# Patient Record
Sex: FEMALE | Race: BLACK OR AFRICAN AMERICAN | Employment: UNEMPLOYED | URBAN - METROPOLITAN AREA
[De-identification: names, ages, dates, MRNs, and addresses within clinical notes are randomized per-mention and may not be internally consistent; named-entity substitution may affect disease eponyms.]

---

## 2017-01-23 ENCOUNTER — TELEPHONE (OUTPATIENT)
Dept: FAMILY MEDICINE CLINIC | Age: 57
End: 2017-01-23

## 2017-01-23 ENCOUNTER — OFFICE VISIT (OUTPATIENT)
Dept: FAMILY MEDICINE CLINIC | Age: 57
End: 2017-01-23

## 2017-01-23 VITALS
SYSTOLIC BLOOD PRESSURE: 136 MMHG | HEIGHT: 66 IN | BODY MASS INDEX: 31.92 KG/M2 | RESPIRATION RATE: 16 BRPM | WEIGHT: 198.6 LBS | DIASTOLIC BLOOD PRESSURE: 83 MMHG | TEMPERATURE: 97.9 F | OXYGEN SATURATION: 96 % | HEART RATE: 77 BPM

## 2017-01-23 DIAGNOSIS — I10 ESSENTIAL HYPERTENSION: ICD-10-CM

## 2017-01-23 DIAGNOSIS — Z91.199 PATIENT NON ADHERENCE: ICD-10-CM

## 2017-01-23 DIAGNOSIS — R35.89 POLYURIA: ICD-10-CM

## 2017-01-23 DIAGNOSIS — E78.1 HYPERTRIGLYCERIDEMIA: ICD-10-CM

## 2017-01-23 DIAGNOSIS — E78.00 HYPERCHOLESTEREMIA: ICD-10-CM

## 2017-01-23 DIAGNOSIS — M48.061 SPINAL STENOSIS OF LUMBAR REGION: Primary | ICD-10-CM

## 2017-01-23 RX ORDER — HYDROCODONE BITARTRATE AND ACETAMINOPHEN 5; 325 MG/1; MG/1
1 TABLET ORAL
Qty: 15 TAB | Refills: 1 | Status: SHIPPED | OUTPATIENT
Start: 2017-01-23 | End: 2017-03-24 | Stop reason: ALTCHOICE

## 2017-01-23 RX ORDER — NAPROXEN 500 MG/1
500 TABLET ORAL 2 TIMES DAILY WITH MEALS
Qty: 60 TAB | Refills: 1 | Status: SHIPPED | OUTPATIENT
Start: 2017-01-23 | End: 2017-06-20

## 2017-01-23 NOTE — PROGRESS NOTES
Emigdio Torres is a 64 y.o. female, her grandson is with her today. Low back pain with sciatica on left. MRI was rescheduled, I advice not to miss it any more, next one scheduled in Feb 2017. Chronic low back pain no changes. Ibuprofen not enough for the pain. Sometimes she needs extra especially with taking care of her grand children. She have been on it in the past and it doesn't cause her to be drowsy. Hypertriglyceridemia: Trigly 600. She is on lopid 600mg BID, pravastatin to 20mg. No side effect. Haven't done labs I've given her for about 6-9 months now. DM2: worsen A1C.  10.9. She is seeing Endocrine. Diet is her main issue. She haven't done any labs for a while. Did not bring her glucometer and doesn't record her BG. Nephrotic syndrome secondary to her uncontrolled DM2. Polyuria slowly more frequent, denies foul odor, dysuria, pelvic discomfort, flank pain, fever or chills. Likely her DM2 uncontrolled. Reviewed: active problem list, medication list, allergies, notes from last encounter, lab results    Pertinent items are noted in HPI. No Known Allergies  Current Outpatient Prescriptions on File Prior to Visit   Medication Sig Dispense Refill    insulin lispro (HUMALOG) 100 unit/mL injection 36 Units by SubCUTAneous route three (3) times daily.  gabapentin (NEURONTIN) 300 mg capsule 3 pills twice in the day and 2 tab at night. 150 Cap 3    fish oil-omega-3 fatty acids 300-1,000 mg cap TAKE ONE CAPSULE BY MOUTH TWICE A  Cap 3    insulin detemir (LEVEMIR FLEXTOUCH) 100 unit/mL (3 mL) inpn 50 Units by SubCUTAneous route two (2) times a day.  pravastatin (PRAVACHOL) 20 mg tablet Take 1 Tab by mouth nightly. Indications: HYPERCHOLESTEROLEMIA 90 Tab 1    gemfibrozil (LOPID) 600 mg tablet Take 1 Tab by mouth two (2) times a day. 60 Tab 2    omega-3 fatty acids-vitamin e (FISH OIL) 1,000 mg cap Take 1 Cap by mouth two (2) times a day.  60 Cap 2    metFORMIN ER (GLUCOPHAGE XR) 750 mg tablet TAKE 1 TABLET BY MOUTH TWICE A  Tab 1    carvedilol (COREG) 25 mg tablet TAKE 1 TABLET BY MOUTH TWICE A DAY FOR 30 DAYS **PLEASE MAKE FOLLOW UP APPOINTMENT** 180 Tab 1    irbesartan (AVAPRO) 300 mg tablet TAKE 1 TABLET BY MOUTH EVERY DAY 90 Tab 1    spironolactone (ALDACTONE) 25 mg tablet TAKE 1 TABLET BY MOUTH EVERY DAY 90 Tab 1    aspirin delayed-release 81 mg tablet Take 81 mg by mouth daily. No current facility-administered medications on file prior to visit. Patient Active Problem List   Diagnosis Code    HTN (hypertension) I10    Type II diabetes mellitus, uncontrolled ([facility] E11.65    Hypercholesteremia E78.00    Chronic lower back pain M54.5, G89.29    Sickle cell trait (HCC) D57.3    Essential hypertension with goal blood pressure less than 140/90 I10    Chronic bilateral low back pain without sciatica M54.5, G89.29    Encounter for long-term (current) use of medications Z79.899    Breast cancer screening Z12.39    Type 2 diabetes, uncontrolled, with neuropathy ([facility] E11.40, E11.65    Patient non adherence Z91.19       Visit Vitals    /83    Pulse 77    Temp 97.9 °F (36.6 °C) (Oral)    Resp 16    Ht 5' 6\" (1.676 m)    Wt 198 lb 9.6 oz (90.1 kg)    SpO2 96%    BMI 32.05 kg/m2     General:  alert, cooperative, no distress, appears stated age, mildly obese   Head:  NCAT w/o lesions or tenderness. Sclera white, eyes quiet    Lungs: clear to auscultation bilaterally   Heart:  regular rate and rhythm, S1, S2 normal, no murmur, click, rub or gallop   Abdomen: soft, non-tender   Neuro: normal without focal findings  mental status, speech normal, alert and oriented x iii  JENN  cranial nerves 2-12 intact   Negative straight leg raise. Normal strength bilat lower legs, normal sensate exam.        Assessment/Plans:    Rupesh Aponte was seen today for back pain.     Diagnoses and all orders for this visit:    Spinal stenosis of lumbar region - 2 prescriptions given. -     HYDROcodone-acetaminophen (NORCO) 5-325 mg per tablet; Take 1 Tab by mouth daily as needed for Pain.  -     naproxen (NAPROSYN) 500 mg tablet; Take 1 Tab by mouth two (2) times daily (with meals). Don't take more than 4 days in a row. Essential hypertension  -     METABOLIC PANEL, COMPREHENSIVE    Hypercholesteremia  -     METABOLIC PANEL, COMPREHENSIVE  -     NMR LIPOPROFILE    Hypertriglyceridemia  -     METABOLIC PANEL, COMPREHENSIVE  -     NMR LIPOPROFILE    Type 2 diabetes, uncontrolled, with neuropathy (HCC)  -     HEMOGLOBIN A1C W/O EAG  -     METABOLIC PANEL, COMPREHENSIVE    Polyuria  -     URINALYSIS W/ RFLX MICROSCOPIC    Patient non adherence      Discussed plans, risk/benefits of treatments/observations. Through the use of shared decision making, above plans were agreed upon. Medication compliance advised. Patient verbalized understanding. Follow-up Disposition:  Return for DM2, XOL, Labs.       Marissa Ackerman MD  1/23/2017

## 2017-01-23 NOTE — MR AVS SNAPSHOT
Visit Information Date & Time Provider Department Dept. Phone Encounter #  
 1/23/2017 10:45 AM Halley Álvarez MD [facility] at [address] [id number] Follow-up Instructions Return for DM2, XOL, Labs. Upcoming Health Maintenance Date Due Hepatitis C Screening 1960 FOOT EXAM Q1 6/4/1970 Pneumococcal 19-64 Highest Risk (1 of 3 - PCV13) 6/4/1979 DTaP/Tdap/Td series (1 - Tdap) 6/4/1981 PAP AKA CERVICAL CYTOLOGY 6/27/2006 FOBT Q 1 YEAR AGE 50-75 6/4/2010 INFLUENZA AGE 9 TO ADULT 8/1/2016 HEMOGLOBIN A1C Q6M 12/28/2016 EYE EXAM RETINAL OR DILATED Q1 3/11/2017 MICROALBUMIN Q1 6/28/2017 LIPID PANEL Q1 6/28/2017 BREAST CANCER SCRN MAMMOGRAM 8/12/2018 Allergies as of 1/23/2017  Review Complete On: 11/28/2016 By: Aubrey Victoria RN No Known Allergies Current Immunizations  Reviewed on 9/24/2015 Name Date Influenza Vaccine Simran Tellez) 10/8/2015 Not reviewed this visit You Were Diagnosed With   
  
 Codes Comments Spinal stenosis of lumbar region    -  Primary ICD-10-CM: M48.06 
ICD-9-CM: 724.02 Essential hypertension     ICD-10-CM: I10 
ICD-9-CM: 401.9 Hypercholesteremia     ICD-10-CM: E78.00 ICD-9-CM: 272.0 Hypertriglyceridemia     ICD-10-CM: E78.1 ICD-9-CM: 272.1 Type 2 diabetes, uncontrolled, with neuropathy (HCC)     ICD-10-CM: E11.40, E11.65 ICD-9-CM: 250.62, 357.2 Polyuria     ICD-10-CM: R35.8 ICD-9-CM: 788.42 Vitals BP Pulse Temp Resp Height(growth percentile) Weight(growth percentile) 136/83 77 97.9 °F (36.6 °C) (Oral) 16 5' 6\" (1.676 m) 198 lb 9.6 oz (90.1 kg) SpO2 BMI OB Status Smoking Status 96% 32.05 kg/m2 Hysterectomy Former Smoker Vitals History BMI and BSA Data Body Mass Index Body Surface Area 32.05 kg/m 2 2.05 m 2 Preferred Pharmacy Pharmacy Name Phone  [facility] [address] [facility] [place] [phone] Your Updated Medication List  
  
   
This list is accurate as of: 1/23/17 11:13 AM.  Always use your most recent med list.  
  
  
  
  
 aspirin delayed-release 81 mg tablet Take 81 mg by mouth daily. carvedilol 25 mg tablet Commonly known as:  COREG  
TAKE 1 TABLET BY MOUTH TWICE A DAY FOR 30 DAYS **PLEASE MAKE FOLLOW UP APPOINTMENT**  
  
 fish oil-omega-3 fatty acids 300-1,000 mg Cap TAKE ONE CAPSULE BY MOUTH TWICE A DAY  
  
 gabapentin 300 mg capsule Commonly known as:  NEURONTIN  
3 pills twice in the day and 2 tab at night. gemfibrozil 600 mg tablet Commonly known as:  LOPID Take 1 Tab by mouth two (2) times a day. HumaLOG 100 unit/mL injection Generic drug:  insulin lispro 36 Units by SubCUTAneous route three (3) times daily. HYDROcodone-acetaminophen 5-325 mg per tablet Commonly known as:  Julaine Kava Take 1 Tab by mouth daily as needed for Pain. insulin detemir 100 unit/mL (3 mL) Inpn Commonly known as:  LEVEMIR FLEXTOUCH  
50 Units by SubCUTAneous route two (2) times a day. irbesartan 300 mg tablet Commonly known as:  AVAPRO TAKE 1 TABLET BY MOUTH EVERY DAY  
  
 metFORMIN  mg tablet Commonly known as:  GLUCOPHAGE XR  
TAKE 1 TABLET BY MOUTH TWICE A DAY  
  
 omega-3 fatty acids-vitamin e 1,000 mg Cap Commonly known as:  FISH OIL Take 1 Cap by mouth two (2) times a day. pravastatin 20 mg tablet Commonly known as:  PRAVACHOL Take 1 Tab by mouth nightly. Indications: HYPERCHOLESTEROLEMIA  
  
 spironolactone 25 mg tablet Commonly known as:  ALDACTONE  
TAKE 1 TABLET BY MOUTH EVERY DAY Prescriptions Printed Refills HYDROcodone-acetaminophen (NORCO) 5-325 mg per tablet 1 Sig: Take 1 Tab by mouth daily as needed for Pain. Class: Print Route: Oral  
  
We Performed the Following HEMOGLOBIN A1C W/O EAG [78433 CPT(R)] METABOLIC PANEL, COMPREHENSIVE [06071 CPT(R)] NMR LIPOPROFILE [id number] CPT(R)] URINALYSIS W/ RFLX MICROSCOPIC [33048 CPT(R)] Follow-up Instructions Return for DM2, XOL, Labs. Introducing [facility] & [facility] SERVICES! Dear Josey oYung: 
Thank you for requesting a Snaps account. Our records indicate that you already have an active Snaps account. You can access your account anytime at [email or web address] Did you know that you can access your hospital and ER discharge instructions at any time in Snaps? You can also review all of your test results from your hospital stay or ER visit. Additional Information If you have questions, please visit the Frequently Asked Questions section of the Snaps website at [email or web address] Remember, Snaps is NOT to be used for urgent needs. For medical emergencies, dial 911. Now available from your iPhone and Android! Please provide this summary of care documentation to your next provider. Your primary care clinician is listed as Lupe Chapa. If you have any questions after today's visit, please call [phone].

## 2017-01-24 LAB
ALBUMIN SERPL-MCNC: 4.7 G/DL (ref 3.5–5.5)
ALBUMIN/GLOB SERPL: 1.7 {RATIO} (ref 1.1–2.5)
ALP SERPL-CCNC: 53 IU/L (ref 39–117)
ALT SERPL-CCNC: 48 IU/L (ref 0–32)
APPEARANCE UR: ABNORMAL
AST SERPL-CCNC: 40 IU/L (ref 0–40)
BACTERIA #/AREA URNS HPF: ABNORMAL /[HPF]
BILIRUB SERPL-MCNC: 0.3 MG/DL (ref 0–1.2)
BILIRUB UR QL STRIP: NEGATIVE
BUN SERPL-MCNC: 20 MG/DL (ref 6–24)
BUN/CREAT SERPL: 22 (ref 9–23)
CALCIUM SERPL-MCNC: 9.9 MG/DL (ref 8.7–10.2)
CASTS URNS QL MICRO: ABNORMAL /LPF
CHLORIDE SERPL-SCNC: 101 MMOL/L (ref 96–106)
CHOLEST SERPL-MCNC: 162 MG/DL (ref 100–199)
CO2 SERPL-SCNC: 25 MMOL/L (ref 18–29)
COLOR UR: YELLOW
CREAT SERPL-MCNC: 0.92 MG/DL (ref 0.57–1)
EPI CELLS #/AREA URNS HPF: ABNORMAL /HPF
GLOBULIN SER CALC-MCNC: 2.8 G/DL (ref 1.5–4.5)
GLUCOSE SERPL-MCNC: 166 MG/DL (ref 65–99)
GLUCOSE UR QL: NEGATIVE
HBA1C MFR BLD: 9.7 % (ref 4.8–5.6)
HDL SERPL-SCNC: 23.4 UMOL/L
HDLC SERPL-MCNC: 27 MG/DL
HGB UR QL STRIP: NEGATIVE
KETONES UR QL STRIP: NEGATIVE
LDL SERPL QN: 19.8 NM
LDL SERPL-SCNC: 1402 NMOL/L
LDL SMALL SERPL-SCNC: 1054 NMOL/L
LDLC SERPL CALC-MCNC: 88 MG/DL (ref 0–99)
LEUKOCYTE ESTERASE UR QL STRIP: ABNORMAL
LP-IR SCORE SERPL: 83
MICRO URNS: ABNORMAL
MUCOUS THREADS URNS QL MICRO: PRESENT
NITRITE UR QL STRIP: POSITIVE
PH UR STRIP: 6.5 [PH] (ref 5–7.5)
POTASSIUM SERPL-SCNC: 4.3 MMOL/L (ref 3.5–5.2)
PROT SERPL-MCNC: 7.5 G/DL (ref 6–8.5)
PROT UR QL STRIP: NEGATIVE
RBC #/AREA URNS HPF: ABNORMAL /HPF
SODIUM SERPL-SCNC: 142 MMOL/L (ref 134–144)
SP GR UR: 1.02 (ref 1–1.03)
TRIGL SERPL-MCNC: 235 MG/DL (ref 0–149)
UROBILINOGEN UR STRIP-MCNC: 1 MG/DL (ref 0.2–1)
WBC #/AREA URNS HPF: >30 /HPF

## 2017-02-01 ENCOUNTER — TELEPHONE (OUTPATIENT)
Dept: FAMILY MEDICINE CLINIC | Age: 57
End: 2017-02-01

## 2017-02-01 DIAGNOSIS — N30.00 ACUTE CYSTITIS WITHOUT HEMATURIA: Primary | ICD-10-CM

## 2017-02-01 RX ORDER — CIPROFLOXACIN 250 MG/1
250 TABLET, FILM COATED ORAL EVERY 12 HOURS
Qty: 10 TAB | Refills: 0 | Status: SHIPPED | OUTPATIENT
Start: 2017-02-01 | End: 2017-02-06

## 2017-02-01 NOTE — TELEPHONE ENCOUNTER
Called pt. per Dr. Wero Chavarria to tell her he called in an antibiotic  because her lab results came back she has a UTI. Also told her we are mailing the rest of her lab results.

## 2017-02-18 ENCOUNTER — HOSPITAL ENCOUNTER (EMERGENCY)
Age: 57
Discharge: HOME | End: 2017-02-18
Attending: EMERGENCY MEDICINE | Admitting: EMERGENCY MEDICINE
Payer: COMMERCIAL

## 2017-02-18 ENCOUNTER — APPOINTMENT (OUTPATIENT)
Dept: GENERAL RADIOLOGY | Age: 57
End: 2017-02-18
Attending: EMERGENCY MEDICINE
Payer: COMMERCIAL

## 2017-02-18 VITALS
TEMPERATURE: 98.7 F | HEIGHT: 66 IN | SYSTOLIC BLOOD PRESSURE: 154 MMHG | WEIGHT: 198 LBS | RESPIRATION RATE: 16 BRPM | DIASTOLIC BLOOD PRESSURE: 81 MMHG | OXYGEN SATURATION: 98 % | BODY MASS INDEX: 31.82 KG/M2 | HEART RATE: 77 BPM

## 2017-02-18 DIAGNOSIS — R05.9 COUGH: Primary | ICD-10-CM

## 2017-02-18 PROCEDURE — 71020 XR CHEST PA LAT: CPT

## 2017-02-18 PROCEDURE — 99282 EMERGENCY DEPT VISIT SF MDM: CPT

## 2017-02-18 RX ORDER — BENZONATATE 100 MG/1
100 CAPSULE ORAL
Qty: 30 CAP | Refills: 0 | Status: SHIPPED | OUTPATIENT
Start: 2017-02-18 | End: 2017-02-25

## 2017-02-18 NOTE — PROGRESS NOTES
Prior to Admission Medications   Prescriptions Last Dose Informant Patient Reported? Taking? HYDROcodone-acetaminophen (NORCO) 5-325 mg per tablet 2017 at 2100  No Yes   Sig: Take 1 Tab by mouth daily as needed for Pain. aspirin delayed-release 81 mg tablet 2017 at Unknown time  Yes Yes   Sig: Take 81 mg by mouth daily. carvedilol (COREG) 25 mg tablet 2017 at 2100  No Yes   Sig: TAKE 1 TABLET BY MOUTH TWICE A DAY FOR 30 DAYS **PLEASE MAKE FOLLOW UP APPOINTMENT**   fish oil-omega-3 fatty acids 300-1,000 mg cap 2017 at 2100  No Yes   Sig: TAKE ONE CAPSULE BY MOUTH TWICE A DAY   gabapentin (NEURONTIN) 300 mg capsule 2017 at 2100  No Yes   Sig: 3 pills twice in the day and 2 tab at night. Patient taking differently: Take  by mouth. 3 pills twice in the day 3 in evening  and 2 tab at night. gemfibrozil (LOPID) 600 mg tablet 2017 at 2100  No Yes   Sig: Take 1 Tab by mouth two (2) times a day. insulin detemir (LEVEMIR FLEXTOUCH) 100 unit/mL (3 mL) inpn 2017 at 2100  Yes Yes   Si Units by SubCUTAneous route two (2) times a day. insulin lispro (HUMALOG) 100 unit/mL injection 2017 at 1800  Yes Yes   Si Units by SubCUTAneous route three (3) times daily. irbesartan (AVAPRO) 300 mg tablet 2017 at 0900  No Yes   Sig: TAKE 1 TABLET BY MOUTH EVERY DAY   metFORMIN ER (GLUCOPHAGE XR) 750 mg tablet 2017 at 2100  No Yes   Sig: TAKE 1 TABLET BY MOUTH TWICE A DAY   naproxen (NAPROSYN) 500 mg tablet Not Taking at Unknown time  No No   Sig: Take 1 Tab by mouth two (2) times daily (with meals). Don't take more than 4 days in a row. omega-3 fatty acids-vitamin e (FISH OIL) 1,000 mg cap 2017 at Unknown time  No Yes   Sig: Take 1 Cap by mouth two (2) times a day. pravastatin (PRAVACHOL) 20 mg tablet 2017 at 2100  No Yes   Sig: Take 1 Tab by mouth nightly.  Indications: HYPERCHOLESTEROLEMIA      Facility-Administered Medications: None   Self: List updated per patient interview. Spironolactone removed. Gabapentin dosage updated.

## 2017-02-18 NOTE — ED PROVIDER NOTES
HPI Comments: 64 y.o. female with extensive past medical history, please see list, significant for DM and HTN who presents from home with chief complaint of cough. Pt reports she has had a nonproductive cough for 11 days which is worse when \"lying down at night\". She states she had fever, chills, and diaphoresis at initial onset of her cough, which have self-resolved since then. She also complains that she is having difficulty sleeping secondary to her cough. Pt notes she has been taking Robitussin DM with no relief. Pt denies previous hx of asthma and emphysema. Pt denies leg swelling and leg pain. There are no other acute medical concerns at this time. Social Hx: former smoker    PCP: Mehul Baker MD    Note written by Campos Gomez, as dictated by Cristal Moore MD 12:43 PM    The history is provided by the patient.         Past Medical History:   Diagnosis Date    Back pain     Breast cancer screening 6/28/2016    Chronic bilateral low back pain without sciatica 6/28/2016    Diabetes ([address]     Encounter for long-term (current) use of medications 6/28/2016    Essential hypertension with goal blood pressure less than 140/90 6/28/2016    Hypercholesteremia 8/6/2015    Hypercholesterolemia     Hypertension     Ill-defined condition      hyperlipidemia    Other ill-defined conditions(799.89)      high cholesterol    Patient non adherence 1/23/2017    Type 2 diabetes, uncontrolled, with neuropathy ([address] 1/23/2017    Type II diabetes mellitus, uncontrolled ([address] 8/6/2015       Past Surgical History:   Procedure Laterality Date    Hx heent       Ear surgery    Hx heent       Left eye    Hx gyn       hysterectomy    Hx other surgical       back surgery X 2    Hx other surgical       Collapsed left lung    Hx other surgical       axillary sweat glands removed    Hx carpal tunnel release      Pr otoplasty protruding ear w/wo size rdctj      Hx ovarian cyst removal      Hx orthopaedic left wrist & left elbow    Hx orthopaedic       Back Surgery x 3    Hx orthopaedic       right thumb at [address] cardiac surg procedure unlist       murmur         Family History:   Problem Relation Age of Onset    Heart Disease Mother        Social History     Social History    Marital status: SINGLE     Spouse name: N/A    Number of children: N/A    Years of education: N/A     Occupational History    Not on file. Social History Main Topics    Smoking status: Former Smoker     Packs/day: 1.00     Years: 30.00     Quit date: 4/1/2005    Smokeless tobacco: Never Used    Alcohol use No    Drug use: No    Sexual activity: No     Other Topics Concern    Not on file     Social History Narrative    ** Merged History Encounter **              ALLERGIES: Review of patient's allergies indicates no known allergies. Review of Systems   Constitutional: Positive for chills, diaphoresis and fever. Negative for activity change, appetite change and fatigue. HENT: Negative for ear pain, facial swelling, sore throat and trouble swallowing. Eyes: Negative for pain, discharge and visual disturbance. Respiratory: Positive for cough. Negative for chest tightness, shortness of breath and wheezing. Cardiovascular: Negative for chest pain, palpitations and leg swelling. Gastrointestinal: Negative for abdominal pain, blood in stool, nausea and vomiting. Genitourinary: Negative for difficulty urinating, flank pain and hematuria. Musculoskeletal: Negative for arthralgias, joint swelling, myalgias and neck pain. Skin: Negative for color change and rash. Neurological: Negative for dizziness, weakness, numbness and headaches. Hematological: Negative for adenopathy. Does not bruise/bleed easily. Psychiatric/Behavioral: Positive for sleep disturbance. Negative for behavioral problems and confusion. All other systems reviewed and are negative.       Vitals:    02/18/17 1202   BP: 154/81 Pulse: 77   Resp: 16   Temp: 98.7 °F (37.1 °C)   SpO2: 98%   Weight: 89.8 kg (198 lb)   Height: 5' 6\" (1.676 m)            Physical Exam   Constitutional: She is oriented to person, place, and time. She appears well-developed and well-nourished. No distress. HENT:   Head: Normocephalic and atraumatic. Nose: Nose normal.   Mouth/Throat: Oropharynx is clear and moist.   Eyes: Conjunctivae and EOM are normal. Pupils are equal, round, and reactive to light. No scleral icterus. Neck: Normal range of motion. Neck supple. No JVD present. No tracheal deviation present. No thyromegaly present. No carotid bruits noted. Cardiovascular: Normal rate, regular rhythm, normal heart sounds and intact distal pulses. Exam reveals no gallop and no friction rub. No murmur heard. Pulmonary/Chest: Effort normal and breath sounds normal. No respiratory distress. She has no wheezes. She has no rales. She exhibits no tenderness. Abdominal: Soft. Bowel sounds are normal. She exhibits no distension and no mass. There is no tenderness. There is no rebound and no guarding. Musculoskeletal: Normal range of motion. She exhibits no edema or tenderness. Lymphadenopathy:     She has no cervical adenopathy. Neurological: She is alert and oriented to person, place, and time. She has normal reflexes. No cranial nerve deficit. Coordination normal.   Skin: Skin is warm and dry. No rash noted. No erythema. Psychiatric: She has a normal mood and affect. Her behavior is normal. Judgment and thought content normal.   Nursing note and vitals reviewed.   Note written by Campos Cervantes, as dictated by Salbador Apodaca MD 12:43 PM    MDM  Number of Diagnoses or Management Options  Cough: new and requires workup     Amount and/or Complexity of Data Reviewed  Clinical lab tests: ordered and reviewed  Tests in the radiology section of CPT®: ordered and reviewed  Decide to obtain previous medical records or to obtain history from someone other than the patient: yes  Review and summarize past medical records: yes  Independent visualization of images, tracings, or specimens: yes    Risk of Complications, Morbidity, and/or Mortality  Presenting problems: moderate  Diagnostic procedures: moderate  Management options: moderate    Patient Progress  Patient progress: stable    ED Course       Procedures    PROGRESS NOTE:  12:44 PM  CXR is clear. Pt has an occasional cough. Will discharge with tessalon perles on top of fluids and rest. Instructed Pt to follow up with her PCP if symptoms continue.

## 2017-03-07 ENCOUNTER — TELEPHONE (OUTPATIENT)
Dept: FAMILY MEDICINE CLINIC | Age: 57
End: 2017-03-07

## 2017-03-07 DIAGNOSIS — M48.061 SPINAL STENOSIS OF LUMBAR REGION: ICD-10-CM

## 2017-03-07 RX ORDER — GABAPENTIN 300 MG/1
CAPSULE ORAL
Qty: 150 CAP | Refills: 3 | Status: SHIPPED | OUTPATIENT
Start: 2017-03-07 | End: 2017-07-01 | Stop reason: SDUPTHER

## 2017-03-07 NOTE — TELEPHONE ENCOUNTER
Called [facility] message left to refill pts test strips, 50 in a box refill 2 boxes test BS once daily.

## 2017-03-08 ENCOUNTER — TELEPHONE (OUTPATIENT)
Dept: FAMILY MEDICINE CLINIC | Age: 57
End: 2017-03-08

## 2017-03-24 ENCOUNTER — OFFICE VISIT (OUTPATIENT)
Dept: FAMILY MEDICINE CLINIC | Age: 57
End: 2017-03-24

## 2017-03-24 VITALS
WEIGHT: 199.2 LBS | HEART RATE: 79 BPM | DIASTOLIC BLOOD PRESSURE: 85 MMHG | SYSTOLIC BLOOD PRESSURE: 140 MMHG | RESPIRATION RATE: 18 BRPM | BODY MASS INDEX: 32.02 KG/M2 | OXYGEN SATURATION: 98 % | HEIGHT: 66 IN | TEMPERATURE: 98.1 F

## 2017-03-24 DIAGNOSIS — E78.00 HYPERCHOLESTEREMIA: ICD-10-CM

## 2017-03-24 DIAGNOSIS — M54.40 CHRONIC RIGHT-SIDED LOW BACK PAIN WITH SCIATICA, SCIATICA LATERALITY UNSPECIFIED: ICD-10-CM

## 2017-03-24 DIAGNOSIS — G89.29 CHRONIC RIGHT-SIDED LOW BACK PAIN WITH SCIATICA, SCIATICA LATERALITY UNSPECIFIED: ICD-10-CM

## 2017-03-24 RX ORDER — PRAVASTATIN SODIUM 40 MG/1
40 TABLET ORAL
Qty: 30 TAB | Refills: 2 | Status: CANCELLED | OUTPATIENT
Start: 2017-03-24

## 2017-03-24 RX ORDER — TRAMADOL HYDROCHLORIDE 50 MG/1
50 TABLET ORAL
Qty: 30 TAB | Refills: 0 | Status: SHIPPED | OUTPATIENT
Start: 2017-03-24 | End: 2017-04-16

## 2017-03-24 RX ORDER — INSULIN LISPRO 100 [IU]/ML
18 INJECTION, SOLUTION INTRAVENOUS; SUBCUTANEOUS 3 TIMES DAILY
Qty: 1 VIAL | Refills: 0
Start: 2017-03-24 | End: 2018-05-02 | Stop reason: SDUPTHER

## 2017-03-24 NOTE — PROGRESS NOTES
1. Have you been to the ER, urgent care clinic since your last visit? Hospitalized since your last visit? No    2. Have you seen or consulted any other health care providers outside of the [address] [place] [address] since your last visit? Include any pap smears or colon screening. Yes Where: heart physician     Pt states she is here for follow up on diabetes and left leg pain.  Rates leg pain a 0/10 when sitting and 10/10 when she is walking

## 2017-03-24 NOTE — MR AVS SNAPSHOT
Visit Information Date & Time Provider Department Dept. Phone Encounter #  
 3/24/2017  3:15 PM Juliet Conteh MD [facility] at [address] [id number] Follow-up Instructions Return in about 3 months (around 6/24/2017) for medicare wellness exam. Upcoming Health Maintenance Date Due Hepatitis C Screening 1960 FOOT EXAM Q1 6/4/1970 Pneumococcal 19-64 Highest Risk (1 of 3 - PCV13) 6/4/1979 PAP AKA CERVICAL CYTOLOGY 6/27/2006 FOBT Q 1 YEAR AGE 50-75 6/4/2010 EYE EXAM RETINAL OR DILATED Q1 3/11/2017 MICROALBUMIN Q1 6/28/2017 HEMOGLOBIN A1C Q6M 7/23/2017 LIPID PANEL Q1 1/23/2018 BREAST CANCER SCRN MAMMOGRAM 8/12/2018 DTaP/Tdap/Td series (2 - Td) 3/24/2027 Allergies as of 3/24/2017  Review Complete On: 3/24/2017 By: Juliet Conteh MD  
 No Known Allergies Current Immunizations  Reviewed on 9/24/2015 Name Date Influenza Vaccine Vy Amirah) 10/8/2015 Not reviewed this visit You Were Diagnosed With   
  
 Codes Comments Type 2 diabetes, uncontrolled, with neuropathy ([facility]    -  Primary ICD-10-CM: E11.40, E11.65 ICD-9-CM: 250.62, 357.2 Chronic right-sided low back pain with sciatica, sciatica laterality unspecified     ICD-10-CM: M54.40, G89.29 ICD-9-CM: 724.2, 724.3, 338.29 Hypercholesteremia     ICD-10-CM: E78.00 ICD-9-CM: 272.0 Vitals BP Pulse Temp Resp Height(growth percentile) Weight(growth percentile) 140/85 (BP 1 Location: Right arm, BP Patient Position: Sitting) 79 98.1 °F (36.7 °C) (Oral) 18 5' 6\" (1.676 m) 199 lb 3.2 oz (90.4 kg) SpO2 BMI OB Status Smoking Status 98% 32.15 kg/m2 Hysterectomy Former Smoker Vitals History BMI and BSA Data Body Mass Index Body Surface Area  
 32.15 kg/m 2 2.05 m 2 Preferred Pharmacy Pharmacy Name Phone [place] [address] [facility] [phone] Your Updated Medication List  
  
   
This list is accurate as of: 3/24/17  4:19 PM.  Always use your most recent med list.  
  
  
  
  
 aspirin delayed-release 81 mg tablet Take 81 mg by mouth daily. carvedilol 25 mg tablet Commonly known as:  COREG  
TAKE 1 TABLET BY MOUTH TWICE A DAY FOR 30 DAYS **PLEASE MAKE FOLLOW UP APPOINTMENT**  
  
 fish oil-omega-3 fatty acids 300-1,000 mg Cap TAKE ONE CAPSULE BY MOUTH TWICE A DAY  
  
 gabapentin 300 mg capsule Commonly known as:  NEURONTIN  
3 pills twice in the day and 2 tab at night. gemfibrozil 600 mg tablet Commonly known as:  LOPID Take 1 Tab by mouth two (2) times a day. insulin detemir 100 unit/mL (3 mL) Inpn Commonly known as:  LEVEMIR FLEXTOUCH  
50 Units by SubCUTAneous route two (2) times a day. insulin lispro 100 unit/mL injection Commonly known as:  HumaLOG  
18 Units by SubCUTAneous route three (3) times daily. irbesartan 300 mg tablet Commonly known as:  AVAPRO TAKE 1 TABLET BY MOUTH EVERY DAY Liraglutide 0.6 mg/0.1 mL (18 mg/3 mL) sub-q pen Commonly known as:  VICTOZA  
0.6mg once daily for 2 weeks then 1.2mg daily. metFORMIN  mg tablet Commonly known as:  GLUCOPHAGE XR  
TAKE 1 TABLET BY MOUTH TWICE A DAY  
  
 naproxen 500 mg tablet Commonly known as:  NAPROSYN Take 1 Tab by mouth two (2) times daily (with meals). Don't take more than 4 days in a row. omega-3 fatty acids-vitamin e 1,000 mg Cap Commonly known as:  FISH OIL Take 1 Cap by mouth two (2) times a day. pravastatin 20 mg tablet Commonly known as:  PRAVACHOL Take 1 Tab by mouth nightly. Indications: HYPERCHOLESTEROLEMIA  
  
 traMADol 50 mg tablet Commonly known as:  ULTRAM  
Take 1 Tab by mouth daily as needed for Pain. Prescriptions Printed Refills  
 traMADol (ULTRAM) 50 mg tablet 0 Sig: Take 1 Tab by mouth daily as needed for Pain. Class: Print  Route: Oral  
  
 Prescriptions Sent to Pharmacy Refills Liraglutide (VICTOZA) 0.6 mg/0.1 mL (18 mg/3 mL) sub-q pen 2 Si.6mg once daily for 2 weeks then 1.2mg daily. Class: Normal  
 Pharmacy: [facility] [address] Ph #: [phone] Follow-up Instructions Return in about 3 months (around 2017) for medicare wellness exam.  
  
  
Introducing \[facility]\"" & HEALTH SERVICES! Dear Francine Page: 
Thank you for requesting a J.G. ink account. Our records indicate that you already have an active J.G. ink account. You can access your account anytime at [email or web address] Did you know that you can access your hospital and ER discharge instructions at any time in J.G. ink? You can also review all of your test results from your hospital stay or ER visit. Additional Information If you have questions, please visit the Frequently Asked Questions section of the J.G. ink website at [email or web address] Remember, J.G. ink is NOT to be used for urgent needs. For medical emergencies, dial 911. Now available from your iPhone and Android! Please provide this summary of care documentation to your next provider. Your primary care clinician is listed as Lisandro Downs. If you have any questions after today's visit, please call [phone].

## 2017-03-24 NOTE — PROGRESS NOTES
Laurita Ash is a 64 y.o. female, her grandson is with her today. Low back pain with sciatica on left. MRI was rescheduled, I advice not to miss it any more, next one scheduled in Feb 2017. Chronic low back pain no changes. Ibuprofen not enough for the pain. Sometimes she needs extra especially with taking care of her grand children. She have been on it in the past and it doesn't cause her to be drowsy. Hypertriglyceridemia: Trigly 600. She is on lopid 600mg BID, pravastatin to 20mg. No side effect. Haven't done labs I've given her for about 6-9 months now. DM2: worsen A1C. 10.9. She is seeing Endocrine. Diet is her main issue. She haven't done any labs for a while. Did not bring her glucometer and doesn't record her BG. Nephrotic syndrome secondary to her uncontrolled DM2. Polyuria slowly more frequent, denies foul odor, dysuria, pelvic discomfort, flank pain, fever or chills. Likely her DM2 uncontrolled. Reviewed: active problem list, medication list, allergies, notes from last encounter, lab results    Pertinent items are noted in HPI. No Known Allergies  Current Outpatient Prescriptions on File Prior to Visit   Medication Sig Dispense Refill    gabapentin (NEURONTIN) 300 mg capsule 3 pills twice in the day and 2 tab at night. 150 Cap 3    fish oil-omega-3 fatty acids 300-1,000 mg cap TAKE ONE CAPSULE BY MOUTH TWICE A  Cap 3    insulin detemir (LEVEMIR FLEXTOUCH) 100 unit/mL (3 mL) inpn 50 Units by SubCUTAneous route two (2) times a day.  pravastatin (PRAVACHOL) 20 mg tablet Take 1 Tab by mouth nightly. Indications: HYPERCHOLESTEROLEMIA 90 Tab 1    omega-3 fatty acids-vitamin e (FISH OIL) 1,000 mg cap Take 1 Cap by mouth two (2) times a day.  60 Cap 2    metFORMIN ER (GLUCOPHAGE XR) 750 mg tablet TAKE 1 TABLET BY MOUTH TWICE A  Tab 1    carvedilol (COREG) 25 mg tablet TAKE 1 TABLET BY MOUTH TWICE A DAY FOR 30 DAYS **PLEASE MAKE FOLLOW UP APPOINTMENT** 180 Tab 1    irbesartan (AVAPRO) 300 mg tablet TAKE 1 TABLET BY MOUTH EVERY DAY 90 Tab 1    aspirin delayed-release 81 mg tablet Take 81 mg by mouth daily.  naproxen (NAPROSYN) 500 mg tablet Take 1 Tab by mouth two (2) times daily (with meals). Don't take more than 4 days in a row. 60 Tab 1    gemfibrozil (LOPID) 600 mg tablet Take 1 Tab by mouth two (2) times a day. 60 Tab 2     No current facility-administered medications on file prior to visit. Patient Active Problem List   Diagnosis Code    HTN (hypertension) I10    Type II diabetes mellitus, uncontrolled ([facility] E11.65    Hypercholesteremia E78.00    Chronic lower back pain M54.5, G89.29    Sickle cell trait ([facility] D57.3    Essential hypertension with goal blood pressure less than 140/90 I10    Encounter for long-term (current) use of medications Z79.899    Breast cancer screening Z12.39    Type 2 diabetes, uncontrolled, with neuropathy ([facility] E11.40, E11.65    Patient non adherence Z91.19       Visit Vitals    /85 (BP 1 Location: Right arm, BP Patient Position: Sitting)    Pulse 79    Temp 98.1 °F (36.7 °C) (Oral)    Resp 18    Ht 5' 6\" (1.676 m)    Wt 199 lb 3.2 oz (90.4 kg)    SpO2 98%    BMI 32.15 kg/m2     General:  alert, cooperative, no distress, appears stated age, mildly obese   Head:  NCAT w/o lesions or tenderness. Sclera white, eyes quiet    Lungs: clear to auscultation bilaterally   Heart:  regular rate and rhythm, S1, S2 normal, no murmur, click, rub or gallop   Abdomen: soft, non-tender   Neuro: normal without focal findings  mental status, speech normal, alert and oriented x iii  JENN  cranial nerves 2-12 intact   Negative straight leg raise. Normal strength bilat lower legs, normal sensate exam.        Assessment/Plans:    Sabrina Little was seen today for diabetes and leg pain.     Diagnoses and all orders for this visit:    Type 2 diabetes, uncontrolled, with neuropathy (HCC)  -     Liraglutide (VICTOZA) 0.6 mg/0.1 mL (18 mg/3 mL) sub-q pen; 0.6mg once daily for 2 weeks then 1.2mg daily. -     insulin lispro (HUMALOG) 100 unit/mL injection; 18 Units by SubCUTAneous route three (3) times daily. Chronic right-sided low back pain with sciatica, sciatica laterality unspecified - given two prescription.  -     traMADol (ULTRAM) 50 mg tablet; Take 1 Tab by mouth daily as needed for Pain. Hypercholesteremia    Discussed plans, risk/benefits of treatments/observations. Through the use of shared decision making, above plans were agreed upon. Medication compliance advised. Patient verbalized understanding.      Follow-up Disposition:  Return in about 3 months (around 6/24/2017) for medicare wellness exam.      Halley Álvarez MD  3/24/2017

## 2017-04-05 ENCOUNTER — TELEPHONE (OUTPATIENT)
Dept: FAMILY MEDICINE CLINIC | Age: 57
End: 2017-04-05

## 2017-04-05 NOTE — TELEPHONE ENCOUNTER
Returned call to pt, no answer, message left to ask her to return my call with MRI results from [facility].

## 2017-04-05 NOTE — TELEPHONE ENCOUNTER
Pt states her her hip, groin & down to her knee still hurt, tramadol doesn't seem to help     Had MRI done at [facility]     Best number to reach her is [phone]

## 2017-04-06 ENCOUNTER — OFFICE VISIT (OUTPATIENT)
Dept: FAMILY MEDICINE CLINIC | Age: 57
End: 2017-04-06

## 2017-04-06 VITALS
RESPIRATION RATE: 16 BRPM | HEIGHT: 66 IN | DIASTOLIC BLOOD PRESSURE: 85 MMHG | BODY MASS INDEX: 31.82 KG/M2 | TEMPERATURE: 97.8 F | OXYGEN SATURATION: 100 % | SYSTOLIC BLOOD PRESSURE: 191 MMHG | WEIGHT: 198 LBS | HEART RATE: 67 BPM

## 2017-04-06 DIAGNOSIS — R10.30 GROIN PAIN, UNSPECIFIED LATERALITY: ICD-10-CM

## 2017-04-06 DIAGNOSIS — G89.29 CHRONIC MIDLINE LOW BACK PAIN WITH LEFT-SIDED SCIATICA: Primary | ICD-10-CM

## 2017-04-06 DIAGNOSIS — M54.42 CHRONIC MIDLINE LOW BACK PAIN WITH LEFT-SIDED SCIATICA: Primary | ICD-10-CM

## 2017-04-06 LAB
BILIRUB UR QL STRIP: NEGATIVE
GLUCOSE UR-MCNC: NEGATIVE MG/DL
KETONES P FAST UR STRIP-MCNC: NEGATIVE MG/DL
PH UR STRIP: 6 [PH] (ref 4.6–8)
PROT UR QL STRIP: NEGATIVE MG/DL
SP GR UR STRIP: 1.02 (ref 1–1.03)
UA UROBILINOGEN AMB POC: NORMAL (ref 0.2–1)
URINALYSIS CLARITY POC: CLEAR
URINALYSIS COLOR POC: YELLOW
URINE BLOOD POC: NORMAL
URINE LEUKOCYTES POC: NORMAL
URINE NITRITES POC: NEGATIVE

## 2017-04-06 RX ORDER — HYDROCODONE BITARTRATE AND ACETAMINOPHEN 5; 325 MG/1; MG/1
1 TABLET ORAL
Qty: 30 TAB | Refills: 0 | Status: SHIPPED | OUTPATIENT
Start: 2017-04-06 | End: 2017-04-16

## 2017-04-06 RX ORDER — METFORMIN HYDROCHLORIDE 750 MG/1
TABLET, EXTENDED RELEASE ORAL
Qty: 180 TAB | Refills: 1 | Status: SHIPPED | OUTPATIENT
Start: 2017-04-06 | End: 2018-03-02 | Stop reason: ALTCHOICE

## 2017-04-06 RX ORDER — PREDNISONE 10 MG/1
TABLET ORAL
Qty: 21 TAB | Refills: 0 | Status: SHIPPED | OUTPATIENT
Start: 2017-04-06 | End: 2017-06-20

## 2017-04-06 NOTE — PROGRESS NOTES
Chief Complaint   Patient presents with    Groin Pain     on left side going down leg causing leg to go numb

## 2017-04-06 NOTE — PROGRESS NOTES
Low Back Pain  Jerome Hoang is a 64 y.o. female who presents for evaluation of groin pain    Low back problems. PMHx of laminectomy. She was seeing Dr. German Carver at [facility] who had ordered MRI. She have f/u with him but he told her to take ibuprofen. MRI showed moderate to severe neural foraminal narrowing at different levels. Symptoms have been present for 2 weeks and include numbness in knee down to ankle, pain in groin down to ankle. Initial inciting event: none. Symptoms are worst: all day. Alleviating factors identifiable by patient are sitting. Exacerbating factors identifiable by patient are standing, walking. Previous lower back problems: Have had surgery on her back 3 times. She have finished PT. 'Red Flags' for Fracture:  1. Recent history of major trauma: no  2. Minor trauma or strenuous lifting in older or potentially osteoporotic patient: no  3. History of chronic corticosteroid therapy: no    Red Flags' Cauda Equina Syndrome:  1. Complaint of saddle anesthesia: no  2. Recent onset of bladder dysfunction, guanaco. retention: no  3.  Severe or progressive LE neurologic deficit: no    Groin pain: U/A positive for +1 leuk, will get culture    ROS:  Constitutional: negative for fevers, chills and fatigue  Respiratory: negative for cough or dyspnea on exertion  Cardiovascular: negative for chest pain, dyspnea, palpitations  Gastrointestinal: negative for nausea, vomiting, diarrhea and abdominal pain  Genitourinary:per HPI  Musculoskeletal: per HPI      No Known Allergies          Past Medical History:   Diagnosis Date    Back pain     Breast cancer screening 6/28/2016    Chronic bilateral low back pain without sciatica 6/28/2016    Diabetes ([facility]     Encounter for long-term (current) use of medications 6/28/2016    Essential hypertension with goal blood pressure less than 140/90 6/28/2016    Hypercholesteremia 8/6/2015    Hypercholesterolemia     Hypertension     Ill-defined condition hyperlipidemia    Other ill-defined conditions(799.89)     high cholesterol    Patient non adherence 1/23/2017    Type 2 diabetes, uncontrolled, with neuropathy ([facility] 1/23/2017    Type II diabetes mellitus, uncontrolled ([facility] 8/6/2015      Current Outpatient Prescriptions   Medication Sig Dispense Refill    metFORMIN ER (GLUCOPHAGE XR) 750 mg tablet TAKE 1 TABLET BY MOUTH TWICE A  Tab 1    HYDROcodone-acetaminophen (NORCO) 5-325 mg per tablet Take 1 Tab by mouth two (2) times daily as needed for Pain. Max Daily Amount: 2 Tabs. 30 Tab 0    predniSONE (STERAPRED DS) 10 mg dose pack See administration instruction per 10mg dose pack 21 Tab 0    insulin lispro (HUMALOG) 100 unit/mL injection 18 Units by SubCUTAneous route three (3) times daily. 1 Vial 0    gabapentin (NEURONTIN) 300 mg capsule 3 pills twice in the day and 2 tab at night. 150 Cap 3    fish oil-omega-3 fatty acids 300-1,000 mg cap TAKE ONE CAPSULE BY MOUTH TWICE A  Cap 3    insulin detemir (LEVEMIR FLEXTOUCH) 100 unit/mL (3 mL) inpn 50 Units by SubCUTAneous route two (2) times a day.  pravastatin (PRAVACHOL) 20 mg tablet Take 1 Tab by mouth nightly. Indications: HYPERCHOLESTEROLEMIA 90 Tab 1    gemfibrozil (LOPID) 600 mg tablet Take 1 Tab by mouth two (2) times a day. 60 Tab 2    omega-3 fatty acids-vitamin e (FISH OIL) 1,000 mg cap Take 1 Cap by mouth two (2) times a day. 60 Cap 2    carvedilol (COREG) 25 mg tablet TAKE 1 TABLET BY MOUTH TWICE A DAY FOR 30 DAYS **PLEASE MAKE FOLLOW UP APPOINTMENT** 180 Tab 1    irbesartan (AVAPRO) 300 mg tablet TAKE 1 TABLET BY MOUTH EVERY DAY 90 Tab 1    aspirin delayed-release 81 mg tablet Take 81 mg by mouth daily.  Liraglutide (VICTOZA) 0.6 mg/0.1 mL (18 mg/3 mL) sub-q pen 0.6mg once daily for 2 weeks then 1.2mg daily. 2 Syringe 2    traMADol (ULTRAM) 50 mg tablet Take 1 Tab by mouth daily as needed for Pain.  30 Tab 0    naproxen (NAPROSYN) 500 mg tablet Take 1 Tab by mouth two (2) times daily (with meals). Don't take more than 4 days in a row. 60 Tab 1        Objective:     Visit Vitals    /85    Pulse 67    Temp 97.8 °F (36.6 °C) (Oral)    Resp 16    Ht 5' 6\" (1.676 m)    Wt 198 lb (89.8 kg)    SpO2 100%    BMI 31.96 kg/m2       General appearance - alert, well appearing, and in no distress  Mental status - alert, oriented to person, place, and time  Chest - clear to auscultation, no wheezes, rales or rhonchi, symmetric air entry  Heart - normal rate, regular rhythm, normal S1, S2, no murmurs, rubs, clicks or gallops  Abdomen - soft, nontender, nondistended, no masses or organomegaly  Neurological - alert, oriented, normal speech, no focal findings or movement disorder noted  Back - Normal ROM    General: alert, cooperative, no distress, appears stated age   Body habitus: mildly obese   Gait: antalgic   Visible deformity? no   Leg muscle asymmetry? no   Tender spinous processes? no   Tender back or buttock? no         Strength Testing: Normal bilat lower legs and feet   Reflexes: 0, knees bilat   Sensory Exam (Light Touch): Normal bilat   Straight Leg Raise Testing: Positive on left. Results for orders placed or performed in visit on 04/06/17   AMB POC URINALYSIS DIP STICK AUTO W/O MICRO   Result Value Ref Range    Color (UA POC) Yellow     Clarity (UA POC) Clear     Glucose (UA POC) Negative Negative    Bilirubin (UA POC) Negative Negative    Ketones (UA POC) Negative Negative    Specific gravity (UA POC) 1.020 1.001 - 1.035    Blood (UA POC) Trace Negative    pH (UA POC) 6.0 4.6 - 8.0    Protein (UA POC) Negative Negative mg/dL    Urobilinogen (UA POC) 0.2 mg/dL 0.2 - 1    Nitrites (UA POC) Negative Negative    Leukocyte esterase (UA POC) 1+ Negative     Groin pain: U/A positive for +1 leuk, will get culture      Assessment/Plan:     Gardenia Haque was seen today for groin pain.     Diagnoses and all orders for this visit:    Chronic midline low back pain with left-sided sciatica  -     HYDROcodone-acetaminophen (NORCO) 5-325 mg per tablet; Take 1 Tab by mouth two (2) times daily as needed for Pain. Max Daily Amount: 2 Tabs. -     predniSONE (STERAPRED DS) 10 mg dose pack; See administration instruction per 10mg dose pack  -     REFERRAL TO ORTHOPEDIC SURGERY    Type 2 diabetes, uncontrolled, with neuropathy (HCC)  -     metFORMIN ER (GLUCOPHAGE XR) 750 mg tablet; TAKE 1 TABLET BY MOUTH TWICE A DAY    Groin pain, unspecified laterality  -     AMB POC URINALYSIS DIP STICK AUTO W/O MICRO  -     CULTURE, URINE    I have discussed the diagnosis with the patient and the intended plan as seen in the above orders. The patient has received an after-visit summary and questions were answered concerning future plans. I have discussed medication side effects and warnings with the patient as well. Follow-up Disposition:  Return in about 1 week (around 4/13/2017), or if symptoms worsen or fail to improve.       Marylen Kirsten, MD  4/6/2017

## 2017-04-06 NOTE — MR AVS SNAPSHOT
Visit Information Date & Time Provider Department Dept. Phone Encounter #  
 4/6/2017 12:15 PM Cher Mccullough MD [facility] at [address] [id number] Your Appointments 4/6/2017 12:15 PM  
ROUTINE CARE with Cher Mccullough MD  
[facility]) Appt Note: groin pain [address] [place] [address] [address] [address]  
  
    
 6/27/2017 10:30 AM  
Medicare Physical with Cher Mccullough MD  
[facility]) Appt Note: 3mo fup;medicare wellness exam  
 [address] [place] [address] [phone]  
[facility] Upcoming Health Maintenance Date Due Hepatitis C Screening 1960 FOOT EXAM Q1 6/4/1970 Pneumococcal 19-64 Medium Risk (1 of 1 - PPSV23) 6/4/1979 PAP AKA CERVICAL CYTOLOGY 6/27/2006 FOBT Q 1 YEAR AGE 50-75 6/4/2010 EYE EXAM RETINAL OR DILATED Q1 3/11/2017 MICROALBUMIN Q1 6/28/2017 HEMOGLOBIN A1C Q6M 7/23/2017 LIPID PANEL Q1 1/23/2018 BREAST CANCER SCRN MAMMOGRAM 8/12/2018 DTaP/Tdap/Td series (2 - Td) 3/24/2027 Allergies as of 4/6/2017  Review Complete On: 4/6/2017 By: Cher Mccullough MD  
 No Known Allergies Current Immunizations  Reviewed on 9/24/2015 Name Date Influenza Vaccine Kerri Funk) 10/8/2015 Not reviewed this visit You Were Diagnosed With   
  
 Codes Comments Chronic midline low back pain with left-sided sciatica    -  Primary ICD-10-CM: M54.42, G89.29 ICD-9-CM: 724.2, 724.3, 338.29 Type 2 diabetes, uncontrolled, with neuropathy (HCC)     ICD-10-CM: E11.40, E11.65 ICD-9-CM: 250.62, 357.2 Vitals BP Pulse Temp Resp Height(growth percentile) Weight(growth percentile) 191/85 67 97.8 °F (36.6 °C) (Oral) 16 5' 6\" (1.676 m) 198 lb (89.8 kg) SpO2 BMI OB Status Smoking Status 100% 31.96 kg/m2 Hysterectomy Former Smoker Vitals History BMI and BSA Data Body Mass Index Body Surface Area 31.96 kg/m 2 2.04 m 2 Preferred Pharmacy Pharmacy Name Phone [facility] [phone] Your Updated Medication List  
  
   
This list is accurate as of: 4/6/17 11:02 AM.  Always use your most recent med list.  
  
  
  
  
 aspirin delayed-release 81 mg tablet Take 81 mg by mouth daily. carvedilol 25 mg tablet Commonly known as:  COREG  
TAKE 1 TABLET BY MOUTH TWICE A DAY FOR 30 DAYS **PLEASE MAKE FOLLOW UP APPOINTMENT**  
  
 fish oil-omega-3 fatty acids 300-1,000 mg Cap TAKE ONE CAPSULE BY MOUTH TWICE A DAY  
  
 gabapentin 300 mg capsule Commonly known as:  NEURONTIN  
3 pills twice in the day and 2 tab at night. gemfibrozil 600 mg tablet Commonly known as:  LOPID Take 1 Tab by mouth two (2) times a day. HYDROcodone-acetaminophen 5-325 mg per tablet Commonly known as:  Maurice Kilts Take 1 Tab by mouth two (2) times daily as needed for Pain. Max Daily Amount: 2 Tabs. insulin detemir 100 unit/mL (3 mL) Inpn Commonly known as:  LEVEMIR FLEXTOUCH  
50 Units by SubCUTAneous route two (2) times a day. insulin lispro 100 unit/mL injection Commonly known as:  HumaLOG  
18 Units by SubCUTAneous route three (3) times daily. irbesartan 300 mg tablet Commonly known as:  AVAPRO TAKE 1 TABLET BY MOUTH EVERY DAY Liraglutide 0.6 mg/0.1 mL (18 mg/3 mL) sub-q pen Commonly known as:  VICTOZA  
0.6mg once daily for 2 weeks then 1.2mg daily. metFORMIN  mg tablet Commonly known as:  GLUCOPHAGE XR  
TAKE 1 TABLET BY MOUTH TWICE A DAY  
  
 naproxen 500 mg tablet Commonly known as:  NAPROSYN Take 1 Tab by mouth two (2) times daily (with meals). Don't take more than 4 days in a row. omega-3 fatty acids-vitamin e 1,000 mg Cap Commonly known as:  FISH OIL Take 1 Cap by mouth two (2) times a day. pravastatin 20 mg tablet Commonly known as:  PRAVACHOL Take 1 Tab by mouth nightly. Indications: HYPERCHOLESTEROLEMIA  
  
 predniSONE 10 mg dose pack Commonly known as:  STERAPRED DS See administration instruction per 10mg dose pack  
  
 traMADol 50 mg tablet Commonly known as:  ULTRAM  
Take 1 Tab by mouth daily as needed for Pain. Prescriptions Printed Refills HYDROcodone-acetaminophen (NORCO) 5-325 mg per tablet 0 Sig: Take 1 Tab by mouth two (2) times daily as needed for Pain. Max Daily Amount: 2 Tabs. Class: Print Route: Oral  
  
Prescriptions Sent to Pharmacy Refills  
 metFORMIN ER (GLUCOPHAGE XR) 750 mg tablet 1 Sig: TAKE 1 TABLET BY MOUTH TWICE A DAY Class: Normal  
 Pharmacy: [facility] [address] [facility] Ph #: [phone]  
 predniSONE (STERAPRED DS) 10 mg dose pack 0 Sig: See administration instruction per 10mg dose pack Class: Normal  
 Pharmacy: [facility] [address], [place] Ph #: [phone] We Performed the Following REFERRAL TO ORTHOPEDIC SURGERY [REF62 Custom] Comments:  
 Please evaluate patient for back pain, MRI done showed neura foraminal narrowing and hx of laminectomy. I don't believe she is pain seeking. Referral Information Referral ID Referred By Referred To  
  
 [id number] EDUARDO LIN   
   [address] [facility] [address] [place], [facility] [address] Phone: [phone] Visits Status Start Date End Date 1 New Request 4/6/17 4/6/18 If your referral has a status of pending review or denied, additional information will be sent to support the outcome of this decision. Introducing [facility]! Dear Елена [place]: Thank you for requesting a [employer] account. Our records indicate that you already have an active [employer] account. You can access your account anytime at [email or web address] Did you know that you can access your hospital and ER discharge instructions at any time in [employer]? You can also review all of your test results from your hospital stay or ER visit. Additional Information If you have questions, please visit the Frequently Asked Questions section of the [employer] website at [email or web address] Remember, [employer] is NOT to be used for urgent needs. For medical emergencies, dial 911. Now available from your iPhone and Android! Please provide this summary of care documentation to your next provider. Your primary care clinician is listed as Sachin Minor. If you have any questions after today's visit, please call [phone].

## 2017-04-08 LAB — BACTERIA UR CULT: ABNORMAL

## 2017-04-11 ENCOUNTER — TELEPHONE (OUTPATIENT)
Dept: FAMILY MEDICINE CLINIC | Age: 57
End: 2017-04-11

## 2017-04-11 DIAGNOSIS — N30.00 ACUTE CYSTITIS WITHOUT HEMATURIA: Primary | ICD-10-CM

## 2017-04-11 RX ORDER — NITROFURANTOIN 25; 75 MG/1; MG/1
100 CAPSULE ORAL 2 TIMES DAILY
Qty: 14 CAP | Refills: 0 | Status: SHIPPED | OUTPATIENT
Start: 2017-04-11 | End: 2017-04-18

## 2017-04-11 NOTE — TELEPHONE ENCOUNTER
Called pt per Dr. Johny Lay ot let her know that she has a UTI, he called in [address] already. She said ok thank you.

## 2017-04-16 ENCOUNTER — HOSPITAL ENCOUNTER (EMERGENCY)
Age: 57
Discharge: HOME | End: 2017-04-16
Attending: EMERGENCY MEDICINE
Payer: COMMERCIAL

## 2017-04-16 ENCOUNTER — APPOINTMENT (OUTPATIENT)
Dept: GENERAL RADIOLOGY | Age: 57
End: 2017-04-16
Attending: NURSE PRACTITIONER
Payer: COMMERCIAL

## 2017-04-16 VITALS
SYSTOLIC BLOOD PRESSURE: 162 MMHG | OXYGEN SATURATION: 96 % | WEIGHT: 198 LBS | BODY MASS INDEX: 32.99 KG/M2 | DIASTOLIC BLOOD PRESSURE: 84 MMHG | HEIGHT: 65 IN | RESPIRATION RATE: 16 BRPM | HEART RATE: 65 BPM | TEMPERATURE: 98.3 F

## 2017-04-16 DIAGNOSIS — M79.672 LEFT FOOT PAIN: Primary | ICD-10-CM

## 2017-04-16 PROCEDURE — 73630 X-RAY EXAM OF FOOT: CPT

## 2017-04-16 PROCEDURE — 74011250637 HC RX REV CODE- 250/637: Performed by: NURSE PRACTITIONER

## 2017-04-16 PROCEDURE — 99283 EMERGENCY DEPT VISIT LOW MDM: CPT

## 2017-04-16 RX ORDER — IBUPROFEN 600 MG/1
600 TABLET ORAL
Status: COMPLETED | OUTPATIENT
Start: 2017-04-16 | End: 2017-04-16

## 2017-04-16 RX ORDER — HYDROCODONE BITARTRATE AND ACETAMINOPHEN 5; 325 MG/1; MG/1
1 TABLET ORAL
Qty: 8 TAB | Refills: 0 | Status: SHIPPED | OUTPATIENT
Start: 2017-04-16 | End: 2017-06-20

## 2017-04-16 RX ADMIN — IBUPROFEN 600 MG: 600 TABLET, FILM COATED ORAL at 13:39

## 2017-04-16 NOTE — DISCHARGE INSTRUCTIONS
Foot Pain: Care Instructions  Your Care Instructions  Foot injuries that cause pain and swelling are fairly common. Almost all sports or home repair projects can cause a misstep that ends up as foot pain. Normal wear and tear, especially as you get older, also can cause foot pain. Most minor foot injuries will heal on their own, and home treatment is usually all you need to do. If you have a severe injury, you may need tests and treatment. Follow-up care is a key part of your treatment and safety. Be sure to make and go to all appointments, and call your doctor if you are having problems. Its also a good idea to know your test results and keep a list of the medicines you take. How can you care for yourself at home? · Take pain medicines exactly as directed. ¨ If the doctor gave you a prescription medicine for pain, take it as prescribed. ¨ If you are not taking a prescription pain medicine, ask your doctor if you can take an over-the-counter medicine. · Rest and protect your foot. Take a break from any activity that may cause pain. · Put ice or a cold pack on your foot for 10 to 20 minutes at a time. Put a thin cloth between the ice and your skin. · Prop up the sore foot on a pillow when you ice it or anytime you sit or lie down during the next 3 days. Try to keep it above the level of your heart. This will help reduce swelling. · Your doctor may recommend that you wrap your foot with an elastic bandage. Keep your foot wrapped for as long as your doctor advises. · If your doctor recommends crutches, use them as directed. · Wear roomy footwear. · As soon as pain and swelling end, begin gentle exercises of your foot. Your doctor can tell you which exercises will help. When should you call for help? Call 911 anytime you think you may need emergency care. For example, call if:  · Your foot turns pale, white, blue, or cold.   Call your doctor now or seek immediate medical care if:  · You cannot move or stand on your foot. · Your foot looks twisted or out of its normal position. · Your foot is not stable when you step down. · You have signs of infection, such as:  ¨ Increased pain, swelling, warmth, or redness. ¨ Red streaks leading from the sore area. ¨ Pus draining from a place on your foot. ¨ A fever. · Your foot is numb or tingly. Watch closely for changes in your health, and be sure to contact your doctor if:  · You do not get better as expected. · You have bruises from an injury that last longer than 2 weeks. Where can you learn more? Go to http://betina-dominick.info/. Enter O125 in the search box to learn more about \"Foot Pain: Care Instructions. \"  Current as of: May 23, 2016  Content Version: 11.2  © 8449-1074 [employer]. Care instructions adapted under license by [employer]Ã¢â€[employer]Â¢ (which disclaims liability or warranty for this information). If you have questions about a medical condition or this instruction, always ask your healthcare professional. Solange López[employer] disclaims any warranty or liability for your use of this information. We hope that we have addressed all of your medical concerns. The examination and treatment you received in the Emergency Department were for an emergent problem and were not intended as complete care. It is important that you follow up with your healthcare provider(s) for ongoing care. If your symptoms worsen or do not improve as expected, and you are unable to reach your usual health care provider(s), you should return to the Emergency Department. Today's healthcare is undergoing tremendous change, and patient satisfaction surveys are one of the many tools to assess the quality of medical care. You may receive a survey from the [employer] regarding your experience in the Emergency Department.   I hope that your experience has been completely positive, particularly the medical care that I provided. As such, please participate in the survey; anything less than excellent does not meet my expectations or intentions. [facility] and [facility] participate in nationally recognized quality of care measures. If your blood pressure is greater than 120/80, as reported below, we urge that you seek medical care to address the potential of high blood pressure, commonly known as hypertension. Hypertension can be hereditary or can be caused by certain medical conditions, pain, stress, or \"white coat syndrome. \"       Please make an appointment with your health care provider(s) for follow up of your Emergency Department visit. VITALS:   Patient Vitals for the past 8 hrs:   Temp Pulse Resp BP SpO2   04/16/17 1251 98.3 °F (36.8 °C) 65 16 162/84 96 %          Thank you for allowing us to provide you with medical care today. We realize that you have many choices for your emergency care needs. Please choose us in the future for any continued health care needs. Abhijit Butcher [facility] Brochure, [address]: [phone]            No results found for this or any previous visit (from the past 24 hour(s)). Xr Foot Lt Min 3 V    Result Date: 4/16/2017  EXAM:  XR FOOT LT MIN 3 V INDICATION:   Left foot pain. COMPARISON:  None. FINDINGS:  Three views of the left foot demonstrate no fracture or other acute osseous or articular abnormality. The soft tissues are within normal limits. IMPRESSION:  No acute abnormality.

## 2017-04-16 NOTE — ED PROVIDER NOTES
HPI Comments: 63 yo F with hx of HTN, DM, high cholesterol (see list)  presents ambulatory to the ED for evaluation of pain to the top of her L foot. States she has had a \"knot\" to the top of her foot for about one year, worse over the last month with increase in pain while wearing socks, shoes. She denies trauma or injury. Denies fever or redness to area. Has not been evaluated for this previously. States she has been taking Tylenol and Advil at home for the sx. Past Medical History:  No date: Back pain  6/28/2016: Breast cancer screening  6/28/2016: Chronic bilateral low back pain without sciati*  No date: Diabetes ([facility] [address]  6/28/2016: Encounter for long-term (current) use of medic*  6/28/2016: Essential hypertension with goal blood pressur*  8/6/2015: Hypercholesteremia  No date: Hypercholesterolemia  No date: Hypertension  No date: Ill-defined condition      Comment: hyperlipidemia  No date: Other ill-defined conditions      Comment: high cholesterol  1/23/2017: Patient non adherence  1/23/2017: Type 2 diabetes, uncontrolled, with neuropathy*  8/6/2015: Type II diabetes mellitus, uncontrolled ([address]    Social History    Marital status: SINGLE              Spouse name:                       Years of education:                 Number of children:               Occupational History    None on file    Social History Main Topics    Smoking status: Former Smoker                                                                Packs/day: 1.00      Years: 30.00          Quit date: 4/1/2005    Smokeless status: Never Used                        Alcohol use: No              Drug use: No              Sexual activity: No                   Other Topics            Concern    None on file    Social History Narrative    ** Merged History Encounter **               Patient is a 64 y.o. female presenting with foot pain. Foot Pain    Pertinent negatives include no back pain.         Past Medical History:   Diagnosis Date    Back pain     Breast cancer screening 6/28/2016    Chronic bilateral low back pain without sciatica 6/28/2016    Diabetes ([facility] [address]     Encounter for long-term (current) use of medications 6/28/2016    Essential hypertension with goal blood pressure less than 140/90 6/28/2016    Hypercholesteremia 8/6/2015    Hypercholesterolemia     Hypertension     Ill-defined condition     hyperlipidemia    Other ill-defined conditions     high cholesterol    Patient non adherence 1/23/2017    Type 2 diabetes, uncontrolled, with neuropathy ([facility] [address] 1/23/2017    Type II diabetes mellitus, uncontrolled ([facility] [address] 8/6/2015       Past Surgical History:   Procedure Laterality Date    CARDIAC SURG PROCEDURE UNLIST      murmur    HX CARPAL TUNNEL RELEASE      HX GYN      hysterectomy    HX HEENT      Ear surgery    HX HEENT      Left eye    HX ORTHOPAEDIC      left wrist & left elbow    HX ORTHOPAEDIC      Back Surgery x 3    HX ORTHOPAEDIC      right thumb at [address] HX OTHER SURGICAL      back surgery X 2    HX OTHER SURGICAL      Collapsed left lung    HX OTHER SURGICAL      axillary sweat glands removed    HX OVARIAN CYST REMOVAL      CT OTOPLASTY PROTRUDING EAR W/WO SIZE RDCTJ           Family History:   Problem Relation Age of Onset    Heart Disease Mother        Social History     Social History    Marital status: SINGLE     Spouse name: N/A    Number of children: N/A    Years of education: N/A     Occupational History    Not on file. Social History Main Topics    Smoking status: Former Smoker     Packs/day: 1.00     Years: 30.00     Quit date: 4/1/2005    Smokeless tobacco: Never Used    Alcohol use No    Drug use: No    Sexual activity: No     Other Topics Concern    Not on file     Social History Narrative    ** Merged History Encounter **              ALLERGIES: Review of patient's allergies indicates no known allergies.     Review of Systems   Constitutional: Negative for chills and fever.   HENT: Negative for congestion and facial swelling. Eyes: Negative for discharge. Respiratory: Negative for cough and shortness of breath. Cardiovascular: Negative for chest pain and leg swelling. Gastrointestinal: Negative for nausea. Genitourinary: Negative for dysuria. Musculoskeletal: Negative for back pain and joint swelling. L foot pain    Skin: Negative for color change. Neurological: Negative for seizures and facial asymmetry. Psychiatric/Behavioral: Negative for confusion. Vitals:    04/16/17 1251   BP: 162/84   Pulse: 65   Resp: 16   Temp: 98.3 °F (36.8 °C)   SpO2: 96%   Weight: 89.8 kg (198 lb)   Height: 5' 5\" (1.651 m)            Physical Exam   Constitutional: She is oriented to person, place, and time. She appears well-developed and well-nourished. No distress. HENT:   Head: Normocephalic and atraumatic. Eyes: Conjunctivae and EOM are normal. Pupils are equal, round, and reactive to light. Neck: Normal range of motion. Neck supple. Cardiovascular: Normal rate, regular rhythm, normal heart sounds and intact distal pulses. Pulmonary/Chest: Effort normal and breath sounds normal.   No evidence of SOB. Abdominal: Soft. Bowel sounds are normal. She exhibits no distension and no mass. There is no tenderness. There is no rebound and no guarding. Abdomen soft, nontender. Musculoskeletal: Normal range of motion. She exhibits tenderness. She exhibits no edema. Left foot: There is tenderness and bony tenderness. There is normal range of motion, no swelling and normal capillary refill. Hard, raised area to top of L foot with no evidence of erythema, edema. +2 DP on L. No ankle edema. Cap refill less than 3 seconds. Neurological: She is alert and oriented to person, place, and time. She has normal strength. She displays no atrophy. No cranial nerve deficit or sensory deficit. She exhibits normal muscle tone.  Coordination and gait normal. GCS eye subscore is 4. GCS verbal subscore is 5. GCS motor subscore is 6. Skin: Skin is warm, dry and intact. Psychiatric: She has a normal mood and affect. Her behavior is normal. Judgment and thought content normal.   Nursing note and vitals reviewed. MDM  Number of Diagnoses or Management Options  Diagnosis management comments: 65 yo F with hard, raised area to top of L foot for approximately one year. States it has increased in size over the last 1-2 months. Denies trauma or injury. Notes pain with anything touching the affected area. No erythema, edema or evidence of infection. +2 DP on L. Cap refill less than 3 seconds. Xray and medication for discomfort ordered. Xray impression:   Results     XR FOOT LT MIN 3 V (Accession [id number]) (Order [id number])      Allergies       No Known Allergies     Result Information     Status Provider Status       Final result (Exam End: 4/16/2017  1:56 PM) Open      Study Result     EXAM: XR FOOT LT MIN 3 V     INDICATION: Left foot pain.     COMPARISON: None.     FINDINGS: Three views of the left foot demonstrate no fracture or other acute  osseous or articular abnormality. The soft tissues are within normal limits.     IMPRESSION  IMPRESSION: No acute abnormality.     Discussed hx, presentation and findings with Dr. Damian Telles who agrees with plan of care and plan for discharge with FU with orthopedics/podiatry for evaluation of current sx. Provider information given at the time of discharge. Discussed findings and plan for discharge with pt who agrees with plan of care/discharge.           Amount and/or Complexity of Data Reviewed  Tests in the radiology section of CPT®: ordered and reviewed  Discuss the patient with other providers: yes (Dr. Damian Telles )    Patient Progress  Patient progress: stable    ED Course       Procedures

## 2017-04-16 NOTE — ED TRIAGE NOTES
Pt reports having a knot to the top of her left foot for the past 3 months. Pt states it is tender to touch.

## 2017-04-17 ENCOUNTER — HOSPITAL ENCOUNTER (EMERGENCY)
Age: 57
Discharge: HOME | End: 2017-04-17
Attending: EMERGENCY MEDICINE
Payer: COMMERCIAL

## 2017-04-17 VITALS
HEIGHT: 65 IN | RESPIRATION RATE: 16 BRPM | BODY MASS INDEX: 32.29 KG/M2 | SYSTOLIC BLOOD PRESSURE: 189 MMHG | OXYGEN SATURATION: 100 % | DIASTOLIC BLOOD PRESSURE: 93 MMHG | WEIGHT: 193.8 LBS | HEART RATE: 53 BPM

## 2017-04-17 DIAGNOSIS — E16.2 HYPOGLYCEMIA: Primary | ICD-10-CM

## 2017-04-17 DIAGNOSIS — R00.1 BRADYCARDIA: ICD-10-CM

## 2017-04-17 LAB
ALBUMIN SERPL BCP-MCNC: 4.3 G/DL (ref 3.5–5)
ALBUMIN/GLOB SERPL: 1.1 {RATIO} (ref 1.1–2.2)
ALP SERPL-CCNC: 63 U/L (ref 45–117)
ALT SERPL-CCNC: 24 U/L (ref 12–78)
ANION GAP BLD CALC-SCNC: 9 MMOL/L (ref 5–15)
AST SERPL W P-5'-P-CCNC: 14 U/L (ref 15–37)
ATRIAL RATE: 43 BPM
BASOPHILS # BLD AUTO: 0 K/UL (ref 0–0.1)
BASOPHILS # BLD: 0 % (ref 0–1)
BILIRUB SERPL-MCNC: 0.4 MG/DL (ref 0.2–1)
BUN SERPL-MCNC: 24 MG/DL (ref 6–20)
BUN/CREAT SERPL: 20 (ref 12–20)
CALCIUM SERPL-MCNC: 9.2 MG/DL (ref 8.5–10.1)
CALCULATED P AXIS, ECG09: 45 DEGREES
CALCULATED R AXIS, ECG10: -12 DEGREES
CALCULATED T AXIS, ECG11: 4 DEGREES
CHLORIDE SERPL-SCNC: 107 MMOL/L (ref 97–108)
CO2 SERPL-SCNC: 25 MMOL/L (ref 21–32)
CREAT SERPL-MCNC: 1.18 MG/DL (ref 0.55–1.02)
DIAGNOSIS, 93000: NORMAL
DIFFERENTIAL METHOD BLD: ABNORMAL
EOSINOPHIL # BLD: 0.1 K/UL (ref 0–0.4)
EOSINOPHIL NFR BLD: 3 % (ref 0–7)
ERYTHROCYTE [DISTWIDTH] IN BLOOD BY AUTOMATED COUNT: 16.1 % (ref 11.5–14.5)
GLOBULIN SER CALC-MCNC: 3.8 G/DL (ref 2–4)
GLUCOSE BLD STRIP.AUTO-MCNC: 163 MG/DL (ref 65–100)
GLUCOSE BLD STRIP.AUTO-MCNC: 42 MG/DL (ref 65–100)
GLUCOSE BLD STRIP.AUTO-MCNC: 56 MG/DL (ref 65–100)
GLUCOSE BLD STRIP.AUTO-MCNC: 60 MG/DL (ref 65–100)
GLUCOSE SERPL-MCNC: 40 MG/DL (ref 65–100)
HCT VFR BLD AUTO: 35.1 % (ref 35–47)
HGB BLD-MCNC: 11.1 G/DL (ref 11.5–16)
LYMPHOCYTES # BLD AUTO: 16 % (ref 12–49)
LYMPHOCYTES # BLD: 0.8 K/UL (ref 0.8–3.5)
MAGNESIUM SERPL-MCNC: 2.3 MG/DL (ref 1.6–2.4)
MCH RBC QN AUTO: 22.2 PG (ref 26–34)
MCHC RBC AUTO-ENTMCNC: 31.6 G/DL (ref 30–36.5)
MCV RBC AUTO: 70.3 FL (ref 80–99)
MONOCYTES # BLD: 0.3 K/UL (ref 0–1)
MONOCYTES NFR BLD AUTO: 7 % (ref 5–13)
NEUTS SEG # BLD: 3.5 K/UL (ref 1.8–8)
NEUTS SEG NFR BLD AUTO: 74 % (ref 32–75)
P-R INTERVAL, ECG05: 166 MS
PLATELET # BLD AUTO: 336 K/UL (ref 150–400)
POTASSIUM SERPL-SCNC: 3.3 MMOL/L (ref 3.5–5.1)
PROT SERPL-MCNC: 8.1 G/DL (ref 6.4–8.2)
Q-T INTERVAL, ECG07: 532 MS
QRS DURATION, ECG06: 92 MS
QTC CALCULATION (BEZET), ECG08: 449 MS
RBC # BLD AUTO: 4.99 M/UL (ref 3.8–5.2)
RBC MORPH BLD: ABNORMAL
SERVICE CMNT-IMP: ABNORMAL
SODIUM SERPL-SCNC: 141 MMOL/L (ref 136–145)
TROPONIN I SERPL-MCNC: <0.04 NG/ML
VENTRICULAR RATE, ECG03: 43 BPM
WBC # BLD AUTO: 4.7 K/UL (ref 3.6–11)

## 2017-04-17 PROCEDURE — 80053 COMPREHEN METABOLIC PANEL: CPT | Performed by: EMERGENCY MEDICINE

## 2017-04-17 PROCEDURE — 83735 ASSAY OF MAGNESIUM: CPT | Performed by: EMERGENCY MEDICINE

## 2017-04-17 PROCEDURE — 82962 GLUCOSE BLOOD TEST: CPT

## 2017-04-17 PROCEDURE — 96374 THER/PROPH/DIAG INJ IV PUSH: CPT

## 2017-04-17 PROCEDURE — 84484 ASSAY OF TROPONIN QUANT: CPT | Performed by: EMERGENCY MEDICINE

## 2017-04-17 PROCEDURE — 96366 THER/PROPH/DIAG IV INF ADDON: CPT

## 2017-04-17 PROCEDURE — 36415 COLL VENOUS BLD VENIPUNCTURE: CPT | Performed by: EMERGENCY MEDICINE

## 2017-04-17 PROCEDURE — 85025 COMPLETE CBC W/AUTO DIFF WBC: CPT | Performed by: EMERGENCY MEDICINE

## 2017-04-17 PROCEDURE — 93005 ELECTROCARDIOGRAM TRACING: CPT

## 2017-04-17 PROCEDURE — 74011000258 HC RX REV CODE- 258: Performed by: EMERGENCY MEDICINE

## 2017-04-17 PROCEDURE — 99285 EMERGENCY DEPT VISIT HI MDM: CPT

## 2017-04-17 RX ORDER — DEXTROSE MONOHYDRATE 100 MG/ML
250 INJECTION, SOLUTION INTRAVENOUS ONCE
Status: COMPLETED | OUTPATIENT
Start: 2017-04-17 | End: 2017-04-17

## 2017-04-17 RX ORDER — MAGNESIUM SULFATE 100 %
CRYSTALS MISCELLANEOUS
Status: DISCONTINUED
Start: 2017-04-17 | End: 2017-04-17 | Stop reason: HOSPADM

## 2017-04-17 RX ADMIN — DEXTROSE MONOHYDRATE 250 ML: 10 INJECTION, SOLUTION INTRAVENOUS at 17:47

## 2017-04-17 NOTE — DISCHARGE INSTRUCTIONS
Bradycardia: Care Instructions  Your Care Instructions    Bradycardia is a slow heart rate. If your heart beats too slowly, it can't supply your body with enough blood. This can make you weak or dizzy. Or it may make you pass out. Sometimes medicine can cause this problem. If this happens, your doctor may have you adjust one of your medicines. If a medicine is not the problem, your doctor may recommend a pacemaker. It is important to treat bradycardia so that you don't get more serious health problems. Your doctor will want to see you on a routine schedule to make sure that your heartbeat is normal.  Follow-up care is a key part of your treatment and safety. Be sure to make and go to all appointments, and call your doctor if you are having problems. It's also a good idea to know your test results and keep a list of the medicines you take. How can you care for yourself at home? · Take your medicines exactly as prescribed. Call your doctor if you think you are having a problem with your medicine. If your bradycardia is caused by another disease, your doctor will try to treat the disease. If it is caused by heart medicines, he or she will adjust your medicines. · Make lifestyle changes to improve your heart health. ¨ To control your cholesterol, avoid foods with a lot of fat, saturated fat, or sodium. Try to eat more fiber. And if your doctor says it's okay, get some exercise on most days. ¨ Do not smoke. Smoking can make your heart condition worse. If you need help quitting, talk to your doctor about stop-smoking programs and medicines. These can increase your chances of quitting for good. ¨ Limit alcohol to 2 drinks a day for men and 1 drink a day for women. Too much alcohol can cause health problems. Pacemaker  If you have a pacemaker, you will get more specific information about it. Be sure to:  · Check your pulse as your doctor tells you.   · Have your pacemaker checked as often as your doctor recommends. You may be able to do this over the phone or computer. · Avoid strong magnetic or electrical fields. These include wand metal detectors used in airports, MRIs, welding equipment, and generators. · You will be checked several times right after you get your pacemaker and when it is time to have the battery changed. Batteries last for 5 to 15 years. · You can talk on a cell phone. But keep it 6 inches away from your pacemaker. · Microwaves, TVs, radios, and kitchen and bathroom appliances won't harm you. When should you call for help? Call 911 anytime you think you may need emergency care. For example, call if:  · You passed out (lost consciousness). · You have symptoms of a heart attack. These may include:  ¨ Chest pain or pressure, or a strange feeling in the chest.  ¨ Sweating. ¨ Shortness of breath. ¨ Nausea or vomiting. ¨ Pain, pressure, or a strange feeling in the back, neck, jaw, or upper belly or in one or both shoulders or arms. ¨ Lightheadedness or sudden weakness. ¨ A fast or irregular heartbeat. After you call 911, the  may tell you to chew 1 adult-strength or 2 to 4 low-dose aspirin. Wait for an ambulance. Do not try to drive yourself. · You have symptoms of a stroke. These may include:  ¨ Sudden numbness, tingling, weakness, or loss of movement in your face, arm, or leg, especially on only one side of your body. ¨ Sudden vision changes. ¨ Sudden trouble speaking. ¨ Sudden confusion or trouble understanding simple statements. ¨ Sudden problems with walking or balance. ¨ A sudden, severe headache that is different from past headaches. Call your doctor now or seek immediate medical care if:  · You are dizzy or lightheaded, or you feel like you may faint. · You have new or increased shortness of breath. · You had a pacemaker implanted and you have signs of infection, such as:  ¨ Increased pain, swelling, warmth, or redness.   ¨ Red streaks leading from the cut (incision). ¨ Pus draining from the incision. ¨ A fever. Watch closely for changes in your health, and be sure to contact your doctor if you have any problems. Where can you learn more? Go to http://betina-dominick.info/. Enter Q621 in the search box to learn more about \"Bradycardia: Care Instructions. \"  Current as of: January 27, 2016  Content Version: 11.2  © 7441-3095 [employer]. Care instructions adapted under license by [employer] (which disclaims liability or warranty for this information). If you have questions about a medical condition or this instruction, always ask your healthcare professional. [address] any warranty or liability for your use of this information. Hypoglycemia: Care Instructions  Your Care Instructions  Hypoglycemia means that your blood sugar is low and your body is not getting enough fuel. Some people get low blood sugar from not eating often enough. Some medicines to treat diabetes can cause low blood sugar. People who have had surgery on their stomachs or intestines may get hypoglycemia. Problems with the pancreas, kidneys, or liver also can cause low blood sugar. A snack or drink with sugar in it will raise your blood sugar and should ease your symptoms right away. Your doctor may recommend that you change or stop your medicines until you can get your blood sugar levels under control. In the long run, you may need to change your diet and eating habits so that you get enough fuel for your body throughout the day. Follow-up care is a key part of your treatment and safety. Be sure to make and go to all appointments, and call your doctor if you are having problems. It's also a good idea to know your test results and keep a list of the medicines you take. How can you care for yourself at home? · Learn to recognize the early signs of low blood sugar. Signs include:  ¨ Nausea. ¨ Hunger.   ¨ Feeling nervous, irritable, or shaky. ¨ Cold, clammy, wet skin. ¨ Sweating (when you are not exercising). ¨ A fast heartbeat. ¨ Numbness or tingling of the fingertips or lips. · If you feel an episode of low blood sugar coming on, drink fruit juice or sugared (not diet) soda, or eat sugar in the form of candy, cubes, or tablets. [employer]) are another [employer]. · Eat small, frequent meals so that you do not get too hungry between meals. · Balance extra exercise with eating more. · Keep a written record of your low blood sugar episodes, including when you last ate and what you ate, so that you can learn what causes your blood sugar to drop. · Make sure your family, friends, and coworkers know the symptoms of low blood sugar and know what to do to get your sugar level up. · Wear medical alert jewelry that lists your condition. You can buy this at most drugstores. When should you call for help? Call 911 anytime you think you may need emergency care. For example, call if:  · You have a seizure. · You passed out (lost consciousness), or you suddenly become very sleepy or confused. (You may have very low blood sugar.)  Call your doctor now or seek immediate medical care if:  · You have symptoms of low blood sugar, such as:  ¨ Sweating. ¨ Feeling nervous, shaky, and weak. ¨ Extreme hunger and slight nausea. ¨ Dizziness and headache. ¨ Blurred vision. ¨ Confusion. Watch closely for changes in your health, and be sure to contact your doctor if:  · Your symptoms continue or return. Where can you learn more? Go to http://betina-dominick.info/. Enter O827 in the search box to learn more about \"Hypoglycemia: Care Instructions. \"  Current as of: May 23, 2016  Content Version: 11.2  © 1797-0971 [employer]. Care instructions adapted under license by [employer] (which disclaims liability or warranty for this information).  If you have questions about a medical condition or this instruction, always ask your healthcare professional. [place] [address] any warranty or liability for your use of this information. We hope that we have addressed all of your medical concerns. The examination and treatment you received in the Emergency Department were for an emergent problem and were not intended as complete care. It is important that you follow up with your healthcare provider(s) for ongoing care. If your symptoms worsen or do not improve as expected, and you are unable to reach your usual health care provider(s), you should return to the Emergency Department. Today's healthcare is undergoing tremendous change, and patient satisfaction surveys are one of the many tools to assess the quality of medical care. You may receive a survey from the [facility] regarding your experience in the Emergency Department. I hope that your experience has been completely positive, particularly the medical care that I provided. As such, please participate in the survey; anything less than excellent does not meet my expectations or intentions. [facility] and [address] [place] [address] participate in nationally recognized quality of care measures. If your blood pressure is greater than 120/80, as reported below, we urge that you seek medical care to address the potential of high blood pressure, commonly known as hypertension. Hypertension can be hereditary or can be caused by certain medical conditions, pain, stress, or \"white coat syndrome. \"       Please make an appointment with your health care provider(s) for follow up of your Emergency Department visit. VITALS:   Patient Vitals for the past 8 hrs:   Pulse Resp BP SpO2   04/17/17 1800 (!) 53 16 (!) 189/93 100 %   04/17/17 1630 (!) 47 16 174/75 100 %          Thank you for allowing us to provide you with medical care today.   We realize that you have many choices for your emergency care needs. Please choose us in the future for any continued health care needs. Siobhan Farris MD    [facility], [facility].   Office: [phone]            Recent Results (from the past 24 hour(s))   EKG, 12 LEAD, INITIAL    Collection Time: 04/17/17  4:38 PM   Result Value Ref Range    Ventricular Rate 43 BPM    Atrial Rate 43 BPM    P-R Interval 166 ms    QRS Duration 92 ms    Q-T Interval 532 ms    QTC Calculation (Bezet) 449 ms    Calculated P Axis 45 degrees    Calculated R Axis -12 degrees    Calculated T Axis 4 degrees    Diagnosis       Marked sinus bradycardia  When compared with ECG of 21-APR-2015 18:02,  No significant change was found     GLUCOSE, POC    Collection Time: 04/17/17  4:40 PM   Result Value Ref Range    Glucose (POC) 60 (L) 65 - 100 mg/dL    Performed by Carrie Batres    GLUCOSE, POC    Collection Time: 04/17/17  5:10 PM   Result Value Ref Range    Glucose (POC) 42 (LL) 65 - 100 mg/dL    Performed by Carrie Batres    CBC WITH AUTOMATED DIFF    Collection Time: 04/17/17  5:24 PM   Result Value Ref Range    WBC 4.7 3.6 - 11.0 K/uL    RBC 4.99 3.80 - 5.20 M/uL    HGB 11.1 (L) 11.5 - 16.0 g/dL    HCT 35.1 35.0 - 47.0 %    MCV 70.3 (L) 80.0 - 99.0 FL    MCH 22.2 (L) 26.0 - 34.0 PG    MCHC 31.6 30.0 - 36.5 g/dL    RDW 16.1 (H) 11.5 - 14.5 %    PLATELET 046 561 - 297 K/uL    NEUTROPHILS 74 32 - 75 %    LYMPHOCYTES 16 12 - 49 %    MONOCYTES 7 5 - 13 %    EOSINOPHILS 3 0 - 7 %    BASOPHILS 0 0 - 1 %    ABS. NEUTROPHILS 3.5 1.8 - 8.0 K/UL    ABS. LYMPHOCYTES 0.8 0.8 - 3.5 K/UL    ABS. MONOCYTES 0.3 0.0 - 1.0 K/UL    ABS. EOSINOPHILS 0.1 0.0 - 0.4 K/UL    ABS.  BASOPHILS 0.0 0.0 - 0.1 K/UL    DF SMEAR SCANNED      RBC COMMENTS ANISOCYTOSIS  1+        RBC COMMENTS HYPOCHROMIA  1+        RBC COMMENTS OVALOCYTES  PRESENT       METABOLIC PANEL, COMPREHENSIVE    Collection Time: 04/17/17  5:24 PM   Result Value Ref Range    Sodium 141 136 - 145 mmol/L Potassium 3.3 (L) 3.5 - 5.1 mmol/L    Chloride 107 97 - 108 mmol/L    CO2 25 21 - 32 mmol/L    Anion gap 9 5 - 15 mmol/L    Glucose 40 (LL) 65 - 100 mg/dL    BUN 24 (H) 6 - 20 MG/DL    Creatinine 1.18 (H) 0.55 - 1.02 MG/DL    BUN/Creatinine ratio 20 12 - 20      GFR est AA 57 (L) >60 ml/min/1.73m2    GFR est non-AA 47 (L) >60 ml/min/1.73m2    Calcium 9.2 8.5 - 10.1 MG/DL    Bilirubin, total 0.4 0.2 - 1.0 MG/DL    ALT (SGPT) 24 12 - 78 U/L    AST (SGOT) 14 (L) 15 - 37 U/L    Alk. phosphatase 63 45 - 117 U/L    Protein, total 8.1 6.4 - 8.2 g/dL    Albumin 4.3 3.5 - 5.0 g/dL    Globulin 3.8 2.0 - 4.0 g/dL    A-G Ratio 1.1 1.1 - 2.2     MAGNESIUM    Collection Time: 04/17/17  5:24 PM   Result Value Ref Range    Magnesium 2.3 1.6 - 2.4 mg/dL   TROPONIN I    Collection Time: 04/17/17  5:24 PM   Result Value Ref Range    Troponin-I, Qt. <0.04 <0.05 ng/mL   GLUCOSE, POC    Collection Time: 04/17/17  5:41 PM   Result Value Ref Range    Glucose (POC) 56 (L) 65 - 100 mg/dL    Performed by Amber Stephens    GLUCOSE, POC    Collection Time: 04/17/17  6:07 PM   Result Value Ref Range    Glucose (POC) 163 (H) 65 - 100 mg/dL    Performed by Amber Stephens        No results found.

## 2017-04-17 NOTE — ED TRIAGE NOTES
TRIAGE NOTE:  Patient arrives by EMS with c/o weakness that began this morning. EMS reports patient BS 89 in which they gave her oral glucose. Patient placed on monitor and was found to be in Sinus Harsh 30-40\"s. Patient reports BS this morning was 91.   Patient reports taking 50 units of Levemir and 18 units of Humalog this AM, patient reports she did eat breakfast.

## 2017-04-17 NOTE — ED PROVIDER NOTES
HPI Comments: 64 y.o. female with past medical history significant for DM,HTN, patient non adherence, hyperlipids, hypercholesterol, who presents via EMS with chief complaint of lightheadedness. Pt reports feeling fine upon waking up this morning, but that she felt sudden lightheadedness, dizziness, and an episode of diaphoresis today and says this is how she felt when she last had low blood sugar, so EMS was called. She reports checking her BS at 80 this am, and that she took her levemir and humalog. She says she is compliant with her medication. EMS reports pt's BS was 86 on scene, after which they gave oral glucose. Pt says that her cardiologist is at [facility]. Pt denies N/V/D, fever, cough, CP, SOB. There are no other acute medical concerns at this time. PCP: Syed Gee MD    Note written by Campos Mcdonough, as dictated by Leia Waldrop MD 5:32 PM      The history is provided by the patient and the EMS personnel.         Past Medical History:   Diagnosis Date    Back pain     Breast cancer screening 6/28/2016    Chronic bilateral low back pain without sciatica 6/28/2016    Diabetes ([address]     Encounter for long-term (current) use of medications 6/28/2016    Essential hypertension with goal blood pressure less than 140/90 6/28/2016    Hypercholesteremia 8/6/2015    Hypercholesterolemia     Hypertension     Ill-defined condition     hyperlipidemia    Other ill-defined conditions     high cholesterol    Patient non adherence 1/23/2017    Type 2 diabetes, uncontrolled, with neuropathy ([address] 1/23/2017    Type II diabetes mellitus, uncontrolled ([address] 8/6/2015       Past Surgical History:   Procedure Laterality Date    CARDIAC SURG PROCEDURE UNLIST      murmur    HX CARPAL TUNNEL RELEASE      HX GYN      hysterectomy    HX HEENT      Ear surgery    HX HEENT      Left eye    HX ORTHOPAEDIC      left wrist & left elbow    HX ORTHOPAEDIC      Back Surgery x 3    HX ORTHOPAEDIC      right thumb at [address] HX OTHER SURGICAL      back surgery X 2    HX OTHER SURGICAL      Collapsed left lung    HX OTHER SURGICAL      axillary sweat glands removed    HX OVARIAN CYST REMOVAL      W[place] OTOPLASTY PROTRUDING EAR W/WO SIZE RDCTJ           Family History:   Problem Relation Age of Onset    Heart Disease Mother        Social History     Social History    Marital status: SINGLE     Spouse name: N/A    Number of children: N/A    Years of education: N/A     Occupational History    Not on file. Social History Main Topics    Smoking status: Former Smoker     Packs/day: 1.00     Years: 30.00     Quit date: 4/1/2005    Smokeless tobacco: Never Used    Alcohol use No    Drug use: No    Sexual activity: No     Other Topics Concern    Not on file     Social History Narrative    ** Merged History Encounter **              ALLERGIES: Review of patient's allergies indicates no known allergies. Review of Systems   Constitutional: Positive for diaphoresis. Negative for fever. HENT: Negative for facial swelling. Eyes: Negative for visual disturbance. Respiratory: Negative for cough, chest tightness and shortness of breath. Cardiovascular: Negative for chest pain. Gastrointestinal: Negative for abdominal pain, diarrhea, nausea and vomiting. Genitourinary: Negative for dysuria. Musculoskeletal: Negative for arthralgias. Skin: Negative for rash. Neurological: Positive for dizziness and light-headedness. Hematological: Negative for adenopathy. Psychiatric/Behavioral: Negative for suicidal ideas. All other systems reviewed and are negative. Vitals:    04/17/17 1630   BP: 174/75   Pulse: (!) 47   Resp: 16   SpO2: 100%   Weight: 87.9 kg (193 lb 12.8 oz)   Height: 5' 5\" (1.651 m)            Physical Exam   Constitutional: She is oriented to person, place, and time. She appears well-developed and well-nourished. She appears lethargic. No distress. Slow to respond. Obese. Somnolent. HENT:   Head: Normocephalic and atraumatic. Mouth/Throat: Oropharynx is clear and moist.   Eyes: Pupils are equal, round, and reactive to light. No scleral icterus. Neck: Normal range of motion. Neck supple. No thyromegaly present. Cardiovascular: Regular rhythm, normal heart sounds and intact distal pulses. Bradycardia present. No murmur heard. Pulmonary/Chest: Effort normal and breath sounds normal. No respiratory distress. Abdominal: Soft. Bowel sounds are normal. She exhibits no distension. There is no tenderness. Musculoskeletal: Normal range of motion. She exhibits no edema. Neurological: She is oriented to person, place, and time. She appears lethargic. Skin: Skin is warm and dry. No rash noted. She is not diaphoretic. Nursing note and vitals reviewed. Note written by Campos Lundy, as dictated by Manisha Rodriguez MD 5:32 PM      MDM  Number of Diagnoses or Management Options  Bradycardia:   Hypoglycemia:   Diagnosis management comments: A:  62yo F with weakness since waking up this AM.  Took regular insulin. Reports eating breakfast but felt her BGL was low so took oral glucose. Pt found to be bradycardic with elevated BP. Exam otherwise unremarkable. P:  ecg  Labs  d10  food    ED Course       Procedures    7:28 PM  IV ACCESS WITH ULTRASOUND GUIDANCE  Manisha Rodriguez MD gained IV access using  **18* gauge needle. Indication is vascular access could not be obtained. The site was cleansed with an alcohol prep, the **L antecubital* vein was localized with ultrasound guidance in an anterior approach. Line confirmation was obtained by direct visualization and good blood return. No anaesthetic was used. The line was successfully flushed with normal saline and was secured with transparent tape. The patient tolerated the procedure well. There were no complications. ED EKG interpretation:  Rhythm: sinus cherri. Rate (approx.): 43.   Axis: normal.  ST segment:  No concerning ST elevations or depressions. This EKG was interpreted by Cruzito Leija MD,ED Provider. labs unremarkable except for glucose=40 (prior to d10 or food)  BP remained elevated but HR improved after glucose and eating. On reassessment, pr resting comfortably without complaints. Glucose stable after eating meal in ED. Stable for discharge home. Pt advised to decreased carvedilol in half until after f/u with PCP and/or cardiologist.  Continue to eat regularly while taking insulin or consider decreasing humulog.

## 2017-05-25 ENCOUNTER — HOSPITAL ENCOUNTER (EMERGENCY)
Age: 57
Discharge: HOME | End: 2017-05-25
Attending: STUDENT IN AN ORGANIZED HEALTH CARE EDUCATION/TRAINING PROGRAM
Payer: COMMERCIAL

## 2017-05-25 VITALS
WEIGHT: 189 LBS | TEMPERATURE: 98.6 F | BODY MASS INDEX: 31.49 KG/M2 | SYSTOLIC BLOOD PRESSURE: 112 MMHG | HEIGHT: 65 IN | DIASTOLIC BLOOD PRESSURE: 73 MMHG | RESPIRATION RATE: 18 BRPM | HEART RATE: 87 BPM | OXYGEN SATURATION: 92 %

## 2017-05-25 DIAGNOSIS — M67.471 GANGLION CYST OF RIGHT FOOT: Primary | ICD-10-CM

## 2017-05-25 PROCEDURE — 99282 EMERGENCY DEPT VISIT SF MDM: CPT

## 2017-05-25 NOTE — ED PROVIDER NOTES
HPI Comments: 64 y.o. female with past medical history significant for hypercholesterolemia, DM, HTN, hyperlipidemia, hysterectomy, back surgery x2, murmur who presents accompanied by son with chief complaint of foot pain. Patient presents in ED complaining of left foot pain secondary to \"knot\" on top of foot. She reports exacerbation in pain with palpation. Patient denies any recent trauma or injury. She denies any gait problem. There are no other acute medical concerns at this time. Social hx: former smoker; quit date: 4/1/2005; 1 pack per day for 30 years. Denies ETOH use. PCP: Rebeca Raya MD    Note written by Laurie Bergeron. Janett Kidd, as dictated by Georgina Dubose MD 11:02 AM      The history is provided by the patient. No  was used.         Past Medical History:   Diagnosis Date    Back pain     Breast cancer screening 6/28/2016    Chronic bilateral low back pain without sciatica 6/28/2016    Diabetes ([address]     Encounter for long-term (current) use of medications 6/28/2016    Essential hypertension with goal blood pressure less than 140/90 6/28/2016    Hypercholesteremia 8/6/2015    Hypercholesterolemia     Hypertension     Ill-defined condition     hyperlipidemia    Other ill-defined conditions     high cholesterol    Patient non adherence 1/23/2017    Type 2 diabetes, uncontrolled, with neuropathy ([address] 1/23/2017    Type II diabetes mellitus, uncontrolled ([address] 8/6/2015       Past Surgical History:   Procedure Laterality Date    CARDIAC SURG PROCEDURE UNLIST      murmur    HX CARPAL TUNNEL RELEASE      HX GYN      hysterectomy    HX HEENT      Ear surgery    HX HEENT      Left eye    HX ORTHOPAEDIC      left wrist & left elbow    HX ORTHOPAEDIC      Back Surgery x 3    HX ORTHOPAEDIC      right thumb at [address] HX OTHER SURGICAL      back surgery X 2    HX OTHER SURGICAL      Collapsed left lung    HX OTHER SURGICAL      axillary sweat glands removed    HX OVARIAN CYST REMOVAL      RI OTOPLASTY PROTRUDING EAR W/WO SIZE RDCTJ           Family History:   Problem Relation Age of Onset    Heart Disease Mother        Social History     Social History    Marital status: SINGLE     Spouse name: N/A    Number of children: N/A    Years of education: N/A     Occupational History    Not on file. Social History Main Topics    Smoking status: Former Smoker     Packs/day: 1.00     Years: 30.00     Quit date: 4/1/2005    Smokeless tobacco: Never Used    Alcohol use No    Drug use: No    Sexual activity: No     Other Topics Concern    Not on file     Social History Narrative    ** Merged History Encounter **              ALLERGIES: Review of patient's allergies indicates no known allergies. Review of Systems   Constitutional: Negative for activity change, diaphoresis, fatigue and fever. HENT: Negative for congestion and sore throat. Eyes: Negative for photophobia and visual disturbance. Respiratory: Negative for chest tightness and shortness of breath. Cardiovascular: Negative for chest pain, palpitations and leg swelling. Gastrointestinal: Negative for abdominal pain, blood in stool, constipation, diarrhea, nausea and vomiting. Genitourinary: Negative for difficulty urinating, dysuria, flank pain, frequency and hematuria. Musculoskeletal: Positive for myalgias (left foot). Negative for back pain and gait problem. Neurological: Negative for dizziness, syncope, numbness and headaches. All other systems reviewed and are negative. Vitals:    05/25/17 1019   BP: 112/73   Pulse: 87   Resp: 18   Temp: 98.6 °F (37 °C)   SpO2: 92%   Weight: 85.7 kg (189 lb)   Height: 5' 5\" (1.651 m)            Physical Exam   Constitutional: She is oriented to person, place, and time. She appears well-developed and well-nourished. No distress. HENT:   Head: Normocephalic and atraumatic.    Nose: Nose normal.   Mouth/Throat: Oropharynx is clear and moist. No oropharyngeal exudate. Eyes: Conjunctivae and EOM are normal. Right eye exhibits no discharge. Left eye exhibits no discharge. No scleral icterus. Neck: Normal range of motion. Neck supple. No JVD present. No tracheal deviation present. No thyromegaly present. Cardiovascular: Normal rate, regular rhythm, normal heart sounds and intact distal pulses. Exam reveals no gallop and no friction rub. No murmur heard. Pulmonary/Chest: Effort normal and breath sounds normal. No stridor. No respiratory distress. She has no wheezes. She has no rales. She exhibits no tenderness. Abdominal: Bowel sounds are normal. She exhibits no distension and no mass. There is no tenderness. There is no rebound. Musculoskeletal: Normal range of motion. She exhibits no edema or tenderness. Lymphadenopathy:     She has no cervical adenopathy. Neurological: She is alert and oriented to person, place, and time. No cranial nerve deficit. Coordination normal.   Skin: Skin is warm and dry. No rash noted. She is not diaphoretic. No erythema. No pallor. Tender ~2 cm cyst formation overlying 4th metatarsal region. Mobile cyst with flexion and extension of toes. No erythema or fluctuance. Psychiatric: She has a normal mood and affect. Her behavior is normal. Judgment and thought content normal.    Note written by Rigo Escamilla. Miranda Castillo, as dictated by Rika Orellana MD 11:02 AM        MDM  Number of Diagnoses or Management Options  Ganglion cyst of right foot:   Diagnosis management comments: Foot pain, ganglion cyst.  65 y/o female with ganglion cyst on US performed. Will have pt follow up with podiatry. No indication for imaging and not amicable for I&D on US.     Risk of Complications, Morbidity, and/or Mortality  Presenting problems: low  Diagnostic procedures: low  Management options: low    Patient Progress  Patient progress: stable    ED Course       Procedures    PROGRESS NOTE:  11:06 AM  Bedside US shows small ganglion cyst overlying 4th metatarsal region. Small fluid collection. Not amicable for drainage.

## 2017-05-25 NOTE — DISCHARGE INSTRUCTIONS
We hope that we have addressed all of your medical concerns. The examination and treatment you received in the Emergency Department were for an emergent problem and were not intended as complete care. It is important that you follow up with your healthcare provider(s) for ongoing care. If your symptoms worsen or do not improve as expected, and you are unable to reach your usual health care provider(s), you should return to the Emergency Department. Today's healthcare is undergoing tremendous change, and patient satisfaction surveys are one of the many tools to assess the quality of medical care. You may receive a survey from the [employer] regarding your experience in the Emergency Department. I hope that your experience has been completely positive, particularly the medical care that I provided. As such, please participate in the survey; anything less than excellent does not meet my expectations or intentions. [facility] and [address] [place] [address] participate in nationally recognized quality of care measures. If your blood pressure is greater than 120/80, as reported below, we urge that you seek medical care to address the potential of high blood pressure, commonly known as hypertension. Hypertension can be hereditary or can be caused by certain medical conditions, pain, stress, or \"white coat syndrome. \"       Please make an appointment with your health care provider(s) for follow up of your Emergency Department visit. VITALS:   Patient Vitals for the past 8 hrs:   Temp Pulse Resp BP SpO2   05/25/17 1019 98.6 °F (37 °C) 87 18 112/73 92 %          Thank you for allowing us to provide you with medical care today. We realize that you have many choices for your emergency care needs. Please choose us in the future for any continued health care needs. Bettye Robb, [facility].   Office: [phone]            No results found for this or any previous visit (from the past 24 hour(s)). No results found. Ganglions: Care Instructions  Your Care Instructions    A ganglion is a small sac, or cyst, filled with a clear fluid that is like jelly. A ganglion may look like a bump on the hand or wrist. It also can appear on your feet, ankles, knees, or shoulders. It is not cancer. A ganglion can grow out of the protective area, or capsule, around a joint. It also can grow on a tendon sheath, which covers the ropelike tendons that connect muscle to bone. A ganglion may hurt or cause numbness if it presses on a nerve. Many ganglions do not need treatment, and they often go away on their own. But if a ganglion hurts, becomes larger, causes numbness, or limits your activity, your doctor may want to drain it with a needle and syringe or remove it with minor surgery. Follow-up care is a key part of your treatment and safety. Be sure to make and go to all appointments, and call your doctor if you are having problems. It's also a good idea to know your test results and keep a list of the medicines you take. How can you care for yourself at home? · Wear a wrist or finger splint as directed by your doctor. It will keep your wrist or hand from moving and help reduce the fluid in the cyst. This may be all you need for the ganglion to shrink and go away. · Do not smash a ganglion with a book or other heavy object. You may break a bone or otherwise injure your wrist by trying this folk remedy, and the ganglion may return anyway. · Do not try to drain the fluid by poking the ganglion with a pin or any other sharp object. You could cause an infection. When should you call for help? Call your doctor now or seek immediate medical care if:  · You have signs of infection, such as:  ¨ Increased pain, swelling, warmth, or redness.   ¨ Red streaks leading from the cyst.  ¨ Pus draining from the cyst.  ¨ A fever. Watch closely for changes in your health, and be sure to contact your doctor if:  · You have increasing pain. · Your ganglion is getting larger. · You still have pain or numbness from a ganglion. Where can you learn more? Go to http://betina-dominick.info/. Enter S031 in the search box to learn more about \"Ganglions: Care Instructions. \"  Current as of: May 23, 2016  Content Version: 11.2  © 4443-7706 [employer]. Care instructions adapted under license by [employer] (which disclaims liability or warranty for this information). If you have questions about a medical condition or this instruction, always ask your healthcare professional. [address] any warranty or liability for your use of this information.

## 2017-06-06 DIAGNOSIS — E78.1 HYPERTRIGLYCERIDEMIA: ICD-10-CM

## 2017-06-07 RX ORDER — IRBESARTAN 300 MG/1
TABLET ORAL
Qty: 30 TAB | Refills: 0 | Status: SHIPPED | OUTPATIENT
Start: 2017-06-07 | End: 2017-07-01 | Stop reason: SDUPTHER

## 2017-06-07 RX ORDER — GEMFIBROZIL 600 MG/1
600 TABLET, FILM COATED ORAL 2 TIMES DAILY
Qty: 60 TAB | Refills: 0 | Status: SHIPPED | OUTPATIENT
Start: 2017-06-07 | End: 2017-07-01 | Stop reason: SDUPTHER

## 2017-06-20 NOTE — PERIOP NOTES
PAT PHONE INTERVIEW COMPLETED WITH PT.  PT WAS GIVEN INFECTION PREVENTION INFORMATION VERBALLY; PT VOICED UNDERSTANDING. PT WAS GIVEN THE OPPORTUNITY TO ASK ADDITIONAL QUESTIONS. MESSAGE LEFT FOR MOISES MCMULLEN TO DR. Danielle Galloway TO SEND ORDERS WITH A SIGNATURE ON THEM.

## 2017-06-21 ENCOUNTER — ANESTHESIA EVENT (OUTPATIENT)
Dept: SURGERY | Age: 57
End: 2017-06-21
Payer: COMMERCIAL

## 2017-06-21 ENCOUNTER — ANESTHESIA (OUTPATIENT)
Dept: SURGERY | Age: 57
End: 2017-06-21
Payer: COMMERCIAL

## 2017-06-21 ENCOUNTER — HOSPITAL ENCOUNTER (OUTPATIENT)
Age: 57
Setting detail: OUTPATIENT SURGERY
Discharge: HOME | End: 2017-06-21
Attending: ORTHOPAEDIC SURGERY | Admitting: ORTHOPAEDIC SURGERY
Payer: COMMERCIAL

## 2017-06-21 VITALS
BODY MASS INDEX: 31.49 KG/M2 | HEIGHT: 65 IN | WEIGHT: 189 LBS | OXYGEN SATURATION: 98 % | HEART RATE: 58 BPM | SYSTOLIC BLOOD PRESSURE: 155 MMHG | RESPIRATION RATE: 15 BRPM | TEMPERATURE: 98.2 F | DIASTOLIC BLOOD PRESSURE: 77 MMHG

## 2017-06-21 LAB
ANION GAP BLD CALC-SCNC: 16 MMOL/L (ref 5–15)
BUN BLD-MCNC: 19 MG/DL (ref 9–20)
CA-I BLD-MCNC: 1.23 MMOL/L (ref 1.12–1.32)
CHLORIDE BLD-SCNC: 106 MMOL/L (ref 98–107)
CO2 BLD-SCNC: 23 MMOL/L (ref 21–32)
CREAT BLD-MCNC: 0.8 MG/DL (ref 0.6–1.3)
GLUCOSE BLD STRIP.AUTO-MCNC: 125 MG/DL (ref 65–100)
GLUCOSE BLD-MCNC: 145 MG/DL (ref 65–100)
HCT VFR BLD CALC: 31 % (ref 35–47)
HGB BLD-MCNC: 10.5 GM/DL (ref 11.5–16)
POTASSIUM BLD-SCNC: 4.4 MMOL/L (ref 3.5–5.1)
SERVICE CMNT-IMP: ABNORMAL
SERVICE CMNT-IMP: ABNORMAL
SODIUM BLD-SCNC: 140 MMOL/L (ref 136–145)

## 2017-06-21 PROCEDURE — 76210000006 HC OR PH I REC 0.5 TO 1 HR: Performed by: ORTHOPAEDIC SURGERY

## 2017-06-21 PROCEDURE — 77030010509 HC AIRWY LMA MSK TELE -A: Performed by: ANESTHESIOLOGY

## 2017-06-21 PROCEDURE — 76060000032 HC ANESTHESIA 0.5 TO 1 HR: Performed by: ORTHOPAEDIC SURGERY

## 2017-06-21 PROCEDURE — 74011000250 HC RX REV CODE- 250: Performed by: ORTHOPAEDIC SURGERY

## 2017-06-21 PROCEDURE — 82962 GLUCOSE BLOOD TEST: CPT

## 2017-06-21 PROCEDURE — 76210000021 HC REC RM PH II 0.5 TO 1 HR: Performed by: ORTHOPAEDIC SURGERY

## 2017-06-21 PROCEDURE — 74011250636 HC RX REV CODE- 250/636: Performed by: ANESTHESIOLOGY

## 2017-06-21 PROCEDURE — 74011250636 HC RX REV CODE- 250/636: Performed by: ORTHOPAEDIC SURGERY

## 2017-06-21 PROCEDURE — 77030018836 HC SOL IRR NACL ICUM -A: Performed by: ORTHOPAEDIC SURGERY

## 2017-06-21 PROCEDURE — 77030036687 HC SHOE PSTOP S2SG -A

## 2017-06-21 PROCEDURE — 76010000138 HC OR TIME 0.5 TO 1 HR: Performed by: ORTHOPAEDIC SURGERY

## 2017-06-21 PROCEDURE — 74011250636 HC RX REV CODE- 250/636

## 2017-06-21 PROCEDURE — 88304 TISSUE EXAM BY PATHOLOGIST: CPT | Performed by: ORTHOPAEDIC SURGERY

## 2017-06-21 PROCEDURE — 80047 BASIC METABLC PNL IONIZED CA: CPT

## 2017-06-21 PROCEDURE — 77030013079 HC BLNKT BAIR HGGR 3M -A: Performed by: ANESTHESIOLOGY

## 2017-06-21 PROCEDURE — 77030002933 HC SUT MCRYL J&J -A: Performed by: ORTHOPAEDIC SURGERY

## 2017-06-21 PROCEDURE — 74011000250 HC RX REV CODE- 250

## 2017-06-21 RX ORDER — FENTANYL CITRATE 50 UG/ML
25 INJECTION, SOLUTION INTRAMUSCULAR; INTRAVENOUS
Status: DISCONTINUED | OUTPATIENT
Start: 2017-06-21 | End: 2017-06-21 | Stop reason: HOSPADM

## 2017-06-21 RX ORDER — SODIUM CHLORIDE 9 MG/ML
25 INJECTION, SOLUTION INTRAVENOUS CONTINUOUS
Status: DISCONTINUED | OUTPATIENT
Start: 2017-06-21 | End: 2017-06-21 | Stop reason: HOSPADM

## 2017-06-21 RX ORDER — LIDOCAINE HYDROCHLORIDE 20 MG/ML
INJECTION, SOLUTION EPIDURAL; INFILTRATION; INTRACAUDAL; PERINEURAL AS NEEDED
Status: DISCONTINUED | OUTPATIENT
Start: 2017-06-21 | End: 2017-06-21 | Stop reason: HOSPADM

## 2017-06-21 RX ORDER — SODIUM CHLORIDE, SODIUM LACTATE, POTASSIUM CHLORIDE, CALCIUM CHLORIDE 600; 310; 30; 20 MG/100ML; MG/100ML; MG/100ML; MG/100ML
100 INJECTION, SOLUTION INTRAVENOUS CONTINUOUS
Status: DISCONTINUED | OUTPATIENT
Start: 2017-06-21 | End: 2017-06-21 | Stop reason: HOSPADM

## 2017-06-21 RX ORDER — HYDROMORPHONE HYDROCHLORIDE 1 MG/ML
0.2 INJECTION, SOLUTION INTRAMUSCULAR; INTRAVENOUS; SUBCUTANEOUS
Status: DISCONTINUED | OUTPATIENT
Start: 2017-06-21 | End: 2017-06-21 | Stop reason: HOSPADM

## 2017-06-21 RX ORDER — SODIUM CHLORIDE 0.9 % (FLUSH) 0.9 %
5-10 SYRINGE (ML) INJECTION AS NEEDED
Status: DISCONTINUED | OUTPATIENT
Start: 2017-06-21 | End: 2017-06-21 | Stop reason: HOSPADM

## 2017-06-21 RX ORDER — HYDROCODONE BITARTRATE AND ACETAMINOPHEN 5; 325 MG/1; MG/1
1 TABLET ORAL
Qty: 30 TAB | Refills: 0 | Status: SHIPPED | OUTPATIENT
Start: 2017-06-21 | End: 2017-07-11 | Stop reason: SDUPTHER

## 2017-06-21 RX ORDER — MIDAZOLAM HYDROCHLORIDE 1 MG/ML
0.5 INJECTION, SOLUTION INTRAMUSCULAR; INTRAVENOUS
Status: DISCONTINUED | OUTPATIENT
Start: 2017-06-21 | End: 2017-06-21 | Stop reason: HOSPADM

## 2017-06-21 RX ORDER — DIPHENHYDRAMINE HYDROCHLORIDE 50 MG/ML
12.5 INJECTION, SOLUTION INTRAMUSCULAR; INTRAVENOUS AS NEEDED
Status: DISCONTINUED | OUTPATIENT
Start: 2017-06-21 | End: 2017-06-21 | Stop reason: HOSPADM

## 2017-06-21 RX ORDER — CEPHALEXIN 500 MG/1
500 CAPSULE ORAL 4 TIMES DAILY
Qty: 4 CAP | Refills: 0 | Status: SHIPPED | OUTPATIENT
Start: 2017-06-21 | End: 2017-06-22

## 2017-06-21 RX ORDER — ROPIVACAINE HYDROCHLORIDE 5 MG/ML
30 INJECTION, SOLUTION EPIDURAL; INFILTRATION; PERINEURAL AS NEEDED
Status: DISCONTINUED | OUTPATIENT
Start: 2017-06-21 | End: 2017-06-21 | Stop reason: HOSPADM

## 2017-06-21 RX ORDER — MIDAZOLAM HYDROCHLORIDE 1 MG/ML
1 INJECTION, SOLUTION INTRAMUSCULAR; INTRAVENOUS AS NEEDED
Status: DISCONTINUED | OUTPATIENT
Start: 2017-06-21 | End: 2017-06-21 | Stop reason: HOSPADM

## 2017-06-21 RX ORDER — CEFAZOLIN SODIUM IN 0.9 % NACL 2 G/50 ML
2 INTRAVENOUS SOLUTION, PIGGYBACK (ML) INTRAVENOUS ONCE
Status: COMPLETED | OUTPATIENT
Start: 2017-06-21 | End: 2017-06-21

## 2017-06-21 RX ORDER — FENTANYL CITRATE 50 UG/ML
INJECTION, SOLUTION INTRAMUSCULAR; INTRAVENOUS AS NEEDED
Status: DISCONTINUED | OUTPATIENT
Start: 2017-06-21 | End: 2017-06-21 | Stop reason: HOSPADM

## 2017-06-21 RX ORDER — BUPIVACAINE HYDROCHLORIDE 5 MG/ML
INJECTION, SOLUTION EPIDURAL; INTRACAUDAL AS NEEDED
Status: DISCONTINUED | OUTPATIENT
Start: 2017-06-21 | End: 2017-06-21 | Stop reason: HOSPADM

## 2017-06-21 RX ORDER — ONDANSETRON 2 MG/ML
INJECTION INTRAMUSCULAR; INTRAVENOUS AS NEEDED
Status: DISCONTINUED | OUTPATIENT
Start: 2017-06-21 | End: 2017-06-21 | Stop reason: HOSPADM

## 2017-06-21 RX ORDER — PROPOFOL 10 MG/ML
INJECTION, EMULSION INTRAVENOUS AS NEEDED
Status: DISCONTINUED | OUTPATIENT
Start: 2017-06-21 | End: 2017-06-21 | Stop reason: HOSPADM

## 2017-06-21 RX ORDER — SODIUM CHLORIDE, SODIUM LACTATE, POTASSIUM CHLORIDE, CALCIUM CHLORIDE 600; 310; 30; 20 MG/100ML; MG/100ML; MG/100ML; MG/100ML
25 INJECTION, SOLUTION INTRAVENOUS CONTINUOUS
Status: DISCONTINUED | OUTPATIENT
Start: 2017-06-21 | End: 2017-06-21 | Stop reason: HOSPADM

## 2017-06-21 RX ORDER — FENTANYL CITRATE 50 UG/ML
50 INJECTION, SOLUTION INTRAMUSCULAR; INTRAVENOUS AS NEEDED
Status: DISCONTINUED | OUTPATIENT
Start: 2017-06-21 | End: 2017-06-21 | Stop reason: HOSPADM

## 2017-06-21 RX ORDER — OXYCODONE HYDROCHLORIDE 5 MG/1
5 TABLET ORAL AS NEEDED
Status: DISCONTINUED | OUTPATIENT
Start: 2017-06-21 | End: 2017-06-21 | Stop reason: HOSPADM

## 2017-06-21 RX ORDER — SODIUM CHLORIDE 0.9 % (FLUSH) 0.9 %
5-10 SYRINGE (ML) INJECTION EVERY 8 HOURS
Status: DISCONTINUED | OUTPATIENT
Start: 2017-06-21 | End: 2017-06-21 | Stop reason: HOSPADM

## 2017-06-21 RX ORDER — MIDAZOLAM HYDROCHLORIDE 1 MG/ML
INJECTION, SOLUTION INTRAMUSCULAR; INTRAVENOUS AS NEEDED
Status: DISCONTINUED | OUTPATIENT
Start: 2017-06-21 | End: 2017-06-21 | Stop reason: HOSPADM

## 2017-06-21 RX ORDER — MORPHINE SULFATE 2 MG/ML
2 INJECTION, SOLUTION INTRAMUSCULAR; INTRAVENOUS
Status: DISCONTINUED | OUTPATIENT
Start: 2017-06-21 | End: 2017-06-21 | Stop reason: HOSPADM

## 2017-06-21 RX ORDER — LIDOCAINE HYDROCHLORIDE 10 MG/ML
0.1 INJECTION, SOLUTION EPIDURAL; INFILTRATION; INTRACAUDAL; PERINEURAL AS NEEDED
Status: DISCONTINUED | OUTPATIENT
Start: 2017-06-21 | End: 2017-06-21 | Stop reason: HOSPADM

## 2017-06-21 RX ORDER — ONDANSETRON 2 MG/ML
4 INJECTION INTRAMUSCULAR; INTRAVENOUS AS NEEDED
Status: DISCONTINUED | OUTPATIENT
Start: 2017-06-21 | End: 2017-06-21 | Stop reason: HOSPADM

## 2017-06-21 RX ADMIN — MIDAZOLAM HYDROCHLORIDE 2 MG: 1 INJECTION, SOLUTION INTRAMUSCULAR; INTRAVENOUS at 13:04

## 2017-06-21 RX ADMIN — FENTANYL CITRATE 100 MCG: 50 INJECTION, SOLUTION INTRAMUSCULAR; INTRAVENOUS at 13:13

## 2017-06-21 RX ADMIN — LIDOCAINE HYDROCHLORIDE 40 MG: 20 INJECTION, SOLUTION EPIDURAL; INFILTRATION; INTRACAUDAL; PERINEURAL at 13:13

## 2017-06-21 RX ADMIN — CEFAZOLIN 2 G: 1 INJECTION, POWDER, FOR SOLUTION INTRAMUSCULAR; INTRAVENOUS; PARENTERAL at 13:19

## 2017-06-21 RX ADMIN — FENTANYL CITRATE 50 MCG: 50 INJECTION, SOLUTION INTRAMUSCULAR; INTRAVENOUS at 13:21

## 2017-06-21 RX ADMIN — PROPOFOL 200 MG: 10 INJECTION, EMULSION INTRAVENOUS at 13:13

## 2017-06-21 RX ADMIN — SODIUM CHLORIDE, SODIUM LACTATE, POTASSIUM CHLORIDE, AND CALCIUM CHLORIDE 25 ML/HR: 600; 310; 30; 20 INJECTION, SOLUTION INTRAVENOUS at 12:44

## 2017-06-21 RX ADMIN — ONDANSETRON 4 MG: 2 INJECTION INTRAMUSCULAR; INTRAVENOUS at 13:35

## 2017-06-21 NOTE — OP NOTES
[address]   Dunia Morales, [address]   OP NOTE       Name:  Mell Jones   MR#:  [id number]   :  1960   Account #:  [de-identified]    Surgery Date:  2017   Date of Adm:  2017       PREOPERATIVE DIAGNOSIS:   1. Left foot dorsal exostosis. 2. Left foot pain. POSTOPERATIVE DIAGNOSES   1. Left foot dorsal exostosis. 2. Left foot pain. 3. Left foot ganglion cyst.    PROCEDURE PERFORMED: Excision left foot dorsal exostosis as   well as ganglion cyst.    SURGEON: Laila Up MD    ANESTHESIA: General.    ESTIMATED BLOOD LOSS: Minimal.    COMPLICATIONS: None. TOURNIQUET TIME: 16 minutes, Esmarch tourniquet, left lower   extremity. SPECIMENS REMOVED: Cyst was sent to the lab. DISPOSITION: The patient was taken to the recovery room in stable   condition. OPERATIVE INDICATIONS: The patient is a 72-year-old female who   has several prominent dorsal exostoses on her left foot, which felt quite   hard and bony. We discussed options. She wished to have these   excised. Informed consent was obtained regarding all risks and   benefits, and she wished to proceed. DESCRIPTION OF PROCEDURE: The patient was identified in the   preoperative holding area. The left lower extremity was marked with   the patient. All preoperative questions were answered. She was seen   by the anesthesia department, taken to the operating room and   transferred to the operating table in supine position. Once appropriate   anesthesia was obtained, the left leg was prepped and draped in the   usual sterile fashion. A time-out was conducted indicating correct   operative site and preoperative antibiotics given prior to incision being   made. Next, an Esmarch tourniquet was applied by appropriately   padding the supramalleolar area and placing the tourniquet. Some   plain Marcaine without epinephrine was injected around the site for   local anesthetic.      A somewhat curved incision was made in the dorsal portion of her foot   to include all of these prominent exostoses and dissected down   carefully to the soft tissue with tenotomy scissors and Gertrude retractors   were used to retract this. The main lump that we came onto actually   appeared to be more of a ganglion cyst, but it was quite hard and felt   bony. This was cut out and sure enough had a little bit of fluid   consistent with a ganglion cyst and this was removed and placed in a   cup to be sent to the lab for analysis. There were 2 other prominent   exostoses at the base of the second and third metatarsals, which an   osteotome and mallet were used to remove and a rasp was used to   further debride this down. Once all these prominent areas that had been bothering her had been   removed, the whole area was thoroughly irrigated with saline, closed   with Monocryl suture deep layer, nylon sutures in the skin. Sterile   dressings were placed. The patient was awakened and taken to   recovery in stable condition.         MD Emery Rollins / Veda Worthington   D:  06/21/2017   13:42   T:  06/21/2017   14:09   Job #:  [id number]

## 2017-06-21 NOTE — ANESTHESIA POSTPROCEDURE EVALUATION
Post-Anesthesia Evaluation and Assessment    Patient: Pamella Eduardo MRN: [id number]  SSN: [id number]    YOB: 1960  Age: 62 y.o. Sex: female       Cardiovascular Function/Vital Signs  Visit Vitals    /77    Pulse (!) 58    Temp 36.8 °C (98.2 °F)    Resp 15    Ht 5' 5\" (1.651 m)    Wt 85.7 kg (189 lb)    SpO2 98%    BMI 31.45 kg/m2       Patient is status post general anesthesia for Procedure(s):  EXCISION LEFT FOOT DORSAL EXOSTOSIS (CHOICE/BLOCK). Nausea/Vomiting: None    Postoperative hydration reviewed and adequate. Pain:  Pain Scale 1: Numeric (0 - 10) (06/21/17 1436)  Pain Intensity 1: 0 (06/21/17 1436)   Managed    Neurological Status:   Neuro (WDL): Within Defined Limits (06/21/17 1436)  Neuro  LUE Motor Response: Purposeful (06/21/17 1436)  LLE Motor Response: Purposeful (06/21/17 1436)  RUE Motor Response: Purposeful (06/21/17 1436)  RLE Motor Response: Purposeful (06/21/17 1436)   At baseline    Mental Status and Level of Consciousness: Arousable    Pulmonary Status:   O2 Device: Nasal cannula (06/21/17 1530)   Adequate oxygenation and airway patent    Complications related to anesthesia: None    Post-anesthesia assessment completed.  No concerns    Signed By: Regla Guerra MD     June 21, 2017

## 2017-06-21 NOTE — IP AVS SNAPSHOT
Current Discharge Medication List  
  
START taking these medications Dose & Instructions Dispensing Information Comments Morning Noon Evening Bedtime  
 cephALEXin 500 mg capsule Commonly known as:  Manpreet Salinas Your last dose was: Your next dose is:    
   
   
 Dose:  500 mg Take 1 Cap by mouth four (4) times daily for 1 day. Quantity:  4 Cap Refills:  0 HYDROcodone-acetaminophen 5-325 mg per tablet Commonly known as:  Jay Mccauley Your last dose was: Your next dose is:    
   
   
 Dose:  1 Tab Take 1 Tab by mouth every four (4) hours as needed for Pain. Max Daily Amount: 6 Tabs. Quantity:  30 Tab Refills:  0 CONTINUE these medications which have CHANGED Dose & Instructions Dispensing Information Comments Morning Noon Evening Bedtime  
 insulin lispro 100 unit/mL injection Commonly known as:  HumaLOG What changed:  how much to take Your last dose was: Your next dose is:    
   
   
 Dose:  18 Units 18 Units by SubCUTAneous route three (3) times daily. Quantity:  1 Vial  
Refills:  0 CONTINUE these medications which have NOT CHANGED Dose & Instructions Dispensing Information Comments Morning Noon Evening Bedtime  
 aspirin delayed-release 81 mg tablet Your last dose was: Your next dose is:    
   
   
 Dose:  81 mg Take 81 mg by mouth daily. Refills:  0  
     
   
   
   
  
 fish oil-omega-3 fatty acids 300-1,000 mg Cap Your last dose was: Your next dose is: TAKE ONE CAPSULE BY MOUTH TWICE A DAY Quantity:  180 Cap Refills:  3  
     
   
   
   
  
 gabapentin 300 mg capsule Commonly known as:  NEURONTIN Your last dose was: Your next dose is:    
   
   
 3 pills twice in the day and 2 tab at night. Quantity:  150 Cap Refills:  3 gemfibrozil 600 mg tablet Commonly known as:  LOPID Your last dose was: Your next dose is:    
   
   
 Dose:  600 mg Take 1 Tab by mouth two (2) times a day. Quantity:  60 Tab Refills:  0  
     
   
   
   
  
 insulin detemir 100 unit/mL (3 mL) Inpn Commonly known as:  eMlani West Your last dose was: Your next dose is:    
   
   
 Dose:  40 Units 40 Units by SubCUTAneous route two (2) times a day. Refills:  0  
     
   
   
   
  
 irbesartan 300 mg tablet Commonly known as:  AVAPRO Your last dose was: Your next dose is: TAKE 1 TABLET BY MOUTH EVERY DAY Quantity:  30 Tab Refills:  0  
     
   
   
   
  
 metFORMIN  mg tablet Commonly known as:  GLUCOPHAGE XR Your last dose was: Your next dose is: TAKE 1 TABLET BY MOUTH TWICE A DAY Quantity:  180 Tab Refills:  1  
     
   
   
   
  
 pravastatin 20 mg tablet Commonly known as:  PRAVACHOL Your last dose was: Your next dose is:    
   
   
 Dose:  20 mg Take 1 Tab by mouth nightly. Indications: HYPERCHOLESTEROLEMIA Quantity:  90 Tab Refills:  1 Where to Get Your Medications Information on where to get these meds will be given to you by the nurse or doctor. ! Ask your nurse or doctor about these medications  
  cephALEXin 500 mg capsule HYDROcodone-acetaminophen 5-325 mg per tablet

## 2017-06-21 NOTE — ROUTINE PROCESS
Patient: Davy Barbosa MRN: [id number]  SSN: [id number]   YOB: 1960  Age: 62 y.o. Sex: female     Patient is status post Procedure(s):  EXCISION LEFT FOOT DORSAL EXOSTOSIS (CHOICE/BLOCK). Surgeon(s) and Role:     * Jose Rodriguez MD - Primary    Local/Dose/Irrigation:                    Peripheral IV 06/21/17 Left External jugular (Active)   Site Assessment Clean, dry, & intact 6/21/2017 12:43 PM   Phlebitis Assessment 0 6/21/2017 12:43 PM   Infiltration Assessment 0 6/21/2017 12:43 PM   Dressing Status Clean, dry, & intact; New 6/21/2017 12:43 PM   Dressing Type Transparent;Tape 6/21/2017 12:43 PM   Hub Color/Line Status Green 6/21/2017 12:43 PM            Airway - Endotracheal Tube (Active)                   Dressing/Packing:  Wound Foot Dorsal-DRESSING TYPE: 4 x 4;Xeroform;Gauze wrap (stacy) (06/21/17 2183)  Splint/Cast:  ]    Other:

## 2017-06-21 NOTE — BRIEF OP NOTE
BRIEF OPERATIVE NOTE    Date of Procedure: 6/21/2017   Preoperative Diagnosis: EXOSTOSIS OF LEFT FOOT  Postoperative Diagnosis: EXOSTOSIS OF LEFT FOOT    Procedure(s):  EXCISION LEFT FOOT DORSAL EXOSTOSIS (CHOICE/BLOCK)  Surgeon(s) and Role:     * Doni Herron MD - Primary         Assistant Staff:       Surgical Staff:  Circ-1: Lavelel Osorio RN-1: Annamarie Hilton RN  Event Time In   Incision Start 1322   Incision Close 1339     Anesthesia: General   Estimated Blood Loss: min  Specimens:   ID Type Source Tests Collected by Time Destination   1 : left foot cyst Fresh Foot, left  Doni Herron MD 6/21/2017 1331 Pathology      Findings: as Fer Jacobs   Complications: none  Implants: * No implants in log *

## 2017-06-21 NOTE — ANESTHESIA PREPROCEDURE EVALUATION
Anesthetic History   No history of anesthetic complications            Review of Systems / Medical History  Patient summary reviewed, nursing notes reviewed and pertinent labs reviewed    Pulmonary  Within defined limits                 Neuro/Psych   Within defined limits           Cardiovascular    Hypertension: well controlled          Hyperlipidemia    Exercise tolerance: >4 METS     GI/Hepatic/Renal  Within defined limits              Endo/Other    Diabetes: type 2, using insulin    Arthritis     Other Findings            Physical Exam    Airway  Mallampati: II  TM Distance: 4 - 6 cm  Neck ROM: normal range of motion   Mouth opening: Normal     Cardiovascular    Rhythm: regular  Rate: normal         Dental    Dentition: Full upper dentures and Poor dentition     Pulmonary  Breath sounds clear to auscultation               Abdominal  GI exam deferred       Other Findings            Anesthetic Plan    ASA: 3  Anesthesia type: general          Induction: Intravenous  Anesthetic plan and risks discussed with: Patient

## 2017-06-21 NOTE — IP AVS SNAPSHOT
[address] [facility] [address] [place] [address] 
[phone] Patient: Nadine Condon MRN: [id number] YMR:9/8/8772 You are allergic to the following No active allergies Recent Documentation Height Weight BMI OB Status Smoking Status 1.651 m 85.7 kg 31.45 kg/m2 Hysterectomy Former Smoker Emergency Contacts Name Discharge Info Relation Home Work [employer] [facility] DISCHARGE CAREGIVER [3] Child [2] [phone] About your hospitalization You were admitted on:  June 21, 2017 [facility] You were discharged on:  June 21, 2017 Unit phone number:  [phone] Why you were hospitalized Your primary diagnosis was:  Not on File Providers Seen During Your Hospitalizations Provider Role Specialty Primary office phone Yung Garcia MD Attending Provider Orthopedic Surgery [phone] Your Primary Care Physician (PCP) Primary Care Physician Office Phone Office Fax Delia Erwin [phone] Follow-up Information Follow up With Details Comments Contact Info Tito Hamm MD   [address] [facility] 1 [address] [facility] 
[phone] Yung Garcia MD Schedule an appointment as soon as possible for a visit in 2 week(s)  [address] [place] [address] [address] [place] [address] 
[phone] Your Appointments Tuesday June 27, 2017 10:30 AM EDT Medicare Physical with Tito Hamm MD  
[facility] at ED [facility]) [address] [place] [address] [address] [facility]  
[phone] Current Discharge Medication List  
  
START taking these medications Dose & Instructions Dispensing Information Comments Morning Noon Evening Bedtime  
 cephALEXin 500 mg capsule Commonly known as:  Jim Zuniga Your last dose was: Your next dose is:    
   
   
 Dose:  500 mg Take 1 Cap by mouth four (4) times daily for 1 day. Quantity:  4 Cap Refills:  0 HYDROcodone-acetaminophen 5-325 mg per tablet Commonly known as:  Mlromy Henley Your last dose was: Your next dose is:    
   
   
 Dose:  1 Tab Take 1 Tab by mouth every four (4) hours as needed for Pain. Max Daily Amount: 6 Tabs. Quantity:  30 Tab Refills:  0 CONTINUE these medications which have CHANGED Dose & Instructions Dispensing Information Comments Morning Noon Evening Bedtime  
 insulin lispro 100 unit/mL injection Commonly known as:  HumaLOG What changed:  how much to take Your last dose was: Your next dose is:    
   
   
 Dose:  18 Units 18 Units by SubCUTAneous route three (3) times daily. Quantity:  1 Vial  
Refills:  0 CONTINUE these medications which have NOT CHANGED Dose & Instructions Dispensing Information Comments Morning Noon Evening Bedtime  
 aspirin delayed-release 81 mg tablet Your last dose was: Your next dose is:    
   
   
 Dose:  81 mg Take 81 mg by mouth daily. Refills:  0  
     
   
   
   
  
 fish oil-omega-3 fatty acids 300-1,000 mg Cap Your last dose was: Your next dose is: TAKE ONE CAPSULE BY MOUTH TWICE A DAY Quantity:  180 Cap Refills:  3  
     
   
   
   
  
 gabapentin 300 mg capsule Commonly known as:  NEURONTIN Your last dose was: Your next dose is:    
   
   
 3 pills twice in the day and 2 tab at night. Quantity:  150 Cap Refills:  3  
     
   
   
   
  
 gemfibrozil 600 mg tablet Commonly known as:  LOPID Your last dose was: Your next dose is:    
   
   
 Dose:  600 mg Take 1 Tab by mouth two (2) times a day. Quantity:  60 Tab Refills:  0  
     
   
   
   
  
 insulin detemir 100 unit/mL (3 mL) Inpn Commonly known as:  Francis Ramos Your last dose was: Your next dose is:    
   
   
 Dose:  40 Units 40 Units by SubCUTAneous route two (2) times a day. Refills:  0  
     
   
   
   
  
 irbesartan 300 mg tablet Commonly known as:  AVAPRO Your last dose was: Your next dose is: TAKE 1 TABLET BY MOUTH EVERY DAY Quantity:  30 Tab Refills:  0  
     
   
   
   
  
 metFORMIN  mg tablet Commonly known as:  GLUCOPHAGE XR Your last dose was: Your next dose is: TAKE 1 TABLET BY MOUTH TWICE A DAY Quantity:  180 Tab Refills:  1  
     
   
   
   
  
 pravastatin 20 mg tablet Commonly known as:  PRAVACHOL Your last dose was: Your next dose is:    
   
   
 Dose:  20 mg Take 1 Tab by mouth nightly. Indications: HYPERCHOLESTEROLEMIA Quantity:  90 Tab Refills:  1 Where to Get Your Medications Information on where to get these meds will be given to you by the nurse or doctor. ! Ask your nurse or doctor about these medications  
  cephALEXin 500 mg capsule HYDROcodone-acetaminophen 5-325 mg per tablet Discharge Instructions May bear weight on heel only in post op shoe Maintain dressing until follow up Dr. Nita Pitts Postoperative Patient Instructions 1. Please maintain the dressing and/or splint placed at surgery until your follow up appointment. We will remove it at your appointment. Please keep the dressing clean and dry. If you have any questions or problems with your dressing, please call our office at [phone]. 
2. Please elevate the operative extremity to the level of the heart to keep swelling at a minimum. You may get up to move around, but when seated please keep the extremity elevated as much as possible. This will decrease swelling and postoperative pain. 3. If you were told to be non-weight bearing following surgery, please do not place the foot on the ground. You may use crutches or we can help arrange for a scooter for you to stay off of your operative leg. 4. If you are in a special shoe or boot and told that you may bear weight as tolerated, then you may do so, but please keep the extremity elevated when not moving around. 5. If you had a block from the Anesthesia doctor before your surgery, expect this to wear off around 12-24 hours after you received it and you should start taking your pain medication when the feeling begins to come back into your leg. 6. A prescription for pain medicine is provided. The key to pain control is staying ahead. For the first 48-72 hours after your surgery, you may want to take your pain medication of a regular schedule. After that, you may only need it on an as-needed basis. 7. Please begin taking one Enteric Coated Aspirin 325 mg daily on the morning after your surgery until you are told you do not need to do this anymore. This can lower the risk of developing a blood clot after surgery. If you are not sure if you can take an Aspirin daily, please check with your primary care doctor to verify. 8. Signs and symptoms of infection include: redness, increased pain, increased swelling not relieved by elevation, drainage, fever, or chills. If you develop any of these symptoms, call the office at [phone]. 
9. Please follow-up at my office at 12-15 days after surgery or when specified at your pre-operative appointment. Please follow the [address] [place] [address] 10 Discharge Instructions provided to you by Dr. Manish Brand for your medications. DATE OF FOLLOW-UP APPOINTMENT: _____________________________________ 
             [address] [facility] [address] Santiago MD 
6/21/2017  
 
______________________________________________________________________ Anesthesia Discharge Instructions After general anesthesia or intervenous sedation, for 24 hours or while taking prescription Narcotics: · Limit your activities · Do not drive or operate hazardous machinery · If you have not urinated within 8 hours after discharge, please contact your surgeon on call. · Do not make important personal or business decisions · Do not drink alcoholic beverages Report the following to your surgeon: 
· Excessive pain, swelling, redness or odor of or around the surgical area · Temperature over 100.5 degrees · Nausea and vomiting lasting longer than 4 hours or if unable to take medication · Any signs of decreased circulation or nerve impairment to extremity:  Change in color, persistent numbness, tingling, coldness or increased pain. · Any questions Discharge Instructions Attachments/References MEFS - CEPHALEXIN (BIO-CEF, KEFLEX) - (BY MOUTH) (ENGLISH) MEFS - HYDROCODONE/ACETAMINOPHEN (VICODIN, Edy Nigh, LORTAB) - (BY MOUTH) (ENGLISH) Discharge Orders None Introducing [facility]! Dear Alan Langley: 
Thank you for requesting a [employer] account. Our records indicate that you already have an active [employer] account. You can access your account anytime at [email or web address] Did you know that you can access your hospital and ER discharge instructions at any time in [employer]? You can also review all of your test results from your hospital stay or ER visit. Additional Information If you have questions, please visit the Frequently Asked Questions section of the [employer] website at [email or web address] Remember, [employer] is NOT to be used for urgent needs. For medical emergencies, dial 911. Now available from your iPhone and Android! General Information Please provide this summary of care documentation to your next provider. Patient Signature:  ____________________________________________________________ Date:  ____________________________________________________________  
  
Gatha Cyphers Provider Signature:  ____________________________________________________________ Date:  ____________________________________________________________ More Information Cephalexin (Bio-Cef, Keflex) - (By mouth) Why this medicine is used:  
Treats infections. Contact a nurse or doctor right away if you have: · Blistering, peeling, or red skin rash · Severe or bloody diarrhea Common side effects: · Mild diarrhea or nausea © 2017 [employer] Information is for End User's use only and may not be sold, redistributed or otherwise used for commercial purposes. Hydrocodone/Acetaminophen (Vicodin, Norco, Lortab) - (By mouth) Why this medicine is used:  
Treats pain. Contact a nurse or doctor right away if you have: · Blistering, peeling, red skin rash · Fast or slow heartbeat, shallow breathing, blue lips, fingernails, or skin · Anxiety, restlessness, muscle spasms, twitching, seeing or hearing things that are not there · Dark urine or pale stools, yellow skin or eyes · Extreme weakness, sweating, seizures, cold or clammy skin · Lightheadedness, dizziness, fainting, fever, sweating Common side effects: 
· Constipation, nausea, vomiting, loss of appetite, stomach pain · Tiredness or sleepiness © 2017 [employer] Information is for End User's use only and may not be sold, redistributed or otherwise used for commercial purposes.

## 2017-06-21 NOTE — DISCHARGE INSTRUCTIONS
May bear weight on heel only in post op shoe   Maintain dressing until follow up           Dr. Jenny Loomis Postoperative Patient Instructions    1. Please maintain the dressing and/or splint placed at surgery until your follow up appointment. We will remove it at your appointment. Please keep the dressing clean and dry. If you have any questions or problems with your dressing, please call our office at [phone].  2. Please elevate the operative extremity to the level of the heart to keep swelling at a minimum. You may get up to move around, but when seated please keep the extremity elevated as much as possible. This will decrease swelling and postoperative pain. 3. If you were told to be non-weight bearing following surgery, please do not place the foot on the ground. You may use crutches or we can help arrange for a scooter for you to stay off of your operative leg. 4. If you are in a special shoe or boot and told that you may bear weight as tolerated, then you may do so, but please keep the extremity elevated when not moving around. 5. If you had a block from the Anesthesia doctor before your surgery, expect this to wear off around 12-24 hours after you received it and you should start taking your pain medication when the feeling begins to come back into your leg. 6. A prescription for pain medicine is provided. The key to pain control is staying ahead. For the first 48-72 hours after your surgery, you may want to take your pain medication of a regular schedule. After that, you may only need it on an as-needed basis. 7. Please begin taking one Enteric Coated Aspirin 325 mg daily on the morning after your surgery until you are told you do not need to do this anymore. This can lower the risk of developing a blood clot after surgery. If you are not sure if you can take an Aspirin daily, please check with your primary care doctor to verify.   8. Signs and symptoms of infection include: redness, increased pain, increased swelling not relieved by elevation, drainage, fever, or chills. If you develop any of these symptoms, call the office at [phone]. 9. Please follow-up at my office at 12-15 days after surgery or when specified at your pre-operative appointment. Please follow the [address] [place] [address] Discharge Instructions provided to you by Dr. Juancho Jones for your medications. DATE OF FOLLOW-UP APPOINTMENT: _____________________________________               [address] [facility] 100    Ceferino Shannon MD  6/21/2017     ______________________________________________________________________    Anesthesia Discharge Instructions    After general anesthesia or intervenous sedation, for 24 hours or while taking prescription Narcotics:  · Limit your activities  · Do not drive or operate hazardous machinery  · If you have not urinated within 8 hours after discharge, please contact your surgeon on call. · Do not make important personal or business decisions  · Do not drink alcoholic beverages    Report the following to your surgeon:  · Excessive pain, swelling, redness or odor of or around the surgical area  · Temperature over 100.5 degrees  · Nausea and vomiting lasting longer than 4 hours or if unable to take medication  · Any signs of decreased circulation or nerve impairment to extremity:  Change in color, persistent numbness, tingling, coldness or increased pain.   · Any questions

## 2017-07-01 DIAGNOSIS — E78.1 HYPERTRIGLYCERIDEMIA: ICD-10-CM

## 2017-07-03 RX ORDER — GEMFIBROZIL 600 MG/1
TABLET, FILM COATED ORAL
Qty: 60 TAB | Refills: 0 | Status: SHIPPED | OUTPATIENT
Start: 2017-07-03 | End: 2017-09-25 | Stop reason: SDUPTHER

## 2017-07-03 RX ORDER — IRBESARTAN 300 MG/1
TABLET ORAL
Qty: 30 TAB | Refills: 0 | Status: SHIPPED | OUTPATIENT
Start: 2017-07-03 | End: 2017-07-17 | Stop reason: SDUPTHER

## 2017-07-11 ENCOUNTER — OFFICE VISIT (OUTPATIENT)
Dept: FAMILY MEDICINE CLINIC | Age: 57
End: 2017-07-11

## 2017-07-11 VITALS
BODY MASS INDEX: 32.82 KG/M2 | RESPIRATION RATE: 16 BRPM | TEMPERATURE: 97.8 F | HEART RATE: 76 BPM | HEIGHT: 65 IN | DIASTOLIC BLOOD PRESSURE: 91 MMHG | WEIGHT: 197 LBS | OXYGEN SATURATION: 96 % | SYSTOLIC BLOOD PRESSURE: 133 MMHG

## 2017-07-11 DIAGNOSIS — M48.061 SPINAL STENOSIS OF LUMBAR REGION: ICD-10-CM

## 2017-07-11 DIAGNOSIS — L30.9 ECZEMA, UNSPECIFIED TYPE: Primary | ICD-10-CM

## 2017-07-11 RX ORDER — TRIAMCINOLONE ACETONIDE 1 MG/G
OINTMENT TOPICAL 2 TIMES DAILY
Qty: 30 G | Refills: 0 | Status: SHIPPED | OUTPATIENT
Start: 2017-07-11 | End: 2017-08-25 | Stop reason: SDUPTHER

## 2017-07-11 RX ORDER — GABAPENTIN 300 MG/1
CAPSULE ORAL
Qty: 150 CAP | Refills: 2 | Status: SHIPPED | OUTPATIENT
Start: 2017-07-11 | End: 2017-12-01 | Stop reason: SDUPTHER

## 2017-07-11 RX ORDER — CHLORTHALIDONE 25 MG/1
TABLET ORAL DAILY
COMMUNITY
End: 2017-07-12 | Stop reason: SDUPTHER

## 2017-07-11 RX ORDER — PRAVASTATIN SODIUM 40 MG/1
40 TABLET ORAL
COMMUNITY
End: 2017-07-12 | Stop reason: SDUPTHER

## 2017-07-11 RX ORDER — LIDOCAINE 50 MG/G
1 PATCH TOPICAL EVERY 24 HOURS
Qty: 30 EACH | Refills: 0 | Status: SHIPPED | OUTPATIENT
Start: 2017-07-11 | End: 2017-08-15 | Stop reason: ALTCHOICE

## 2017-07-11 RX ORDER — HYDROCODONE BITARTRATE AND ACETAMINOPHEN 5; 325 MG/1; MG/1
1 TABLET ORAL
Qty: 20 TAB | Refills: 0 | Status: SHIPPED | OUTPATIENT
Start: 2017-07-11 | End: 2017-08-11 | Stop reason: SDUPTHER

## 2017-07-11 RX ORDER — TIZANIDINE 2 MG/1
2 TABLET ORAL
Qty: 30 TAB | Refills: 0 | Status: SHIPPED | OUTPATIENT
Start: 2017-07-11 | End: 2017-09-03 | Stop reason: SDUPTHER

## 2017-07-11 NOTE — PROGRESS NOTES
Low Back Pain  Sandra Diaz is a 62 y.o. female who presents for evaluation of groin pain    Went to ED twice for foot pain, had operation on foot eventually: exostosis and ganglion cyst removal.    Had one episode of hypoglycemia for her it was in 80. Rash started out on lower left leg, X 2 weeks, slowly climbing up leg to lower knee. Small cluster of round lesion, some looks like vesicles. Denies going in the yard or trying anything new. Report had similar issue years ago and saw dermatologist and was given a cream and it helped. One lesion on left lower leg. Low back problems. Since last visit have seen new orthopedic and had 2 steroid injection to the spine, didn't think it helps. At this time we'll try to manage pain. Try combination of muscle relaxant, gabapentin, pain patch first, she report they do not help, and norco 5mg every day PRN. We'll f/u to see which ones provide the most benefit. PMHx of laminectomy. She was seeing Dr. Bobby Acosta at [address] [place] [address] who had ordered MRI. She have f/u with him but he told her to take ibuprofen. MRI showed moderate to severe neural foraminal narrowing at different levels. Symptoms have been present for 6 weeks and include numbness in knee down to ankle, pain in groin down to ankle. Initial inciting event: none. Symptoms are worst: all day. Alleviating factors identifiable by patient are sitting. Exacerbating factors identifiable by patient are standing, walking. Previous lower back problems: Have had surgery on her back 3 times. She have finished PT. 'Red Flags' for Fracture:  1. Recent history of major trauma: no  2. Minor trauma or strenuous lifting in older or potentially osteoporotic patient: no  3. History of chronic corticosteroid therapy: no    Red Flags' Cauda Equina Syndrome:  1. Complaint of saddle anesthesia: no  2. Recent onset of bladder dysfunction, guanaco. retention: no  3.  Severe or progressive LE neurologic deficit: no    ROS:  Constitutional: negative for fevers, chills and fatigue  Respiratory: negative for cough or dyspnea on exertion  Cardiovascular: negative for chest pain, dyspnea, palpitations  Gastrointestinal: negative for nausea, vomiting, diarrhea and abdominal pain  Genitourinary:per HPI  Musculoskeletal: per HPI      No Known Allergies          Past Medical History:   Diagnosis Date    Arthritis     Back pain     Breast cancer screening 6/28/2016    Chronic bilateral low back pain without sciatica 6/28/2016    Diabetes ([facility]     Encounter for long-term (current) use of medications 6/28/2016    Essential hypertension with goal blood pressure less than 140/90 6/28/2016    Hypercholesteremia 8/6/2015    Hypercholesterolemia     Hypertension     Ill-defined condition     hyperlipidemia    Other ill-defined conditions     high cholesterol    Patient non adherence 1/23/2017    [facility], [facility] 1/23/2017    Type II diabetes mellitus, uncontrolled ([facility] 8/6/2015      Current Outpatient Prescriptions   Medication Sig Dispense Refill    pravastatin (PRAVACHOL) 40 mg tablet Take 40 mg by mouth nightly.  chlorthalidone (HYGROTEN) 25 mg tablet Take  by mouth daily.  gabapentin (NEURONTIN) 300 mg capsule TAKE THREE CAPSULES BY MOUTH IN THE MORNING AND AFTERNOON AND TAKE TWO CAPSULES EVERY NIGHT AT BEDTIME 150 Cap 2    triamcinolone acetonide (KENALOG) 0.1 % ointment Apply  to affected area two (2) times a day. use thin layer 30 g 0    lidocaine (LIDODERM) 5 % 1 Patch by TransDERmal route every twenty-four (24) hours. Apply patch to the affected area for 12 hours a day and remove for 12 hours a day. 30 Each 0    tiZANidine (ZANAFLEX) 2 mg tablet Take 1 Tab by mouth nightly. 30 Tab 0    HYDROcodone-acetaminophen (NORCO) 5-325 mg per tablet Take 1 Tab by mouth daily as needed for Pain.  20 Tab 0    gemfibrozil (LOPID) 600 mg tablet TAKE ONE TABLET BY MOUTH TWICE A DAY 60 Tab 0    irbesartan (AVAPRO) 300 mg tablet TAKE ONE TABLET BY MOUTH DAILY 30 Tab 0    metFORMIN ER (GLUCOPHAGE XR) 750 mg tablet TAKE 1 TABLET BY MOUTH TWICE A  Tab 1    insulin lispro (HUMALOG) 100 unit/mL injection 18 Units by SubCUTAneous route three (3) times daily. (Patient taking differently: 24 Units by SubCUTAneous route three (3) times daily. ) 1 Vial 0    fish oil-omega-3 fatty acids 300-1,000 mg cap TAKE ONE CAPSULE BY MOUTH TWICE A  Cap 3    insulin detemir (LEVEMIR FLEXTOUCH) 100 unit/mL (3 mL) inpn 40 Units by SubCUTAneous route two (2) times a day.  aspirin delayed-release 81 mg tablet Take 81 mg by mouth daily. Objective:     Visit Vitals    BP (!) 133/91 (BP 1 Location: Right arm, BP Patient Position: Sitting)    Pulse 76    Temp 97.8 °F (36.6 °C) (Oral)    Resp 16    Ht 5' 5\" (1.651 m)    Wt 197 lb (89.4 kg)    SpO2 96%    BMI 32.78 kg/m2       General appearance - alert, well appearing, and in no distress  Mental status - alert, oriented to person, place, and time  Chest - clear to auscultation, no wheezes, rales or rhonchi, symmetric air entry  Heart - normal rate, regular rhythm, normal S1, S2, no murmurs, rubs, clicks or gallops  Abdomen - soft, nontender, nondistended, no masses or organomegaly  Neurological - alert, oriented, normal speech, no focal findings or movement disorder noted  Back - Normal ROM    Skin: lower left leg, X 2 weeks, slowly climbing up leg to lower knee. Small cluster of round lesion, some looks like vesicles. Denies going in the yard or trying anything new. Report had similar issue years ago and saw dermatologist and was given a cream and it helped. One lesion on left lower leg.      General: alert, cooperative, no distress, appears stated age   Body habitus: mildly obese   Gait: antalgic   Visible deformity? no   Leg muscle asymmetry? no   Tender spinous processes? no   Tender back or buttock? no         Strength Testing: Normal bilat lower legs and feet   Reflexes: 0, knees bilat   Sensory Exam (Light Touch): Normal bilat   Straight Leg Raise Testing: negative bilat       Assessment/Plan:     Kika Nobles was seen today for groin pain and rash. Diagnoses and all orders for this visit:    Eczema, unspecified type  -     triamcinolone acetonide (KENALOG) 0.1 % ointment; Apply  to affected area two (2) times a day. use thin layer    Spinal stenosis of lumbar region  -     gabapentin (NEURONTIN) 300 mg capsule; TAKE THREE CAPSULES BY MOUTH IN THE MORNING AND AFTERNOON AND TAKE TWO CAPSULES EVERY NIGHT AT BEDTIME  -     lidocaine (LIDODERM) 5 %; 1 Patch by TransDERmal route every twenty-four (24) hours. Apply patch to the affected area for 12 hours a day and remove for 12 hours a day. -     tiZANidine (ZANAFLEX) 2 mg tablet; Take 1 Tab by mouth nightly.  -     HYDROcodone-acetaminophen (NORCO) 5-325 mg per tablet; Take 1 Tab by mouth daily as needed for Pain. I have discussed the diagnosis with the patient and the intended plan as seen in the above orders. The patient has received an after-visit summary and questions were answered concerning future plans. I have discussed medication side effects and warnings with the patient as well. Follow-up Disposition:  Return in about 4 weeks (around 8/8/2017) for low back pain.       Hamlet Jones MD  7/11/2017

## 2017-07-11 NOTE — MR AVS SNAPSHOT
Visit Information Date & Time Provider Department Dept. Phone Encounter #  
 7/11/2017  3:00 PM Nimisha Alvarez MD [facility] at [address] [id number] Follow-up Instructions Return in about 4 weeks (around 8/8/2017) for low back pain. Upcoming Health Maintenance Date Due Hepatitis C Screening 1960 FOOT EXAM Q1 6/4/1970 Pneumococcal 19-64 Medium Risk (1 of 1 - PPSV23) 6/4/1979 PAP AKA CERVICAL CYTOLOGY 6/27/2006 FOBT Q 1 YEAR AGE 50-75 6/4/2010 EYE EXAM RETINAL OR DILATED Q1 3/11/2017 MICROALBUMIN Q1 6/28/2017 HEMOGLOBIN A1C Q6M 7/23/2017 INFLUENZA AGE 9 TO ADULT 8/1/2017 LIPID PANEL Q1 1/23/2018 BREAST CANCER SCRN MAMMOGRAM 8/12/2018 DTaP/Tdap/Td series (2 - Td) 3/24/2027 Allergies as of 7/11/2017  Review Complete On: 6/21/2017 By: Jodie Gutiérrez RN No Known Allergies Current Immunizations  Reviewed on 9/24/2015 Name Date Influenza Vaccine Mrak Kappa) 10/8/2015 Not reviewed this visit You Were Diagnosed With   
  
 Codes Comments Eczema, unspecified type    -  Primary ICD-10-CM: L30.9 ICD-9-CM: 692.9 Spinal stenosis of lumbar region     ICD-10-CM: M48.06 
ICD-9-CM: 724.02 Vitals BP Pulse Temp Resp Height(growth percentile) Weight(growth percentile) (!) 133/91 (BP 1 Location: Right arm, BP Patient Position: Sitting) 76 97.8 °F (36.6 °C) (Oral) 16 5' 5\" (1.651 m) 197 lb (89.4 kg) SpO2 BMI OB Status Smoking Status 96% 32.78 kg/m2 Hysterectomy Former Smoker Vitals History BMI and BSA Data Body Mass Index Body Surface Area 32.78 kg/m 2 2.02 m 2 Preferred Pharmacy Pharmacy Name Phone Dayana Chilel [address] [facility] [address] [facility] [phone] Your Updated Medication List  
  
   
This list is accurate as of: 7/11/17  3:24 PM.  Always use your most recent med list.  
  
  
  
  
 aspirin delayed-release 81 mg tablet Take 81 mg by mouth daily. chlorthalidone 25 mg tablet Commonly known as:  Earnie Sleet Take  by mouth daily. fish oil-omega-3 fatty acids 300-1,000 mg Cap TAKE ONE CAPSULE BY MOUTH TWICE A DAY  
  
 gabapentin 300 mg capsule Commonly known as:  NEURONTIN  
TAKE THREE CAPSULES BY MOUTH IN THE MORNING AND AFTERNOON AND TAKE TWO CAPSULES EVERY NIGHT AT BEDTIME  
  
 gemfibrozil 600 mg tablet Commonly known as:  LOPID TAKE ONE TABLET BY MOUTH TWICE A DAY HYDROcodone-acetaminophen 5-325 mg per tablet Commonly known as:  Eva Jose Take 1 Tab by mouth daily as needed for Pain. insulin detemir 100 unit/mL (3 mL) Inpn Commonly known as:  LEVEMIR FLEXTOUCH  
40 Units by SubCUTAneous route two (2) times a day. insulin lispro 100 unit/mL injection Commonly known as:  HumaLOG  
18 Units by SubCUTAneous route three (3) times daily. irbesartan 300 mg tablet Commonly known as:  AVAPRO TAKE ONE TABLET BY MOUTH DAILY  
  
 lidocaine 5 % Commonly known as:  LIDODERM  
1 Patch by TransDERmal route every twenty-four (24) hours. Apply patch to the affected area for 12 hours a day and remove for 12 hours a day. metFORMIN  mg tablet Commonly known as:  GLUCOPHAGE XR  
TAKE 1 TABLET BY MOUTH TWICE A DAY  
  
 pravastatin 40 mg tablet Commonly known as:  PRAVACHOL Take 40 mg by mouth nightly. tiZANidine 2 mg tablet Commonly known as:  Diego Able Take 1 Tab by mouth nightly. triamcinolone acetonide 0.1 % ointment Commonly known as:  KENALOG Apply  to affected area two (2) times a day. use thin layer Prescriptions Printed Refills  
 lidocaine (LIDODERM) 5 % 0 Si Patch by TransDERmal route every twenty-four (24) hours. Apply patch to the affected area for 12 hours a day and remove for 12 hours a day. Class: Print  Route: TransDERmal  
 HYDROcodone-acetaminophen (NORCO) 5-325 mg per tablet 0  
 Sig: Take 1 Tab by mouth daily as needed for Pain. Class: Print Route: Oral  
  
Prescriptions Sent to Pharmacy Refills  
 gabapentin (NEURONTIN) 300 mg capsule 2 Sig: TAKE THREE CAPSULES BY MOUTH IN THE MORNING AND AFTERNOON AND TAKE TWO CAPSULES EVERY NIGHT AT BEDTIME Class: Normal  
 Pharmacy: [facility] [address]  Ph #: [phone]  
 triamcinolone acetonide (KENALOG) 0.1 % ointment 0 Sig: Apply  to affected area two (2) times a day. use thin layer Class: Normal  
 Pharmacy: [place] [address] Ph #: [phone] Route: Topical  
 tiZANidine (ZANAFLEX) 2 mg tablet 0 Sig: Take 1 Tab by mouth nightly. Class: Normal  
 Pharmacy: [place] [address] Ph #: [phone] Route: Oral  
  
Follow-up Instructions Return in about 4 weeks (around 8/8/2017) for low back pain. Introducing [facility]! Dear Oseas Mejia: 
Thank you for requesting a [employer] account. Our records indicate that you already have an active [employer] account. You can access your account anytime at [email or web address] Did you know that you can access your hospital and ER discharge instructions at any time in Didatuan? You can also review all of your test results from your hospital stay or ER visit. Additional Information If you have questions, please visit the Frequently Asked Questions section of the [employer] website at [email or web address] Remember, Didatuan is NOT to be used for urgent needs. For medical emergencies, dial 911. Now available from your iPhone and Android! Please provide this summary of care documentation to your next provider. Your primary care clinician is listed as Laina Quevedo. If you have any questions after today's visit, please call [phone].

## 2017-07-12 DIAGNOSIS — M48.061 SPINAL STENOSIS OF LUMBAR REGION: ICD-10-CM

## 2017-07-12 RX ORDER — CHLORTHALIDONE 25 MG/1
25 TABLET ORAL DAILY
Qty: 90 TAB | Refills: 1 | Status: SHIPPED | OUTPATIENT
Start: 2017-07-12 | End: 2018-02-18 | Stop reason: SDUPTHER

## 2017-07-12 RX ORDER — PRAVASTATIN SODIUM 40 MG/1
40 TABLET ORAL
Qty: 90 TAB | Refills: 1 | Status: SHIPPED | OUTPATIENT
Start: 2017-07-12 | End: 2017-08-15 | Stop reason: SINTOL

## 2017-07-17 RX ORDER — IRBESARTAN 300 MG/1
TABLET ORAL
Qty: 30 TAB | Refills: 5 | Status: SHIPPED | OUTPATIENT
Start: 2017-07-17 | End: 2018-03-26 | Stop reason: SDUPTHER

## 2017-07-18 ENCOUNTER — TELEPHONE (OUTPATIENT)
Dept: FAMILY MEDICINE CLINIC | Age: 57
End: 2017-07-18

## 2017-08-11 ENCOUNTER — OFFICE VISIT (OUTPATIENT)
Dept: FAMILY MEDICINE CLINIC | Age: 57
End: 2017-08-11

## 2017-08-11 VITALS
HEIGHT: 65 IN | HEART RATE: 63 BPM | BODY MASS INDEX: 32.99 KG/M2 | DIASTOLIC BLOOD PRESSURE: 83 MMHG | SYSTOLIC BLOOD PRESSURE: 141 MMHG | OXYGEN SATURATION: 97 % | WEIGHT: 198 LBS | TEMPERATURE: 97.8 F | RESPIRATION RATE: 16 BRPM

## 2017-08-11 DIAGNOSIS — Z23 ENCOUNTER FOR IMMUNIZATION: ICD-10-CM

## 2017-08-11 DIAGNOSIS — Z00.00 MEDICARE ANNUAL WELLNESS VISIT, SUBSEQUENT: Primary | ICD-10-CM

## 2017-08-11 DIAGNOSIS — Z79.899 ENCOUNTER FOR LONG-TERM (CURRENT) USE OF MEDICATIONS: ICD-10-CM

## 2017-08-11 DIAGNOSIS — G89.29 CHRONIC LOW BACK PAIN WITH SCIATICA, SCIATICA LATERALITY UNSPECIFIED, UNSPECIFIED BACK PAIN LATERALITY: ICD-10-CM

## 2017-08-11 DIAGNOSIS — E78.00 HYPERCHOLESTEREMIA: ICD-10-CM

## 2017-08-11 DIAGNOSIS — M54.40 CHRONIC LOW BACK PAIN WITH SCIATICA, SCIATICA LATERALITY UNSPECIFIED, UNSPECIFIED BACK PAIN LATERALITY: ICD-10-CM

## 2017-08-11 DIAGNOSIS — I10 ESSENTIAL HYPERTENSION WITH GOAL BLOOD PRESSURE LESS THAN 140/90: ICD-10-CM

## 2017-08-11 DIAGNOSIS — M48.061 SPINAL STENOSIS OF LUMBAR REGION: ICD-10-CM

## 2017-08-11 RX ORDER — HYDROCODONE BITARTRATE AND ACETAMINOPHEN 5; 325 MG/1; MG/1
1 TABLET ORAL
Qty: 20 TAB | Refills: 0 | Status: SHIPPED | OUTPATIENT
Start: 2017-08-11 | End: 2017-12-01 | Stop reason: ALTCHOICE

## 2017-08-11 NOTE — PROGRESS NOTES
This is a Regine-Raj Exam (AWV)     I have reviewed the patient's medical history in detail and updated the computerized patient record. Rash on lower left leg, is resolved. Knee pain. Advice that i've been seeing her for pain. That this will need to wait since it's not acute. I will refer her to chronic pain clinic. For her back pain we'll refill her norco #20, she's to see pain clinic for management. She haven't done annual exam, check on diabetes, HTN, cholesterol or other medication. HTN: BP borderline today. Report compliance with her meds. HLD: she's no longer taking pravastatin, report causes leg cramps. We'll d/c. To take fish oil 2000u every day. DM2: taking oral meds. She's not checking her BG regularly. Denies hypoglycemic or hyperglycemic symptoms. We'll get labs monitoring.        History     Past Medical History:   Diagnosis Date    Arthritis     Back pain     Breast cancer screening 6/28/2016    Chronic bilateral low back pain without sciatica 6/28/2016    Diabetes ([address]     Encounter for long-term (current) use of medications 6/28/2016    Essential hypertension with goal blood pressure less than 140/90 6/28/2016    Hypercholesteremia 8/6/2015    Hypercholesterolemia     Hypertension     Ill-defined condition     hyperlipidemia    Other ill-defined conditions     high cholesterol    Patient non adherence 1/23/2017    Type 2 diabetes, uncontrolled, with neuropathy ([address] 1/23/2017    Type II diabetes mellitus, uncontrolled ([address] 8/6/2015      Past Surgical History:   Procedure Laterality Date    CARDIAC SURG PROCEDURE UNLIST      murmur- PT DENIES SURGERY AND MURMUR 6/20/17    HX CARPAL TUNNEL RELEASE      HX GYN      hysterectomy    HX HEENT      Ear surgery    HX HEENT      Left eye    HX ORTHOPAEDIC      left wrist & left elbow    HX ORTHOPAEDIC      Back Surgery x 3    HX ORTHOPAEDIC      right thumb at [address] OTHER SURGICAL      back surgery X 2    HX OTHER SURGICAL      Collapsed left lung    HX OTHER SURGICAL      axillary sweat glands removed    HX OVARIAN CYST REMOVAL      CA OTOPLASTY PROTRUDING EAR W/WO SIZE RDCTJ       Current Outpatient Prescriptions   Medication Sig Dispense Refill    HYDROcodone-acetaminophen (NORCO) 5-325 mg per tablet Take 1 Tab by mouth daily as needed for Pain. 20 Tab 0    irbesartan (AVAPRO) 300 mg tablet TAKE ONE TABLET BY MOUTH DAILY 30 Tab 5    chlorthalidone (HYGROTEN) 25 mg tablet Take 1 Tab by mouth daily. 90 Tab 1    gabapentin (NEURONTIN) 300 mg capsule TAKE THREE CAPSULES BY MOUTH IN THE MORNING AND AFTERNOON AND TAKE TWO CAPSULES EVERY NIGHT AT BEDTIME 150 Cap 2    triamcinolone acetonide (KENALOG) 0.1 % ointment Apply  to affected area two (2) times a day. use thin layer 30 g 0    tiZANidine (ZANAFLEX) 2 mg tablet Take 1 Tab by mouth nightly. 30 Tab 0    gemfibrozil (LOPID) 600 mg tablet TAKE ONE TABLET BY MOUTH TWICE A DAY 60 Tab 0    metFORMIN ER (GLUCOPHAGE XR) 750 mg tablet TAKE 1 TABLET BY MOUTH TWICE A  Tab 1    insulin lispro (HUMALOG) 100 unit/mL injection 18 Units by SubCUTAneous route three (3) times daily. (Patient taking differently: 24 Units by SubCUTAneous route three (3) times daily. ) 1 Vial 0    fish oil-omega-3 fatty acids 300-1,000 mg cap TAKE ONE CAPSULE BY MOUTH TWICE A  Cap 3    insulin detemir (LEVEMIR FLEXTOUCH) 100 unit/mL (3 mL) inpn 40 Units by SubCUTAneous route two (2) times a day.  aspirin delayed-release 81 mg tablet Take 81 mg by mouth daily.        Allergies   Allergen Reactions    Pravachol [Pravastatin] Myalgia     Family History   Problem Relation Age of Onset    Heart Disease Mother     Heart Attack Mother     No Known Problems Father     Cancer Sister      UNKNOWN    Diabetes Son     Sickle Cell Anemia Daughter     Anesth Problems Son      CHEST PAIN     Social History   Substance Use Topics    Smoking status: Former Smoker     Packs/day: 1.00     Years: 30.00     Quit date: 4/1/2005    Smokeless tobacco: Never Used    Alcohol use No     Patient Active Problem List   Diagnosis Code    HTN (hypertension) I10    Type II diabetes mellitus, uncontrolled ([facility] E11.65    Hypercholesteremia E78.00    Chronic lower back pain M54.5, G89.29    Sickle cell trait ([facility] D57.3    Essential hypertension with goal blood pressure less than 140/90 I10    Encounter for long-term (current) use of medications Z79.899    Breast cancer screening Z12.39    Type 2 diabetes, uncontrolled, with neuropathy ([facility] E11.40, E11.65    Patient non adherence Z91.19         Depression Risk Factor Screening:     PHQ over the last two weeks 8/6/2015   Little interest or pleasure in doing things Not at all   Feeling down, depressed or hopeless Not at all   Total Score PHQ 2 0     Alcohol Risk Factor Screening: On any occasion during the past 3 months, have you had more than 3 drinks containing alcohol? Yes    Do you average more than 7 drinks per week? No      Functional Ability and Level of Safety:     Hearing Loss   none    Activities of Daily Living   Self-care. Requires assistance with: no ADLs    Fall Risk   No flowsheet data found. Abuse Screen   Patient is not abused    Review of Systems   A comprehensive review of systems was negative except for that written in the HPI.     Physical Examination     Evaluation of Cognitive Function:  Mood/affect:  neutral  Appearance: age appropriate, casually dressed and overweight  Family member/caregiver input: none needed    Visit Vitals    /83    Pulse 63    Temp 97.8 °F (36.6 °C) (Oral)    Resp 16    Ht 5' 5\" (1.651 m)    Wt 198 lb (89.8 kg)    SpO2 97%    BMI 32.95 kg/m2     General appearance - alert, well appearing, and in no distress  Mental status - alert, oriented to person, place, and time  Chest - clear to auscultation, no wheezes, rales or rhonchi, symmetric air entry  Heart - normal rate, regular rhythm, normal S1, S2, no murmurs, rubs, clicks or gallops  Abdomen - soft, nontender, nondistended, no masses or organomegaly  Neurological - alert, oriented, normal speech, no focal findings or movement disorder noted  Back - Normal ROM    Patient Care Team:  Elin Adkins MD as PCP - General (Family Practice)  Edward Lin MD (Family Practice)  Bobby Acosta MD (Neurosurgery)    Advice/Referrals/Counseling   Education and counseling provided:  Are appropriate based on today's review and evaluation  Pneumococcal Vaccine  Influenza Vaccine  Screening Mammography  Colorectal cancer screening tests  Diabetes screening test    Assessment/Plan     Diagnoses and all orders for this visit:    1. Medicare annual wellness visit, subsequent  -     LIPID PANEL  -     HEMOGLOBIN A1C W/O EAG  -     CBC W/O DIFF  -     METABOLIC PANEL, COMPREHENSIVE  -     MICROALBUMIN, UR, RAND W/ MICROALBUMIN/CREA RATIO  -     TSH RFX ON ABNORMAL TO FREE T4  -     VITAMIN D, 25 HYDROXY  -     Pneumococcal Polysac (PPSV) 23-Valent  -     OCCULT BLOOD, IMMUNOASSAY (FIT)    2. Chronic low back pain with sciatica, sciatica laterality unspecified, unspecified back pain laterality  -     REFERRAL TO PAIN CLINIC  -     HYDROcodone-acetaminophen (NORCO) 5-325 mg per tablet; Take 1 Tab by mouth daily as needed for Pain. 3. Type 2 diabetes, uncontrolled, with neuropathy (HCC)  -     HEMOGLOBIN A1C W/O EAG  -     MICROALBUMIN, UR, RAND W/ MICROALBUMIN/CREA RATIO    4. Essential hypertension with goal blood pressure less than 906/91  -     METABOLIC PANEL, COMPREHENSIVE  -     MICROALBUMIN, UR, RAND W/ MICROALBUMIN/CREA RATIO    5.  Encounter for long-term (current) use of medications  -     LIPID PANEL  -     HEMOGLOBIN A1C W/O EAG  -     CBC W/O DIFF  -     METABOLIC PANEL, COMPREHENSIVE  -     MICROALBUMIN, UR, RAND W/ MICROALBUMIN/CREA RATIO  -     TSH RFX ON ABNORMAL TO FREE T4  -     VITAMIN D, 25 HYDROXY    6. Hypercholesteremia  -     LIPID PANEL  -     METABOLIC PANEL, COMPREHENSIVE    7. Encounter for immunization  -     Pneumococcal Polysac (PPSV) 23-Valent  -     OCCULT BLOOD, IMMUNOASSAY (FIT)    8. Spinal stenosis of lumbar region  -     HYDROcodone-acetaminophen (NORCO) 5-325 mg per tablet; Take 1 Tab by mouth daily as needed for Pain. Other orders  -     CKD REPORT  -     DIABETES PATIENT EDUCATION      Follow-up Disposition:  Return in about 2 months (around 10/11/2017) for diabetes, HTN, cholesterol, sooner if labs abnormal.      Analia Vazquez MD  8/15/2017.

## 2017-08-11 NOTE — MR AVS SNAPSHOT
Visit Information Date & Time Provider Department Dept. Phone Encounter #  
 8/11/2017 11:30 AM Hamlet Jones MD [facility] at [address] [id number] Follow-up Instructions Return in about 2 months (around 10/11/2017). Upcoming Health Maintenance Date Due Hepatitis C Screening 1960 FOOT EXAM Q1 6/4/1970 Pneumococcal 19-64 Medium Risk (1 of 1 - PPSV23) 6/4/1979 PAP AKA CERVICAL CYTOLOGY 6/27/2006 FOBT Q 1 YEAR AGE 50-75 6/4/2010 EYE EXAM RETINAL OR DILATED Q1 3/11/2017 MICROALBUMIN Q1 6/28/2017 HEMOGLOBIN A1C Q6M 7/23/2017 INFLUENZA AGE 9 TO ADULT 8/1/2017 LIPID PANEL Q1 1/23/2018 BREAST CANCER SCRN MAMMOGRAM 8/12/2018 DTaP/Tdap/Td series (2 - Td) 3/24/2027 Allergies as of 8/11/2017  Review Complete On: 8/11/2017 By: Hamlet Jones MD  
 No Known Allergies Current Immunizations  Reviewed on 9/24/2015 Name Date Influenza Vaccine Elenora Gemma) 10/8/2015 Pneumococcal Polysaccharide (PPSV-23)  Incomplete Not reviewed this visit You Were Diagnosed With   
  
 Codes Comments Medicare annual wellness visit, subsequent    -  Primary ICD-10-CM: Z00.00 ICD-9-CM: V70.0 Chronic low back pain with sciatica, sciatica laterality unspecified, unspecified back pain laterality     ICD-10-CM: M54.40, G89.29 ICD-9-CM: 724.2, 724.3, 338.29 Type 2 diabetes, uncontrolled, with neuropathy (HCC)     ICD-10-CM: E11.40, E11.65 ICD-9-CM: 250.62, 357.2 Essential hypertension with goal blood pressure less than 140/90     ICD-10-CM: I10 
ICD-9-CM: 401.9 Encounter for long-term (current) use of medications     ICD-10-CM: Z79.899 ICD-9-CM: V58.69 Hypercholesteremia     ICD-10-CM: E78.00 ICD-9-CM: 272.0 Encounter for immunization     ICD-10-CM: L21 ICD-9-CM: V03.89 Spinal stenosis of lumbar region     ICD-10-CM: M48.06 
ICD-9-CM: 724.02 Vitals BP Pulse Temp Resp Height(growth percentile) Weight(growth percentile) 141/83 63 97.8 °F (36.6 °C) (Oral) 16 5' 5\" (1.651 m) 198 lb (89.8 kg) SpO2 BMI OB Status Smoking Status 97% 32.95 kg/m2 Hysterectomy Former Smoker Vitals History BMI and BSA Data Body Mass Index Body Surface Area 32.95 kg/m 2 2.03 m 2 Preferred Pharmacy Pharmacy Name Phone Jessica Barrera [address] [facility] [address] [facility] [phone] Your Updated Medication List  
  
   
This list is accurate as of: 8/11/17 11:54 AM.  Always use your most recent med list.  
  
  
  
  
 aspirin delayed-release 81 mg tablet Take 81 mg by mouth daily. chlorthalidone 25 mg tablet Commonly known as:  Danuta Roles Take 1 Tab by mouth daily. fish oil-omega-3 fatty acids 300-1,000 mg Cap TAKE ONE CAPSULE BY MOUTH TWICE A DAY  
  
 gabapentin 300 mg capsule Commonly known as:  NEURONTIN  
TAKE THREE CAPSULES BY MOUTH IN THE MORNING AND AFTERNOON AND TAKE TWO CAPSULES EVERY NIGHT AT BEDTIME  
  
 gemfibrozil 600 mg tablet Commonly known as:  LOPID TAKE ONE TABLET BY MOUTH TWICE A DAY HYDROcodone-acetaminophen 5-325 mg per tablet Commonly known as:  Two Dot Laity Take 1 Tab by mouth daily as needed for Pain. insulin detemir 100 unit/mL (3 mL) Inpn Commonly known as:  LEVEMIR FLEXTOUCH  
40 Units by SubCUTAneous route two (2) times a day. insulin lispro 100 unit/mL injection Commonly known as:  HumaLOG  
18 Units by SubCUTAneous route three (3) times daily. irbesartan 300 mg tablet Commonly known as:  AVAPRO TAKE ONE TABLET BY MOUTH DAILY  
  
 lidocaine 5 % Commonly known as:  LIDODERM  
1 Patch by TransDERmal route every twenty-four (24) hours. Apply patch to the affected area for 12 hours a day and remove for 12 hours a day. metFORMIN  mg tablet Commonly known as:  GLUCOPHAGE XR  
TAKE 1 TABLET BY MOUTH TWICE A DAY  
  
 pravastatin 40 mg tablet Commonly known as:  PRAVACHOL Take 1 Tab by mouth nightly. tiZANidine 2 mg tablet Commonly known as:  Temo Vicky Take 1 Tab by mouth nightly. triamcinolone acetonide 0.1 % ointment Commonly known as:  KENALOG Apply  to affected area two (2) times a day. use thin layer Prescriptions Printed Refills HYDROcodone-acetaminophen (NORCO) 5-325 mg per tablet 0 Sig: Take 1 Tab by mouth daily as needed for Pain. Class: Print Route: Oral  
  
We Performed the Following CBC W/O DIFF [29842 CPT(R)] HEMOGLOBIN A1C W/O EAG [34808 CPT(R)] LIPID PANEL [99231 CPT(R)] METABOLIC PANEL, COMPREHENSIVE [50131 CPT(R)] MICROALBUMIN, UR, RAND W/ MICROALBUMIN/CREA RATIO [id number] CPT(R)] OCCULT BLOOD, IMMUNOASSAY (FIT) [id number] CPT(R)] PNEUMOCOCCAL POLYSACCHARIDE VACCINE, 23-VALENT, ADULT OR IMMUNOSUPPRESSED PT DOSE, [70990 CPT(R)] REFERRAL TO PAIN CLINIC [MBW96 Custom] Comments:  
 Please evaluate patient for low back pain, hx of back surgery X3. TSH RFX ON ABNORMAL TO FREE T4 [[id number] Custom] VITAMIN D, 25 HYDROXY [id number] CPT(R)] Follow-up Instructions Return in about 2 months (around 10/11/2017). Referral Information Referral ID Referred By Referred To  
  
 [id number] DELIA, [address] [facility] [place] [address] [place], [address] Visits Status Start Date End Date 1 New Request 8/11/17 8/11/18 If your referral has a status of pending review or denied, additional information will be sent to support the outcome of this decision. Introducing [facility]! Dear Selina Merlin: 
Thank you for requesting a [place] [employer] account. Our records indicate that you already have an active [place] [employer] account. You can access your account anytime at [email or web address] Did you know that you can access your hospital and ER discharge instructions at any time in [employer]-One? You can also review all of your test results from your hospital stay or ER visit. Additional Information If you have questions, please visit the Frequently Asked Questions section of the [employer] website at [email or web address] Remember, [employer]-One is NOT to be used for urgent needs. For medical emergencies, dial 911. Now available from your iPhone and Android! Please provide this summary of care documentation to your next provider. Your primary care clinician is listed as Daniel Jacobsen. If you have any questions after today's visit, please call [phone].

## 2017-08-12 LAB
25(OH)D3+25(OH)D2 SERPL-MCNC: 22.8 NG/ML (ref 30–100)
ALBUMIN SERPL-MCNC: 4.7 G/DL (ref 3.5–5.5)
ALBUMIN/CREAT UR: 12.6 MG/G CREAT (ref 0–30)
ALBUMIN/GLOB SERPL: 1.7 {RATIO} (ref 1.2–2.2)
ALP SERPL-CCNC: 48 IU/L (ref 39–117)
ALT SERPL-CCNC: 31 IU/L (ref 0–32)
AST SERPL-CCNC: 29 IU/L (ref 0–40)
BILIRUB SERPL-MCNC: 0.3 MG/DL (ref 0–1.2)
BUN SERPL-MCNC: 21 MG/DL (ref 6–24)
BUN/CREAT SERPL: 20 (ref 9–23)
CALCIUM SERPL-MCNC: 9.4 MG/DL (ref 8.7–10.2)
CHLORIDE SERPL-SCNC: 100 MMOL/L (ref 96–106)
CHOLEST SERPL-MCNC: 172 MG/DL (ref 100–199)
CO2 SERPL-SCNC: 20 MMOL/L (ref 18–29)
CREAT SERPL-MCNC: 1.07 MG/DL (ref 0.57–1)
CREAT UR-MCNC: 72.8 MG/DL
ERYTHROCYTE [DISTWIDTH] IN BLOOD BY AUTOMATED COUNT: 17.2 % (ref 12.3–15.4)
GLOBULIN SER CALC-MCNC: 2.7 G/DL (ref 1.5–4.5)
GLUCOSE SERPL-MCNC: 182 MG/DL (ref 65–99)
HBA1C MFR BLD: 9.3 % (ref 4.8–5.6)
HCT VFR BLD AUTO: 32.7 % (ref 34–46.6)
HDLC SERPL-MCNC: 34 MG/DL
HGB BLD-MCNC: 9.9 G/DL (ref 11.1–15.9)
INTERPRETATION: NORMAL
LDLC SERPL CALC-MCNC: 102 MG/DL (ref 0–99)
Lab: NORMAL
MCH RBC QN AUTO: 21.8 PG (ref 26.6–33)
MCHC RBC AUTO-ENTMCNC: 30.3 G/DL (ref 31.5–35.7)
MCV RBC AUTO: 72 FL (ref 79–97)
MICROALBUMIN UR-MCNC: 9.2 UG/ML
PLATELET # BLD AUTO: 296 X10E3/UL (ref 150–379)
POTASSIUM SERPL-SCNC: 5.1 MMOL/L (ref 3.5–5.2)
PROT SERPL-MCNC: 7.4 G/DL (ref 6–8.5)
RBC # BLD AUTO: 4.54 X10E6/UL (ref 3.77–5.28)
SODIUM SERPL-SCNC: 138 MMOL/L (ref 134–144)
TRIGL SERPL-MCNC: 181 MG/DL (ref 0–149)
TSH SERPL DL<=0.005 MIU/L-ACNC: 1.48 UIU/ML (ref 0.45–4.5)
VLDLC SERPL CALC-MCNC: 36 MG/DL (ref 5–40)
WBC # BLD AUTO: 4.7 X10E3/UL (ref 3.4–10.8)

## 2017-08-16 ENCOUNTER — TELEPHONE (OUTPATIENT)
Dept: FAMILY MEDICINE CLINIC | Age: 57
End: 2017-08-16

## 2017-08-16 NOTE — TELEPHONE ENCOUNTER
Called pt per Dr. Georjean Lombard, no answer message left to tell her that her lab results came back with high cholesterol, uncontrolled diabetes, low vit D. Dr. Georjean Lombard wants to see her sooner than her next appt. 10/11/17. Ask her to return call and make a sooner appt.

## 2017-08-18 LAB
HEMOCCULT STL QL IA: NEGATIVE
PLEASE NOTE:, 188601: NORMAL

## 2017-08-25 DIAGNOSIS — L30.9 ECZEMA, UNSPECIFIED TYPE: ICD-10-CM

## 2017-08-28 RX ORDER — TRIAMCINOLONE ACETONIDE 1 MG/G
OINTMENT TOPICAL 2 TIMES DAILY
Qty: 30 G | Refills: 0 | Status: SHIPPED | OUTPATIENT
Start: 2017-08-28 | End: 2018-02-01 | Stop reason: SDUPTHER

## 2017-09-03 DIAGNOSIS — M48.061 SPINAL STENOSIS OF LUMBAR REGION: ICD-10-CM

## 2017-09-05 RX ORDER — TIZANIDINE 2 MG/1
TABLET ORAL
Qty: 30 TAB | Refills: 0 | Status: SHIPPED | OUTPATIENT
Start: 2017-09-05 | End: 2017-12-01 | Stop reason: ALTCHOICE

## 2017-09-25 DIAGNOSIS — E78.1 HYPERTRIGLYCERIDEMIA: ICD-10-CM

## 2017-09-25 RX ORDER — GEMFIBROZIL 600 MG/1
TABLET, FILM COATED ORAL
Qty: 180 TAB | Refills: 1 | Status: SHIPPED | OUTPATIENT
Start: 2017-09-25 | End: 2018-08-02 | Stop reason: ALTCHOICE

## 2017-10-11 ENCOUNTER — OFFICE VISIT (OUTPATIENT)
Dept: FAMILY MEDICINE CLINIC | Age: 57
End: 2017-10-11

## 2017-10-11 ENCOUNTER — TELEPHONE (OUTPATIENT)
Dept: FAMILY MEDICINE CLINIC | Age: 57
End: 2017-10-11

## 2017-10-11 VITALS
OXYGEN SATURATION: 100 % | HEART RATE: 72 BPM | SYSTOLIC BLOOD PRESSURE: 155 MMHG | BODY MASS INDEX: 32.15 KG/M2 | HEIGHT: 65 IN | RESPIRATION RATE: 16 BRPM | WEIGHT: 193 LBS | DIASTOLIC BLOOD PRESSURE: 93 MMHG | TEMPERATURE: 97.1 F

## 2017-10-11 DIAGNOSIS — E55.9 VITAMIN D DEFICIENCY: ICD-10-CM

## 2017-10-11 DIAGNOSIS — Z79.899 ENCOUNTER FOR LONG-TERM (CURRENT) USE OF MEDICATIONS: ICD-10-CM

## 2017-10-11 DIAGNOSIS — I10 ESSENTIAL HYPERTENSION WITH GOAL BLOOD PRESSURE LESS THAN 140/90: ICD-10-CM

## 2017-10-11 DIAGNOSIS — E78.00 HYPERCHOLESTEREMIA: ICD-10-CM

## 2017-10-11 RX ORDER — ERGOCALCIFEROL 1.25 MG/1
50000 CAPSULE ORAL
Qty: 12 CAP | Refills: 1 | Status: SHIPPED | OUTPATIENT
Start: 2017-10-11 | End: 2018-03-19 | Stop reason: SDUPTHER

## 2017-10-11 RX ORDER — PRAVASTATIN SODIUM 40 MG/1
TABLET ORAL
COMMUNITY
Start: 2017-02-07 | End: 2017-10-11 | Stop reason: SINTOL

## 2017-10-11 RX ORDER — CARVEDILOL 25 MG/1
TABLET ORAL
COMMUNITY
Start: 2016-04-11 | End: 2017-04-06

## 2017-10-11 RX ORDER — CARVEDILOL 25 MG/1
25 TABLET ORAL 2 TIMES DAILY
COMMUNITY
Start: 2017-09-30 | End: 2020-01-27

## 2017-10-11 NOTE — PROGRESS NOTES
Perla Hoover is a 62 y.o. female     has a past medical history of Arthritis; Back pain; Breast cancer screening (6/28/2016); Chronic bilateral low back pain without sciatica (6/28/2016); Diabetes ([facility] Encounter for long-term (current) use of medications (6/28/2016); Essential hypertension with goal blood pressure less than 140/90 (6/28/2016); Hypercholesteremia (8/6/2015); Hypercholesterolemia; Hypertension; Ill-defined condition; Other ill-defined conditions(799.89); Patient non adherence (1/23/2017); Type 2 diabetes, uncontrolled, with neuropathy ([facility] (1/23/2017); and Type II diabetes mellitus, uncontrolled ([facility] (8/6/2015). Vit d deficiency: start 00695 Q 7 days. DM2: A1C 9.3% down form 9.7 in Jan 2017. Increase lantus to 42u BID. Continue humalog TID with meals, metformin. She doesn't donnie BG logs or checks them regularly. Advice to do so as always. HTN: BP high, she didn't take her antihypertensive this morning. HLD: continue fish oil, can't tolerate statins. Reviewed: active problem list, medication list, allergies, notes from last encounter, lab results    A comprehensive review of systems was negative except for that written in the HPI. Allergies   Allergen Reactions    Pravachol [Pravastatin] Myalgia     Current Outpatient Prescriptions on File Prior to Visit   Medication Sig Dispense Refill    gemfibrozil (LOPID) 600 mg tablet TAKE ONE TABLET BY MOUTH TWICE A  Tab 1    triamcinolone acetonide (KENALOG) 0.1 % ointment Apply  to affected area two (2) times a day. 30 g 0    irbesartan (AVAPRO) 300 mg tablet TAKE ONE TABLET BY MOUTH DAILY 30 Tab 5    chlorthalidone (HYGROTEN) 25 mg tablet Take 1 Tab by mouth daily.  90 Tab 1    gabapentin (NEURONTIN) 300 mg capsule TAKE THREE CAPSULES BY MOUTH IN THE MORNING AND AFTERNOON AND TAKE TWO CAPSULES EVERY NIGHT AT BEDTIME 150 Cap 2    metFORMIN ER (GLUCOPHAGE XR) 750 mg tablet TAKE 1 TABLET BY MOUTH TWICE A  Tab 1  insulin lispro (HUMALOG) 100 unit/mL injection 18 Units by SubCUTAneous route three (3) times daily. (Patient taking differently: 24 Units by SubCUTAneous route three (3) times daily. ) 1 Vial 0    fish oil-omega-3 fatty acids 300-1,000 mg cap TAKE ONE CAPSULE BY MOUTH TWICE A  Cap 3    aspirin delayed-release 81 mg tablet Take 81 mg by mouth daily.  tiZANidine (ZANAFLEX) 2 mg tablet TAKE ONE TABLET BY MOUTH EVERY NIGHT AT BEDTIME 30 Tab 0    HYDROcodone-acetaminophen (NORCO) 5-325 mg per tablet Take 1 Tab by mouth daily as needed for Pain. 20 Tab 0     No current facility-administered medications on file prior to visit. Patient Active Problem List   Diagnosis Code    Hypercholesteremia E78.00    Chronic lower back pain M54.5, G89.29    Sickle cell trait ([facility] D57.3    Essential hypertension with goal blood pressure less than 140/90 I10    Encounter for long-term (current) use of medications Z79.899    Breast cancer screening Z12.31    Type 2 diabetes, uncontrolled, with neuropathy ([facility] E11.40, E11.65    Patient non adherence Z91.19       Visit Vitals    BP (!) 155/93    Pulse 72    Temp 97.1 °F (36.2 °C) (Oral)    Resp 16    Ht 5' 5\" (1.651 m)    Wt 193 lb (87.5 kg)    SpO2 100%    BMI 32.12 kg/m2       General appearance: alert, cooperative, no distress, appears stated age  Neurologic: Alert and oriented X 3, normal strength and tone, symmetric. Normal without focal findings. Cranial nerves 2-12 intact. Normal coordination and gait. Mental status: Alert, oriented, thought content appropriate, affect: stable, mood-congruent. Head: Normocephalic, without obvious abnormality, atraumatic  Eyes: conjunctivae/corneas clear. PERRL, EOM's intact. Neck: supple, symmetrical, trachea midline, no JVD  Lungs: clear to auscultation bilaterally  Heart: regular rate and rhythm, S1, S2 normal, no murmur, click, rub or gallop  Abdomen: soft, non-tender.      Feet: dry, warm, skin intact, structure intact. normal DP and PT pulses, no trophic changes or ulcerative lesions  Right foot normal monofilament exam  Left foot decrease sensation up to lower calf. Assessment/Plans:    Diagnoses and all orders for this visit:    1. Type 2 diabetes, uncontrolled, with neuropathy (HCC)  -     ergocalciferol (ERGOCALCIFEROL) 50,000 unit capsule; Take 1 Cap by mouth every seven (7) days. -      DIABETES FOOT EXAM  -     METABOLIC PANEL, COMPREHENSIVE  -     HEMOGLOBIN A1C W/O EAG    2. Essential hypertension with goal blood pressure less than 718/76  -     METABOLIC PANEL, COMPREHENSIVE    3. Hypercholesteremia  -     METABOLIC PANEL, COMPREHENSIVE    4. Vitamin D deficiency  -     insulin detemir (LEVEMIR FLEXTOUCH) 100 unit/mL (3 mL) inpn; 42 Units by SubCUTAneous route two (2) times a day. 5. Encounter for long-term (current) use of medications  -     METABOLIC PANEL, COMPREHENSIVE  -     HEMOGLOBIN A1C W/O EAG      Discussed plans, risk/benefits of treatments/observations. Through the use of shared decision making, above plans were agreed upon. Medication compliance advised. Patient verbalized understanding. Follow-up Disposition:  Return in about 3 months (around 1/11/2018) for diabetes, HTN.       Liberty Mccartney MD  10/11/2017

## 2017-10-11 NOTE — MR AVS SNAPSHOT
Visit Information Date & Time Provider Department Dept. Phone Encounter #  
 10/11/2017 11:20 AM [place] [facility] [place] [address] [id number] Follow-up Instructions Return in about 3 months (around 1/11/2018) for diabetes, HTN. Upcoming Health Maintenance Date Due Hepatitis C Screening 1960 FOOT EXAM Q1 6/4/1970 PAP AKA CERVICAL CYTOLOGY 6/27/2006 EYE EXAM RETINAL OR DILATED Q1 3/11/2017 HEMOGLOBIN A1C Q6M 2/11/2018 MICROALBUMIN Q1 8/11/2018 LIPID PANEL Q1 8/11/2018 BREAST CANCER SCRN MAMMOGRAM 8/12/2018 FOBT Q 1 YEAR AGE 50-75 8/16/2018 DTaP/Tdap/Td series (2 - Td) 3/24/2027 Allergies as of 10/11/2017  Review Complete On: 10/11/2017 By: Eliezer Arnett LPN Severity Noted Reaction Type Reactions Pravachol [Pravastatin]  08/15/2017    Myalgia Current Immunizations  Reviewed on 8/11/2017 Name Date Influenza Vaccine Fara Olive) 10/8/2015 Pneumococcal Polysaccharide (PPSV-23) 8/11/2017 Not reviewed this visit You Were Diagnosed With   
  
 Codes Comments Type 2 diabetes, uncontrolled, with neuropathy ([facility]    -  Primary ICD-10-CM: E11.40, E11.65 ICD-9-CM: 250.62, 357.2 Essential hypertension with goal blood pressure less than 140/90     ICD-10-CM: I10 
ICD-9-CM: 401.9 Hypercholesteremia     ICD-10-CM: E78.00 ICD-9-CM: 272.0 Vitamin D deficiency     ICD-10-CM: E55.9 ICD-9-CM: 268.9 Encounter for long-term (current) use of medications     ICD-10-CM: Z79.899 ICD-9-CM: V58.69 Vitals BP Pulse Temp Resp Height(growth percentile) Weight(growth percentile) (!) 155/93 72 97.1 °F (36.2 °C) (Oral) 16 5' 5\" (1.651 m) 193 lb (87.5 kg) SpO2 BMI OB Status Smoking Status 100% 32.12 kg/m2 Hysterectomy Former Smoker Vitals History BMI and BSA Data Body Mass Index Body Surface Area  
 32.12 kg/m 2 2 m 2 Preferred Pharmacy Pharmacy Name Phone Lilia B[facility] [address] [facility] [address] [facility] [phone] Your Updated Medication List  
  
   
This list is accurate as of: 10/11/17 11:49 AM.  Always use your most recent med list.  
  
  
  
  
 aspirin delayed-release 81 mg tablet Take 81 mg by mouth daily. carvedilol 25 mg tablet Commonly known as:  COREG  
  
 chlorthalidone 25 mg tablet Commonly known as:  Germaine Phoenix Take 1 Tab by mouth daily. ergocalciferol 50,000 unit capsule Commonly known as:  ERGOCALCIFEROL Take 1 Cap by mouth every seven (7) days. fish oil-omega-3 fatty acids 300-1,000 mg Cap TAKE ONE CAPSULE BY MOUTH TWICE A DAY  
  
 gabapentin 300 mg capsule Commonly known as:  NEURONTIN  
TAKE THREE CAPSULES BY MOUTH IN THE MORNING AND AFTERNOON AND TAKE TWO CAPSULES EVERY NIGHT AT BEDTIME  
  
 gemfibrozil 600 mg tablet Commonly known as:  LOPID TAKE ONE TABLET BY MOUTH TWICE A DAY HYDROcodone-acetaminophen 5-325 mg per tablet Commonly known as:  Alana President Take 1 Tab by mouth daily as needed for Pain. insulin detemir 100 unit/mL (3 mL) Inpn Commonly known as:  LEVEMIR FLEXTOUCH  
42 Units by SubCUTAneous route two (2) times a day. insulin lispro 100 unit/mL injection Commonly known as:  HumaLOG  
18 Units by SubCUTAneous route three (3) times daily. irbesartan 300 mg tablet Commonly known as:  AVAPRO TAKE ONE TABLET BY MOUTH DAILY  
  
 metFORMIN  mg tablet Commonly known as:  GLUCOPHAGE XR  
TAKE 1 TABLET BY MOUTH TWICE A DAY  
  
 tiZANidine 2 mg tablet Commonly known as:  Brigitte Sohan TAKE ONE TABLET BY MOUTH EVERY NIGHT AT BEDTIME  
  
 triamcinolone acetonide 0.1 % ointment Commonly known as:  KENALOG Apply  to affected area two (2) times a day. Prescriptions Sent to Pharmacy Refills  
 ergocalciferol (ERGOCALCIFEROL) 50,000 unit capsule 1 Sig: Take 1 Cap by mouth every seven (7) days.   
 Class: Normal  
 Pharmacy: Sourav Avilez [address] [facility] [address] Ph #: [phone] Route: Oral  
  
We Performed the Following HEMOGLOBIN A1C W/O EAG [38270 CPT(R)]  DIABETES FOOT EXAM [HM7 Custom] METABOLIC PANEL, COMPREHENSIVE [50282 CPT(R)] Follow-up Instructions Return in about 3 months (around 1/11/2018) for diabetes, HTN. Introducing [facility] & [facility] SERVICES! Dear Abhi Clemens: 
Thank you for requesting a GOODWIN account. Our records indicate that you already have an active GOODWIN account. You can access your account anytime at [email or web address] Did you know that you can access your hospital and ER discharge instructions at any time in GOODWIN? You can also review all of your test results from your hospital stay or ER visit. Additional Information If you have questions, please visit the Frequently Asked Questions section of the GOODWIN website at [email or web address] Remember, GOODWIN is NOT to be used for urgent needs. For medical emergencies, dial 911. Now available from your iPhone and Android! Please provide this summary of care documentation to your next provider. Your primary care clinician is listed as Lesa Mercado. If you have any questions after today's visit, please call [phone].

## 2017-10-11 NOTE — TELEPHONE ENCOUNTER
Pt wants to confirm if she is to stop taking - pravastatin       Best number to reach her is [phone]

## 2017-10-12 ENCOUNTER — TELEPHONE (OUTPATIENT)
Dept: FAMILY MEDICINE CLINIC | Age: 57
End: 2017-10-12

## 2017-10-12 NOTE — TELEPHONE ENCOUNTER
Called pt message left to tell her to take pravastatin if she can tolerate it if not take fish oil .

## 2017-10-27 ENCOUNTER — HOSPITAL ENCOUNTER (EMERGENCY)
Age: 57
Discharge: HOME | End: 2017-10-27
Attending: EMERGENCY MEDICINE
Payer: COMMERCIAL

## 2017-10-27 VITALS
SYSTOLIC BLOOD PRESSURE: 155 MMHG | RESPIRATION RATE: 18 BRPM | HEART RATE: 69 BPM | OXYGEN SATURATION: 97 % | TEMPERATURE: 98.8 F | DIASTOLIC BLOOD PRESSURE: 80 MMHG

## 2017-10-27 DIAGNOSIS — G89.29 CHRONIC LEFT-SIDED LOW BACK PAIN, WITH SCIATICA PRESENCE UNSPECIFIED: Primary | ICD-10-CM

## 2017-10-27 DIAGNOSIS — M54.16 LUMBAR RADICULOPATHY: ICD-10-CM

## 2017-10-27 DIAGNOSIS — M54.5 CHRONIC LEFT-SIDED LOW BACK PAIN, WITH SCIATICA PRESENCE UNSPECIFIED: Primary | ICD-10-CM

## 2017-10-27 PROCEDURE — 74011250636 HC RX REV CODE- 250/636: Performed by: PHYSICIAN ASSISTANT

## 2017-10-27 PROCEDURE — 96372 THER/PROPH/DIAG INJ SC/IM: CPT

## 2017-10-27 PROCEDURE — 99282 EMERGENCY DEPT VISIT SF MDM: CPT

## 2017-10-27 RX ORDER — CYCLOBENZAPRINE HCL 10 MG
10 TABLET ORAL
Qty: 15 TAB | Refills: 0 | Status: SHIPPED | OUTPATIENT
Start: 2017-10-27 | End: 2017-12-01 | Stop reason: ALTCHOICE

## 2017-10-27 RX ORDER — DICLOFENAC POTASSIUM 50 MG/1
50 TABLET, FILM COATED ORAL 3 TIMES DAILY
Qty: 15 TAB | Refills: 0 | Status: SHIPPED | OUTPATIENT
Start: 2017-10-27 | End: 2018-05-02 | Stop reason: ALTCHOICE

## 2017-10-27 RX ORDER — KETOROLAC TROMETHAMINE 30 MG/ML
60 INJECTION, SOLUTION INTRAMUSCULAR; INTRAVENOUS
Status: COMPLETED | OUTPATIENT
Start: 2017-10-27 | End: 2017-10-27

## 2017-10-27 RX ADMIN — KETOROLAC TROMETHAMINE 60 MG: 30 INJECTION, SOLUTION INTRAMUSCULAR at 10:21

## 2017-10-27 NOTE — DISCHARGE INSTRUCTIONS
Back Pain: Care Instructions  Your Care Instructions    Back pain has many possible causes. It is often related to problems with muscles and ligaments of the back. It may also be related to problems with the nerves, discs, or bones of the back. Moving, lifting, standing, sitting, or sleeping in an awkward way can strain the back. Sometimes you don't notice the injury until later. Arthritis is another common cause of back pain. Although it may hurt a lot, back pain usually improves on its own within several weeks. Most people recover in 12 weeks or less. Using good home treatment and being careful not to stress your back can help you feel better sooner. Follow-up care is a key part of your treatment and safety. Be sure to make and go to all appointments, and call your doctor if you are having problems. It's also a good idea to know your test results and keep a list of the medicines you take. How can you care for yourself at home? · Sit or lie in positions that are most comfortable and reduce your pain. Try one of these positions when you lie down:  ¨ Lie on your back with your knees bent and supported by large pillows. ¨ Lie on the floor with your legs on the seat of a sofa or chair. Tildon Floss on your side with your knees and hips bent and a pillow between your legs. ¨ Lie on your stomach if it does not make pain worse. · Do not sit up in bed, and avoid soft couches and twisted positions. Bed rest can help relieve pain at first, but it delays healing. Avoid bed rest after the first day of back pain. · Change positions every 30 minutes. If you must sit for long periods of time, take breaks from sitting. Get up and walk around, or lie in a comfortable position. · Try using a heating pad on a low or medium setting for 15 to 20 minutes every 2 or 3 hours. Try a warm shower in place of one session with the heating pad. · You can also try an ice pack for 10 to 15 minutes every 2 to 3 hours.  Put a thin cloth between the ice pack and your skin. · Take pain medicines exactly as directed. ¨ If the doctor gave you a prescription medicine for pain, take it as prescribed. ¨ If you are not taking a prescription pain medicine, ask your doctor if you can take an over-the-counter medicine. · Take short walks several times a day. You can start with 5 to 10 minutes, 3 or 4 times a day, and work up to longer walks. Walk on level surfaces and avoid hills and stairs until your back is better. · Return to work and other activities as soon as you can. Continued rest without activity is usually not good for your back. · To prevent future back pain, do exercises to stretch and strengthen your back and stomach. Learn how to use good posture, safe lifting techniques, and proper body mechanics. When should you call for help? Call your doctor now or seek immediate medical care if:  ? · You have new or worsening numbness in your legs. ? · You have new or worsening weakness in your legs. (This could make it hard to stand up.)   ? · You lose control of your bladder or bowels. ? Watch closely for changes in your health, and be sure to contact your doctor if:  ? · Your pain gets worse. ? · You are not getting better after 2 weeks. Where can you learn more? Go to http://betina-dominick.info/. Enter B373 in the search box to learn more about \"Back Pain: Care Instructions. \"  Current as of: March 21, 2017  Content Version: 11.4  © 4211-2300 [employer]. Care instructions adapted under license by [employer] (which disclaims liability or warranty for this information). If you have questions about a medical condition or this instruction, always ask your healthcare professional. [place] [address] any warranty or liability for your use of this information.          Back Pain, Emergency or Urgent Symptoms: Care Instructions  Your Care Instructions    Many people have back pain at one time or another. In most cases, pain gets better with self-care that includes over-the-counter pain medicine, ice, heat, and exercises. Unless you have symptoms of a severe injury or heart attack, you may be able to give yourself a few days before you call a doctor. But some back problems are very serious. Do not ignore symptoms that need to be checked right away. Follow-up care is a key part of your treatment and safety. Be sure to make and go to all appointments, and call your doctor if you are having problems. It's also a good idea to know your test results and keep a list of the medicines you take. How can you care for yourself at home? · Sit or lie in positions that are most comfortable and that reduce your pain. Try one of these positions when you lie down:  ¨ Lie on your back with your knees bent and supported by large pillows. ¨ Lie on the floor with your legs on the seat of a sofa or chair. Josephus Phlegm on your side with your knees and hips bent and a pillow between your legs. ¨ Lie on your stomach if it does not make pain worse. · Do not sit up in bed, and avoid soft couches and twisted positions. Bed rest can help relieve pain at first, but it delays healing. Avoid bed rest after the first day. · Change positions every 30 minutes. If you must sit for long periods of time, take breaks from sitting. Get up and walk around, or lie flat. · Try using a heating pad on a low or medium setting, for 15 to 20 minutes every 2 or 3 hours. Try a warm shower in place of one session with the heating pad. You can also buy single-use heat wraps that last up to 8 hours. You can also try ice or cold packs on your back for 10 to 20 minutes at a time, several times a day. (Put a thin cloth between the ice pack and your skin.) This reduces pain and makes it easier to be active and exercise. · Take pain medicines exactly as directed. ¨ If the doctor gave you a prescription medicine for pain, take it as prescribed.   ¨ If you are not taking a prescription pain medicine, ask your doctor if you can take an over-the-counter medicine. When should you call for help? Call 911 anytime you think you may need emergency care. For example, call if:  ? · You are unable to move a leg at all. ? · You have back pain with severe belly pain. ? · You have symptoms of a heart attack. These may include:  ¨ Chest pain or pressure, or a strange feeling in the chest.  ¨ Sweating. ¨ Shortness of breath. ¨ Nausea or vomiting. ¨ Pain, pressure, or a strange feeling in the back, neck, jaw, or upper belly or in one or both shoulders or arms. ¨ Lightheadedness or sudden weakness. ¨ A fast or irregular heartbeat. After you call 911, the  may tell you to chew 1 adult-strength or 2 to 4 low-dose aspirin. Wait for an ambulance. Do not try to drive yourself. ?Call your doctor now or seek immediate medical care if:  ? · You have new or worse symptoms in your arms, legs, chest, belly, or buttocks. Symptoms may include:  ¨ Numbness or tingling. ¨ Weakness. ¨ Pain. ? · You lose bladder or bowel control. ? · You have back pain and:  ¨ You have injured your back while lifting or doing some other activity. Call if the pain is severe, has not gone away after 1 or 2 days, and you cannot do your normal daily activities. ¨ You have had a back injury before that needed treatment. ¨ Your pain has lasted longer than 4 weeks. ¨ You have had weight loss you cannot explain. ¨ You are age 48 or older. ¨ You have cancer now or have had it before. ? Watch closely for changes in your health, and be sure to contact your doctor if you are not getting better as expected. Where can you learn more? Go to http://betina-dominick.info/. Enter S973 in the search box to learn more about \"Back Pain, Emergency or Urgent Symptoms: Care Instructions. \"  Current as of: March 20, 2017  Content Version: 11.4  © 0292-7953 [employer].  Care instructions adapted under license by [employer] (which disclaims liability or warranty for this information). If you have questions about a medical condition or this instruction, always ask your healthcare professional. [address] any warranty or liability for your use of this information.

## 2017-10-27 NOTE — ED PROVIDER NOTES
HPI Comments: 62year old female presenting to the ED for buttock/leg pain. Pt notes that the leg/groin pain has been going on for about 1-2 years, notes that the buttock pain started about 1-2 months ago. Pt notes that when she stands up she has left low back, buttock,  and anterior/lateral thigh pain, worsened with walking. States that sometimes she feels like she has numbness in the knee. Pt notes that she has been seeing Dr. Julisa Reeves (ortho), has been receiving steroid injections in her back and hip, most recently this past summer. [place] [facility] [place]. Pt notes that today she came in primarily because of buttock pain. Denies swelling or redness in the leg. Notes that sometimes the left leg feels weak and notes that she [place] in the mall parking lot a couple of weeks ago. Patient denies hx VTE, recent immobilization, exogenous estrogen use. Pt has tried ibuprofen, aleve, and tylenol at home with minimal relief. Patient denies urinary retention, incontinence of bowel or bladder, perineal numbness, fever, or history of IV drug abuse. No long-term steroid use. Pt notes that her last [place] was about one year ago [place] [facility]. Pt was referred to spinal surgery (Edyta) by the physician who does her injections and has an appt next month. No CP, SOB, or other concerns. PMHx: high cholesterol, DM, HTN, back pain  Social: non-smoker      Patient is a 62 y.o. female presenting with leg pain. The history is provided by the patient. Leg Pain    Associated symptoms include back pain.         Past Medical History:   Diagnosis Date    Arthritis     Back pain     Breast cancer screening 6/28/2016    Chronic bilateral low back pain without sciatica 6/28/2016    Diabetes ([facility] [address]     Encounter for long-term (current) use of medications 6/28/2016    Essential hypertension with goal blood pressure less than 140/90 6/28/2016    Hypercholesteremia 8/6/2015    Hypercholesterolemia     Hypertension     Ill-defined condition     hyperlipidemia    Other ill-defined conditions(799.89)     high cholesterol    Patient non adherence 1/23/2017    Type 2 diabetes, uncontrolled, with neuropathy ([facility] [address] 1/23/2017    Type II diabetes mellitus, uncontrolled ([facility] [address] 8/6/2015       Past Surgical History:   Procedure Laterality Date    CARDIAC SURG PROCEDURE UNLIST      murmur- PT DENIES SURGERY AND MURMUR 6/20/17    HX CARPAL TUNNEL RELEASE      HX GYN      hysterectomy    HX HEENT      Ear surgery    HX HEENT      Left eye    HX ORTHOPAEDIC      left wrist & left elbow    HX ORTHOPAEDIC      Back Surgery x 3    HX ORTHOPAEDIC      right thumb at [address] HX OTHER SURGICAL      back surgery X 2    HX OTHER SURGICAL      Collapsed left lung    HX OTHER SURGICAL      axillary sweat glands removed    HX OVARIAN CYST REMOVAL      ME OTOPLASTY PROTRUDING EAR W/WO SIZE RDCTJ           Family History:   Problem Relation Age of Onset    Heart Disease Mother     Heart Attack Mother     No Known Problems Father     Cancer Sister      UNKNOWN    Diabetes Son     Sickle Cell Anemia Daughter     Anesth Problems Son      CHEST PAIN       Social History     Social History    Marital status: SINGLE     Spouse name: N/A    Number of children: N/A    Years of education: N/A     Occupational History    Not on file. Social History Main Topics    Smoking status: Former Smoker     Packs/day: 1.00     Years: 30.00     Quit date: 4/1/2005    Smokeless tobacco: Never Used    Alcohol use No    Drug use: No    Sexual activity: No     Other Topics Concern    Not on file     Social History Narrative    ** Merged History Encounter **              ALLERGIES: Pravachol [pravastatin]    Review of Systems   Constitutional: Negative for fever. HENT: Negative for congestion. Eyes: Negative for discharge. Respiratory: Negative for shortness of breath. Cardiovascular: Negative for chest pain.    Gastrointestinal: Negative for diarrhea and vomiting. Genitourinary: Negative for difficulty urinating. Musculoskeletal: Positive for back pain and myalgias. Negative for joint swelling. Skin: Negative for wound. Neurological: Negative for syncope. All other systems reviewed and are negative. Vitals:    10/27/17 0940   BP: 155/80   Pulse: 69   Resp: 18   Temp: 98.8 °F (37.1 °C)   SpO2: 97%            Physical Exam   Constitutional: She appears well-developed and well-nourished. No distress. HENT:   Head: Normocephalic and atraumatic. Right Ear: External ear normal.   Left Ear: External ear normal.   Eyes: Conjunctivae are normal. Pupils are equal, round, and reactive to light. Right eye exhibits no discharge. Left eye exhibits no discharge. Neck: Normal range of motion. Neck supple. No C-spine midline tenderness   Cardiovascular: Normal rate, regular rhythm and normal heart sounds. Exam reveals no gallop and no friction rub. No murmur heard. Distal pulses intact   Pulmonary/Chest: Effort normal and breath sounds normal. No respiratory distress. Abdominal: Soft. She exhibits no distension. Musculoskeletal:   No CVA tenderness  + left lumbar muscular TTP extending into the left buttock   Neurological: She is alert. She has normal strength. She is not disoriented. No sensory deficit. 5/5 strength at the ankles, knees, and hips  Sensation grossly intact distally  Normal achilles reflexes   Skin: Skin is warm and dry. Psychiatric: She has a normal mood and affect. Her behavior is normal.   Nursing note and vitals reviewed. MDM  Number of Diagnoses or Management Options  Diagnosis management comments: 62year old female presenting for chronic back pain with radicular symptoms. Occasional feelings of numbness and weakness.   No red flag symptoms c/f cauda equina, epidural abscess, etc.  Good strength on exam.  Will try course of NSAID with muscle relaxer, encouraged pt to keep ortho f/u as planned.        Amount and/or Complexity of Data Reviewed  Discuss the patient with other providers: yes (Dr. Peace Colvin, ED attending)      ED Course       Procedures

## 2017-10-27 NOTE — ED NOTES
The patient displayed an understanding in discharge information and has no further questions at the time of discharge. The patient was discharged home with (2) prescription(s). The patient appears to be in no respiratory distress at the time of discharge. The patient is ambulatory out of ER with a steady gait.

## 2017-10-27 NOTE — ED TRIAGE NOTES
Pt arrives from home c/o left leg pain for 5-6 mo. Pt is being treated by Dr. Kenyon Tejada (ortho) and reports steroid shots are not working. Pt ambulatory to room with steady gait.  Denies injury/trauma

## 2017-11-27 ENCOUNTER — TELEPHONE (OUTPATIENT)
Dept: FAMILY MEDICINE CLINIC | Age: 57
End: 2017-11-27

## 2017-11-29 RX ORDER — SENNOSIDES 25 MG/1
TABLET, FILM COATED ORAL
COMMUNITY
End: 2017-12-01 | Stop reason: ALTCHOICE

## 2017-12-01 ENCOUNTER — OFFICE VISIT (OUTPATIENT)
Dept: FAMILY MEDICINE CLINIC | Age: 57
End: 2017-12-01

## 2017-12-01 VITALS
BODY MASS INDEX: 32.82 KG/M2 | HEIGHT: 65 IN | HEART RATE: 91 BPM | RESPIRATION RATE: 20 BRPM | TEMPERATURE: 98.8 F | DIASTOLIC BLOOD PRESSURE: 80 MMHG | WEIGHT: 197 LBS | SYSTOLIC BLOOD PRESSURE: 125 MMHG | OXYGEN SATURATION: 96 %

## 2017-12-01 DIAGNOSIS — M48.061 SPINAL STENOSIS OF LUMBAR REGION, UNSPECIFIED WHETHER NEUROGENIC CLAUDICATION PRESENT: ICD-10-CM

## 2017-12-01 DIAGNOSIS — M54.32 SCIATICA OF LEFT SIDE: Primary | ICD-10-CM

## 2017-12-01 RX ORDER — GABAPENTIN 300 MG/1
CAPSULE ORAL
Qty: 150 CAP | Refills: 2
Start: 2017-12-01 | End: 2018-01-15 | Stop reason: SDUPTHER

## 2017-12-01 RX ORDER — PREDNISONE 10 MG/1
TABLET ORAL
Qty: 21 TAB | Refills: 0 | Status: SHIPPED | OUTPATIENT
Start: 2017-12-01 | End: 2017-12-14 | Stop reason: ALTCHOICE

## 2017-12-01 RX ORDER — TRAMADOL HYDROCHLORIDE 50 MG/1
50 TABLET ORAL
Qty: 30 TAB | Refills: 0 | Status: SHIPPED | OUTPATIENT
Start: 2017-12-01 | End: 2018-02-23 | Stop reason: SDUPTHER

## 2017-12-01 NOTE — MR AVS SNAPSHOT
Visit Information Date & Time Provider Department Dept. Phone Encounter #  
 12/1/2017  2:40 PM Destiny Contreras MD [facility] at [address] [id number] Follow-up Instructions Return in about 1 week (around 12/8/2017), or if symptoms worsen or fail to improve. Your Appointments 1/17/2018 11:00 AM  
ROUTINE CARE with Destiny Contreras MD  
[facility] at [facility] Appt Note: 3m fu   diabetes  htn  
 [address] [id number]  
[facility] Upcoming Health Maintenance Date Due Hepatitis C Screening 1960 EYE EXAM RETINAL OR DILATED Q1 2/1/2018* HEMOGLOBIN A1C Q6M 2/11/2018 MICROALBUMIN Q1 8/11/2018 LIPID PANEL Q1 8/11/2018 BREAST CANCER SCRN MAMMOGRAM 8/12/2018 FOBT Q 1 YEAR AGE 50-75 8/16/2018 FOOT EXAM Q1 10/11/2018 PAP AKA CERVICAL CYTOLOGY 12/1/2020 DTaP/Tdap/Td series (2 - Td) 3/24/2027 *Topic was postponed. The date shown is not the original due date. Allergies as of 12/1/2017  Review Complete On: 10/27/2017 By: Jatin Oconnell RN Severity Noted Reaction Type Reactions Pravachol [Pravastatin]  08/15/2017    Myalgia Current Immunizations  Reviewed on 8/11/2017 Name Date Influenza Vaccine Dakota Keller) 10/8/2015 Pneumococcal Polysaccharide (PPSV-23) 8/11/2017 Not reviewed this visit You Were Diagnosed With   
  
 Codes Comments Sciatica of left side    -  Primary ICD-10-CM: M54.32 
ICD-9-CM: 724.3 Spinal stenosis of lumbar region, unspecified whether neurogenic claudication present     ICD-10-CM: M48.061 
ICD-9-CM: 724.02 Vitals BP Pulse Temp Resp Height(growth percentile) Weight(growth percentile) 125/80 91 98.8 °F (37.1 °C) (Oral) 20 5' 5\" (1.651 m) 197 lb (89.4 kg) SpO2 BMI OB Status Smoking Status 96% 32.78 kg/m2 Hysterectomy Former Smoker Vitals History BMI and BSA Data Body Mass Index Body Surface Area 32.78 kg/m 2 2.02 m 2 Preferred Pharmacy Pharmacy Name Phone Agatha Her[facility] [id number] [place] [address] [address] [facility] [phone] Your Updated Medication List  
  
   
This list is accurate as of: 12/1/17  3:30 PM.  Always use your most recent med list.  
  
  
  
  
 aspirin delayed-release 81 mg tablet Take 81 mg by mouth daily. carvedilol 25 mg tablet Commonly known as:  COREG  
  
 chlorthalidone 25 mg tablet Commonly known as:  Emily Folk Take 1 Tab by mouth daily. diclofenac potassium 50 mg tablet Commonly known as:  CATAFLAM  
Take 1 Tab by mouth three (3) times daily. ergocalciferol 50,000 unit capsule Commonly known as:  ERGOCALCIFEROL Take 1 Cap by mouth every seven (7) days. fish oil-omega-3 fatty acids 300-1,000 mg Cap TAKE ONE CAPSULE BY MOUTH TWICE A DAY  
  
 gabapentin 300 mg capsule Commonly known as:  NEURONTIN  
2 tab in am, 1 tab with dinner and 2 tab bed time  
  
 gemfibrozil 600 mg tablet Commonly known as:  LOPID TAKE ONE TABLET BY MOUTH TWICE A DAY  
  
 insulin detemir 100 unit/mL (3 mL) Inpn Commonly known as:  LEVEMIR FLEXTOUCH  
42 Units by SubCUTAneous route two (2) times a day. insulin lispro 100 unit/mL injection Commonly known as:  HumaLOG  
18 Units by SubCUTAneous route three (3) times daily. irbesartan 300 mg tablet Commonly known as:  AVAPRO TAKE ONE TABLET BY MOUTH DAILY  
  
 metFORMIN  mg tablet Commonly known as:  GLUCOPHAGE XR  
TAKE 1 TABLET BY MOUTH TWICE A DAY  
  
 predniSONE 10 mg dose pack Commonly known as:  STERAPRED DS See administration instruction per 10mg dose pack  
  
 traMADol 50 mg tablet Commonly known as:  ULTRAM  
Take 1 Tab by mouth daily as needed for Pain.  
  
 triamcinolone acetonide 0.1 % ointment Commonly known as:  KENALOG Apply  to affected area two (2) times a day. Prescriptions Printed Refills  
 traMADol (ULTRAM) 50 mg tablet 0 Sig: Take 1 Tab by mouth daily as needed for Pain. Class: Print Route: Oral  
  
Prescriptions Sent to Pharmacy Refills  
 predniSONE (STERAPRED DS) 10 mg dose pack 0 Sig: See administration instruction per 10mg dose pack Class: Normal  
 Pharmacy: [address] Dr. Madie Plata [facility] Ph #: [phone] Follow-up Instructions Return in about 1 week (around 12/8/2017), or if symptoms worsen or fail to improve. Introducing [facility]! Dear Beatrice January: 
Thank you for requesting a [employer] account. Our records indicate that you already have an active [employer] account. You can access your account anytime at [email or web address] Did you know that you can access your hospital and ER discharge instructions at any time in [employer]? You can also review all of your test results from your hospital stay or ER visit. Additional Information If you have questions, please visit the Frequently Asked Questions section of the [employer] website at [email or web address] Remember, [employer] is NOT to be used for urgent needs. For medical emergencies, dial 911. Now available from your iPhone and Android! Please provide this summary of care documentation to your next provider. Your primary care clinician is listed as Yakelin Fry. If you have any questions after today's visit, please call [phone].

## 2017-12-01 NOTE — PROGRESS NOTES
Last Trevizo is a 62 y.o. female      Low Back Pain  Last Trevizo is a 62 y.o. female, buttock/leg pain. Pt notes that the leg/groin pain has been going on for about 1-2 years, notes that the buttock pain started about 1-2 months ago. Pt notes that when she stands up she has left low back, buttock,  and anterior/lateral thigh pain, worsened with walking. Pt sees Dr. Everette House (ortho), has been receiving steroid injections in her back and hip, most recently this past summer. She was referred and went to see another orthopedic surgeon and was told \"doesn't need surgery\" and was sent back here. Had back surgery at [facility] 3[place]4 years ago. Gabapentin, she says she takes 3 tabs in am, 3 with dinner and 2 with bed. It doesn't seem right b/c i've only given her enough for 2 in am, 2 with dinner and 1 at bed that makes 150#/month. I told her to go back to the right dose at #150/month    Current flares up, write for prednisone, and tramadol #30. 'Red Flags' for Fracture:  1. Recent history of major trauma: no  2. Minor trauma or strenuous lifting in older or potentially osteoporotic patient: no  3. History of chronic corticosteroid therapy: no    Red Flags' Cauda Equina Syndrome:  1. Complaint of saddle anesthesia: no  2. Recent onset of bladder dysfunction, guanaco. retention: no  3.  Severe or progressive LE neurologic deficit: no      ROS:  Constitutional: negative for fevers, chills and fatigue  Respiratory: negative for cough or dyspnea on exertion  Cardiovascular: negative for chest pain, dyspnea, palpitations  Gastrointestinal: negative for nausea, vomiting, diarrhea and abdominal pain  Genitourinary:per HPI  Musculoskeletal: per HPI    Allergies   Allergen Reactions    Pravachol [Pravastatin] Myalgia      Social History     Social History    Marital status: SINGLE     Spouse name: N/A    Number of children: N/A    Years of education: N/A     Social History Main Topics    Smoking status: Former Smoker Packs/day: 1.00     Years: 30.00     Quit date: 4/1/2005    Smokeless tobacco: Never Used    Alcohol use No    Drug use: No    Sexual activity: No     Other Topics Concern    None     Social History Narrative    ** Merged History Encounter **           Family History   Problem Relation Age of Onset    Heart Disease Mother     Heart Attack Mother     No Known Problems Father     Cancer Sister      UNKNOWN    Diabetes Son     Sickle Cell Anemia Daughter     Anesth Problems Son      CHEST PAIN      Past Surgical History:   Procedure Laterality Date    CARDIAC SURG PROCEDURE UNLIST      murmur- PT DENIES SURGERY AND MURMUR 6/20/17    HX CARPAL TUNNEL RELEASE      HX GYN      hysterectomy    HX HEENT      Ear surgery    HX HEENT      Left eye    HX ORTHOPAEDIC      left wrist & left elbow    HX ORTHOPAEDIC      Back Surgery x 3    HX ORTHOPAEDIC      right thumb at [address] HX OTHER SURGICAL      back surgery X 2    HX OTHER SURGICAL      Collapsed left lung    HX OTHER SURGICAL      axillary sweat glands removed    HX OVARIAN CYST REMOVAL      SC OTOPLASTY PROTRUDING EAR W/WO SIZE RDCTJ        Past Medical History:   Diagnosis Date    Arthritis     Back pain     Breast cancer screening 6/28/2016    Chronic bilateral low back pain without sciatica 6/28/2016    Diabetes ([facility] [address]     Encounter for long-term (current) use of medications 6/28/2016    Essential hypertension with goal blood pressure less than 140/90 6/28/2016    Hypercholesteremia 8/6/2015    Hypercholesterolemia     Hypertension     Ill-defined condition     hyperlipidemia    Other ill-defined conditions(799.89)     high cholesterol    Patient non adherence 1/23/2017    Type 2 diabetes, uncontrolled, with neuropathy ([facility] [address] 1/23/2017    Type II diabetes mellitus, uncontrolled ([facility] [address] 8/6/2015      Current Outpatient Prescriptions   Medication Sig Dispense Refill    predniSONE (STERAPRED DS) 10 mg dose pack See administration instruction per 10mg dose pack 21 Tab 0    traMADol (ULTRAM) 50 mg tablet Take 1 Tab by mouth daily as needed for Pain. 30 Tab 0    gabapentin (NEURONTIN) 300 mg capsule 2 tab in am, 1 tab with dinner and 2 tab bed time 150 Cap 2    fish oil-omega-3 fatty acids 300-1,000 mg cap TAKE ONE CAPSULE BY MOUTH TWICE A  Cap 1    diclofenac potassium (CATAFLAM) 50 mg tablet Take 1 Tab by mouth three (3) times daily. 15 Tab 0    carvedilol (COREG) 25 mg tablet       ergocalciferol (ERGOCALCIFEROL) 50,000 unit capsule Take 1 Cap by mouth every seven (7) days. 12 Cap 1    insulin detemir (LEVEMIR FLEXTOUCH) 100 unit/mL (3 mL) inpn 42 Units by SubCUTAneous route two (2) times a day. 6 Adjustable Dose Pre-filled Pen Syringe 2    gemfibrozil (LOPID) 600 mg tablet TAKE ONE TABLET BY MOUTH TWICE A  Tab 1    triamcinolone acetonide (KENALOG) 0.1 % ointment Apply  to affected area two (2) times a day. 30 g 0    irbesartan (AVAPRO) 300 mg tablet TAKE ONE TABLET BY MOUTH DAILY 30 Tab 5    chlorthalidone (HYGROTEN) 25 mg tablet Take 1 Tab by mouth daily. 90 Tab 1    metFORMIN ER (GLUCOPHAGE XR) 750 mg tablet TAKE 1 TABLET BY MOUTH TWICE A  Tab 1    insulin lispro (HUMALOG) 100 unit/mL injection 18 Units by SubCUTAneous route three (3) times daily. (Patient taking differently: 24 Units by SubCUTAneous route three (3) times daily. ) 1 Vial 0    aspirin delayed-release 81 mg tablet Take 81 mg by mouth daily.           Objective:     Visit Vitals    /80    Pulse 91    Temp 98.8 °F (37.1 °C) (Oral)    Resp 20    Ht 5' 5\" (1.651 m)    Wt 197 lb (89.4 kg)    SpO2 96%    BMI 32.78 kg/m2       General appearance - alert, well appearing, and in no distress  Mental status - alert, oriented to person, place, and time  Chest - clear to auscultation, no wheezes, rales or rhonchi, symmetric air entry  Heart - normal rate, regular rhythm, normal S1, S2, no murmurs, rubs, clicks or gallops  Abdomen - soft, nontender, nondistended, no masses or organomegaly  Neurological - alert, oriented, normal speech, no focal findings or movement disorder noted  Back - Normal ROM    General: alert, cooperative, no distress, appears stated age   Body habitus: Not obese   Gait: Normal   Visible deformity? no   Leg muscle asymmetry? no   Tender spinous processes? no   Tender back or buttock? No         Strength Testing: Normal bilat upper, lower legs and feet   Reflexes:   Sensory Exam (Light Touch): Normal bilat   Straight Leg Raise Testing:        Assessment/Plan:     Diagnoses and all orders for this visit:    1. Sciatica of left side  -     predniSONE (STERAPRED DS) 10 mg dose pack; See administration instruction per 10mg dose pack  -     traMADol (ULTRAM) 50 mg tablet; Take 1 Tab by mouth daily as needed for Pain.  -     gabapentin (NEURONTIN) 300 mg capsule; 2 tab in am, 1 tab with dinner and 2 tab bed time    2. Spinal stenosis of lumbar region, unspecified whether neurogenic claudication present  -     predniSONE (STERAPRED DS) 10 mg dose pack; See administration instruction per 10mg dose pack  -     traMADol (ULTRAM) 50 mg tablet; Take 1 Tab by mouth daily as needed for Pain.  -     gabapentin (NEURONTIN) 300 mg capsule; 2 tab in am, 1 tab with dinner and 2 tab bed time         I have discussed the diagnosis with the patient and the intended plan as seen in the above orders. The patient has received an after-visit summary and questions were answered concerning future plans. I have discussed medication side effects and warnings with the patient as well. Follow-up Disposition:  Return in about 1 week (around 12/8/2017), or if symptoms worsen or fail to improve.       Praneeth Lora MD  12/1/2017

## 2017-12-14 ENCOUNTER — OFFICE VISIT (OUTPATIENT)
Dept: FAMILY MEDICINE CLINIC | Age: 57
End: 2017-12-14

## 2017-12-14 VITALS
OXYGEN SATURATION: 98 % | TEMPERATURE: 97.9 F | SYSTOLIC BLOOD PRESSURE: 126 MMHG | BODY MASS INDEX: 32.99 KG/M2 | WEIGHT: 198 LBS | DIASTOLIC BLOOD PRESSURE: 75 MMHG | RESPIRATION RATE: 16 BRPM | HEIGHT: 65 IN | HEART RATE: 72 BPM

## 2017-12-14 DIAGNOSIS — M54.42 CHRONIC MIDLINE LOW BACK PAIN WITH LEFT-SIDED SCIATICA: Primary | ICD-10-CM

## 2017-12-14 DIAGNOSIS — G89.29 CHRONIC MIDLINE LOW BACK PAIN WITH LEFT-SIDED SCIATICA: Primary | ICD-10-CM

## 2017-12-14 PROBLEM — E11.21 TYPE 2 DIABETES MELLITUS WITH NEPHROPATHY (HCC): Status: ACTIVE | Noted: 2017-12-14

## 2017-12-14 RX ORDER — GEMFIBROZIL 600 MG/1
TABLET, FILM COATED ORAL
COMMUNITY
Start: 2016-08-17 | End: 2018-02-23 | Stop reason: SDUPTHER

## 2017-12-14 RX ORDER — METHOCARBAMOL 750 MG/1
TABLET, FILM COATED ORAL
COMMUNITY
Start: 2015-04-15 | End: 2018-02-23 | Stop reason: ALTCHOICE

## 2017-12-14 RX ORDER — GEMFIBROZIL 600 MG/1
TABLET, FILM COATED ORAL
COMMUNITY
Start: 2017-02-07 | End: 2018-02-23 | Stop reason: SDUPTHER

## 2017-12-14 RX ORDER — ASPIRIN 325 MG
TABLET, DELAYED RELEASE (ENTERIC COATED) ORAL
COMMUNITY
Start: 2016-10-17 | End: 2019-05-07

## 2017-12-14 RX ORDER — DULOXETIN HYDROCHLORIDE 20 MG/1
20 CAPSULE, DELAYED RELEASE ORAL DAILY
Qty: 30 CAP | Refills: 2 | Status: SHIPPED | OUTPATIENT
Start: 2017-12-14 | End: 2018-05-02 | Stop reason: ALTCHOICE

## 2017-12-14 RX ORDER — FENOFIBRATE 145 MG/1
145 TABLET, COATED ORAL DAILY
COMMUNITY
Start: 2017-12-01 | End: 2018-05-02 | Stop reason: ALTCHOICE

## 2017-12-14 RX ORDER — PRAVASTATIN SODIUM 20 MG/1
TABLET ORAL
COMMUNITY
Start: 2016-08-17 | End: 2017-12-14 | Stop reason: SDUPTHER

## 2017-12-14 RX ORDER — INSULIN ASPART 100 [IU]/ML
INJECTION, SOLUTION INTRAVENOUS; SUBCUTANEOUS
COMMUNITY
Start: 2016-08-17 | End: 2018-02-23 | Stop reason: ALTCHOICE

## 2017-12-14 RX ORDER — METHYLPREDNISOLONE 4 MG/1
TABLET ORAL
COMMUNITY
Start: 2016-09-27 | End: 2017-12-14 | Stop reason: SDUPTHER

## 2017-12-14 RX ORDER — FLUOCINONIDE 1 MG/G
CREAM TOPICAL
COMMUNITY
Start: 2017-12-06 | End: 2018-05-02 | Stop reason: ALTCHOICE

## 2017-12-14 RX ORDER — IRBESARTAN 300 MG/1
TABLET ORAL
COMMUNITY
Start: 2016-04-11 | End: 2017-12-14 | Stop reason: SDUPTHER

## 2017-12-14 RX ORDER — DOCUSATE SODIUM 100 MG/1
CAPSULE, LIQUID FILLED ORAL
COMMUNITY
Start: 2017-02-07 | End: 2018-02-23 | Stop reason: ALTCHOICE

## 2017-12-14 RX ORDER — CHLORTHALIDONE 25 MG/1
TABLET ORAL
COMMUNITY
Start: 2016-04-11 | End: 2017-12-14 | Stop reason: SDUPTHER

## 2017-12-14 RX ORDER — METFORMIN HYDROCHLORIDE 500 MG/1
500 TABLET, EXTENDED RELEASE ORAL 2 TIMES DAILY
COMMUNITY
Start: 2017-11-29

## 2017-12-14 RX ORDER — SPIRONOLACTONE 25 MG/1
12.5 TABLET ORAL
COMMUNITY
Start: 2016-04-11

## 2017-12-14 RX ORDER — CLOBETASOL PROPIONATE 0.5 MG/G
OINTMENT TOPICAL
COMMUNITY
Start: 2014-04-02 | End: 2018-05-02 | Stop reason: ALTCHOICE

## 2017-12-14 RX ORDER — BLOOD SUGAR DIAGNOSTIC
STRIP MISCELLANEOUS
COMMUNITY
Start: 2017-11-08

## 2017-12-14 RX ORDER — TAMSULOSIN HYDROCHLORIDE 0.4 MG/1
CAPSULE ORAL
COMMUNITY
Start: 2015-04-17 | End: 2017-12-14 | Stop reason: SDUPTHER

## 2017-12-14 RX ORDER — PRAVASTATIN SODIUM 20 MG/1
TABLET ORAL
COMMUNITY
Start: 2016-08-17 | End: 2018-02-23

## 2017-12-14 RX ORDER — INSULIN LISPRO 100 [IU]/ML
INJECTION, SOLUTION INTRAVENOUS; SUBCUTANEOUS
COMMUNITY
Start: 2017-12-01 | End: 2018-05-02 | Stop reason: SDUPTHER

## 2017-12-14 RX ORDER — OXYCODONE HYDROCHLORIDE 10 MG/1
TABLET ORAL
COMMUNITY
Start: 2015-04-15 | End: 2018-02-23 | Stop reason: ALTCHOICE

## 2017-12-14 RX ORDER — INSULIN ASPART 100 [IU]/ML
INJECTION, SOLUTION INTRAVENOUS; SUBCUTANEOUS
COMMUNITY
Start: 2016-08-17 | End: 2018-02-23 | Stop reason: SDUPTHER

## 2017-12-14 RX ORDER — METHYLPREDNISOLONE 4 MG/1
TABLET ORAL
COMMUNITY
Start: 2016-09-27 | End: 2018-02-23 | Stop reason: ALTCHOICE

## 2017-12-14 RX ORDER — IRBESARTAN 300 MG/1
TABLET ORAL
COMMUNITY
Start: 2016-04-11 | End: 2018-02-23 | Stop reason: SDUPTHER

## 2017-12-14 RX ORDER — METFORMIN HYDROCHLORIDE 750 MG/1
TABLET, EXTENDED RELEASE ORAL
COMMUNITY
Start: 2017-04-06 | End: 2018-02-23 | Stop reason: DRUGHIGH

## 2017-12-14 RX ORDER — MELATONIN
COMMUNITY
Start: 2014-04-29 | End: 2018-02-23 | Stop reason: ALTCHOICE

## 2017-12-14 RX ORDER — ONDANSETRON 4 MG/1
TABLET, FILM COATED ORAL
COMMUNITY
Start: 2015-04-15 | End: 2018-02-23 | Stop reason: ALTCHOICE

## 2017-12-14 RX ORDER — INSULIN GLARGINE 100 [IU]/ML
INJECTION, SOLUTION SUBCUTANEOUS
COMMUNITY
Start: 2015-04-01 | End: 2018-02-23 | Stop reason: ALTCHOICE

## 2017-12-14 RX ORDER — CYCLOBENZAPRINE HCL 10 MG
TABLET ORAL
COMMUNITY
Start: 2017-10-27 | End: 2018-05-02 | Stop reason: ALTCHOICE

## 2017-12-14 RX ORDER — LANCETS
EACH MISCELLANEOUS
COMMUNITY
Start: 2017-11-09

## 2017-12-14 RX ORDER — GUAIFENESIN 100 MG/5ML
81 LIQUID (ML) ORAL DAILY
COMMUNITY

## 2017-12-14 RX ORDER — ATORVASTATIN CALCIUM 40 MG/1
40 TABLET, FILM COATED ORAL DAILY
COMMUNITY
Start: 2017-12-01 | End: 2018-08-15 | Stop reason: DRUGHIGH

## 2017-12-14 RX ORDER — PEN NEEDLE, DIABETIC 31 GX3/16"
NEEDLE, DISPOSABLE MISCELLANEOUS
COMMUNITY
Start: 2017-11-18

## 2017-12-14 RX ORDER — GABAPENTIN 300 MG/1
CAPSULE ORAL
COMMUNITY
Start: 2017-03-07 | End: 2018-01-15 | Stop reason: SDUPTHER

## 2017-12-14 RX ORDER — TAMSULOSIN HYDROCHLORIDE 0.4 MG/1
CAPSULE ORAL
COMMUNITY
Start: 2015-04-17 | End: 2018-02-23 | Stop reason: ALTCHOICE

## 2017-12-14 RX ORDER — FUROSEMIDE 20 MG/1
TABLET ORAL
COMMUNITY
Start: 2014-12-29 | End: 2018-05-02 | Stop reason: ALTCHOICE

## 2017-12-14 RX ORDER — LIDOCAINE 50 MG/G
OINTMENT TOPICAL
COMMUNITY
Start: 2017-12-06 | End: 2018-05-02 | Stop reason: ALTCHOICE

## 2017-12-14 NOTE — MR AVS SNAPSHOT
Visit Information Date & Time Provider Department Dept. Phone Encounter #  
 12/14/2017 10:20 AM Madan Balbuena MD [facility] at [address] [id number] Follow-up Instructions Return in about 2 months (around 2/14/2018) for dm2, htn, labs. Your Appointments 1/17/2018 11:00 AM  
ROUTINE CARE with Madan Balbuena MD  
[facility] at [facility]) Appt Note: 3m fu   diabetes  htn  
 [facility] [place] [phone]  
[facility] Upcoming Health Maintenance Date Due Hepatitis C Screening 1960 EYE EXAM RETINAL OR DILATED Q1 2/1/2018* HEMOGLOBIN A1C Q6M 2/11/2018 MICROALBUMIN Q1 8/11/2018 LIPID PANEL Q1 8/11/2018 FOBT Q 1 YEAR AGE 50-75 8/16/2018 FOOT EXAM Q1 10/11/2018 PAP AKA CERVICAL CYTOLOGY 12/1/2020 DTaP/Tdap/Td series (2 - Td) 3/24/2027 *Topic was postponed. The date shown is not the original due date. Allergies as of 12/14/2017  Review Complete On: 12/14/2017 By: Valentina Cockayne Severity Noted Reaction Type Reactions Pravachol [Pravastatin]  08/15/2017    Myalgia Current Immunizations  Reviewed on 8/11/2017 Name Date Influenza Vaccine [place] Angely) 10/8/2015 Pneumococcal Polysaccharide (PPSV-23) 8/11/2017 Not reviewed this visit You Were Diagnosed With   
  
 Codes Comments Chronic midline low back pain with left-sided sciatica    -  Primary ICD-10-CM: M54.42, G89.29 ICD-9-CM: 724.2, 724.3, 338.29 Vitals BP Pulse Temp Resp Height(growth percentile) Weight(growth percentile) 126/75 (BP 1 Location: Right arm, BP Patient Position: Sitting) 72 97.9 °F (36.6 °C) (Oral) 16 5' 5\" (1.651 m) 198 lb (89.8 kg) SpO2 BMI OB Status Smoking Status 98% 32.95 kg/m2 Hysterectomy Former Smoker Vitals History BMI and BSA Data Body Mass Index Body Surface Area 32.95 kg/m 2 2.03 m 2 Preferred Pharmacy Pharmacy Name Phone Kamilah Mauricio [facility] ([place], [place]) [address] [facility] [phone] Your Updated Medication List  
  
   
This list is accurate as of: 12/14/17 10:29 AM.  Always use your most recent med list.  
  
  
  
  
 * aspirin delayed-release 81 mg tablet Take 81 mg by mouth daily. * aspirin 81 mg chewable tablet Take 81 mg by mouth. atorvastatin 40 mg tablet Commonly known as:  LIPITOR  
  
 carvedilol 25 mg tablet Commonly known as:  Media Lima * chlorthalidone 25 mg tablet Commonly known as:  HYGROTEN  
25 mg = 1 tab each dose, PO, daily, # 30 tab, 11 Refills, Pharmacy: [facility]/pharmacy #[id number] * chlorthalidone 25 mg tablet Commonly known as:  HYGROTEN  
25 mg = 1 tab each dose, PO, daily, # 30 tab, 11 Refills, Pharmacy: [facility]/pharmacy #[id number] * chlorthalidone 25 mg tablet Commonly known as:  HYGROTEN  
25 mg = 1 tab each dose, PO, daily, # 30 tab, 11 Refills, Pharmacy: [facility]/pharmacy #[id number] * chlorthalidone 25 mg tablet Commonly known as:  HYGROTEN  
25 mg = 1 tab each dose, PO, daily, # 30 tab, 11 Refills, Pharmacy: [facility]/pharmacy #[id number] * chlorthalidone 25 mg tablet Commonly known as:  HYGROTEN  
25 mg = 1 tab each dose, PO, daily, # 30 tab, 11 Refills, Pharmacy: [facility]/pharmacy #[id number] * chlorthalidone 25 mg tablet Commonly known as:  Valeda Randall Take 1 Tab by mouth daily. cholecalciferol 1,000 unit tablet Commonly known as:  VITAMIN D3  
1,000 IU = 1 tab each dose, PO, daily, # 30 tab, 0 Refills  
  
 clobetasol 0.05 % ointment Commonly known as:  Linzie Rubins Apply  to affected area. cyclobenzaprine 10 mg tablet Commonly known as:  FLEXERIL  
  
 diclofenac potassium 50 mg tablet Commonly known as:  CATAFLAM  
Take 1 Tab by mouth three (3) times daily. * docusate sodium 100 mg capsule Commonly known as:  Pradeep Early  
 100 mg = 1 CAP each dose, PO, daily, # 30 CAP, 0 Refills * docusate sodium 100 mg capsule Commonly known as:  COLACE  
100 mg = 1 CAP each dose, PO, daily, # 30 CAP, 0 Refills DULoxetine 20 mg capsule Commonly known as:  CYMBALTA Take 1 Cap by mouth daily. ergocalciferol 50,000 unit capsule Commonly known as:  ERGOCALCIFEROL Take 1 Cap by mouth every seven (7) days. fenofibrate nanocrystallized 145 mg tablet Commonly known as:  [employer] FISH OIL 60- mg Cap Generic drug:  omega 3-dha-epa-fish oil TAKE ONE CAPSULE BY MOUTH TWICE A DAY  
  
 fish oil-omega-3 fatty acids 300-1,000 mg Cap TAKE ONE CAPSULE BY MOUTH TWICE A DAY  
  
 * FLOMAX 0.4 mg capsule Generic drug:  tamsulosin 0.4 mg = 1 CAP each dose, PO, bedtime, # 14 CAP, 0 Refills * FLOMAX 0.4 mg capsule Generic drug:  tamsulosin 0.4 mg = 1 CAP each dose, PO, bedtime, # 14 CAP, 0 Refills * FLOMAX 0.4 mg capsule Generic drug:  tamsulosin 0.4 mg = 1 CAP each dose, PO, bedtime, # 14 CAP, 0 Refills * FLOMAX 0.4 mg capsule Generic drug:  tamsulosin 0.4 mg = 1 CAP each dose, PO, bedtime, # 14 CAP, 0 Refills  
  
 fluocinonide 0.1 % topical cream  
Commonly known as:  VANOS  
  
 * gabapentin 300 mg capsule Commonly known as:  NEURONTIN  
3 pills twice in the day and 2 tab at night. * gabapentin 300 mg capsule Commonly known as:  NEURONTIN  
2 tab in am, 1 tab with dinner and 2 tab bed time * gemfibrozil 600 mg tablet Commonly known as:  LOPID  
mg tab each dose, PO, bid, 0 Refills * gemfibrozil 600 mg tablet Commonly known as:  LOPID  
600 mg = 1 tab each dose, PO, bid * gemfibrozil 600 mg tablet Commonly known as:  LOPID  
 600 mg = 1 tab each dose, PO, bid * gemfibrozil 600 mg tablet Commonly known as:  LOPID TAKE ONE TABLET BY MOUTH TWICE A DAY  
  
 * insulin detemir 100 unit/mL injection Commonly known as:  LEVEMIR  
50 Units, SQ, every 12 hours, # 50 mL, 6 Refills, Pharmacy: [place] [id number]  
  
 * insulin detemir 100 unit/mL injection Commonly known as:  LEVEMIR  
50 Units, SQ, every 12 hours, # 50 mL, 6 Refills, Pharmacy: [facility]  
  
 * insulin detemir 100 unit/mL injection Commonly known as:  LEVEMIR  
50 Units, SQ, every 12 hours, # 50 mL, 6 Refills, Pharmacy: [facility]  
  
 * insulin detemir 100 unit/mL injection Commonly known as:  LEVEMIR  
50 Units, SQ, every 12 hours, # 50 mL, 6 Refills, Pharmacy: [place] [address]  
  
 * insulin detemir 100 unit/mL injection Commonly known as:  LEVEMIR  
50 Units, SQ, every 12 hours, # 50 mL, 6 Refills, Pharmacy: [place] [address]  
  
 * insulin detemir 100 unit/mL (3 mL) Inpn Commonly known as:  LEVEMIR FLEXTOUCH  
42 Units by SubCUTAneous route two (2) times a day. * insulin lispro 100 unit/mL injection Commonly known as:  HumaLOG  
18 Units by SubCUTAneous route three (3) times daily. * HumaLOG KwikPen 100 unit/mL kwikpen Generic drug:  insulin lispro Insulin Needles (Disposable) 32 gauge x 5/32\" Ndle * irbesartan 300 mg tablet Commonly known as:  AVAPRO  
300 mg = 1 tab each dose, PO, daily, # 30 tab, 11 Refills, Pharmacy: [facility]/pharmacy #[id number] * irbesartan 300 mg tablet Commonly known as:  AVAPRO  
300 mg = 1 tab each dose, PO, daily, # 30 tab, 11 Refills, Pharmacy: [facility]/pharmacy #[id number] * irbesartan 300 mg tablet Commonly known as:  AVAPRO TAKE ONE TABLET BY MOUTH DAILY LANCETS, [address] Generic drug:  Lancets LANTUS SOLOSTAR 100 unit/mL (3 mL) Inpn Generic drug:  insulin glargine = 60 Units each dose, SQ, daily, # 30 mL, 11 Refills, Pharmacy: [facility]  
  
 LASIX 20 mg tablet Generic drug:  furosemide 20 mg = 1 tab each dose, PO, daily, # 30 tab, 5 Refills, Pharmacy: [facility]  
  
 lidocaine 5 % ointment Commonly known as:  XYLOCAINE  
  
 * MEDROL (ADRIENNE) 4 mg tablet Generic drug:  methylPREDNISolone See Instructions, as directed on package labeling, # 1 packs, 0 Refills, Pharmacy: [place] [address] * MEDROL (ADRIENNE) 4 mg tablet Generic drug:  methylPREDNISolone See Instructions, as directed on package labeling, # 1 packs, 0 Refills, Pharmacy: [place] [address] * metFORMIN  mg tablet Commonly known as:  GLUCOPHAGE XR  
TAKE 1 TABLET BY MOUTH TWICE A DAY  
  
 * metFORMIN  mg tablet Commonly known as:  GLUCOPHAGE XR  
TAKE 1 TABLET BY MOUTH TWICE A DAY  
  
 * metFORMIN  mg tablet Commonly known as:  GLUCOPHAGE XR  
  
 methocarbamol 750 mg tablet Commonly known as:  ROBAXIN  
1,500 mg = 2 tab each dose, PO, every 8 hours, PRN: Cramping/spasms, # 125 tab, 0 Refills * NovoLOG Flexpen 100 unit/mL Inpn Generic drug:  insulin aspart 36 Units, SQ, ac tid, # 40 mL, 6 Refills, Pharmacy: [place] [address] * NovoLOG Flexpen 100 unit/mL In Generic drug:  insulin aspart 36 Units, SQ, ac tid, # 40 mL, 6 Refills, Pharmacy: [place] [address] * NovoLOG Flexpen 100 unit/mL Inpn Generic drug:  insulin aspart 36 Units, SQ, ac tid, # 40 mL, 6 Refills, Pharmacy: [facility]  
  
 ondansetron hcl 4 mg tablet Commonly known as:  ZOFRAN  
4 mg = 1 tab each dose, PO, every 8 hours, PRN: as needed for nausea/vomiting, # 30 tab, 0 Refills ONETOUCH ULTRA TEST strip Generic drug:  glucose blood VI test strips  
  
 oxyCODONE IR 10 mg Tab immediate release tablet Commonly known as:  ROXICODONE  
10 mg = 1 tab each dose, PO, every 3 hours, PRN: Pain-SEVERE (per pain scale), # 125 tab, 0 Refills * pravastatin 20 mg tablet Commonly known as:  PRAVACHOL  
20 mg = 1 tab each dose, PO, daily, # 30 tab, 0 Refills * pravastatin 20 mg tablet Commonly known as:  PRAVACHOL  
20 mg = 1 tab each dose, PO, daily, # 30 tab, 0 Refills * pravastatin 20 mg tablet Commonly known as:  PRAVACHOL  
20 mg = 1 tab each dose, PO, daily, # 30 tab, 0 Refills  
  
 predniSONE 10 mg dose pack Commonly known as:  STERAPRED DS See administration instruction per 10mg dose pack  
  
 spironolactone 25 mg tablet Commonly known as:  ALDACTONE  
25 mg = 1 tab each dose, PO, daily, # 30 tab, 11 Refills, Pharmacy: [facility]/pharmacy #[id number] TENS unit and electrodes Cmpk  
1 Units by Does Not Apply route every six (6) hours as needed. traMADol 50 mg tablet Commonly known as:  ULTRAM  
Take 1 Tab by mouth daily as needed for Pain.  
  
 triamcinolone acetonide 0.1 % ointment Commonly known as:  KENALOG Apply  to affected area two (2) times a day. * Notice: This list has 44 medication(s) that are the same as other medications prescribed for you. Read the directions carefully, and ask your doctor or other care provider to review them with you. Prescriptions Sent to Pharmacy Refills DULoxetine (CYMBALTA) 20 mg capsule 2 Sig: Take 1 Cap by mouth daily. Class: Normal  
 Pharmacy: [place] [address] Ph #: [phone] Route: Oral  
 TENS unit and electrodes cmpk 0 Si Units by Does Not Apply route every six (6) hours as needed. Class: Normal  
 Pharmacy: [address] Ph #: [phone] Route: Does Not Apply Follow-up Instructions Return in about 2 months (around 2018) for dm2, htn, labs. Introducing \[facility] [place] [facility]\"" & HEALTH SERVICES! Dear Breanne Aguero: 
Thank you for requesting a [employer] account.   Our records indicate that you already have an active [employer] account. You can access your account anytime at [email or web address]. [email or web address] Did you know that you can access your hospital and ER discharge instructions at any time in [employer]? You can also review all of your test results from your hospital stay or ER visit. Additional Information If you have questions, please visit the Frequently Asked Questions section of the [employer] website at [email or web address] Remember, [employer] is NOT to be used for urgent needs. For medical emergencies, dial 911. Now available from your iPhone and Android! Please provide this summary of care documentation to your next provider. Your primary care clinician is listed as Madan Balbuena. If you have any questions after today's visit, please call [phone].

## 2017-12-14 NOTE — PROGRESS NOTES
Anita Mariscal is a 62 y.o. female    Low Back Pain  Anita Mariscal is a 62 y.o. female, buttock/leg pain. Pt notes that the leg/groin pain has been going on for about 1-2 years, notes that the buttock pain started about 1-2 months ago. Pt notes that when she stands up she has left low back, buttock,  and anterior/lateral thigh pain, worsened with walking. Pt sees Dr. Deangelo Robert (ortho), has been receiving steroid injections in her back and hip, most recently this past summer. She was referred and went to see another orthopedic surgeon and was told \"doesn't need surgery\" and was sent back here. She was referred to pain management, but they do not take her medicaid. Had 3X back surgery at [facility] 3-4 years ago. We'll try cymbalta 20mg. Add in TENS units. 'Red Flags' for Fracture:  1. Recent history of major trauma: no  2. Minor trauma or strenuous lifting in older or potentially osteoporotic patient: no  3. History of chronic corticosteroid therapy: no    Red Flags' Cauda Equina Syndrome:  1. Complaint of saddle anesthesia: no  2. Recent onset of bladder dysfunction, guanaco. retention: no  3.  Severe or progressive LE neurologic deficit: no      ROS:  Constitutional: negative for fevers, chills and fatigue  Respiratory: negative for cough or dyspnea on exertion  Cardiovascular: negative for chest pain, dyspnea, palpitations  Gastrointestinal: negative for nausea, vomiting, diarrhea and abdominal pain  Genitourinary:per HPI  Musculoskeletal: per HPI    Allergies   Allergen Reactions    Pravachol [Pravastatin] Myalgia      Social History     Social History    Marital status: SINGLE     Spouse name: N/A    Number of children: N/A    Years of education: N/A     Social History Main Topics    Smoking status: Former Smoker     Packs/day: 1.00     Years: 30.00     Quit date: 4/1/2005    Smokeless tobacco: Never Used    Alcohol use No    Drug use: No    Sexual activity: No     Other Topics Concern    Not on file     Social History Narrative    ** Merged History Encounter **           Family History   Problem Relation Age of Onset    Heart Disease Mother     Heart Attack Mother     No Known Problems Father     Cancer Sister      UNKNOWN    Diabetes Son     Sickle Cell Anemia Daughter     Anesth Problems Son      CHEST PAIN      Past Surgical History:   Procedure Laterality Date    CARDIAC SURG PROCEDURE UNLIST      murmur- PT DENIES SURGERY AND MURMUR 6/20/17    HX CARPAL TUNNEL RELEASE      HX GYN      hysterectomy    HX HEENT      Ear surgery    HX HEENT      Left eye    HX ORTHOPAEDIC      left wrist & left elbow    HX ORTHOPAEDIC      Back Surgery x 3    HX ORTHOPAEDIC      right thumb at [address] HX OTHER SURGICAL      back surgery X 2    HX OTHER SURGICAL      Collapsed left lung    HX OTHER SURGICAL      axillary sweat glands removed    HX OVARIAN CYST REMOVAL      AK OTOPLASTY PROTRUDING EAR W/WO SIZE RDCTJ        Past Medical History:   Diagnosis Date    Arthritis     Back pain     Breast cancer screening 6/28/2016    Chronic bilateral low back pain without sciatica 6/28/2016    Diabetes ([facility]     Encounter for long-term (current) use of medications 6/28/2016    Essential hypertension with goal blood pressure less than 140/90 6/28/2016    Hypercholesteremia 8/6/2015    Hypercholesterolemia     Hypertension     Ill-defined condition     hyperlipidemia    Other ill-defined conditions(799.89)     high cholesterol    Patient non adherence 1/23/2017    Type 2 diabetes, uncontrolled, with neuropathy ([facility] 1/23/2017    Type II diabetes mellitus, uncontrolled ([facility] 8/6/2015      Current Outpatient Prescriptions   Medication Sig Dispense Refill    gabapentin (NEURONTIN) 300 mg capsule 3 pills twice in the day and 2 tab at night.       omega 3-dha-epa-fish oil (FISH OIL) 60- mg cap TAKE ONE CAPSULE BY MOUTH TWICE A DAY      insulin glargine (LANTUS SOLOSTAR) 100 unit/mL (3 mL) inpn   = 60 Units each dose, SQ, daily, # 30 mL, 11 Refills, Pharmacy: [facility]      insulin aspart (NOVOLOG FLEXPEN) 100 unit/mL inpn   36 Units, SQ, ac tid, # 40 mL, 6 Refills, Pharmacy: [place] [id number]      insulin aspart (NOVOLOG FLEXPEN) 100 unit/mL inpn   36 Units, SQ, ac tid, # 40 mL, 6 Refills, Pharmacy: [place] [id number]      insulin detemir (LEVEMIR) 100 unit/mL injection   50 Units, SQ, every 12 hours, # 50 mL, 6 Refills, Pharmacy: [place] [id number]      insulin aspart (NOVOLOG FLEXPEN) 100 unit/mL inpn   36 Units, SQ, ac tid, # 40 mL, 6 Refills, Pharmacy: [place] [id number]      insulin detemir (LEVEMIR) 100 unit/mL injection   50 Units, SQ, every 12 hours, # 50 mL, 6 Refills, Pharmacy: [place] [id number]      insulin detemir (LEVEMIR) 100 unit/mL injection   50 Units, SQ, every 12 hours, # 50 mL, 6 Refills, Pharmacy: [place] [id number]      gemfibrozil (LOPID) 600 mg tablet   mg tab each dose, PO, bid, 0 Refills      insulin detemir (LEVEMIR) 100 unit/mL injection   50 Units, SQ, every 12 hours, # 50 mL, 6 Refills, Pharmacy: [place] [id number]      insulin detemir (LEVEMIR) 100 unit/mL injection   50 Units, SQ, every 12 hours, # 50 mL, 6 Refills, Pharmacy: [place] [id number]      gemfibrozil (LOPID) 600 mg tablet   mg tab each dose, PO, bid, 0 Refills      gemfibrozil (LOPID) 600 mg tablet   mg tab each dose, PO, bid, 0 Refills      gemfibrozil (LOPID) 600 mg tablet   600 mg = 1 tab each dose, PO, bid      gemfibrozil (LOPID) 600 mg tablet   600 mg = 1 tab each dose, PO, bid      irbesartan (AVAPRO) 300 mg tablet   300 mg = 1 tab each dose, PO, daily, # 30 tab, 11 Refills, Pharmacy: [facility]/pharmacy #[id number]     irbesartan (AVAPRO) 300 mg tablet   300 mg = 1 tab each dose, PO, daily, # 30 tab, 11 Refills, Pharmacy: [facility]/pharmacy #[id number]     chlorthalidone (HYGROTEN) 25 mg tablet   25 mg = 1 tab each dose, PO, daily, # 30 tab, 11 Refills, [facility]: [id number]     chlorthalidone (HYGROTEN) 25 mg tablet   25 mg = 1 tab each dose, PO, daily, # 30 tab, 11 Refills, [id number]     chlorthalidone (HYGROTEN) 25 mg tablet   25 mg = 1 tab each dose, PO, daily, # 30 tab, 11 Refills, [facility]: [id number]     chlorthalidone (HYGROTEN) 25 mg tablet   25 mg = 1 tab each dose, PO, daily, # 30 tab, 11 Refills, [facility]: [id number]     chlorthalidone (HYGROTEN) 25 mg tablet   25 mg = 1 tab each dose, PO, daily, # 30 tab, 11 Refills, [facility]: [id number]     metFORMIN ER (GLUCOPHAGE XR) 750 mg tablet TAKE 1 TABLET BY MOUTH TWICE A DAY      tamsulosin (FLOMAX) 0.4 mg capsule   0.4 mg = 1 CAP each dose, PO, bedtime, # 14 CAP, 0 Refills      tamsulosin (FLOMAX) 0.4 mg capsule   0.4 mg = 1 CAP each dose, PO, bedtime, # 14 CAP, 0 Refills      tamsulosin (FLOMAX) 0.4 mg capsule   0.4 mg = 1 CAP each dose, PO, bedtime, # 14 CAP, 0 Refills      tamsulosin (FLOMAX) 0.4 mg capsule   0.4 mg = 1 CAP each dose, PO, bedtime, # 14 CAP, 0 Refills      cholecalciferol (VITAMIN D3) 1,000 unit tablet   1,000 IU = 1 tab each dose, PO, daily, # 30 tab, 0 Refills      furosemide (LASIX) 20 mg tablet   20 mg = 1 tab each dose, PO, daily, # 30 tab, 5 Refills, Pharmacy: [facility]      ondansetron hcl (ZOFRAN) 4 mg tablet   4 mg = 1 tab each dose, PO, every 8 hours, PRN: as needed for nausea/vomiting, # 30 tab, 0 Refills      oxyCODONE IR (ROXICODONE) 10 mg tab immediate release tablet   10 mg = 1 tab each dose, PO, every 3 hours, PRN: Pain-SEVERE (per pain scale), # 125 tab, 0 Refills      methocarbamol (ROBAXIN) 750 mg tablet   1,500 mg = 2 tab each dose, PO, every 8 hours, PRN: Cramping/spasms, # 125 tab, 0 Refills      docusate sodium (COLACE) 100 mg capsule   100 mg = 1 CAP each dose, PO, daily, # 30 CAP, 0 Refills      docusate sodium (COLACE) 100 mg capsule   100 mg = 1 CAP each dose, PO, daily, # 30 CAP, 0 Refills      clobetasol (TEMOVATE) 0.05 % ointment Apply  to affected area.  spironolactone (ALDACTONE) 25 mg tablet   25 mg = 1 tab each dose, PO, daily, # 30 tab, 11 Refills, Pharmacy: [facility]/pharmacy #[id number]     pravastatin (PRAVACHOL) 20 mg tablet   20 mg = 1 tab each dose, PO, daily, # 30 tab, 0 Refills      pravastatin (PRAVACHOL) 20 mg tablet   20 mg = 1 tab each dose, PO, daily, # 30 tab, 0 Refills      pravastatin (PRAVACHOL) 20 mg tablet   20 mg = 1 tab each dose, PO, daily, # 30 tab, 0 Refills      methylPREDNISolone (MEDROL, ADRIENNE,) 4 mg tablet   See Instructions, as directed on package labeling, # 1 packs, 0 Refills, Pharmacy: [id number]      methylPREDNISolone (MEDROL, ADRIENNE,) 4 mg tablet   See Instructions, as directed on package labeling, # 1 packs, 0 Refills, Pharmacy: [facility]       DULoxetine (CYMBALTA) 20 mg capsule Take 1 Cap by mouth daily. 30 Cap 2    TENS unit and electrodes cmpk 1 Units by Does Not Apply route every six (6) hours as needed. 1 Units 0    GLUCOMETER,PRE-LOADED STRIPS (GLUCOSE METER, DISP & STRIPS)   Glucose meter, See Instructions, #: 1 EA, 0 Refill(s), Pharmacy: [facility]/pharmacy #[id number]     GEMFIBROZIL, BULK,   600 mg, PO, bid, 0 Refills      GEMFIBROZIL, BULK,   600 mg, PO, bid, 0 Refills      GEMFIBROZIL, BULK,   600 mg, PO, bid, 0 Refills      oxyCODONE 5 mg TbOr   5 mg = 1 tab each dose, PO, every 4 hours, # 80 tab, 0 Refills      gabapentin (NEURONTIN) 300 mg capsule 2 tab in am, 1 tab with dinner and 2 tab bed time 150 Cap 2    fish oil-omega-3 fatty acids 300-1,000 mg cap TAKE ONE CAPSULE BY MOUTH TWICE A  Cap 1    ergocalciferol (ERGOCALCIFEROL) 50,000 unit capsule Take 1 Cap by mouth every seven (7) days. 12 Cap 1    insulin detemir (LEVEMIR FLEXTOUCH) 100 unit/mL (3 mL) inpn 42 Units by SubCUTAneous route two (2) times a day.  6 Adjustable Dose Pre-filled Pen Syringe 2    irbesartan (AVAPRO) 300 mg tablet TAKE ONE TABLET BY MOUTH DAILY 30 Tab 5    chlorthalidone (HYGROTEN) 25 mg tablet Take 1 Tab by mouth daily. 90 Tab 1    insulin lispro (HUMALOG) 100 unit/mL injection 18 Units by SubCUTAneous route three (3) times daily. (Patient taking differently: 24 Units by SubCUTAneous route three (3) times daily. ) 1 Vial 0    fenofibrate nanocrystallized (TRICOR) 145 mg tablet       HUMALOG KWIKPEN 100 unit/mL kwikpen       metFORMIN ER (GLUCOPHAGE XR) 500 mg tablet       aspirin 81 mg chewable tablet Take 81 mg by mouth.  cyclobenzaprine (FLEXERIL) 10 mg tablet       fluocinonide (VANOS) 0.1 % topical cream       atorvastatin (LIPITOR) 40 mg tablet       Insulin Needles, Disposable, 32 gauge x 5/32\" ndle       LANCETS, SUPER [facility]IN [facility]       ONETOUCH ULTRA TEST strip       lidocaine (XYLOCAINE) 5 % ointment       predniSONE (STERAPRED DS) 10 mg dose pack See administration instruction per 10mg dose pack 21 Tab 0    traMADol (ULTRAM) 50 mg tablet Take 1 Tab by mouth daily as needed for Pain. 30 Tab 0    diclofenac potassium (CATAFLAM) 50 mg tablet Take 1 Tab by mouth three (3) times daily. 15 Tab 0    carvedilol (COREG) 25 mg tablet       gemfibrozil (LOPID) 600 mg tablet TAKE ONE TABLET BY MOUTH TWICE A  Tab 1    triamcinolone acetonide (KENALOG) 0.1 % ointment Apply  to affected area two (2) times a day. 30 g 0    metFORMIN ER (GLUCOPHAGE XR) 750 mg tablet TAKE 1 TABLET BY MOUTH TWICE A  Tab 1    aspirin delayed-release 81 mg tablet Take 81 mg by mouth daily.           Objective:     Visit Vitals    /75 (BP 1 Location: Right arm, BP Patient Position: Sitting)    Pulse 72    Temp 97.9 °F (36.6 °C) (Oral)    Resp 16    Ht 5' 5\" (1.651 m)    Wt 198 lb (89.8 kg)    SpO2 98%    BMI 32.95 kg/m2       General appearance - alert, well appearing, and in no distress  Mental status - alert, oriented to person, place, and time  Chest - clear to auscultation, no wheezes, rales or rhonchi, symmetric air entry  Heart - normal rate, regular rhythm, normal S1, S2, no murmurs, rubs, clicks or gallops  Abdomen - soft, nontender, nondistended, no masses or organomegaly  Neurological - alert, oriented, normal speech, no focal findings or movement disorder noted  Back - Normal ROM    General: alert, cooperative, no distress, appears stated age   Body habitus: Not obese   Gait: Normal   Visible deformity? no   Leg muscle asymmetry? no   Tender spinous processes? no   Tender back or buttock? No          Assessment/Plan:     Diagnoses and all orders for this visit:    1. Chronic midline low back pain with left-sided sciatica  -     DULoxetine (CYMBALTA) 20 mg capsule; Take 1 Cap by mouth daily.  -     TENS unit and electrodes cmpk; 1 Units by Does Not Apply route every six (6) hours as needed. I have discussed the diagnosis with the patient and the intended plan as seen in the above orders. The patient has received an after-visit summary and questions were answered concerning future plans. I have discussed medication side effects and warnings with the patient as well. Follow-up Disposition:  Return in about 2 months (around 2/14/2018) for dm2, htn, labs.       Magdaleno Alexander MD  12/14/2017

## 2018-01-31 DIAGNOSIS — L30.9 ECZEMA, UNSPECIFIED TYPE: ICD-10-CM

## 2018-02-01 RX ORDER — TRIAMCINOLONE ACETONIDE 1 MG/G
OINTMENT TOPICAL
Qty: 80 G | Refills: 0 | Status: SHIPPED | OUTPATIENT
Start: 2018-02-01 | End: 2018-05-20 | Stop reason: SDUPTHER

## 2018-02-23 ENCOUNTER — HOSPITAL ENCOUNTER (OUTPATIENT)
Dept: GENERAL RADIOLOGY | Age: 58
Discharge: HOME | End: 2018-02-23
Payer: COMMERCIAL

## 2018-02-23 ENCOUNTER — OFFICE VISIT (OUTPATIENT)
Dept: FAMILY MEDICINE CLINIC | Age: 58
End: 2018-02-23

## 2018-02-23 VITALS
OXYGEN SATURATION: 98 % | HEART RATE: 75 BPM | BODY MASS INDEX: 33.92 KG/M2 | DIASTOLIC BLOOD PRESSURE: 80 MMHG | HEIGHT: 65 IN | WEIGHT: 203.6 LBS | RESPIRATION RATE: 16 BRPM | TEMPERATURE: 98 F | SYSTOLIC BLOOD PRESSURE: 137 MMHG

## 2018-02-23 DIAGNOSIS — G89.29 CHRONIC PAIN OF LEFT KNEE: Primary | ICD-10-CM

## 2018-02-23 DIAGNOSIS — M54.32 SCIATICA OF LEFT SIDE: ICD-10-CM

## 2018-02-23 DIAGNOSIS — M25.562 CHRONIC PAIN OF LEFT KNEE: Primary | ICD-10-CM

## 2018-02-23 DIAGNOSIS — M25.562 CHRONIC PAIN OF LEFT KNEE: ICD-10-CM

## 2018-02-23 DIAGNOSIS — G89.29 CHRONIC PAIN OF LEFT KNEE: ICD-10-CM

## 2018-02-23 DIAGNOSIS — M48.061 SPINAL STENOSIS OF LUMBAR REGION, UNSPECIFIED WHETHER NEUROGENIC CLAUDICATION PRESENT: ICD-10-CM

## 2018-02-23 PROCEDURE — 73560 X-RAY EXAM OF KNEE 1 OR 2: CPT

## 2018-02-23 RX ORDER — DICLOFENAC SODIUM 10 MG/G
GEL TOPICAL
COMMUNITY
Start: 2018-02-06 | End: 2018-05-02 | Stop reason: ALTCHOICE

## 2018-02-23 RX ORDER — TRAMADOL HYDROCHLORIDE 50 MG/1
50 TABLET ORAL
Qty: 30 TAB | Refills: 0 | Status: SHIPPED | OUTPATIENT
Start: 2018-02-23 | End: 2018-03-02 | Stop reason: SDUPTHER

## 2018-02-23 NOTE — MR AVS SNAPSHOT
[place] [address] [facility] 
[phone] Patient: Brittany Walker MRN: [id number] GBB:[birth year] Visit Information Date & Time Provider Department Dept. Phone Encounter #  
 2/23/2018  1:40 PM Michele Buenrostro MD [facility] at [address] [id number] Follow-up Instructions Return for left knee injection. Upcoming Health Maintenance Date Due Hepatitis C Screening 1960 EYE EXAM RETINAL OR DILATED Q1 3/11/2017 HEMOGLOBIN A1C Q6M 2/11/2018 MICROALBUMIN Q1 8/11/2018 LIPID PANEL Q1 8/11/2018 BREAST CANCER SCRN MAMMOGRAM 8/12/2018 FOBT Q 1 YEAR AGE 50-75 8/16/2018 FOOT EXAM Q1 10/11/2018 PAP AKA CERVICAL CYTOLOGY 12/1/2020 DTaP/Tdap/Td series (2 - Td) 3/24/2027 Allergies as of 2/23/2018  Review Complete On: 2/23/2018 By: Michele Buenrostro MD  
  
 Severity Noted Reaction Type Reactions Pravachol [Pravastatin]  08/15/2017    Myalgia Current Immunizations  Reviewed on 8/11/2017 Name Date Influenza Vaccine Julane Will) 10/8/2015 Pneumococcal Polysaccharide (PPSV-23) 8/11/2017 Not reviewed this visit You Were Diagnosed With   
  
 Codes Comments Chronic pain of left knee    -  Primary ICD-10-CM: M25.562, M08.24 ICD-9-CM: 719.46, 338.29 Sciatica of left side     ICD-10-CM: M54.32 
ICD-9-CM: 724.3 Spinal stenosis of lumbar region, unspecified whether neurogenic claudication present     ICD-10-CM: M48.061 
ICD-9-CM: 724.02 Vitals BP Pulse Temp Resp Height(growth percentile) Weight(growth percentile) 137/80 (BP 1 Location: Left arm, BP Patient Position: Sitting) 75 98 °F (36.7 °C) (Oral) 16 5' 5\" (1.651 m) 203 lb 9.6 oz (92.4 kg) SpO2 BMI OB Status Smoking Status 98% 33.88 kg/m2 Hysterectomy Former Smoker BMI and BSA Data Body Mass Index Body Surface Area  
 33.88 kg/m 2 2.06 m 2 Preferred Pharmacy Pharmacy Name Phone [facility] [address] [place] [address] Dr. Ramachandran Jr [facility] [phone] Your Updated Medication List  
  
   
This list is accurate as of 2/23/18  2:26 PM.  Always use your most recent med list.  
  
  
  
  
 aspirin 81 mg chewable tablet Take 81 mg by mouth. atorvastatin 40 mg tablet Commonly known as:  LIPITOR Take 40 mg by mouth daily. carvedilol 25 mg tablet Commonly known as:  [place] [employer] Take 25 mg by mouth two (2) times a day. chlorthalidone 25 mg tablet Commonly known as:  HYGROTEN  
TAKE ONE TABLET BY MOUTH DAILY  
  
 clobetasol 0.05 % ointment Commonly known as:  Nasim Langton Apply  to affected area. cyclobenzaprine 10 mg tablet Commonly known as:  FLEXERIL  
  
 diclofenac 1 % Gel Commonly known as:  VOLTAREN  
  
 diclofenac potassium 50 mg tablet Commonly known as:  CATAFLAM  
Take 1 Tab by mouth three (3) times daily. DULoxetine 20 mg capsule Commonly known as:  CYMBALTA Take 1 Cap by mouth daily. ergocalciferol 50,000 unit capsule Commonly known as:  ERGOCALCIFEROL Take 1 Cap by mouth every seven (7) days. fenofibrate nanocrystallized 145 mg tablet Commonly known as:  [employer] Take 145 mg by mouth daily. FISH OIL 60- mg Cap Generic drug:  omega 3-dha-epa-fish oil TAKE ONE CAPSULE BY MOUTH TWICE A DAY  
  
 fish oil-omega-3 fatty acids 300-1,000 mg Cap TAKE ONE CAPSULE BY MOUTH TWICE A DAY  
  
 fluocinonide 0.1 % topical cream  
Commonly known as:  VANOS  
  
 gabapentin 300 mg capsule Commonly known as:  NEURONTIN  
TAKE THREE CAPSULES BY MOUTH EVERY MORNING AND AFTERNOON AND TAKE TWO CAPSULES BY MOUTH EVERY NIGHT AT BEDTIME  
  
 gemfibrozil 600 mg tablet Commonly known as:  LOPID TAKE ONE TABLET BY MOUTH TWICE A DAY  
  
 GLUCOSE METER, DISP & STRIPS Glucose meter, See Instructions, #: 1 EA, 0 Refill(s), Pharmacy: [facility]/pharmacy #[id number] insulin detemir U-100 100 unit/mL (3 mL) Inpn Commonly known as:  LEVEMIR FLEXTOUCH  
42 Units by SubCUTAneous route two (2) times a day. * insulin lispro 100 unit/mL injection Commonly known as:  HumaLOG U-100 Insulin 18 Units by SubCUTAneous route three (3) times daily. * HumaLOG KwikPen Insulin 100 unit/mL kwikpen Generic drug:  insulin lispro Insulin Needles (Disposable) 32 gauge x 5/32\" Ndle  
  
 irbesartan 300 mg tablet Commonly known as:  AVAPRO TAKE ONE TABLET BY MOUTH DAILY LANCETS, [address] [employer] Generic drug:  Lancets LASIX 20 mg tablet Generic drug:  furosemide 20 mg = 1 tab each dose, PO, daily, # 30 tab, 5 Refills, [facility]  
  
 lidocaine 5 % ointment Commonly known as:  XYLOCAINE  
  
 * metFORMIN  mg tablet Commonly known as:  GLUCOPHAGE XR  
TAKE 1 TABLET BY MOUTH TWICE A DAY  
  
 * metFORMIN  mg tablet Commonly known as:  GLUCOPHAGE XR Take 1,000 mg by mouth. Indications: 500 mg tab taking 2 tabs daily ONETOUCH ULTRA TEST strip Generic drug:  glucose blood VI test strips  
  
 spironolactone 25 mg tablet Commonly known as:  ALDACTONE  
25 mg = 1 tab each dose, PO, daily, # 30 tab, 11 Refills, Pharmacy: [facility]/pharmacy #[id number] TENS unit and electrodes Cmpk  
1 Units by Does Not Apply route every six (6) hours as needed. traMADol 50 mg tablet Commonly known as:  ULTRAM  
Take 1 Tab by mouth daily as needed for Pain.  
  
 triamcinolone acetonide 0.1 % ointment Commonly known as:  KENALOG  
APPLY A THIN LAYER TO AFFECTED AREA(S) TWO TIMES A DAY * Notice: This list has 4 medication(s) that are the same as other medications prescribed for you. Read the directions carefully, and ask your doctor or other care provider to review them with you. Prescriptions Printed Refills  
 traMADol (ULTRAM) 50 mg tablet 0 Sig: Take 1 Tab by mouth daily as needed for Pain. Class: Print Route: Oral  
  
Follow-up Instructions Return for left knee injection. To-Do List   
 02/23/2018 Imaging:  XR KNEE LT MAX 2 VWS Introducing [facility] SERVICES! Dear Nova Flores: 
Thank you for requesting a [employer] account. Our records indicate that you already have an active [employer] account. You can access your account anytime at [email or web address]. [email or web address] Did you know that you can access your hospital and ER discharge instructions at any time in [employer]? You can also review all of your test results from your hospital stay or ER visit. Additional Information If you have questions, please visit the Frequently Asked Questions section of the [employer] website at [email or web address] Remember, [employer] is NOT to be used for urgent needs. For medical emergencies, dial 911. Now available from your iPhone and Android! Please provide this summary of care documentation to your next provider. Your primary care clinician is listed as Shai Lexington. If you have any questions after today's visit, please call [phone].

## 2018-02-23 NOTE — PROGRESS NOTES
Chief Complaint   Patient presents with    Diabetes    Hypertension     3 mo check    1. Have you been to the ER, urgent care clinic since your last visit? Hospitalized since your last visit? no    2. Have you seen or consulted any other health care providers outside of the [facility] since your last visit? Include any pap smears or colon screening.  Yes [facility]

## 2018-03-02 ENCOUNTER — OFFICE VISIT (OUTPATIENT)
Dept: FAMILY MEDICINE CLINIC | Age: 58
End: 2018-03-02

## 2018-03-02 VITALS
DIASTOLIC BLOOD PRESSURE: 85 MMHG | TEMPERATURE: 96.9 F | WEIGHT: 201.6 LBS | HEIGHT: 65 IN | BODY MASS INDEX: 33.59 KG/M2 | HEART RATE: 67 BPM | RESPIRATION RATE: 16 BRPM | SYSTOLIC BLOOD PRESSURE: 170 MMHG | OXYGEN SATURATION: 96 %

## 2018-03-02 DIAGNOSIS — M17.12 PRIMARY OSTEOARTHRITIS OF LEFT KNEE: Primary | ICD-10-CM

## 2018-03-02 DIAGNOSIS — G89.29 CHRONIC PAIN OF LEFT KNEE: ICD-10-CM

## 2018-03-02 DIAGNOSIS — Z11.59 NEED FOR HEPATITIS C SCREENING TEST: ICD-10-CM

## 2018-03-02 DIAGNOSIS — M48.061 SPINAL STENOSIS OF LUMBAR REGION, UNSPECIFIED WHETHER NEUROGENIC CLAUDICATION PRESENT: ICD-10-CM

## 2018-03-02 DIAGNOSIS — M54.32 SCIATICA OF LEFT SIDE: ICD-10-CM

## 2018-03-02 DIAGNOSIS — M25.562 CHRONIC PAIN OF LEFT KNEE: ICD-10-CM

## 2018-03-02 RX ORDER — TRAMADOL HYDROCHLORIDE 50 MG/1
50 TABLET ORAL
Qty: 20 TAB | Refills: 0 | Status: SHIPPED | OUTPATIENT
Start: 2018-03-02 | End: 2018-05-02 | Stop reason: ALTCHOICE

## 2018-03-02 RX ORDER — LIDOCAINE HYDROCHLORIDE 10 MG/ML
1 INJECTION INFILTRATION; PERINEURAL ONCE
Qty: 1 VIAL | Refills: 0
Start: 2018-03-02 | End: 2018-03-02

## 2018-03-02 RX ORDER — TRIAMCINOLONE ACETONIDE 40 MG/ML
40 INJECTION, SUSPENSION INTRA-ARTICULAR; INTRAMUSCULAR ONCE
Qty: 1 ML | Refills: 0
Start: 2018-03-02 | End: 2018-03-02

## 2018-03-02 NOTE — PROGRESS NOTES
Subjective:     Mekhi Kebede is a 62 y.o. female, present with her two adopted kids from who's her grand children. seen for evaluation and treatment of   left knee pain. This is evaluated as a personal injury. The pain began 2 months ago. Injury history: no, this just started on own. The pain is located global. Symptoms improve with rest. Symptoms worsen with activity, stair climbing. The knee has not given out or felt unstable. The patient can bend and straighten the knee fully. Treatment to date has included NSAID's, without significant relief. Patients activity level: minimal physical exertion, confined to activities of daily living    A comprehensive review of systems was negative except for that written in the HPI. Patient Active Problem List    Diagnosis Date Noted    Type 2 diabetes mellitus with nephropathy ([facility] 12/14/2017    [facility], [facility] 01/23/2017    Patient non adherence 01/23/2017    Essential hypertension with goal blood pressure less than 140/90 06/28/2016    Encounter for long-term (current) use of medications 06/28/2016    Breast cancer screening 06/28/2016    Sickle cell trait (HCC) 10/08/2015    Hypercholesteremia 08/06/2015    Chronic lower back pain 08/06/2015     Current Outpatient Prescriptions   Medication Sig Dispense Refill    diclofenac (VOLTAREN) 1 % gel       traMADol (ULTRAM) 50 mg tablet Take 1 Tab by mouth daily as needed for Pain. 30 Tab 0    chlorthalidone (HYGROTEN) 25 mg tablet TAKE ONE TABLET BY MOUTH DAILY 30 Tab 5    triamcinolone acetonide (KENALOG) 0.1 % ointment APPLY A THIN LAYER TO AFFECTED AREA(S) TWO TIMES A DAY 80 g 0    gabapentin (NEURONTIN) 300 mg capsule TAKE THREE CAPSULES BY MOUTH EVERY MORNING AND AFTERNOON AND TAKE TWO CAPSULES BY MOUTH EVERY NIGHT AT BEDTIME 150 Cap 2    fenofibrate nanocrystallized (TRICOR) 145 mg tablet Take 145 mg by mouth daily.       omega 3-dha-epa-fish oil (FISH OIL) 60- mg cap TAKE ONE CAPSULE BY MOUTH TWICE A DAY      HUMALOG KWIKPEN 100 unit/mL kwikpen       metFORMIN ER (GLUCOPHAGE XR) 500 mg tablet Take 1,000 mg by mouth. Indications: 500 mg tab taking 2 tabs daily      aspirin 81 mg chewable tablet Take 81 mg by mouth.  furosemide (LASIX) 20 mg tablet   20 mg = 1 tab each dose, PO, daily, # 30 tab, 5 Refills, Pharmacy: [facility]      cyclobenzaprine (FLEXERIL) 10 mg tablet       clobetasol (TEMOVATE) 0.05 % ointment Apply  to affected area.  fluocinonide (VANOS) 0.1 % topical cream       spironolactone (ALDACTONE) 25 mg tablet   25 mg = 1 tab each dose, PO, daily, # 30 tab, 11 Refills, Pharmacy: [facility]/pharmacy #[id number]     atorvastatin (LIPITOR) 40 mg tablet Take 40 mg by mouth daily.  Insulin Needles, Disposable, 32 gauge x 5/32\" ndle       LANCETS, SUPER THIN misc       ONETOUCH ULTRA TEST strip       lidocaine (XYLOCAINE) 5 % ointment       DULoxetine (CYMBALTA) 20 mg capsule Take 1 Cap by mouth daily. 30 Cap 2    GLUCOMETER,PRE-LOADED STRIPS (GLUCOSE METER, DISP & STRIPS)   Glucose meter, See Instructions, #: 1 EA, 0 Refill(s), Pharmacy: [facility]/pharmacy #[id number]     fish oil-omega-3 fatty acids 300-1,000 mg cap TAKE ONE CAPSULE BY MOUTH TWICE A  Cap 1    diclofenac potassium (CATAFLAM) 50 mg tablet Take 1 Tab by mouth three (3) times daily. 15 Tab 0    carvedilol (COREG) 25 mg tablet Take 25 mg by mouth two (2) times a day.  ergocalciferol (ERGOCALCIFEROL) 50,000 unit capsule Take 1 Cap by mouth every seven (7) days. (Patient taking differently: Take 50,000 Units by mouth every seven (7) days. Indications: =1.25mg) 12 Cap 1    insulin detemir (LEVEMIR FLEXTOUCH) 100 unit/mL (3 mL) inpn 42 Units by SubCUTAneous route two (2) times a day.  6 Adjustable Dose Pre-filled Pen Syringe 2    gemfibrozil (LOPID) 600 mg tablet TAKE ONE TABLET BY MOUTH TWICE A  Tab 1    irbesartan (AVAPRO) 300 mg tablet TAKE ONE TABLET BY MOUTH DAILY 30 Tab 5    metFORMIN ER (GLUCOPHAGE XR) 750 mg tablet TAKE 1 TABLET BY MOUTH TWICE A  Tab 1    insulin lispro (HUMALOG) 100 unit/mL injection 18 Units by SubCUTAneous route three (3) times daily. (Patient taking differently: 24 Units by SubCUTAneous route three (3) times daily. ) 1 Vial 0    TENS unit and electrodes cmpk 1 Units by Does Not Apply route every six (6) hours as needed. 1 Units 0     Allergies   Allergen Reactions    Pravachol [Pravastatin] Myalgia     Past Surgical History:   Procedure Laterality Date    CARDIAC SURG PROCEDURE UNLIST      murmur- PT DENIES SURGERY AND MURMUR 6/20/17    HX CARPAL TUNNEL RELEASE      HX GYN      hysterectomy    HX HEENT      Ear surgery    HX HEENT      Left eye    HX ORTHOPAEDIC      left wrist & left elbow    HX ORTHOPAEDIC      Back Surgery x 3    HX ORTHOPAEDIC      right thumb at [address] HX OTHER SURGICAL      back surgery X 2    HX OTHER SURGICAL      Collapsed left lung    HX OTHER SURGICAL      axillary sweat glands removed    HX OVARIAN CYST REMOVAL      MS OTOPLASTY PROTRUDING EAR W/WO SIZE RDCTJ          Objective:      Visit Vitals    /80 (BP 1 Location: Left arm, BP Patient Position: Sitting)    Pulse 75    Temp 98 °F (36.7 °C) (Oral)    Resp 16    Ht 5' 5\" (1.651 m)    Wt 203 lb 9.6 oz (92.4 kg)    SpO2 98%    BMI 33.88 kg/m2     Lungs: clear to auscultation bilaterally  Heart: regular rate and rhythm, S1, S2 normal, no murmur, click, rub or gallop    General :   alert, cooperative, no distress, appears stated age   Gait: Normal. The patient can bear weight on the injured extremity. Bilateral Lower Extremity  Hip Palpation:  no tenderness over the greater  trochanter   Hip ROM: 90% of normal   Hamstring tightness reveals a popliteal angle to be 30 degrees   Knee Effusion:  None. Ecchymosis:  none   Knee ROM:  0 to 130 degrees without subpatellar   crepitance.    Patella:  Patella does track normally. Patellar apprehension test: negative  Patellar compression test: negative   Tenderness: none   Stability:  Lachman's test: negative  Posterior drawer: negative  Medial collateral ligament: negative  Lateral collateral ligament: negative     Addis's Test:  negative with no joint line tenderness   Sensation:   intact to light touch   Pulses: normal DP and PT pulses         Assessment:     Diagnoses and all orders for this visit:    1. Chronic pain of left knee  -     traMADol (ULTRAM) 50 mg tablet; Take 1 Tab by mouth daily as needed for Pain.  -     XR KNEE LT MAX 2 VWS; Future    2. Sciatica of left side  -     traMADol (ULTRAM) 50 mg tablet; Take 1 Tab by mouth daily as needed for Pain.  -     XR KNEE LT MAX 2 VWS; Future    3. Spinal stenosis of lumbar region, unspecified whether neurogenic claudication present  -     traMADol (ULTRAM) 50 mg tablet; Take 1 Tab by mouth daily as needed for Pain.  -     XR KNEE LT MAX 2 VWS; Future      Follow-up Disposition:  Return for left knee injection.       Nadia Tucker MD  3/2/2018

## 2018-03-02 NOTE — MR AVS SNAPSHOT
[address] [facility] [address] [facility] 
[phone] Patient: Kathy Barbosa MRN: [id number] VMD:[birth year] Visit Information Date & Time Provider Department Dept. Phone Encounter #  
 3/2/2018 10:20 AM Kwan Valadez MD [facility] at [address] [id number] Follow-up Instructions Return in about 3 months (around 6/2/2018) for diabetes, sooner if labs abnormal or as needed. Upcoming Health Maintenance Date Due Hepatitis C Screening 1960 EYE EXAM RETINAL OR DILATED Q1 3/11/2017 HEMOGLOBIN A1C Q6M 2/11/2018 MICROALBUMIN Q1 8/11/2018 LIPID PANEL Q1 8/11/2018 BREAST CANCER SCRN MAMMOGRAM 8/12/2018 FOBT Q 1 YEAR AGE 50-75 8/16/2018 FOOT EXAM Q1 10/11/2018 PAP AKA CERVICAL CYTOLOGY 12/1/2020 DTaP/Tdap/Td series (2 - Td) 3/24/2027 Allergies as of 3/2/2018  Review Complete On: 3/2/2018 By: Kwan Valadez MD  
  
 Severity Noted Reaction Type Reactions Pravachol [Pravastatin]  08/15/2017    Myalgia Current Immunizations  Reviewed on 8/11/2017 Name Date Influenza Vaccine Parthenia Foyer) 10/8/2015 Pneumococcal Polysaccharide (PPSV-23) 8/11/2017 Not reviewed this visit You Were Diagnosed With   
  
 Codes Comments Type 2 diabetes, uncontrolled, with neuropathy ([facility]    -  Primary ICD-10-CM: E11.40, E11.65 ICD-9-CM: 250.62, 357.2 Primary osteoarthritis of left knee     ICD-10-CM: M17.12 
ICD-9-CM: 715.16 Need for hepatitis C screening test     ICD-10-CM: Z11.59 
ICD-9-CM: V73.89 Vitals BP Pulse Temp Resp Height(growth percentile) Weight(growth percentile) 170/85 (BP 1 Location: Left arm, BP Patient Position: Sitting) 67 96.9 °F (36.1 °C) (Oral) 16 5' 5\" (1.651 m) 201 lb 9.6 oz (91.4 kg) SpO2 BMI OB Status Smoking Status 96% 33.55 kg/m2 Hysterectomy Former Smoker Vitals History BMI and BSA Data Body Mass Index Body Surface Area  
 33.55 kg/m 2 2.05 m 2 Preferred Pharmacy Pharmacy Name Phone [facility] [address] Ramachandran [facility] [phone] Your Updated Medication List  
  
   
This list is accurate as of 3/2/18 10:58 AM.  Always use your most recent med list.  
  
  
  
  
 aspirin 81 mg chewable tablet Take 81 mg by mouth. atorvastatin 40 mg tablet Commonly known as:  LIPITOR Take 40 mg by mouth daily. carvedilol 25 mg tablet Commonly known as:  Drena Gavel Take 25 mg by mouth two (2) times a day. chlorthalidone 25 mg tablet Commonly known as:  HYGROTEN  
TAKE ONE TABLET BY MOUTH DAILY  
  
 clobetasol 0.05 % ointment Commonly known as:  Garrel Kim Apply  to affected area. cyclobenzaprine 10 mg tablet Commonly known as:  FLEXERIL  
  
 diclofenac 1 % Gel Commonly known as:  VOLTAREN  
  
 diclofenac potassium 50 mg tablet Commonly known as:  CATAFLAM  
Take 1 Tab by mouth three (3) times daily. DULoxetine 20 mg capsule Commonly known as:  CYMBALTA Take 1 Cap by mouth daily. ergocalciferol 50,000 unit capsule Commonly known as:  ERGOCALCIFEROL Take 1 Cap by mouth every seven (7) days. fenofibrate nanocrystallized 145 mg tablet Commonly known as:  [employer] Take 145 mg by mouth daily. FISH OIL 60- mg Cap Generic drug:  omega 3-dha-epa-fish oil TAKE ONE CAPSULE BY MOUTH TWICE A DAY  
  
 fish oil-omega-3 fatty acids 300-1,000 mg Cap TAKE ONE CAPSULE BY MOUTH TWICE A DAY  
  
 fluocinonide 0.1 % topical cream  
Commonly known as:  VANOS  
  
 gabapentin 300 mg capsule Commonly known as:  NEURONTIN  
TAKE THREE CAPSULES BY MOUTH EVERY MORNING AND AFTERNOON AND TAKE TWO CAPSULES BY MOUTH EVERY NIGHT AT BEDTIME  
  
 gemfibrozil 600 mg tablet Commonly known as:  LOPID TAKE ONE TABLET BY MOUTH TWICE A DAY  
  
 GLUCOSE METER, DISP & STRIPS  
 Glucose meter, See Instructions, #: 1 EA, 0 Refill(s), Pharmacy: [facility]/pharmacy #[id number] 
  
 insulin detemir U-100 100 unit/mL (3 mL) Inpn Commonly known as:  LEVEMIR FLEXTOUCH  
42 Units by SubCUTAneous route two (2) times a day. * insulin lispro 100 unit/mL injection Commonly known as:  HumaLOG U-100 Insulin 18 Units by SubCUTAneous route three (3) times daily. * HumaLOG KwikPen Insulin 100 unit/mL kwikpen Generic drug:  insulin lispro Insulin Needles (Disposable) 32 gauge x 5/32\" Ndle  
  
 irbesartan 300 mg tablet Commonly known as:  AVAPRO TAKE ONE TABLET BY MOUTH DAILY LANCETS, [address] Generic drug:  Lancets LASIX 20 mg tablet Generic drug:  furosemide 20 mg = 1 tab each dose, PO, daily, # 30 tab, 5 Refills, Pharmacy: [facility]  
  
 lidocaine 10 mg/mL (1 %) injection Commonly known as:  XYLOCAINE  
1 mL by IntraDERMal route once for 1 dose. lidocaine 5 % ointment Commonly known as:  XYLOCAINE  
  
 metFORMIN  mg tablet Commonly known as:  GLUCOPHAGE XR Take 1,000 mg by mouth. Indications: 500 mg tab taking 2 tabs daily ONETOUCH ULTRA TEST strip Generic drug:  glucose blood VI test strips  
  
 spironolactone 25 mg tablet Commonly known as:  ALDACTONE  
25 mg = 1 tab each dose, PO, daily, # 30 tab, 11 Refills, [id number] 
  
 traMADol 50 mg tablet Commonly known as:  ULTRAM  
Take 1 Tab by mouth daily as needed for Pain. * triamcinolone acetonide 0.1 % ointment Commonly known as:  KENALOG  
APPLY A THIN LAYER TO AFFECTED AREA(S) TWO TIMES A DAY  
  
 * triamcinolone acetonide 40 mg/mL injection Commonly known as:  KENALOG  
1 mL by IntraMUSCular route once for 1 dose. * Notice: This list has 4 medication(s) that are the same as other medications prescribed for you. Read the directions carefully, and ask your doctor or other care provider to review them with you. We Performed the Following HEMOGLOBIN A1C W/O EAG [32357 CPT(R)] HEPATITIS C AB [66994 CPT(R)] LIPID PANEL [07419 CPT(R)] METABOLIC PANEL, COMPREHENSIVE [75459 CPT(R)] [place] DRAIN/INJECT LARGE JOINT/BURSA [id number] CPT(R)] TRIAMCINOLONE ACETONIDE INJ [ [facility]] Follow-up Instructions Return in about 3 months (around 6/2/2018) for diabetes, sooner if labs abnormal or as needed. Introducing [facility]! Dear Claude Barrera: 
Thank you for requesting a Inside account. Our records indicate that you already have an active Inside account. You can access your account anytime at [email or web address] Did you know that you can access your hospital and ER discharge instructions at any time in Inside? You can also review all of your test results from your hospital stay or ER visit. Additional Information If you have questions, please visit the Frequently Asked Questions section of the Inside website at [email or web address] Remember, Inside is NOT to be used for urgent needs. For medical emergencies, dial 911. Now available from your iPhone and Android! Please provide this summary of care documentation to your next provider. Your primary care clinician is listed as Katharine Mosher. If you have any questions after today's visit, please call [phone].

## 2018-03-02 NOTE — PROGRESS NOTES
Chief Complaint   Patient presents with    Leg Pain     Wants steroid injection left knee. 1. Have you been to the ER, urgent care clinic since your last visit? Hospitalized since your last visit? no    2. Have you seen or consulted any other health care providers outside of the [address] [place] [address] since your last visit? Include any pap smears or colon screening.  no    [facility]  OFFICE PROCEDURE PROGRESS NOTE        Chart reviewed for the following:   Niya ALEJO LPN, have reviewed the History, Physical and updated the Allergic reactions for Kena Patella performed immediately prior to start of procedure:   Niya ALEJO LPN, have performed the following reviews on Christen Suh prior to the start of the procedure:            * Patient was identified by name and date of birth   * Agreement on procedure being performed was verified  * Risks and Benefits explained to the patient  * Procedure site verified and marked as necessary  * Patient was positioned for comfort  * Consent was signed and verified     Time: 11AM      Date of procedure: 3/2/2018    Procedure performed by:  Maury Russell MD    Provider assisted by: none    Patient assisted by: self    How tolerated by patient: tolerated well    Post Procedural Pain Scale: 0/10    Comments: left knee steroid injection

## 2018-03-02 NOTE — PROGRESS NOTES
Bella Joaquin is a 62 y.o. female     has a past medical history of Arthritis; Back pain; Breast cancer screening (6/28/2016); Chronic bilateral low back pain without sciatica (6/28/2016); Diabetes ([facility] Encounter for long-term (current) use of medications (6/28/2016); Essential hypertension with goal blood pressure less than 140/90 (6/28/2016); Hypercholesteremia (8/6/2015); Hypercholesterolemia; Hypertension; Ill-defined condition; Other ill-defined conditions(799.89); Patient non adherence (1/23/2017); Primary osteoarthritis of left knee (3/2/2018); Type 2 diabetes, uncontrolled, with neuropathy ([facility] (1/23/2017); and Type II diabetes mellitus, uncontrolled ([facility] (8/6/2015). HTN: Last visit her BP was at goal. This time not, b/c of her knee pain and she rushed here. Primary osteoarthritis of left knee. She have had injection to her lower back and hip. Never had injection to her knee. Pain is constant most day, she would like a steroid injection today. Chronic low back pain. Last time I saw her, I have her tramadol #30, but the pharmacist can only give her #7. We'll give her #20 to hold on to and take PRN. Reviewed: active problem list, medication list, allergies, notes from last encounter, lab results, imaging    A comprehensive review of systems was negative except for that written in the HPI.       Allergies   Allergen Reactions    Pravachol [Pravastatin] Myalgia     Current Outpatient Prescriptions on File Prior to Visit   Medication Sig Dispense Refill    diclofenac (VOLTAREN) 1 % gel       chlorthalidone (HYGROTEN) 25 mg tablet TAKE ONE TABLET BY MOUTH DAILY 30 Tab 5    triamcinolone acetonide (KENALOG) 0.1 % ointment APPLY A THIN LAYER TO AFFECTED AREA(S) TWO TIMES A DAY 80 g 0    gabapentin (NEURONTIN) 300 mg capsule TAKE THREE CAPSULES BY MOUTH EVERY MORNING AND AFTERNOON AND TAKE TWO CAPSULES BY MOUTH EVERY NIGHT AT BEDTIME 150 Cap 2    fenofibrate nanocrystallized (TRICOR) 145 mg tablet Take 145 mg by mouth daily.  omega 3-dha-epa-fish oil (FISH OIL) 60- mg cap TAKE ONE CAPSULE BY MOUTH TWICE A DAY      HUMALOG KWIKPEN 100 unit/mL kwikpen       metFORMIN ER (GLUCOPHAGE XR) 500 mg tablet Take 1,000 mg by mouth. Indications: 500 mg tab taking 2 tabs daily      aspirin 81 mg chewable tablet Take 81 mg by mouth.  furosemide (LASIX) 20 mg tablet   20 mg = 1 tab each dose, PO, daily, # 30 tab, 5 Refills, Pharmacy: [facility]      cyclobenzaprine (FLEXERIL) 10 mg tablet       clobetasol (TEMOVATE) 0.05 % ointment Apply  to affected area.  fluocinonide (VANOS) 0.1 % topical cream       spironolactone (ALDACTONE) 25 mg tablet   25 mg = 1 tab each dose, PO, daily, # 30 tab, 11 Refills, Pharmacy: [facility]/pharmacy #[id number]     atorvastatin (LIPITOR) 40 mg tablet Take 40 mg by mouth daily.  Insulin Needles, Disposable, 32 gauge x 5/32\" ndle       LANCETS, SUPER [employer]IN misc       ONETOUCH ULTRA TEST strip       lidocaine (XYLOCAINE) 5 % ointment       DULoxetine (CYMBALTA) 20 mg capsule Take 1 Cap by mouth daily. 30 Cap 2    GLUCOMETER,PRE-LOADED STRIPS (GLUCOSE METER, DISP & STRIPS)   Glucose meter, See Instructions, #: 1 EA, 0 Refill(s), Pharmacy: [facility]/pharmacy #[id number]     fish oil-omega-3 fatty acids 300-1,000 mg cap TAKE ONE CAPSULE BY MOUTH TWICE A  Cap 1    diclofenac potassium (CATAFLAM) 50 mg tablet Take 1 Tab by mouth three (3) times daily. 15 Tab 0    carvedilol (COREG) 25 mg tablet Take 25 mg by mouth two (2) times a day.  ergocalciferol (ERGOCALCIFEROL) 50,000 unit capsule Take 1 Cap by mouth every seven (7) days. (Patient taking differently: Take 50,000 Units by mouth every seven (7) days. Indications: =1.25mg) 12 Cap 1    insulin detemir (LEVEMIR FLEXTOUCH) 100 unit/mL (3 mL) inpn 42 Units by SubCUTAneous route two (2) times a day.  6 Adjustable Dose Pre-filled Pen Syringe 2    gemfibrozil (LOPID) 600 mg tablet TAKE ONE TABLET BY MOUTH TWICE A  Tab 1    irbesartan (AVAPRO) 300 mg tablet TAKE ONE TABLET BY MOUTH DAILY 30 Tab 5    insulin lispro (HUMALOG) 100 unit/mL injection 18 Units by SubCUTAneous route three (3) times daily. (Patient taking differently: 24 Units by SubCUTAneous route three (3) times daily. ) 1 Vial 0     No current facility-administered medications on file prior to visit. Patient Active Problem List   Diagnosis Code    Hypercholesteremia E78.00    Chronic lower back pain M54.5, G89.29    Sickle cell trait ([facility] D57.3    Essential hypertension with goal blood pressure less than 140/90 I10    Encounter for long-term (current) use of medications Z79.899    Breast cancer screening Z12.31    Type 2 diabetes, uncontrolled, [facility] neuropathy [facility] E11.40, E11.65    Patient non adherence Z91.19    Type 2 diabetes mellitus with nephropathy (HCC) E11.21    Primary osteoarthritis of left knee M17.12       Visit Vitals    /85 (BP 1 Location: Left arm, BP Patient Position: Sitting)    Pulse 67    Temp 96.9 °F (36.1 °C) (Oral)    Resp 16    Ht 5' 5\" (1.651 m)    Wt 201 lb 9.6 oz (91.4 kg)    SpO2 96%    BMI 33.55 kg/m2       General appearance: alert, cooperative, no distress, appears stated age  Neurologic: Alert and oriented X 3, normal strength and tone, symmetric. Normal without focal findings. Cranial nerves 2-12 intact. Normal coordination and gait. Mental status: Alert, oriented, thought content appropriate, affect: stable, mood-congruent. Head: Normocephalic, without obvious abnormality, atraumatic  Eyes: conjunctivae/corneas clear. PERRL, EOM's intact. Neck: supple, symmetrical, trachea midline, no JVD  Lungs: clear to auscultation bilaterally  Heart: regular rate and rhythm, S1, S2 normal, no murmur, click, rub or gallop  Abdomen: soft, non-tender. Extremities: extremities normal, atraumatic, no cyanosis or edema      Risk and benefit discussed.   Pain, scaring, bleeding, infection risk discussed. Pt understand and want to proceed. Consent form done and time out done. Dose: 1% Xylocaine, 1 mL     Steroid 40mg/mL Triamcinolone acetonide        Procedure: The area was prepped in the usual sterile manner. A 3ml syringe and a 23G 1.5\" needle was used. Steroid mixture was injected:    Knee injection: Left knee   medial to the tendon (for the anteromedial approach),    sit with the knee flexed 90°. The sterile needle is inserted either lateral to the patellar tendon approximately 1 cm above the tibial plateau, and directed 15-45° from the anterior knee surface vertical midline toward the intra-articular joint space. There were no bleeding or complications during this procedure. Pt tolerated procedure well. Followup: Standard post-procedure care is explained and return precautions are given. Assessment/Plans:    Diagnoses and all orders for this visit:    1. Primary osteoarthritis of left knee  -     TRIAMCINOLONE ACETONIDE INJ  -     triamcinolone acetonide (KENALOG) 40 mg/mL injection; 1 mL by IntraMUSCular route once for 1 dose. -     lidocaine (XYLOCAINE) 10 mg/mL (1 %) injection; 1 mL by IntraDERMal route once for 1 dose. -     PA DRAIN/INJECT LARGE JOINT/BURSA  - 20610    2. Type 2 diabetes, uncontrolled, with neuropathy (HCC)  -     HEMOGLOBIN A1C W/O EAG  -     METABOLIC PANEL, COMPREHENSIVE  -     LIPID PANEL    3. Need for hepatitis C screening test  -     HEPATITIS C AB    4. Sciatica of left side  -     traMADol (ULTRAM) 50 mg tablet; Take 1 Tab by mouth daily as needed for Pain. 5. Spinal stenosis of lumbar region, unspecified whether neurogenic claudication present  -     traMADol (ULTRAM) 50 mg tablet; Take 1 Tab by mouth daily as needed for Pain. 6. Chronic pain of left knee  -     traMADol (ULTRAM) 50 mg tablet; Take 1 Tab by mouth daily as needed for Pain. Discussed plans, risk/benefits of treatments/observations.    Through the use of shared decision making, above plans were agreed upon. Medication compliance advised. Patient verbalized understanding. Follow-up Disposition:  Return in about 3 months (around 6/2/2018) for diabetes, sooner if labs abnormal or as needed.       Jalil Kat MD  3/2/2018

## 2018-03-02 NOTE — PATIENT INSTRUCTIONS
Joint Injections: Care Instructions  Your Care Instructions  Joint injections are shots into a joint, such as the knee. They may be used to put in medicines, such as pain relievers. Or they can be used to take out fluid. Sometimes the fluid is tested in a lab. This can help find the cause of a joint problem. A corticosteroid, or steroid, shot is used to reduce inflammation in tendons or joints. It is often used to treat problems such as arthritis, tendinitis, and bursitis. Steroids can be injected directly into a painful, inflamed joint. They can also help reduce inflammation of a bursa. A bursa is a sac of fluid. It cushions and lubricates areas where tendons, ligaments, skin, muscles, or bones rub against each other. A steroid shot can sometimes help with short-term pain relief when other treatments haven't worked. If steroid shots help, pain may improve for weeks or months. Follow-up care is a key part of your treatment and safety. Be sure to make and go to all appointments, and call your doctor if you are having problems. It's also a good idea to know your test results and keep a list of the medicines you take. How can you care for yourself at home? · Put ice or a cold pack on the area for 10 to 20 minutes at a time. Put a thin cloth between the ice and your skin. · Take anti-inflammatory medicines to reduce pain, swelling, or inflammation. These include ibuprofen (Advil, Motrin) and naproxen (Aleve). Read and follow all instructions on the label. · Avoid strenuous activities for several days, especially those that put stress on the area where you got the shot. · If you have dressings over the area, keep them clean and dry. You may remove them when your doctor tells you to. When should you call for help? Call your doctor now or seek immediate medical care if:  ? · You have signs of infection, such as:  ¨ Increased pain, swelling, warmth, or redness. ¨ Red streaks leading from the site.   ¨ Pus draining from the site. ¨ A fever. ? Watch closely for changes in your health, and be sure to contact your doctor if you have any problems. Where can you learn more? Go to http://betina-dominick.info/. Enter N616 in the search box to learn more about \"Joint Injections: Care Instructions. \"  Current as of: March 21, 2017  Content Version: 11.4  © 2006-2017 [employer]. Care instructions adapted under license by [employer] (which disclaims liability or warranty for this information). If you have questions about a medical condition or this instruction, always ask your healthcare professional. [address] any warranty or liability for your use of this information. Joint Injections: Care Instructions  Your Care Instructions  Joint injections are shots into a joint, such as the knee. They may be used to put in medicines, such as pain relievers. Or they can be used to take out fluid. Sometimes the fluid is tested in a lab. This can help find the cause of a joint problem. A corticosteroid, or steroid, shot is used to reduce inflammation in tendons or joints. It is often used to treat problems such as arthritis, tendinitis, and bursitis. Steroids can be injected directly into a painful, inflamed joint. They can also help reduce inflammation of a bursa. A bursa is a sac of fluid. It cushions and lubricates areas where tendons, ligaments, skin, muscles, or bones rub against each other. A steroid shot can sometimes help with short-term pain relief when other treatments haven't worked. If steroid shots help, pain may improve for weeks or months. Follow-up care is a key part of your treatment and safety. Be sure to make and go to all appointments, and call your doctor if you are having problems. It's also a good idea to know your test results and keep a list of the medicines you take. How can you care for yourself at home?   · Put ice or a cold pack on the area for 10 to 20 minutes at a time. Put a thin cloth between the ice and your skin. · Take anti-inflammatory medicines to reduce pain, swelling, or inflammation. These include ibuprofen (Advil, Motrin) and naproxen (Aleve). Read and follow all instructions on the label. · Avoid strenuous activities for several days, especially those that put stress on the area where you got the shot. · If you have dressings over the area, keep them clean and dry. You may remove them when your doctor tells you to. When should you call for help? Call your doctor now or seek immediate medical care if:  ? · You have signs of infection, such as:  ¨ Increased pain, swelling, warmth, or redness. ¨ Red streaks leading from the site. ¨ Pus draining from the site. ¨ A fever. ? Watch closely for changes in your health, and be sure to contact your doctor if you have any problems. Where can you learn more? Go to http://betina-dominick.info/. Enter N616 in the search box to learn more about \"Joint Injections: Care Instructions. \"  Current as of: March 21, 2017  Content Version: 11.4  © 7448-8426 [employer]. Care instructions adapted under license by Celebr[employer] (which disclaims liability or warranty for this information). If you have questions about a medical condition or this instruction, always ask your healthcare professional. [address] any warranty or liability for your use of this information.

## 2018-03-06 LAB
ALBUMIN SERPL-MCNC: 4.3 G/DL (ref 3.5–5.5)
ALBUMIN/GLOB SERPL: 1.4 {RATIO} (ref 1.2–2.2)
ALP SERPL-CCNC: 42 IU/L (ref 39–117)
ALT SERPL-CCNC: 48 IU/L (ref 0–32)
AST SERPL-CCNC: 43 IU/L (ref 0–40)
BILIRUB SERPL-MCNC: 0.3 MG/DL (ref 0–1.2)
BUN SERPL-MCNC: 23 MG/DL (ref 6–24)
BUN/CREAT SERPL: 21 (ref 9–23)
CALCIUM SERPL-MCNC: 9.7 MG/DL (ref 8.7–10.2)
CHLORIDE SERPL-SCNC: 100 MMOL/L (ref 96–106)
CHOLEST SERPL-MCNC: 162 MG/DL (ref 100–199)
CO2 SERPL-SCNC: 23 MMOL/L (ref 18–29)
CREAT SERPL-MCNC: 1.11 MG/DL (ref 0.57–1)
GFR SERPLBLD CREATININE-BSD FMLA CKD-EPI: 55 ML/MIN/1.73
GFR SERPLBLD CREATININE-BSD FMLA CKD-EPI: 64 ML/MIN/1.73
GLOBULIN SER CALC-MCNC: 3.1 G/DL (ref 1.5–4.5)
GLUCOSE SERPL-MCNC: 129 MG/DL (ref 65–99)
HBA1C MFR BLD: 7.8 % (ref 4.8–5.6)
HCV AB S/CO SERPL IA: <0.1 S/CO RATIO (ref 0–0.9)
HDLC SERPL-MCNC: 34 MG/DL
INTERPRETATION: NORMAL
LDLC SERPL CALC-MCNC: 107 MG/DL (ref 0–99)
Lab: NORMAL
POTASSIUM SERPL-SCNC: 4.7 MMOL/L (ref 3.5–5.2)
PROT SERPL-MCNC: 7.4 G/DL (ref 6–8.5)
SODIUM SERPL-SCNC: 139 MMOL/L (ref 134–144)
TRIGL SERPL-MCNC: 105 MG/DL (ref 0–149)
VLDLC SERPL CALC-MCNC: 21 MG/DL (ref 5–40)

## 2018-04-07 DIAGNOSIS — M54.30 SCIATIC LEG PAIN: ICD-10-CM

## 2018-04-09 RX ORDER — GABAPENTIN 300 MG/1
CAPSULE ORAL
Qty: 180 CAP | Refills: 2 | Status: SHIPPED | OUTPATIENT
Start: 2018-04-09 | End: 2018-07-14 | Stop reason: SDUPTHER

## 2018-04-09 NOTE — TELEPHONE ENCOUNTER
Clarified gabapentin with pt. She have been taking gabapentin 300mg; 3tabs in am, 3 in afternoon and 2 before bed. We'll wean it down to 2 tabs 3X daily. She was fine with it.      Clarita Bernard MD  4/9/2018

## 2018-05-02 ENCOUNTER — OFFICE VISIT (OUTPATIENT)
Dept: INTERNAL MEDICINE CLINIC | Facility: CLINIC | Age: 58
End: 2018-05-02

## 2018-05-02 VITALS
HEIGHT: 65 IN | TEMPERATURE: 97.2 F | HEART RATE: 68 BPM | RESPIRATION RATE: 16 BRPM | BODY MASS INDEX: 33.49 KG/M2 | WEIGHT: 201 LBS | SYSTOLIC BLOOD PRESSURE: 137 MMHG | DIASTOLIC BLOOD PRESSURE: 75 MMHG

## 2018-05-02 DIAGNOSIS — Z71.3 DIETARY COUNSELING: ICD-10-CM

## 2018-05-02 DIAGNOSIS — E78.00 HYPERCHOLESTEREMIA: ICD-10-CM

## 2018-05-02 DIAGNOSIS — Z71.82 EXERCISE COUNSELING: ICD-10-CM

## 2018-05-02 DIAGNOSIS — E11.21 TYPE 2 DIABETES MELLITUS WITH NEPHROPATHY (HCC): Primary | ICD-10-CM

## 2018-05-02 DIAGNOSIS — R79.89 ABNORMAL CBC: ICD-10-CM

## 2018-05-02 DIAGNOSIS — I10 ESSENTIAL HYPERTENSION WITH GOAL BLOOD PRESSURE LESS THAN 140/90: ICD-10-CM

## 2018-05-02 DIAGNOSIS — Z78.0 MENOPAUSE: ICD-10-CM

## 2018-05-02 DIAGNOSIS — R79.89 ELEVATED LIVER FUNCTION TESTS: ICD-10-CM

## 2018-05-02 RX ORDER — INSULIN LISPRO 100 [IU]/ML
15 INJECTION, SOLUTION INTRAVENOUS; SUBCUTANEOUS
COMMUNITY
Start: 2017-11-29 | End: 2020-02-05

## 2018-05-02 RX ORDER — DULOXETIN HYDROCHLORIDE 20 MG/1
CAPSULE, DELAYED RELEASE ORAL
COMMUNITY
Start: 2018-04-07 | End: 2018-08-02

## 2018-05-02 NOTE — MR AVS SNAPSHOT
[address] [place] [address] 
[phone] Patient: Papo Nagy MRN: [id number] LTZ:[id number] Visit Information Date & Time Provider Department Dept. Phone Encounter #  
 5/2/2018  9:45 AM Josselin Oneal, [place] [address] Internal Medicine [phone] [id number] Follow-up Instructions Return in about 3 months (around 8/2/2018) for Schedule your annual physical with fasting labs. Follow-up and Disposition History Your Appointments 6/8/2018 10:00 AM  
ROUTINE CARE with Minerva Palacios MD  
[facility] [address] [facility]) Appt Note: 3m fu    diabetes [address] [place] [address] [id number]  
[facility] Upcoming Health Maintenance Date Due  
 EYE EXAM RETINAL OR DILATED Q1 3/11/2017 BREAST CANCER SCRN MAMMOGRAM 8/12/2018 Influenza Age 5 to Adult 8/1/2018 FOBT Q 1 YEAR AGE 50-75 8/16/2018 HEMOGLOBIN A1C Q6M 9/5/2018 FOOT EXAM Q1 10/11/2018 MICROALBUMIN Q1 11/29/2018 LIPID PANEL Q1 3/5/2019 PAP AKA CERVICAL CYTOLOGY 12/1/2020 DTaP/Tdap/Td series (2 - Td) 3/24/2027 Allergies as of 5/2/2018  Review Complete On: 5/2/2018 By: Josselin Oneal NP Severity Noted Reaction Type Reactions Pravachol [Pravastatin]  08/15/2017    Myalgia Current Immunizations  Reviewed on 8/11/2017 Name Date Influenza Vaccine Karis Cook) 10/8/2015 Pneumococcal Polysaccharide (PPSV-23) 8/11/2017 Not reviewed this visit You Were Diagnosed With   
  
 Codes Comments Type 2 diabetes mellitus with nephropathy ([facility]    -  Primary ICD-10-CM: E11.21 
ICD-9-CM: 250.40, 583.81 Hypercholesteremia     ICD-10-CM: E78.00 ICD-9-CM: 272.0 Essential hypertension with goal blood pressure less than 140/90     ICD-10-CM: I10 
ICD-9-CM: 401.9 Menopause     ICD-10-CM: Z78.0 ICD-9-CM: 627.2 Elevated liver function tests     ICD-10-CM: R79.89 ICD-9-CM: 790.6 Abnormal CBC     ICD-10-CM: R79.89 ICD-9-CM: 790.6 Dietary counseling     ICD-10-CM: Z71.3 ICD-9-CM: V65.3 Exercise counseling     ICD-10-CM: Z71.82 
ICD-9-CM: V65.41 Vitals BP Pulse Temp Resp Height(growth percentile) Weight(growth percentile)  
 137/75 68 97.2 °F (36.2 °C) (Oral) 16 5' 5\" (1.651 m) 201 lb (91.2 kg) BMI OB Status Smoking Status 33.45 kg/m2 Hysterectomy Former Smoker Vitals History BMI and BSA Data Body Mass Index Body Surface Area  
 33.45 kg/m 2 2.05 m 2 Preferred Pharmacy Pharmacy Name Phone Gustavo Fernandes [phone] [phone] Your Updated Medication List  
  
   
This list is accurate as of 5/2/18 10:31 AM.  Always use your most recent med list.  
  
  
  
  
 aspirin 81 mg chewable tablet Take 81 mg by mouth. atorvastatin 40 mg tablet Commonly known as:  LIPITOR Take 40 mg by mouth daily. carvedilol 25 mg tablet Commonly known as:  Elisabeth Salts Take 25 mg by mouth two (2) times a day. chlorthalidone 25 mg tablet Commonly known as:  HYGROTEN  
TAKE ONE TABLET BY MOUTH DAILY DULoxetine 20 mg capsule Commonly known as:  CYMBALTA FISH OIL 60- mg Cap Generic drug:  omega 3-dha-epa-fish oil TAKE ONE CAPSULE BY MOUTH TWICE A DAY  
  
 fish oil-omega-3 fatty acids 300-1,000 mg Cap TAKE ONE CAPSULE BY MOUTH TWICE A DAY  
  
 gabapentin 300 mg capsule Commonly known as:  NEURONTIN  
2 tabs three times daily  
  
 gemfibrozil 600 mg tablet Commonly known as:  LOPID TAKE ONE TABLET BY MOUTH TWICE A DAY  
  
 GLUCOSE METER, DISP & STRIPS Glucose meter, See Instructions, #: 1 EA, 0 Refill(s), Pharmacy: [facility]/pharmacy #[id number] HumaLOG KwikPen Insulin 100 unit/mL kwikpen Generic drug:  insulin lispro 30 Units, SQ, ac tid, take when eating food. if not eating do NOT take., # 35 mL, 3 Refills, Pharmacy: [facility]  
  
 insulin detemir U-100 100 unit/mL (3 mL) Inpn Commonly known as:  LEVEMIR FLEXTOUCH  
42 Units by SubCUTAneous route two (2) times a day. Insulin Needles (Disposable) 32 gauge x 5/32\" Ndle  
  
 irbesartan 300 mg tablet Commonly known as:  AVAPRO TAKE ONE TABLET BY MOUTH DAILY LANCETS, [address] Generic drug:  Lancets  
  
 metFORMIN  mg tablet Commonly known as:  GLUCOPHAGE XR Take 1,000 mg by mouth. Indications: 500 mg tab taking 2 tabs daily ONETOUCH ULTRA TEST strip Generic drug:  glucose blood VI test strips  
  
 spironolactone 25 mg tablet Commonly known as:  ALDACTONE  
25 mg = 1 tab each dose, PO, daily, # 30 tab, 11 Refills, Pharmacy: [facility]/pharmacy #[id number] 
  
 triamcinolone acetonide 0.1 % ointment Commonly known as:  KENALOG  
APPLY A THIN LAYER TO AFFECTED AREA(S) TWO TIMES A DAY  
  
 VITAMIN D2 50,000 unit capsule Generic drug:  ergocalciferol TAKE ONE CAPSULE BY MOUTH EVERY SEVEN DAYS AS DIRECTED We Performed the Following CBC WITH AUTOMATED DIFF [10378 CPT(R)] METABOLIC PANEL, COMPREHENSIVE [70526 CPT(R)] REFERRAL TO GYNECOLOGY [REF30 Custom] Comments:  
 Please evaluate patient for menopause treatment. Follow-up Instructions Return in about 3 months (around 8/2/2018) for Schedule your annual physical with fasting labs. Referral Information Referral ID Referred By Referred To  
  
 [id number] [facility] [address] [facility] [address] [place], [address] [place] Visits Status Start Date End Date 1 New Request 5/2/18 5/2/19 If your referral has a status of pending review or denied, additional information will be sent to support the outcome of this decision. Patient Instructions Starting a Weight Loss Plan: Care Instructions Your Care Instructions If you are thinking about losing weight, it can be hard to know where to start. Your doctor can help you set up a weight loss plan that best meets your needs. You may want to take a class on nutrition or exercise, or join a weight loss support group. If you have questions about how to make changes to your eating or exercise habits, ask your doctor about seeing a registered dietitian or an exercise specialist. 
It can be a big challenge to lose weight. But you do not have to make huge changes at once. Make small changes, and stick with them. When those changes become habit, add a few more changes. If you do not think you are ready to make changes right now, try to pick a date in the future. Make an appointment to see your doctor to discuss whether the time is right for you to start a plan. Follow-up care is a key part of your treatment and safety. Be sure to make and go to all appointments, and call your doctor if you are having problems. It's also a good idea to know your test results and keep a list of the medicines you take. How can you care for yourself at home? · Set realistic goals. Many people expect to lose much more weight than is likely. A weight loss of 5% to 10% of your body weight may be enough to improve your health. · Get family and friends involved to provide support. Talk to them about why you are trying to lose weight, and ask them to help. They can help by participating in exercise and having meals with you, even if they may be eating something different. · Find what works best for you. If you do not have time or do not like to cook, a program that offers meal replacement bars or shakes may be better for you. Or if you like to prepare meals, finding a plan that includes daily menus and recipes may be best. 
· Ask your doctor about other health professionals who can help you achieve your weight loss goals. ¨ A dietitian can help you make healthy changes in your diet. ¨ An exercise specialist or  can help you develop a safe and effective exercise program. 
¨ A counselor or psychiatrist can help you cope with issues such as depression, anxiety, or family problems that can make it hard to focus on weight loss. · Consider joining a support group for people who are trying to lose weight. Your doctor can suggest groups in your area. Where can you learn more? Go to http://betina-dominick.info/. Enter A446 in the search box to learn more about \"Starting a Weight Loss Plan: Care Instructions. \" Current as of: October 13, 2016 Content Version: 11.4 © 1324-8671 [employer] Inspiring solutions to transform cities. Care instructions adapted under license by [employer] (which disclaims liability or warranty for this information). If you have questions about a medical condition or this instruction, always ask your healthcare professional. [place] [address] any warranty or liability for your use of this information. Learning About the [address] [facility] [address] Diet What is the Mediterranean diet? The Mediterranean diet is a style of eating rather than a diet plan. It features foods eaten in [place], [place], [place] and [place], and other countries along the [facility]. It emphasizes eating foods like fish, fruits, vegetables, beans, high-fiber breads and whole grains, nuts, and olive oil. This style of eating includes limited red meat, cheese, and sweets. Why choose the Mediterranean diet? A Mediterranean-style diet may improve heart health. It contains more fat than other heart-healthy diets. But the fats are mainly from nuts, unsaturated oils (such as fish oils and olive oil), and certain nut or seed oils (such as canola, soybean, or flaxseed oil). These fats may help protect the heart and blood vessels. How can you get started on the Mediterranean diet? Here are some things you can do to switch to a more Mediterranean way of eating. What to eat · Eat a variety of fruits and vegetables each day, such as grapes, blueberries, tomatoes, broccoli, peppers, figs, olives, spinach, eggplant, beans, lentils, and chickpeas. · Eat a variety of whole-grain foods each day, such as oats, brown rice, and whole wheat bread, pasta, and couscous. · Eat fish at least 2 times a week. Try tuna, salmon, mackerel, lake trout, herring, or sardines. · Eat moderate amounts of low-fat dairy products, such as milk, cheese, or yogurt. · Eat moderate amounts of poultry and eggs. · Choose healthy (unsaturated) fats, such as nuts, olive oil, and certain nut or seed oils like canola, soybean, and flaxseed. · Limit unhealthy (saturated) fats, such as butter, palm oil, and coconut oil. And limit fats found in animal products, such as meat and dairy products made with whole milk. Try to eat red meat only a few times a month in very small amounts. · Limit sweets and desserts to only a few times a week. This includes sugar-sweetened drinks like soda. The Mediterranean diet may also include red wine with your meal-1 glass each day for women and up to 2 glasses a day for men. Tips for eating at home · Use herbs, spices, garlic, lemon zest, and citrus juice instead of salt to add flavor to foods. · Add avocado slices to your sandwich instead of perea. · Have fish for lunch or dinner instead of red meat. Brush the fish with olive oil, and broil or grill it. · Sprinkle your salad with seeds or nuts instead of cheese. · Cook with olive or canola oil instead of butter or oils that are high in saturated fat. · Switch from 2% milk or whole milk to 1% or fat-free milk. · Dip raw vegetables in a vinaigrette dressing or hummus instead of dips made from mayonnaise or sour cream. 
· Have a piece of fruit for dessert instead of a piece of cake. Try baked apples, or have some dried fruit. Tips for eating out · Try broiled, grilled, baked, or poached fish instead of having it fried or breaded. · Ask your  to have your meals prepared with olive oil instead of butter. · Order dishes made with marinara sauce or sauces made from olive oil. Avoid sauces made from cream or mayonnaise. · Choose whole-grain breads, whole wheat pasta and pizza crust, brown rice, beans, and lentils. · Cut back on butter or margarine on bread. Instead, you can dip your bread in a small amount of olive oil. · Ask for a side salad or grilled vegetables instead of french fries or chips. Where can you learn more? Go to http://betina-dominick.info/. Enter [phone] in the search box to learn more about \"Learning About the Mediterranean Diet. \" Current as of: May 12, 2017 Content Version: 11.4 © 4553-3439 [employer]. Care instructions adapted under license by [employer] (which disclaims liability or warranty for this information). If you have questions about a medical condition or this instruction, always ask your healthcare professional. [address] any warranty or liability for your use of this information. Body Mass Index: Care Instructions Your Care Instructions Body mass index (BMI) can help you see if your weight is raising your risk for health problems. It uses a formula to compare how much you weigh with how tall you are. · A BMI lower than 18.5 is considered underweight. · A BMI between 18.5 and 24.9 is considered healthy. · A BMI between 25 and 29.9 is considered overweight. A BMI of 30 or higher is considered obese. If your BMI is in the normal range, it means that you have a lower risk for weight-related health problems.  If your BMI is in the overweight or obese range, you may be at increased risk for weight-related health problems, such as high blood pressure, heart disease, stroke, arthritis or joint pain, and diabetes. If your BMI is in the underweight range, you may be at increased risk for health problems such as fatigue, lower protection (immunity) against illness, muscle loss, bone loss, hair loss, and hormone problems. BMI is just one measure of your risk for weight-related health problems. You may be at higher risk for health problems if you are not active, you eat an unhealthy diet, or you drink too much alcohol or use tobacco products. Follow-up care is a key part of your treatment and safety. Be sure to make and go to all appointments, and call your doctor if you are having problems. It's also a good idea to know your test results and keep a list of the medicines you take. How can you care for yourself at home? · Practice healthy eating habits. This includes eating plenty of fruits, vegetables, whole grains, lean protein, and low-fat dairy. · If your doctor recommends it, get more exercise. Walking is a good choice. Bit by bit, increase the amount you walk every day. Try for at least 30 minutes on most days of the week. · Do not smoke. Smoking can increase your risk for health problems. If you need help quitting, talk to your doctor about stop-smoking programs and medicines. These can increase your chances of quitting for good. · Limit alcohol to 2 drinks a day for men and 1 drink a day for women. Too much alcohol can cause health problems. If you have a BMI higher than 25 · Your doctor may do other tests to check your risk for weight-related health problems. This may include measuring the distance around your waist. A waist measurement of more than 40 inches in men or 35 inches in women can increase the risk of weight-related health problems. · Talk with your doctor about steps you can take to stay healthy or improve your health. You may need to make lifestyle changes to lose weight and stay healthy, such as changing your diet and getting regular exercise. If you have a BMI lower than 18.5 · Your doctor may do other tests to check your risk for health problems. · Talk with your doctor about steps you can take to stay healthy or improve your health. You may need to make lifestyle changes to gain or maintain weight and stay healthy, such as getting more healthy foods in your diet and doing exercises to build muscle. Where can you learn more? Go to http://betina-dominick.info/. Enter S176 in the search box to learn more about \"Body Mass Index: Care Instructions. \" Current as of: October 13, 2016 Content Version: 11.4 © 1786-0654 [employer]. Care instructions adapted under license by [employer] (which disclaims liability or warranty for this information). If you have questions about a medical condition or this instruction, always ask your healthcare professional. [address] any warranty or liability for your use of this information. Patient Instructions History Introducing [facility]! Dear Vickie Laurent: 
Thank you for requesting a Sambazon account. Our records indicate that you already have an active Sambazon account. You can access your account anytime at [email or web address] Did you know that you can access your hospital and ER discharge instructions at any time in Sambazon? You can also review all of your test results from your hospital stay or ER visit. Additional Information If you have questions, please visit the Frequently Asked Questions section of the Sambazon website at [email or web address] Remember, Sambazon is NOT to be used for urgent needs. For medical emergencies, dial 911. Now available from your iPhone and Android! Please provide this summary of care documentation to your next provider. Your primary care clinician is listed as Narciso Lowery.  If you have any questions after today's visit, please call [phone].

## 2018-05-02 NOTE — PROGRESS NOTES
Subjective:      Jasper Elizondo is a 62 y.o. female who is a new patient and is here to establish care. Previous followed by .  The following sections were reviewed & updated as appropriate: PMH, PL, PSH, FH, RxH, and SH. She sees cardiologist at [facility]. She sees an endocrinologist Dr. Aissatou Payton  She is seen for hypertension and hyperlipidemia. Since last visit she reports: no polyuria or polydipsia, no chest pain or dyspnea, no chest pain, dyspnea or TIA's, no numbness, tingling or pain in extremities, no unusual visual symptoms, no hypoglycemia, no significant changes. Home glucose monitoring: is performed regularly. She reports medication compliance: compliant all of the time. Medication side effects: none. Diabetic diet compliance: compliant most of the time. Lab review: labs reviewed and discussed with patient. Will get labs today. Cardiovascular Review  The patient has hypertension and hyperlipidemia. She reports taking medications as instructed, no medication side effects noted, no TIA's, no chest pain on exertion, no swelling of ankles, no palpitations, she notes SOB with sitting, this has been present for years. .  Diet and Lifestyle: generally follows a low fat low cholesterol diet, generally follows a low sodium diet, no formal exercise but active during the day. Labs: reviewed and discussed with patient. She last saw her cardiologist 2 months ago. She has an apt in June. PHQ: score 4. Will address at follow up.     Patient Active Problem List    Diagnosis Date Noted    Primary osteoarthritis of left knee 03/02/2018    Type 2 diabetes mellitus with nephropathy ([facility] [address] 12/14/2017    Type 2 diabetes, uncontrolled, with neuropathy ([facility] [address] 01/23/2017    Patient non adherence 01/23/2017    Essential hypertension with goal blood pressure less than 140/90 06/28/2016    Encounter for long-term (current) use of medications 06/28/2016    Breast cancer screening 06/28/2016    Sickle cell trait (HCC) 10/08/2015    Hypercholesteremia 08/06/2015    Chronic lower back pain 08/06/2015     Current Outpatient Prescriptions   Medication Sig Dispense Refill    insulin lispro (HUMALOG KWIKPEN INSULIN) 100 unit/mL kwikpen   30 Units, SQ, ac tid, take when eating food. if not eating do NOT take., # 35 mL, 3 Refills, Pharmacy: [id number]      DULoxetine (CYMBALTA) 20 mg capsule       gabapentin (NEURONTIN) 300 mg capsule 2 tabs three times daily 180 Cap 2    irbesartan (AVAPRO) 300 mg tablet TAKE ONE TABLET BY MOUTH DAILY 30 Tab 4    VITAMIN D2 50,000 unit capsule TAKE ONE CAPSULE BY MOUTH EVERY SEVEN DAYS AS DIRECTED 4 Cap 2    chlorthalidone (HYGROTEN) 25 mg tablet TAKE ONE TABLET BY MOUTH DAILY 30 Tab 5    triamcinolone acetonide (KENALOG) 0.1 % ointment APPLY A THIN LAYER TO AFFECTED AREA(S) TWO TIMES A DAY 80 g 0    omega 3-dha-epa-fish oil (FISH OIL) 60- mg cap TAKE ONE CAPSULE BY MOUTH TWICE A DAY      metFORMIN ER (GLUCOPHAGE XR) 500 mg tablet Take 1,000 mg by mouth. Indications: 500 mg tab taking 2 tabs daily      aspirin 81 mg chewable tablet Take 81 mg by mouth.  spironolactone (ALDACTONE) 25 mg tablet   25 mg = 1 tab each dose, PO, daily, # 30 tab, 11 Refills, Pharmacy: [facility]/pharmacy #[id number]     atorvastatin (LIPITOR) 40 mg tablet Take 40 mg by mouth daily.  Insulin Needles, Disposable, 32 gauge x 5/32\" ndle       LANCETS, SUPER THIN [facility]       ONETOUCH ULTRA TEST strip       GLUCOMETER,PRE-LOADED STRIPS (GLUCOSE METER, DISP & STRIPS)   Glucose meter, See Instructions, #: 1 EA, 0 Refill(s), Pharmacy: [facility]/pharmacy #[id number]     fish oil-omega-3 fatty acids 300-1,000 mg cap TAKE ONE CAPSULE BY MOUTH TWICE A  Cap 1    carvedilol (COREG) 25 mg tablet Take 25 mg by mouth two (2) times a day.  insulin detemir (LEVEMIR FLEXTOUCH) 100 unit/mL (3 mL) inpn 42 Units by SubCUTAneous route two (2) times a day.  (Patient taking differently: 38 Units by SubCUTAneous route two (2) times a day.) 6 Adjustable Dose Pre-filled Pen Syringe 2    gemfibrozil (LOPID) 600 mg tablet TAKE ONE TABLET BY MOUTH TWICE A  Tab 1     Allergies   Allergen Reactions    Pravachol [Pravastatin] Myalgia     Past Medical History:   Diagnosis Date    Arthritis     Back pain     Breast cancer screening 6/28/2016    Chronic bilateral low back pain without sciatica 6/28/2016    Diabetes ([facility] [address]     Encounter for long-term (current) use of medications 6/28/2016    Essential hypertension with goal blood pressure less than 140/90 6/28/2016    Hypercholesteremia 8/6/2015    Hypercholesterolemia     Hypertension     Ill-defined condition     hyperlipidemia    Other ill-defined conditions(799.89)     high cholesterol    Patient non adherence 1/23/2017    Primary osteoarthritis of left knee 3/2/2018    Type 2 diabetes, uncontrolled, with neuropathy ([facility] [address] 1/23/2017    Type II diabetes mellitus, uncontrolled ([facility] [address] 8/6/2015     Family History   Problem Relation Age of Onset    Heart Disease Mother     Heart Attack Mother     No Known Problems Father     Cancer Sister      UNKNOWN    Diabetes Son     Sickle Cell Anemia Daughter     Anesth Problems Son      CHEST PAIN     Social History   Substance Use Topics    Smoking status: Former Smoker     Packs/day: 1.00     Years: 30.00     Quit date: 4/1/2005    Smokeless tobacco: Never Used    Alcohol use No        Review of Systems    A comprehensive review of systems was negative except for that written in the HPI. Objective:     Visit Vitals    /75    Pulse 68    Temp 97.2 °F (36.2 °C) (Oral)    Resp 16    Ht 5' 5\" (1.651 m)    Wt 201 lb (91.2 kg)    BMI 33.45 kg/m2     General appearance: alert, cooperative, no distress, appears stated age  Head: Normocephalic, without obvious abnormality, atraumatic  Eyes: conjunctivae/corneas clear. PERRL, EOM's intact.   Neck: supple, symmetrical, trachea midline and no adenopathy  Back: symmetric, no curvature. ROM normal. No CVA tenderness. Lungs: clear to auscultation bilaterally  Heart: regular rate and rhythm, S1, S2 normal, no murmur, click, rub or gallop  Abdomen: soft, non-tender. Bowel sounds normal. No masses,  no organomegaly  Extremities: extremities normal, atraumatic, no cyanosis or edema  Pulses: 2+ and symmetric  Skin: Skin color, texture, turgor normal. No rashes or lesions  Neurologic: Alert and oriented X 3, normal strength and tone. Normal symmetric reflexes. Normal coordination and gait  Nursing note and vitals reviewed  Assessment/Plan:       ICD-10-CM ICD-9-CM    1. Type 2 diabetes mellitus with nephropathy (HCC) E11.21 250.40      583.81    2. Hypercholesteremia E78.00 272.0    3. Essential hypertension with goal blood pressure less than 140/90 V58 825.2 METABOLIC PANEL, COMPREHENSIVE   4. Menopause Z78.0 627.2 REFERRAL TO GYNECOLOGY   5. Elevated liver function tests P90.27 954.3 METABOLIC PANEL, COMPREHENSIVE   6. Abnormal CBC R79.89 790.6 CBC WITH AUTOMATED DIFF   Will address depression at follow up. Follow-up Disposition:  Return in about 3 months (around 8/2/2018) for Schedule your annual physical with fasting labs. Advised her to call back or return to office if symptoms worsen/change/persist.  Discussed expected course/resolution/complications of diagnosis in detail with patient. Medication risks/benefits/costs/interactions/alternatives discussed with patient. She was given an after visit summary which includes diagnoses, current medications, & vitals. She expressed understanding with the diagnosis and plan. Discussed the patient's BMI with her. The BMI follow up plan is as follows:     dietary management education, guidance, and counseling  encourage exercise  monitor weight  prescribed dietary intake    An After Visit Summary was printed and given to the patient.

## 2018-05-02 NOTE — PATIENT INSTRUCTIONS
Starting a Weight Loss Plan: Care Instructions  Your Care Instructions    If you are thinking about losing weight, it can be hard to know where to start. Your doctor can help you set up a weight loss plan that best meets your needs. You may want to take a class on nutrition or exercise, or join a weight loss support group. If you have questions about how to make changes to your eating or exercise habits, ask your doctor about seeing a registered dietitian or an exercise specialist.  It can be a big challenge to lose weight. But you do not have to make huge changes at once. Make small changes, and stick with them. When those changes become habit, add a few more changes. If you do not think you are ready to make changes right now, try to pick a date in the future. Make an appointment to see your doctor to discuss whether the time is right for you to start a plan. Follow-up care is a key part of your treatment and safety. Be sure to make and go to all appointments, and call your doctor if you are having problems. It's also a good idea to know your test results and keep a list of the medicines you take. How can you care for yourself at home? · Set realistic goals. Many people expect to lose much more weight than is likely. A weight loss of 5% to 10% of your body weight may be enough to improve your health. · Get family and friends involved to provide support. Talk to them about why you are trying to lose weight, and ask them to help. They can help by participating in exercise and having meals with you, even if they may be eating something different. · Find what works best for you. If you do not have time or do not like to cook, a program that offers meal replacement bars or shakes may be better for you. Or if you like to prepare meals, finding a plan that includes daily menus and recipes may be best.  · Ask your doctor about other health professionals who can help you achieve your weight loss goals.   ¨ A dietitian can help you make healthy changes in your diet. ¨ An exercise specialist or  can help you develop a safe and effective exercise program.  ¨ A counselor or psychiatrist can help you cope with issues such as depression, anxiety, or family problems that can make it hard to focus on weight loss. · Consider joining a support group for people who are trying to lose weight. Your doctor can suggest groups in your area. Where can you learn more? Go to http://betina-dominick.info/. Enter E523 in the search box to learn more about \"Starting a Weight Loss Plan: Care Instructions. \"  Current as of: October 13, 2016  Content Version: 11.4  © 7430-7295 [employer]. Care instructions adapted under license by [employer] (which disclaims liability or warranty for this information). If you have questions about a medical condition or this instruction, always ask your healthcare professional. [address] any warranty or liability for your use of this information. Learning About the [address] Diet  What is the Mediterranean diet? The Mediterranean diet is a style of eating rather than a diet plan. It features foods eaten in [place], [place], [place] and [place], and other countries along the [facility]. It emphasizes eating foods like fish, fruits, vegetables, beans, high-fiber breads and whole grains, nuts, and olive oil. This style of eating includes limited red meat, cheese, and sweets. Why choose the Mediterranean diet? A Mediterranean-style diet may improve heart health. It contains more fat than other heart-healthy diets. But the fats are mainly from nuts, unsaturated oils (such as fish oils and olive oil), and certain nut or seed oils (such as canola, soybean, or flaxseed oil). These fats may help protect the heart and blood vessels. How can you get started on the Mediterranean diet?   Here are some things you can do to switch to a more Mediterranean way of eating. What to eat  · Eat a variety of fruits and vegetables each day, such as grapes, blueberries, tomatoes, broccoli, peppers, figs, olives, spinach, eggplant, beans, lentils, and chickpeas. · Eat a variety of whole-grain foods each day, such as oats, brown rice, and whole wheat bread, pasta, and couscous. · Eat fish at least 2 times a week. Try tuna, salmon, mackerel, lake trout, herring, or sardines. · Eat moderate amounts of low-fat dairy products, such as milk, cheese, or yogurt. · Eat moderate amounts of poultry and eggs. · Choose healthy (unsaturated) fats, such as nuts, olive oil, and certain nut or seed oils like canola, soybean, and flaxseed. · Limit unhealthy (saturated) fats, such as butter, palm oil, and coconut oil. And limit fats found in animal products, such as meat and dairy products made with whole milk. Try to eat red meat only a few times a month in very small amounts. · Limit sweets and desserts to only a few times a week. This includes sugar-sweetened drinks like soda. The Mediterranean diet may also include red wine with your meal-1 glass each day for women and up to 2 glasses a day for men. Tips for eating at home  · Use herbs, spices, garlic, lemon zest, and citrus juice instead of salt to add flavor to foods. · Add avocado slices to your sandwich instead of perea. · Have fish for lunch or dinner instead of red meat. Brush the fish with olive oil, and broil or grill it. · Sprinkle your salad with seeds or nuts instead of cheese. · Cook with olive or canola oil instead of butter or oils that are high in saturated fat. · Switch from 2% milk or whole milk to 1% or fat-free milk. · Dip raw vegetables in a vinaigrette dressing or hummus instead of dips made from mayonnaise or sour cream.  · Have a piece of fruit for dessert instead of a piece of cake. Try baked apples, or have some dried fruit.   Tips for eating out  · Try broiled, grilled, baked, or poached fish instead of having it fried or breaded. · Ask your  to have your meals prepared with olive oil instead of butter. · Order dishes made with marinara sauce or sauces made from olive oil. Avoid sauces made from cream or mayonnaise. · Choose whole-grain breads, whole wheat pasta and pizza crust, brown rice, beans, and lentils. · Cut back on butter or margarine on bread. Instead, you can dip your bread in a small amount of olive oil. · Ask for a side salad or grilled vegetables instead of french fries or chips. Where can you learn more? Go to http://betinaBISONdominick.info/. Enter 723-521-9262 in the search box to learn more about \"Learning About the Mediterranean Diet. \"  Current as of: May 12, 2017  Content Version: 11.4  © 4113-2898 [employer]. Care instructions adapted under license by [employer] (which disclaims liability or warranty for this information). If you have questions about a medical condition or this instruction, always ask your healthcare professional. [address] any warranty or liability for your use of this information. Body Mass Index: Care Instructions  Your Care Instructions    Body mass index (BMI) can help you see if your weight is raising your risk for health problems. It uses a formula to compare how much you weigh with how tall you are. · A BMI lower than 18.5 is considered underweight. · A BMI between 18.5 and 24.9 is considered healthy. · A BMI between 25 and 29.9 is considered overweight. A BMI of 30 or higher is considered obese. If your BMI is in the normal range, it means that you have a lower risk for weight-related health problems. If your BMI is in the overweight or obese range, you may be at increased risk for weight-related health problems, such as high blood pressure, heart disease, stroke, arthritis or joint pain, and diabetes.  If your BMI is in the underweight range, you may be at increased risk for health problems such as fatigue, lower protection (immunity) against illness, muscle loss, bone loss, hair loss, and hormone problems. BMI is just one measure of your risk for weight-related health problems. You may be at higher risk for health problems if you are not active, you eat an unhealthy diet, or you drink too much alcohol or use tobacco products. Follow-up care is a key part of your treatment and safety. Be sure to make and go to all appointments, and call your doctor if you are having problems. It's also a good idea to know your test results and keep a list of the medicines you take. How can you care for yourself at home? · Practice healthy eating habits. This includes eating plenty of fruits, vegetables, whole grains, lean protein, and low-fat dairy. · If your doctor recommends it, get more exercise. Walking is a good choice. Bit by bit, increase the amount you walk every day. Try for at least 30 minutes on most days of the week. · Do not smoke. Smoking can increase your risk for health problems. If you need help quitting, talk to your doctor about stop-smoking programs and medicines. These can increase your chances of quitting for good. · Limit alcohol to 2 drinks a day for men and 1 drink a day for women. Too much alcohol can cause health problems. If you have a BMI higher than 25  · Your doctor may do other tests to check your risk for weight-related health problems. This may include measuring the distance around your waist. A waist measurement of more than 40 inches in men or 35 inches in women can increase the risk of weight-related health problems. · Talk with your doctor about steps you can take to stay healthy or improve your health. You may need to make lifestyle changes to lose weight and stay healthy, such as changing your diet and getting regular exercise.   If you have a BMI lower than 18.5  · Your doctor may do other tests to check your risk for health problems. · Talk with your doctor about steps you can take to stay healthy or improve your health. You may need to make lifestyle changes to gain or maintain weight and stay healthy, such as getting more healthy foods in your diet and doing exercises to build muscle. Where can you learn more? Go to http://betina-dominick.info/. Enter S176 in the search box to learn more about \"Body Mass Index: Care Instructions. \"  Current as of: October 13, 2016  Content Version: 11.4  © 2778-2540 [employer]. Care instructions adapted under license by [employer] (which disclaims liability or warranty for this information). If you have questions about a medical condition or this instruction, always ask your healthcare professional. [address] any warranty or liability for your use of this information.

## 2018-05-03 LAB
ALBUMIN SERPL-MCNC: 4.7 G/DL (ref 3.5–5.5)
ALBUMIN/GLOB SERPL: 1.6 {RATIO} (ref 1.2–2.2)
ALP SERPL-CCNC: 42 IU/L (ref 39–117)
ALT SERPL-CCNC: 28 IU/L (ref 0–32)
AST SERPL-CCNC: 29 IU/L (ref 0–40)
BASOPHILS # BLD AUTO: 0 X10E3/UL (ref 0–0.2)
BASOPHILS NFR BLD AUTO: 1 %
BILIRUB SERPL-MCNC: 0.4 MG/DL (ref 0–1.2)
BUN SERPL-MCNC: 30 MG/DL (ref 6–24)
BUN/CREAT SERPL: 23 (ref 9–23)
CALCIUM SERPL-MCNC: 9.8 MG/DL (ref 8.7–10.2)
CHLORIDE SERPL-SCNC: 99 MMOL/L (ref 96–106)
CO2 SERPL-SCNC: 23 MMOL/L (ref 18–29)
CREAT SERPL-MCNC: 1.28 MG/DL (ref 0.57–1)
EOSINOPHIL # BLD AUTO: 0.2 X10E3/UL (ref 0–0.4)
EOSINOPHIL NFR BLD AUTO: 5 %
ERYTHROCYTE [DISTWIDTH] IN BLOOD BY AUTOMATED COUNT: 18 % (ref 12.3–15.4)
GFR SERPLBLD CREATININE-BSD FMLA CKD-EPI: 47 ML/MIN/1.73
GFR SERPLBLD CREATININE-BSD FMLA CKD-EPI: 54 ML/MIN/1.73
GLOBULIN SER CALC-MCNC: 3 G/DL (ref 1.5–4.5)
GLUCOSE SERPL-MCNC: 139 MG/DL (ref 65–99)
HCT VFR BLD AUTO: 36.1 % (ref 34–46.6)
HGB BLD-MCNC: 10.5 G/DL (ref 11.1–15.9)
IMM GRANULOCYTES # BLD: 0 X10E3/UL (ref 0–0.1)
IMM GRANULOCYTES NFR BLD: 0 %
LYMPHOCYTES # BLD AUTO: 1.8 X10E3/UL (ref 0.7–3.1)
LYMPHOCYTES NFR BLD AUTO: 42 %
MCH RBC QN AUTO: 21.2 PG (ref 26.6–33)
MCHC RBC AUTO-ENTMCNC: 29.1 G/DL (ref 31.5–35.7)
MCV RBC AUTO: 73 FL (ref 79–97)
MONOCYTES # BLD AUTO: 0.3 X10E3/UL (ref 0.1–0.9)
MONOCYTES NFR BLD AUTO: 8 %
NEUTROPHILS # BLD AUTO: 1.7 X10E3/UL (ref 1.4–7)
NEUTROPHILS NFR BLD AUTO: 44 %
PLATELET # BLD AUTO: 383 X10E3/UL (ref 150–379)
POTASSIUM SERPL-SCNC: 4.8 MMOL/L (ref 3.5–5.2)
PROT SERPL-MCNC: 7.7 G/DL (ref 6–8.5)
RBC # BLD AUTO: 4.96 X10E6/UL (ref 3.77–5.28)
SODIUM SERPL-SCNC: 142 MMOL/L (ref 134–144)
WBC # BLD AUTO: 4 X10E3/UL (ref 3.4–10.8)

## 2018-05-11 PROBLEM — R17 ELEVATED BILIRUBIN: Status: ACTIVE | Noted: 2018-05-11

## 2018-05-11 PROBLEM — N18.9 CKD (CHRONIC KIDNEY DISEASE): Status: ACTIVE | Noted: 2018-05-11

## 2018-05-20 DIAGNOSIS — L30.9 ECZEMA, UNSPECIFIED TYPE: ICD-10-CM

## 2018-05-23 RX ORDER — TRIAMCINOLONE ACETONIDE 1 MG/G
OINTMENT TOPICAL
Qty: 80 G | Refills: 0 | Status: SHIPPED | OUTPATIENT
Start: 2018-05-23 | End: 2018-08-24 | Stop reason: DRUGHIGH

## 2018-06-18 RX ORDER — ERGOCALCIFEROL 1.25 MG/1
CAPSULE ORAL
Qty: 4 CAP | Refills: 1 | Status: SHIPPED | OUTPATIENT
Start: 2018-06-18 | End: 2018-07-14 | Stop reason: SDUPTHER

## 2018-07-14 DIAGNOSIS — M54.30 SCIATIC LEG PAIN: ICD-10-CM

## 2018-07-20 RX ORDER — ERGOCALCIFEROL 1.25 MG/1
CAPSULE ORAL
Qty: 4 CAP | Refills: 0 | Status: SHIPPED | OUTPATIENT
Start: 2018-07-20 | End: 2018-08-18 | Stop reason: SDUPTHER

## 2018-07-20 RX ORDER — GABAPENTIN 300 MG/1
CAPSULE ORAL
Qty: 180 CAP | Refills: 0 | Status: SHIPPED | OUTPATIENT
Start: 2018-07-20 | End: 2018-08-18 | Stop reason: SDUPTHER

## 2018-08-02 ENCOUNTER — OFFICE VISIT (OUTPATIENT)
Dept: INTERNAL MEDICINE CLINIC | Facility: CLINIC | Age: 58
End: 2018-08-02

## 2018-08-02 VITALS
TEMPERATURE: 98.3 F | RESPIRATION RATE: 18 BRPM | SYSTOLIC BLOOD PRESSURE: 162 MMHG | WEIGHT: 202 LBS | DIASTOLIC BLOOD PRESSURE: 87 MMHG | HEART RATE: 65 BPM | HEIGHT: 65 IN | BODY MASS INDEX: 33.66 KG/M2

## 2018-08-02 DIAGNOSIS — Z00.01 ENCOUNTER FOR GENERAL ADULT MEDICAL EXAMINATION WITH ABNORMAL FINDINGS: Primary | ICD-10-CM

## 2018-08-02 DIAGNOSIS — Z71.82 EXERCISE COUNSELING: ICD-10-CM

## 2018-08-02 DIAGNOSIS — Z23 ENCOUNTER FOR IMMUNIZATION: ICD-10-CM

## 2018-08-02 DIAGNOSIS — Z12.11 COLON CANCER SCREENING: ICD-10-CM

## 2018-08-02 DIAGNOSIS — Z91.199 NONCOMPLIANCE: ICD-10-CM

## 2018-08-02 DIAGNOSIS — Z71.3 DIETARY COUNSELING: ICD-10-CM

## 2018-08-02 DIAGNOSIS — Z78.0 POST-MENOPAUSAL: ICD-10-CM

## 2018-08-02 DIAGNOSIS — E66.9 OBESITY, CLASS I, BMI 30-34.9: ICD-10-CM

## 2018-08-02 RX ORDER — FENOFIBRATE 120 MG/1
145 TABLET ORAL
COMMUNITY
End: 2018-11-29 | Stop reason: SDUPTHER

## 2018-08-02 RX ORDER — ROSUVASTATIN CALCIUM 10 MG/1
10 TABLET, COATED ORAL
COMMUNITY
End: 2018-08-15 | Stop reason: DRUGHIGH

## 2018-08-02 RX ORDER — MELOXICAM 15 MG/1
15 TABLET ORAL DAILY
COMMUNITY
End: 2018-11-02 | Stop reason: ALTCHOICE

## 2018-08-02 NOTE — PROGRESS NOTES
Ronaldo Moran  is a 62 y.o.  female  who present for TDAP immunizations/injections. He/she denies any symptoms , reactions or allergies that would exclude them from being immunized today. Risks and adverse reactions were discussed and the VIS was given if applicable to them. All questions were addressed. He/She was observed for 5 min post injection. There were no reactions observed.  
 
Patsy Dooley LPN

## 2018-08-02 NOTE — PROGRESS NOTES
Subjective:  
  
Claudette Snider is a 62 y.o. female who presents today for CPE. She is seeing and endocrinology and cardiology at [facility]. Health Maintenance Immunizations:  
 Influenza: she will plan on getting it this fall. Tetanus: not UTD- will do today. Shingles: due for Shingrix - script provided. Pneumonia: up to date. Cancer screening:  
 Cervical: she had total hysterectomy , reviewed guidelines. Will not do at this time. Breast: reviewed guidelines, reviewed SBE with her. Colon: FIT and referral to GI placed. Bone Density: not UTD - order placed Leg pain: she has an apt with ortho this month. She states she gets numbness and tingling in her leg. It is limited her ability to stand for extended periods. Patient Care Team: 
Sabiha Brown NP as PCP - General (Nurse Practitioner) Leona Albarran MD (Family Practice) Santiago Malave MD (Neurosurgery) The following sections were reviewed & updated as appropriate: PMH, PSH, FH, and SH. Patient Active Problem List  
Diagnosis Code  Hypercholesteremia E78.00  Chronic lower back pain M54.5, G89.29  
 Sickle cell trait ([address] D57.3  Essential hypertension with goal blood pressure less than 140/90 I10  
 Encounter for long-term (current) use of medications Z79.899  Breast cancer screening Z12.31  
 Type 2 diabetes, uncontrolled, with neuropathy ([address] E11.40, E11.65  Patient non adherence Z91.19  Type 2 diabetes mellitus with nephropathy (HCC) E11.21  
 Primary osteoarthritis of left knee M17.12  Elevated bilirubin R17  
 CKD (chronic kidney disease) N18.9 Prior to Admission medications Medication Sig Start Date End Date Taking? Authorizing Provider  
meloxicam (MOBIC) 15 mg tablet Take 15 mg by mouth daily. Yes Historical Provider Fenofibrate 120 mg tab Take 145 mg by mouth. Yes Historical Provider  
empagliflozin (JARDIANCE) 10 mg tablet Take  by mouth daily.    Yes Historical Provider rosuvastatin (CRESTOR) 10 mg tablet Take 10 mg by mouth nightly. Yes Historical Provider  
insulin detemir U-100 (LEVEMIR FLEXTOUCH) 100 unit/mL (3 mL) inpn 35 Units by SubCUTAneous route. Yes Historical Provider VITAMIN D2 50,000 unit capsule TAKE ONE CAPSULE BY MOUTH EVERY 7 DAYS AS DIRECTED 7/20/18  Yes Karis Cruz MD  
gabapentin (NEURONTIN) 300 mg capsule TAKE TWO CAPSULES BY MOUTH THREE TIMES A DAY 7/20/18  Yes Karis Cruz MD  
fish oil-omega-3 fatty acids 300-1,000 mg cap TAKE ONE CAPSULE BY MOUTH TWICE A DAY 7/2/18  Yes Isaak Gaitan MD  
insulin lispro (HUMALOG KWIKPEN INSULIN) 100 unit/mL kwikpen 30 Units, SQ, ac tid, take when eating food. if not eating do NOT take., # 35 mL, 3 Refills, Pharmacy: [place] [id number] 11/29/17  Yes Historical Provider  
irbesartan (AVAPRO) 300 mg tablet TAKE ONE TABLET BY MOUTH DAILY 3/26/18  Yes Isaak Gaitan MD  
metFORMIN ER (GLUCOPHAGE XR) 500 mg tablet Take 1,000 mg by mouth. Indications: 500 mg tab taking 2 tabs daily 11/29/17  Yes Historical Provider  
aspirin 81 mg chewable tablet Take 81 mg by mouth. Yes Historical Provider Insulin Needles, Disposable, 32 gauge x 5/32\" ndle  11/18/17  Yes Historical Provider ONETOUCH ULTRA TEST strip  11/8/17  Yes Historical Provider GLUCOMETER,PRE-LOADED STRIPS (GLUCOSE METER, DISP & STRIPS) Glucose meter, See Instructions, #: 1 EA, 0 Refill(s), Pharmacy: [facility]/pharmacy #[id number] 12/15/15  Yes Historical Provider  
carvedilol (COREG) 25 mg tablet Take 25 mg by mouth two (2) times a day. 9/30/17  Yes Historical Provider  
insulin detemir (LEVEMIR FLEXTOUCH) 100 unit/mL (3 mL) inpn 42 Units by SubCUTAneous route two (2) times a day. Patient taking differently: 35 Units by SubCUTAneous route two (2) times a day.  10/11/17  Yes Isaak Gaitan MD  
triamcinolone acetonide (KENALOG) 0.1 % ointment APPLY A THIN LAYER TO AFFECTED AREA(S) TWO TIMES A DAY 5/23/18   Alfredo Found, NP  
DULoxetine (CYMBALTA) 20 mg capsule TAKE ONE CAPSULE BY MOUTH DAILY 5/7/18   Sonya Todd MD  
DULoxetine (CYMBALTA) 20 mg capsule  4/7/18   Historical Provider  
chlorthalidone (HYGROTEN) 25 mg tablet TAKE ONE TABLET BY MOUTH DAILY 2/18/18   Sonya Todd MD  
omega 3-dha-epa-fish oil (FISH OIL) 60- mg cap TAKE ONE CAPSULE BY MOUTH TWICE A DAY 10/17/16   Historical Provider  
spironolactone (ALDACTONE) 25 mg tablet 25 mg = 1 tab each dose, PO, daily, # 30 tab, 11 Refills, Pharmacy: [facility]/pharmacy #[id number] 4/11/16   Historical Provider  
atorvastatin (LIPITOR) 40 mg tablet Take 40 mg by mouth daily. 12/1/17   Historical Provider LANCETS, SUPER THIN misc  11/9/17   Historical Provider  
gemfibrozil (LOPID) 600 mg tablet TAKE ONE TABLET BY MOUTH TWICE A DAY 9/25/17   Sonya Todd MD  
  
Allergies Allergen Reactions  Pravachol [Pravastatin] Myalgia Past Medical History:  
Diagnosis Date  Arthritis  Back pain  Breast cancer screening 6/28/2016  Chronic bilateral low back pain without sciatica 6/28/2016  Diabetes ([address]  Encounter for long-term (current) use of medications 6/28/2016  Essential hypertension with goal blood pressure less than 140/90 6/28/2016  Hypercholesteremia 8/6/2015  Hypercholesterolemia  Hypertension  Ill-defined condition   
 hyperlipidemia  Other ill-defined conditions(799.89)   
 high cholesterol  Patient non adherence 1/23/2017  Primary osteoarthritis of left knee 3/2/2018  Type 2 diabetes, uncontrolled, with neuropathy ([address] 1/23/2017  Type II diabetes mellitus, uncontrolled ([address] 8/6/2015 Past Surgical History:  
Procedure Laterality Date  CARDIAC SURG PROCEDURE UNLIST    
 murmur- PT DENIES SURGERY AND MURMUR 6/20/17  HX CARPAL TUNNEL RELEASE  HX GYN    
 hysterectomy  HX HEENT Ear surgery  HX HEENT Left eye  HX ORTHOPAEDIC    
 left wrist & left elbow  HX ORTHOPAEDIC  Back Surgery x 3  
 HX ORTHOPAEDIC    
 right thumb at [facility]   HX OTHER SURGICAL    
 back surgery X 2  
 HX OTHER SURGICAL Collapsed left lung  HX OTHER SURGICAL    
 axillary sweat glands removed  HX OVARIAN CYST REMOVAL  [place] OTOPLASTY PROTRUDING EAR W/WO SIZE RDCTJ Family History Problem Relation Age of Onset  Heart Disease Mother  Heart Attack Mother  No Known Problems Father  Cancer Sister UNKNOWN  
 Diabetes Son   
 Sickle Cell Anemia Daughter  Anesth Problems Son CHEST PAIN Social History Substance Use Topics  Smoking status: Former Smoker Packs/day: 1.00 Years: 30.00 Quit date: 4/1/2005  Smokeless tobacco: Never Used  Alcohol use No  
  
 
Review of Systems A comprehensive review of systems was negative except for that written in the HPI. Objective:  
 
Visit Vitals  /87  Pulse 65  Temp 98.3 °F (36.8 °C) (Oral)  Resp 18  Ht 5' 5\" (1.651 m)  Wt 202 lb (91.6 kg)  BMI 33.61 kg/m2 General:  Alert, cooperative, no distress, appears stated age, obese Head:  Normocephalic, without obvious abnormality, atraumatic. Eyes:  Conjunctivae/corneas clear. PERRL, EOMs intact. Ears:  Normal TMs and external ear canals both ears. Nose: Nares normal. Septum midline. Mucosa normal. No drainage or sinus tenderness. Throat: Lips, mucosa, normal. Dentures noted Neck: Supple, symmetrical, trachea midline, no adenopathy, thyroid: no enlargement/tenderness/nodules, no carotid bruit and no JVD. Back:   Symmetric, no curvature. ROM normal. No CVA tenderness. Lungs:   Clear to auscultation bilaterally. Chest wall:  No tenderness or deformity. Heart:  Regular rate and rhythm, S1, S2 normal, no murmur, click, rub or gallop. Breast Exam:  No tenderness, masses, or nipple abnormality. Abdomen:   Soft, non-tender. Bowel sounds normal. No masses,  No organomegaly.   
Extremities: Extremities normal, atraumatic, no cyanosis or edema. Pulses: 2+ and symmetric all extremities. Skin: Skin color, texture, turgor normal. No rashes or lesions. Lymph nodes: Cervical, supraclavicular, and axillary nodes normal.  
Neurologic: CNII-XII intact. Normal strength, sensation and reflexes throughout. Nursing note and vitals reviewed Assessment/Plan: ICD-10-CM ICD-9-CM 1. Encounter for general adult medical examination with abnormal findings Z00.01 V70.0 CBC WITH AUTOMATED DIFF  
   LIPID PANEL  
   METABOLIC PANEL, COMPREHENSIVE  
   HEMOGLOBIN A1C WITH EAG 2. Dietary counseling Z71.3 V65.3 3. Exercise counseling Z71.82 V65.41   
4. Noncompliance Z91.19 V15.81   
5. Type 2 diabetes, uncontrolled, with neuropathy (HCC) E11.40 250.62 HEMOGLOBIN A1C WITH EAG  
 E11.65 357.2 6. Colon cancer screening Z12.11 V76.51 OCCULT BLOOD IMMUNOASSAY,DIAGNOSTIC  
   REFERRAL TO GASTROENTEROLOGY 7. Post-menopausal Z78.0 V49.81 DEXA BONE DENSITY STUDY AXIAL She will schedule apt with cardiology upon leaving the office. Follow-up Disposition: 
Return in about 3 months (around 11/2/2018) for chronic care. Advised her to call back or return to office if symptoms worsen/change/persist. 
Discussed expected course/resolution/complications of diagnosis in detail with patient. Medication risks/benefits/costs/interactions/alternatives discussed with patient. She was given an after visit summary which includes diagnoses, current medications, & vitals. She expressed understanding with the diagnosis and plan.

## 2018-08-02 NOTE — PROGRESS NOTES
1. Have you been to the ER, urgent care clinic since your last visit? Hospitalized since your last visit? No 
 
2. Have you seen or consulted any other health care providers outside of the [address] [place] [address] since your last visit? Include any pap smears or colon screening. \ 
 
Chief Complaint Patient presents with  Physical

## 2018-08-02 NOTE — MR AVS SNAPSHOT
[address] [place] [address] 
[phone] Patient: Mckay Carney MRN: [id number] UUE:[birth year] Visit Information Date & Time Provider Department Dept. Phone Encounter #  
 8/2/2018  9:45 AM Shahid Tadeo, [place] [address] Internal Medicine [phone] [id number] Follow-up Instructions Return in about 3 months (around 11/2/2018) for chronic care. Upcoming Health Maintenance Date Due  
 EYE EXAM RETINAL OR DILATED Q1 3/11/2017 Influenza Age 5 to Adult 8/1/2018 BREAST CANCER SCRN MAMMOGRAM 8/12/2018 FOBT Q 1 YEAR AGE 50-75 8/16/2018 HEMOGLOBIN A1C Q6M 9/5/2018 FOOT EXAM Q1 10/11/2018 MICROALBUMIN Q1 11/29/2018 LIPID PANEL Q1 3/5/2019 PAP AKA CERVICAL CYTOLOGY 12/1/2020 DTaP/Tdap/Td series (2 - Td) 3/24/2027 Allergies as of 8/2/2018  Review Complete On: 8/2/2018 By: Shahid Tadeo NP Severity Noted Reaction Type Reactions Pravachol [Pravastatin]  08/15/2017    Myalgia Current Immunizations  Reviewed on 8/11/2017 Name Date Influenza Vaccine Mittie Shouts) 10/8/2015 Pneumococcal Polysaccharide (PPSV-23) 8/11/2017 Tdap  Incomplete Not reviewed this visit You Were Diagnosed With   
  
 Codes Comments Encounter for general adult medical examination with abnormal findings    -  Primary ICD-10-CM: Z00.01 
ICD-9-CM: V70.0 Post-menopausal     ICD-10-CM: Z78.0 ICD-9-CM: V49.81 Colon cancer screening     ICD-10-CM: Z12.11 ICD-9-CM: V76.51 Type 2 diabetes, uncontrolled, with neuropathy (HCC)     ICD-10-CM: E11.40, E11.65 ICD-9-CM: 250.62, 357.2 Dietary counseling     ICD-10-CM: Z71.3 ICD-9-CM: V65.3 Exercise counseling     ICD-10-CM: Z71.82 
ICD-9-CM: V65.41 Noncompliance     ICD-10-CM: Z91.19 ICD-9-CM: V15.81 Obesity, Class I, BMI 30-34.9     ICD-10-CM: E66.9 ICD-9-CM: 278.00  Encounter for immunization     ICD-10-CM: L85 
 ICD-9-CM: V03.89 Vitals BP Pulse Temp Resp Height(growth percentile) Weight(growth percentile) 162/87 65 98.3 °F (36.8 °C) (Oral) 18 5' 5\" (1.651 m) 202 lb (91.6 kg) BMI OB Status Smoking Status 33.61 kg/m2 Hysterectomy Former Smoker BMI and BSA Data Body Mass Index Body Surface Area  
 33.61 kg/m 2 2.05 m 2 Preferred Pharmacy Pharmacy Name Phone [facility] [address] [facility] [address] [facility] [phone] Your Updated Medication List  
  
   
This list is accurate as of 8/2/18 10:23 AM.  Always use your most recent med list.  
  
  
  
  
 aspirin 81 mg chewable tablet Take 81 mg by mouth. atorvastatin 40 mg tablet Commonly known as:  LIPITOR Take 40 mg by mouth daily. carvedilol 25 mg tablet Commonly known as:  Cori Raymundo Take 25 mg by mouth two (2) times a day. chlorthalidone 25 mg tablet Commonly known as:  HYGROTEN  
TAKE ONE TABLET BY MOUTH DAILY DULoxetine 20 mg capsule Commonly known as:  CYMBALTA TAKE ONE CAPSULE BY MOUTH DAILY Fenofibrate 120 mg Tab Take 145 mg by mouth. FISH OIL 60- mg Cap Generic drug:  omega 3-dha-epa-fish oil TAKE ONE CAPSULE BY MOUTH TWICE A DAY  
  
 fish oil-omega-3 fatty acids 300-1,000 mg Cap TAKE ONE CAPSULE BY MOUTH TWICE A DAY  
  
 gabapentin 300 mg capsule Commonly known as:  NEURONTIN  
TAKE TWO CAPSULES BY MOUTH THREE TIMES A DAY  
  
 GLUCOSE METER, DISP & STRIPS Glucose meter, See Instructions, #: 1 EA, 0 Refill(s), Pharmacy: [facility]/pharmacy #[id number] HumaLOG KwikPen Insulin 100 unit/mL kwikpen Generic drug:  insulin lispro 30 Units, SQ, ac tid, take when eating food. if not eating do NOT take., # 35 mL, 3 Refills, Pharmacy: [id number] * insulin detemir U-100 100 unit/mL (3 mL) Inpn Commonly known as:  LEVEMIR FLEXTOUCH  
35 Units by SubCUTAneous route. * insulin detemir U-100 100 unit/mL (3 mL) Inpn Commonly known as:  LEVEMIR FLEXTOUCH  
42 Units by SubCUTAneous route two (2) times a day. Insulin Needles (Disposable) 32 gauge x 5/32\" Ndle  
  
 irbesartan 300 mg tablet Commonly known as:  AVAPRO TAKE ONE TABLET BY MOUTH DAILY JARDIANCE 10 mg tablet Generic drug:  empagliflozin Take  by mouth daily. LANCETS, [address] Generic drug:  Lancets  
  
 metFORMIN  mg tablet Commonly known as:  GLUCOPHAGE XR Take 1,000 mg by mouth. Indications: 500 mg tab taking 2 tabs daily MOBIC 15 mg tablet Generic drug:  meloxicam  
Take 15 mg by mouth daily. ONETOUCH ULTRA TEST strip Generic drug:  glucose blood VI test strips  
  
 rosuvastatin 10 mg tablet Commonly known as:  CRESTOR Take 10 mg by mouth nightly. spironolactone 25 mg tablet Commonly known as:  ALDACTONE  
25 mg = 1 tab each dose, PO, daily, # 30 tab, 11 Refills, Pharmacy: [facility]/pharmacy #[id number] 
  
 triamcinolone acetonide 0.1 % ointment Commonly known as:  KENALOG  
APPLY A THIN LAYER TO AFFECTED AREA(S) TWO TIMES A DAY  
  
 VITAMIN D2 50,000 unit capsule Generic drug:  ergocalciferol TAKE ONE CAPSULE BY MOUTH EVERY 7 DAYS AS DIRECTED * Notice: This list has 2 medication(s) that are the same as other medications prescribed for you. Read the directions carefully, and ask your doctor or other care provider to review them with you. We Performed the Following CBC WITH AUTOMATED DIFF [81751 CPT(R)] HEMOGLOBIN A1C WITH EAG [20631 CPT(R)] LIPID PANEL [76157 CPT(R)] METABOLIC PANEL, COMPREHENSIVE [85747 CPT(R)] OCCULT BLOOD IMMUNOASSAY,DIAGNOSTIC [23111 CPT(R)] REFERRAL TO GASTROENTEROLOGY [CXD95 Custom] TETANUS, DIPHTHERIA TOXOIDS AND ACELLULAR PERTUSSIS VACCINE (TDAP), IN INDIVIDS. >=7, IM [id number] CPT(R)] Follow-up Instructions Return in about 3 months (around 11/2/2018) for chronic care. To-Do List   
 08/02/2018 Imaging:  DEXA BONE DENSITY STUDY AXIAL Referral Information Referral ID Referred By Referred To  
  
 [id number] SKIFF, [facility]   
   [address] [place], [facility] Visits Status Start Date End Date 1 New Request 8/2/18 8/2/19 If your referral has a status of pending review or denied, additional information will be sent to support the outcome of this decision. Introducing [facility] HEALTH [facility]! Dear Santiago Reddy: 
Thank you for requesting a [employer] account. Our records indicate that you already have an active [employer] account. You can access your account anytime at [email or web address]. [email or web address] Did you know that you can access your hospital and ER discharge instructions at any time in [employer]? You can also review all of your test results from your hospital stay or ER visit. Additional Information If you have questions, please visit the Frequently Asked Questions section of the [employer] website at [email or web address]/. Remember, [employer] is NOT to be used for urgent needs. For medical emergencies, dial 911. Now available from your iPhone and Android! Please provide this summary of care documentation to your next provider. Your primary care clinician is listed as Abdirizak Magallanes. If you have any questions after today's visit, please call [phone].

## 2018-08-03 LAB
ALBUMIN SERPL-MCNC: 4.2 G/DL (ref 3.5–5.5)
ALBUMIN/GLOB SERPL: 1.6 {RATIO} (ref 1.2–2.2)
ALP SERPL-CCNC: 39 IU/L (ref 39–117)
ALT SERPL-CCNC: 21 IU/L (ref 0–32)
AST SERPL-CCNC: 21 IU/L (ref 0–40)
BASOPHILS # BLD AUTO: 0 X10E3/UL (ref 0–0.2)
BASOPHILS NFR BLD AUTO: 0 %
BILIRUB SERPL-MCNC: 0.3 MG/DL (ref 0–1.2)
BUN SERPL-MCNC: 21 MG/DL (ref 6–24)
BUN/CREAT SERPL: 16 (ref 9–23)
CALCIUM SERPL-MCNC: 9.1 MG/DL (ref 8.7–10.2)
CHLORIDE SERPL-SCNC: 105 MMOL/L (ref 96–106)
CHOLEST SERPL-MCNC: 93 MG/DL (ref 100–199)
CO2 SERPL-SCNC: 20 MMOL/L (ref 20–29)
CREAT SERPL-MCNC: 1.28 MG/DL (ref 0.57–1)
EOSINOPHIL # BLD AUTO: 0.3 X10E3/UL (ref 0–0.4)
EOSINOPHIL NFR BLD AUTO: 6 %
ERYTHROCYTE [DISTWIDTH] IN BLOOD BY AUTOMATED COUNT: 18.5 % (ref 12.3–15.4)
EST. AVERAGE GLUCOSE BLD GHB EST-MCNC: 235 MG/DL
GLOBULIN SER CALC-MCNC: 2.6 G/DL (ref 1.5–4.5)
GLUCOSE SERPL-MCNC: 111 MG/DL (ref 65–99)
HBA1C MFR BLD: 9.8 % (ref 4.8–5.6)
HCT VFR BLD AUTO: 32.4 % (ref 34–46.6)
HDLC SERPL-MCNC: 23 MG/DL
HGB BLD-MCNC: 9.6 G/DL (ref 11.1–15.9)
IMM GRANULOCYTES # BLD: 0 X10E3/UL (ref 0–0.1)
IMM GRANULOCYTES NFR BLD: 0 %
LDLC SERPL CALC-MCNC: 40 MG/DL (ref 0–99)
LYMPHOCYTES # BLD AUTO: 1.5 X10E3/UL (ref 0.7–3.1)
LYMPHOCYTES NFR BLD AUTO: 30 %
MCH RBC QN AUTO: 21.3 PG (ref 26.6–33)
MCHC RBC AUTO-ENTMCNC: 29.6 G/DL (ref 31.5–35.7)
MCV RBC AUTO: 72 FL (ref 79–97)
MONOCYTES # BLD AUTO: 0.7 X10E3/UL (ref 0.1–0.9)
MONOCYTES NFR BLD AUTO: 14 %
NEUTROPHILS # BLD AUTO: 2.5 X10E3/UL (ref 1.4–7)
NEUTROPHILS NFR BLD AUTO: 50 %
PLATELET # BLD AUTO: 343 X10E3/UL (ref 150–379)
POTASSIUM SERPL-SCNC: 4.6 MMOL/L (ref 3.5–5.2)
PROT SERPL-MCNC: 6.8 G/DL (ref 6–8.5)
RBC # BLD AUTO: 4.5 X10E6/UL (ref 3.77–5.28)
SODIUM SERPL-SCNC: 142 MMOL/L (ref 134–144)
TRIGL SERPL-MCNC: 149 MG/DL (ref 0–149)
VLDLC SERPL CALC-MCNC: 30 MG/DL (ref 5–40)
WBC # BLD AUTO: 5 X10E3/UL (ref 3.4–10.8)

## 2018-08-06 DIAGNOSIS — N18.9 CHRONIC KIDNEY DISEASE, UNSPECIFIED CKD STAGE: Primary | ICD-10-CM

## 2018-08-06 DIAGNOSIS — D50.9 IRON DEFICIENCY ANEMIA, UNSPECIFIED IRON DEFICIENCY ANEMIA TYPE: ICD-10-CM

## 2018-08-06 RX ORDER — LANOLIN ALCOHOL/MO/W.PET/CERES
325 CREAM (GRAM) TOPICAL
Qty: 3 TAB | Refills: 5 | Status: SHIPPED | OUTPATIENT
Start: 2018-08-06 | End: 2018-08-06 | Stop reason: SDUPTHER

## 2018-08-06 NOTE — PROGRESS NOTES
CKD noted, referral placed for nephrology. Iron deficiency noted, iron supplement ordered and repeat testing needed in 3 months. Cholesterol is low, will confirm dose of statin and decrease this.  She will follow up with endocrine on her elevated A1c

## 2018-08-10 LAB — HEMOCCULT STL QL IA: NEGATIVE

## 2018-08-15 DIAGNOSIS — E78.00 HYPERCHOLESTEREMIA: Primary | ICD-10-CM

## 2018-08-15 RX ORDER — ROSUVASTATIN CALCIUM 5 MG/1
5 TABLET, COATED ORAL
Qty: 30 TAB | Refills: 5 | Status: SHIPPED | OUTPATIENT
Start: 2018-08-15 | End: 2019-02-04

## 2018-08-15 NOTE — PROGRESS NOTES
Spoke to patient who states that the referrals placed to the specialists, require notes and labs sent to them before scheduling appointments. Patient states she takes Rouvastatin 10mg. Patient unsure of how much cholesterol medication to take at this time. . She states the iron pills gives her diarrhea so she is not taking it. . Has appointment with endocrinologist next month.

## 2018-08-15 NOTE — PROGRESS NOTES
Patient advised of cholesterol medication sent to pharmacy. Patient asked about pain management. List of pain management doctors mailed to patient.

## 2018-08-18 DIAGNOSIS — M54.30 SCIATIC LEG PAIN: ICD-10-CM

## 2018-08-19 RX ORDER — GABAPENTIN 300 MG/1
CAPSULE ORAL
Qty: 180 CAP | Refills: 0 | Status: SHIPPED | OUTPATIENT
Start: 2018-08-19 | End: 2018-11-02 | Stop reason: ALTCHOICE

## 2018-08-19 RX ORDER — ERGOCALCIFEROL 1.25 MG/1
CAPSULE ORAL
Qty: 4 CAP | Refills: 0 | Status: SHIPPED | OUTPATIENT
Start: 2018-08-19 | End: 2018-09-15 | Stop reason: SDUPTHER

## 2018-08-24 ENCOUNTER — OFFICE VISIT (OUTPATIENT)
Dept: INTERNAL MEDICINE CLINIC | Facility: CLINIC | Age: 58
End: 2018-08-24

## 2018-08-24 VITALS
RESPIRATION RATE: 16 BRPM | OXYGEN SATURATION: 95 % | BODY MASS INDEX: 34.16 KG/M2 | WEIGHT: 205 LBS | HEIGHT: 65 IN | TEMPERATURE: 98.5 F | DIASTOLIC BLOOD PRESSURE: 90 MMHG | HEART RATE: 68 BPM | SYSTOLIC BLOOD PRESSURE: 180 MMHG

## 2018-08-24 DIAGNOSIS — I10 ESSENTIAL HYPERTENSION: ICD-10-CM

## 2018-08-24 DIAGNOSIS — L30.9 ECZEMA, UNSPECIFIED TYPE: Primary | ICD-10-CM

## 2018-08-24 RX ORDER — TRIAMCINOLONE ACETONIDE 5 MG/G
OINTMENT TOPICAL 2 TIMES DAILY
Qty: 30 G | Refills: 0 | Status: SHIPPED | OUTPATIENT
Start: 2018-08-24 | End: 2018-11-02 | Stop reason: ALTCHOICE

## 2018-08-24 RX ORDER — AMLODIPINE BESYLATE 5 MG/1
5 TABLET ORAL DAILY
Qty: 30 TAB | Refills: 5 | Status: SHIPPED | OUTPATIENT
Start: 2018-08-24 | End: 2020-02-05

## 2018-08-24 NOTE — PROGRESS NOTES
HISTORY OF PRESENT ILLNESS  Jules Hunter is a 62 y.o. female. HPI  Itchy Rash B lower arms x 2-3 weeks. No known exposures to new lotions/soaps/allergens. Hx eczema B lower legs. Not moisturizing regularly. HTN - BP remains uncontrolled   BP Readings from Last 3 Encounters:   08/24/18 180/90   08/02/18 162/87   05/02/18 137/75   Reports that she has a follow up with cardiology scheduled in 2-3 weeks. Review of Systems   Respiratory: Negative for shortness of breath. Cardiovascular: Negative for chest pain and palpitations. Skin: Positive for itching and rash. Neurological: Negative for dizziness, loss of consciousness and weakness. Physical Exam   Constitutional: She is oriented to person, place, and time. She appears well-developed and well-nourished. No distress. HENT:   Head: Normocephalic and atraumatic. Neck: Neck supple. No JVD present. Pulmonary/Chest: Effort normal. No respiratory distress. Musculoskeletal: She exhibits no edema. Neurological: She is alert and oriented to person, place, and time. Skin: Skin is warm and dry. B lower arms with excoriations. Pt actively scratching in office. Skin slightly pink, very dry. Not broken. Psychiatric: She has a normal mood and affect. Her behavior is normal. Judgment and thought content normal.   Nursing note and vitals reviewed. ASSESSMENT and PLAN  Diagnoses and all orders for this visit:    1. Eczema, unspecified type  -     triamcinolone acetonide (KENALOG) 0.5 % ointment; Apply  to affected area two (2) times a day. use thin layer  Discussed importance of moisturizing immediately after bathing. 2. Essential hypertension - uncontrolled  -    Add amLODIPine (NORVASC) 5 mg tablet; Take 1 Tab by mouth daily. Follow up with cardiology in 2-3 weeks.

## 2018-08-24 NOTE — PROGRESS NOTES
Chief Complaint   Patient presents with    Rash     both armsx 2 weeks       1. Have you been to the ER, urgent care clinic since your last visit? Hospitalized since your last visit? No    2. Have you seen or consulted any other health care providers outside of the [address] [place] [address] since your last visit? Include any pap smears or colon screening. No      Med Reconciliation completed and up to date and changes noted.  Please update med list.    PHQ over the last two weeks 8/24/2018   Little interest or pleasure in doing things Not at all   Feeling down, depressed, irritable, or hopeless Not at all   Total Score PHQ 2 0

## 2018-08-28 ENCOUNTER — HOSPITAL ENCOUNTER (EMERGENCY)
Age: 58
Discharge: LEFT BEFORE DISCHARGE | End: 2018-08-28
Attending: EMERGENCY MEDICINE
Payer: MEDICAID

## 2018-08-28 VITALS
WEIGHT: 208.2 LBS | HEART RATE: 65 BPM | DIASTOLIC BLOOD PRESSURE: 85 MMHG | TEMPERATURE: 98.1 F | HEIGHT: 66 IN | RESPIRATION RATE: 16 BRPM | OXYGEN SATURATION: 98 % | BODY MASS INDEX: 33.46 KG/M2 | SYSTOLIC BLOOD PRESSURE: 186 MMHG

## 2018-08-28 DIAGNOSIS — Z53.20 LEFT BEFORE TREATMENT COMPLETED: Primary | ICD-10-CM

## 2018-08-28 PROCEDURE — 75810000275 HC EMERGENCY DEPT VISIT NO LEVEL OF CARE

## 2018-08-28 NOTE — ED TRIAGE NOTES
Triage note: Patient c/o bilateral UE \"rash\" beginning two weeks ago, + itching. Seen by PCP one week ago and prescribed kenalog, patient reports no relief.

## 2018-08-28 NOTE — ED PROVIDER NOTES
HPI Comments: No patient care contact. Patient is a 62 y.o. female presenting with rash. Rash Past Medical History:  
Diagnosis Date  Arthritis  Back pain  Breast cancer screening 6/28/2016  Chronic bilateral low back pain without sciatica 6/28/2016  Diabetes ([facility]  Encounter for long-term (current) use of medications 6/28/2016  Essential hypertension with goal blood pressure less than 140/90 6/28/2016  Hypercholesteremia 8/6/2015  Hypercholesterolemia  Hypertension  Ill-defined condition   
 hyperlipidemia  Other ill-defined conditions(799.89)   
 high cholesterol  Patient non adherence 1/23/2017  Primary osteoarthritis of left knee 3/2/2018  Type 2 diabetes, uncontrolled, with neuropathy ([facility] 1/23/2017  Type II diabetes mellitus, uncontrolled ([facility] 8/6/2015 Past Surgical History:  
Procedure Laterality Date  CARDIAC SURG PROCEDURE UNLIST    
 murmur- PT DENIES SURGERY AND MURMUR 6/20/17  HX CARPAL TUNNEL RELEASE  HX GYN    
 hysterectomy  HX HEENT Ear surgery  HX HEENT Left eye  HX ORTHOPAEDIC    
 left wrist & left elbow  HX ORTHOPAEDIC Back Surgery x 3  
 HX ORTHOPAEDIC    
 right thumb at [facility]  HX OTHER SURGICAL    
 back surgery X 2  
 HX OTHER SURGICAL Collapsed left lung  HX OTHER SURGICAL    
 axillary sweat glands removed  HX OVARIAN CYST REMOVAL  [place] OTOPLASTY PROTRUDING EAR W/WO SIZE RDCTJ Family History:  
Problem Relation Age of Onset  Heart Disease Mother  Heart Attack Mother  No Known Problems Father  Cancer Sister UNKNOWN  
 Diabetes Son   
 Sickle Cell Anemia Daughter  Anesth Problems Son CHEST PAIN Social History Social History  Marital status: SINGLE Spouse name: N/A  
 Number of children: N/A  
 Years of education: N/A Occupational History  Not on file. Social History Main Topics  Smoking status: Former Smoker Packs/day: 1.00 Years: 30.00 Quit date: 4/1/2005  Smokeless tobacco: Never Used  Alcohol use No  
 Drug use: No  
 Sexual activity: No  
 
Other Topics Concern  Not on file Social History Narrative ** Merged History Encounter ** ALLERGIES: Pravachol [pravastatin] Review of Systems Skin: Positive for rash. Vitals:  
 08/28/18 1112 BP: 186/85 Pulse: 65 Resp: 16 Temp: 98.1 °F (36.7 °C) SpO2: 98% Weight: 94.4 kg (208 lb 3.2 oz) Height: 5' 6\" (1.676 m) Physical Exam  
 
MDM 
 
 
ED Course Procedures

## 2018-08-29 NOTE — CALL BACK NOTE
I attempted to contact patient to perform service recovery and follow up but was unsuccessful. Left a voicemail message.

## 2018-08-30 ENCOUNTER — OFFICE VISIT (OUTPATIENT)
Dept: ONCOLOGY | Age: 58
End: 2018-08-30

## 2018-08-30 VITALS
DIASTOLIC BLOOD PRESSURE: 102 MMHG | HEIGHT: 66 IN | OXYGEN SATURATION: 96 % | HEART RATE: 67 BPM | TEMPERATURE: 99.2 F | BODY MASS INDEX: 32.56 KG/M2 | WEIGHT: 202.6 LBS | SYSTOLIC BLOOD PRESSURE: 169 MMHG

## 2018-08-30 DIAGNOSIS — D50.0 IRON DEFICIENCY ANEMIA DUE TO CHRONIC BLOOD LOSS: Primary | ICD-10-CM

## 2018-09-03 NOTE — PROGRESS NOTES
Hematology Consultation        Patient: Dung Forrester MRN: [id number]  SSN: [id number]    YOB: 1960  Age: 62 y.o. Sex: female      Subjective:      Dung Forrester is a 62 y.o. female who I am seeing in consultation for iron deficiency anemia. She is fatigued. She denies rectal bleeding. Anemia has been present for over 5 years. It was noted in routine laboratory studies.        Review of Systems:    Constitutional: negative  Eyes: negative  Ears, Nose, Mouth, Throat, and Face: negative  Respiratory: negative  Cardiovascular: negative  Gastrointestinal: negative  Genitourinary:negative  Integument/Breast: negative  Hematologic/Lymphatic: negative  Musculoskeletal:negative  Neurological: negative      Past Medical History:   Diagnosis Date    Arthritis     Back pain     Breast cancer screening 6/28/2016    Chronic bilateral low back pain without sciatica 6/28/2016    Diabetes [facility]     Encounter for long-term (current) use of medications 6/28/2016    Essential hypertension with goal blood pressure less than 140/90 6/28/2016    Hypercholesteremia 8/6/2015    Hypercholesterolemia     Hypertension     Ill-defined condition     hyperlipidemia    Other ill-defined conditions(799.89)     high cholesterol    Patient non adherence 1/23/2017    Primary osteoarthritis of left knee 3/2/2018    Type 2 diabetes, uncontrolled, with neuropathy ([address] 1/23/2017    Type II diabetes mellitus, uncontrolled ([address] 8/6/2015     Past Surgical History:   Procedure Laterality Date    CARDIAC SURG PROCEDURE UNLIST      murmur- PT DENIES SURGERY AND MURMUR 6/20/17    HX CARPAL TUNNEL RELEASE      HX GYN      hysterectomy    HX HEENT      Ear surgery    HX HEENT      Left eye    HX ORTHOPAEDIC      left wrist & left elbow    HX ORTHOPAEDIC      Back Surgery x 3    HX ORTHOPAEDIC      right thumb at [address] [facility] HX OTHER SURGICAL      back surgery X 2    HX OTHER SURGICAL      Collapsed left lung    HX OTHER SURGICAL      axillary sweat glands removed    HX OVARIAN CYST REMOVAL      PA OTOPLASTY PROTRUDING EAR W/WO SIZE RDCTJ        Family History   Problem Relation Age of Onset    Heart Disease Mother     Heart Attack Mother     No Known Problems Father     Cancer Sister      UNKNOWN    Diabetes Son     Sickle Cell Anemia Daughter     Anesth Problems Son      CHEST PAIN     Social History   Substance Use Topics    Smoking status: Former Smoker     Packs/day: 1.00     Years: 30.00     Quit date: 4/1/2005    Smokeless tobacco: Never Used    Alcohol use No      Prior to Admission medications    Medication Sig Start Date End Date Taking? Authorizing Provider   triamcinolone acetonide (KENALOG) 0.5 % ointment Apply  to affected area two (2) times a day. use thin layer 8/24/18  Yes Rosa Fan PA-C   amLODIPine (NORVASC) 5 mg tablet Take 1 Tab by mouth daily. 8/24/18  Yes Rosa Fan PA-C   gabapentin (NEURONTIN) 300 mg capsule TAKE TWO CAPSULES BY MOUTH THREE TIMES A DAY 8/19/18  Yes Marc Ibrahim MD   VITAMIN D2 50,000 unit capsule TAKE ONE CAPSULE BY MOUTH EVERY 7 DAYS AS DIRECTED 8/19/18  Yes Marc Ibrahim MD   irbesartan (AVAPRO) 300 mg tablet TAKE ONE TABLET BY MOUTH DAILY 8/19/18  Yes Emmanuel Diez MD   meloxicam (MOBIC) 15 mg tablet Take 15 mg by mouth daily. Yes Historical Provider   Fenofibrate 120 mg tab Take 145 mg by mouth. Yes Historical Provider   insulin detemir U-100 (LEVEMIR FLEXTOUCH) 100 unit/mL (3 mL) inpn 35 Units by SubCUTAneous route. Yes Historical Provider   fish oil-omega-3 fatty acids 300-1,000 mg cap TAKE ONE CAPSULE BY MOUTH TWICE A DAY 7/2/18  Yes Emmanuel Diez MD   insulin lispro (HUMALOG KWIKPEN INSULIN) 100 unit/mL kwikpen   30 Units, SQ, ac tid, take when eating food.  if not eating do NOT take., # 35 mL, 3 Refills, Pharmacy: [facility] 11/29/17  Yes Historical Provider   omega 3-dha-epa-fish oil (FISH OIL) 60- mg cap TAKE ONE CAPSULE BY MOUTH TWICE A DAY 10/17/16  Yes Historical Provider   metFORMIN ER (GLUCOPHAGE XR) 500 mg tablet Take 500 mg by mouth two (2) times a day. Indications: 500 mg tab taking 2 tabs daily 11/29/17  Yes Historical Provider   aspirin 81 mg chewable tablet Take 81 mg by mouth. Yes Historical Provider   spironolactone (ALDACTONE) 25 mg tablet   25 mg = 1 tab each dose, PO, daily, # 30 tab, 11 Refills, [id number]  Yes Historical Provider   Insulin Needles, Disposable, 32 gauge x 5/32\" ndle  11/18/17  Yes Historical Provider   LANCETS, SUPER THIN misc  11/9/17  Yes Historical Provider   ONETOUCH ULTRA TEST strip  11/8/17  Yes Historical Provider   GLUCOMETER,PRE-LOADED STRIPS (GLUCOSE METER, DISP & STRIPS)   Glucose meter, See Instructions, #: 1 EA, 0 Refill(s), [id number]15  Yes Historical Provider   carvedilol (COREG) 25 mg tablet Take 25 mg by mouth two (2) times a day. 9/30/17  Yes Historical Provider   insulin detemir (LEVEMIR FLEXTOUCH) 100 unit/mL (3 mL) inpn 42 Units by SubCUTAneous route two (2) times a day. Patient taking differently: 35 Units by SubCUTAneous route two (2) times a day. 10/11/17  Yes Lana Holly MD   rosuvastatin (CRESTOR) 5 mg tablet Take 1 Tab by mouth nightly. 8/15/18   Adarsh Enrique NP   ferrous sulfate 325 mg (65 mg iron) tablet Take 1 Tab by mouth Daily (before breakfast). 8/7/18   Adarsh Enrique NP   empagliflozin (JARDIANCE) 10 mg tablet Take  by mouth daily.     Historical Provider   DULoxetine (CYMBALTA) 20 mg capsule TAKE ONE CAPSULE BY MOUTH DAILY 5/7/18   Lana Holly MD   chlorthalidone (HYGROTEN) 25 mg tablet TAKE ONE TABLET BY MOUTH DAILY 2/18/18   Lana Holly MD              Allergies   Allergen Reactions    Pravachol [Pravastatin] Myalgia           Objective:     Vitals:    08/30/18 1535   BP: (!) 169/102   Pulse: 67   Temp: 99.2 °F (37.3 °C)   SpO2: 96%   Weight: 202 lb 9.6 oz (91.9 kg) Height: 5' 6\" (1.676 m)            Physical Exam:    GENERAL: alert, cooperative, no distress  EYE: negative  LYMPHATIC: Cervical, supraclavicular, and axillary nodes normal.   THROAT & NECK: normal and no erythema or exudates noted. LUNG: clear to auscultation bilaterally  HEART: regular rate and rhythm  ABDOMEN: soft, non-tender  EXTREMITIES:  no edema  SKIN: Normal.  NEUROLOGIC: negative      Lab Results   Component Value Date/Time    WBC 5.0 08/02/2018 10:25 AM    Hemoglobin (POC) 10.5 (L) 06/21/2017 12:18 PM    HGB 9.6 (L) 08/02/2018 10:25 AM    Hematocrit (POC) 31 (L) 06/21/2017 12:18 PM    HCT 32.4 (L) 08/02/2018 10:25 AM    PLATELET 265 11/53/5940 10:25 AM    MCV 72 (L) 08/02/2018 10:25 AM       Lab Results   Component Value Date/Time    Iron 82 11/02/2015 01:54 PM    TIBC 415 11/02/2015 01:54 PM    Iron % saturation 20 11/02/2015 01:54 PM    Ferritin 55 11/02/2015 01:54 PM                Assessment:     1. Iron deficiency anemia:    Intolerant to oral iron supplements. Last colonoscopy within last 5 years    I discussed with her various ways to replace/supplement iron which includes giving oral iron preparation such as iron sulfate or similar products or utilizing intravenous access to administer the total dose of iron. The patient was given the option to choose from various oral and intravenous iron preparation and after a prolonged discussion we have agreed to proceed with IV Iron to be administered in [facility]. I counseled the patient regarding the side effects of IV Iron infusion which includes rare instances of anaphylactic reactions etc.  After weighing the benefit and risks, the patient agreed to proceed with the treatment. Plan:       1. IV Iron in [facility]  2. Labs in 3 and 6 months  3.  Return in 6 monhs      Signed by: Devan Cortez MD                     September 3, 2018

## 2018-09-05 PROBLEM — D50.0 IRON DEFICIENCY ANEMIA DUE TO CHRONIC BLOOD LOSS: Status: ACTIVE | Noted: 2018-09-05

## 2018-09-17 RX ORDER — ERGOCALCIFEROL 1.25 MG/1
CAPSULE ORAL
Qty: 4 CAP | Refills: 0 | Status: SHIPPED | OUTPATIENT
Start: 2018-09-17 | End: 2018-10-13 | Stop reason: SDUPTHER

## 2018-09-21 ENCOUNTER — HOSPITAL ENCOUNTER (OUTPATIENT)
Dept: INFUSION THERAPY | Age: 58
Discharge: HOME | End: 2018-09-21
Payer: MEDICAID

## 2018-09-21 VITALS
WEIGHT: 202.6 LBS | BODY MASS INDEX: 32.7 KG/M2 | SYSTOLIC BLOOD PRESSURE: 126 MMHG | TEMPERATURE: 97.9 F | DIASTOLIC BLOOD PRESSURE: 76 MMHG | RESPIRATION RATE: 16 BRPM | HEART RATE: 73 BPM

## 2018-09-21 DIAGNOSIS — D50.0 IRON DEFICIENCY ANEMIA DUE TO CHRONIC BLOOD LOSS: ICD-10-CM

## 2018-09-21 PROCEDURE — 96365 THER/PROPH/DIAG IV INF INIT: CPT

## 2018-09-21 PROCEDURE — 74011250636 HC RX REV CODE- 250/636: Performed by: INTERNAL MEDICINE

## 2018-09-21 RX ORDER — SODIUM CHLORIDE 9 MG/ML
500 INJECTION, SOLUTION INTRAVENOUS ONCE
Status: COMPLETED | OUTPATIENT
Start: 2018-09-21 | End: 2018-09-21

## 2018-09-21 RX ADMIN — FERRIC CARBOXYMALTOSE INJECTION 750 MG: 50 INJECTION, SOLUTION INTRAVENOUS at 12:10

## 2018-09-21 RX ADMIN — SODIUM CHLORIDE 500 ML: 900 INJECTION, SOLUTION INTRAVENOUS at 11:30

## 2018-09-21 NOTE — PROGRESS NOTES
Outpatient Infusion Center Progress Note 1100 Pt admit to [facility] for Injectafer 1/2 ambulatory in stable condition. Assessment completed. No new concerns voiced. PIV established with [facility] blood return and connected to [facility]. Pt provided education on new medication, discussed possible side effects/reactions, pt states understanding. Patient Vitals for the past 12 hrs: 
 Temp Pulse Resp BP  
09/21/18 1255 97.9 °F (36.6 °C) 73 16 126/76  
09/21/18 1126 98.7 °F (37.1 °C) 68 18 131/81 Medications: 
NS Thora Haagensen Injectafer over 20 min 1300 Pt tolerated treatment well. Pt observed for 30 min post infusion with out any s/s of reaction. D/c home ambulatory in no distress. Pt aware of next appointment scheduled for 09/28/18 at 1300.

## 2018-09-25 RX ORDER — SODIUM CHLORIDE 9 MG/ML
25 INJECTION, SOLUTION INTRAVENOUS CONTINUOUS
Status: DISPENSED | OUTPATIENT
Start: 2018-09-28 | End: 2018-09-29

## 2018-09-28 ENCOUNTER — HOSPITAL ENCOUNTER (OUTPATIENT)
Dept: INFUSION THERAPY | Age: 58
Discharge: HOME | End: 2018-09-28
Payer: MEDICAID

## 2018-09-28 VITALS
RESPIRATION RATE: 18 BRPM | TEMPERATURE: 97.6 F | HEART RATE: 69 BPM | DIASTOLIC BLOOD PRESSURE: 86 MMHG | SYSTOLIC BLOOD PRESSURE: 156 MMHG

## 2018-09-28 DIAGNOSIS — D50.0 IRON DEFICIENCY ANEMIA DUE TO CHRONIC BLOOD LOSS: ICD-10-CM

## 2018-09-28 PROCEDURE — 74011250636 HC RX REV CODE- 250/636: Performed by: INTERNAL MEDICINE

## 2018-09-28 PROCEDURE — 96365 THER/PROPH/DIAG IV INF INIT: CPT

## 2018-09-28 PROCEDURE — 96374 THER/PROPH/DIAG INJ IV PUSH: CPT

## 2018-09-28 RX ADMIN — FERRIC CARBOXYMALTOSE INJECTION 750 MG: 50 INJECTION, SOLUTION INTRAVENOUS at 14:39

## 2018-09-28 RX ADMIN — SODIUM CHLORIDE 25 ML/HR: 900 INJECTION, SOLUTION INTRAVENOUS at 13:35

## 2018-09-28 NOTE — PROGRESS NOTES
1315 Pt admit to [facility] ambulatory in stable condition. Assessment completed. No new concerns voiced. Peripheral IV established left forearm with positive blood return. [facility] Visit Vitals  /86  Pulse 69  Temp 97.6 °F (36.4 °C)  Resp 18 Medications: 
Normal Saline Magdaline Clunes Injectafer 
 
1500 Pt tolerated treatment well. Peripheral IV maintained positive blood return throughout treatment, flushed with positive blood return and removed at conclusion. D/c home ambulatory in no distress.

## 2018-10-14 RX ORDER — ERGOCALCIFEROL 1.25 MG/1
CAPSULE ORAL
Qty: 4 CAP | Refills: 0 | Status: SHIPPED | OUTPATIENT
Start: 2018-10-14 | End: 2018-11-12 | Stop reason: SDUPTHER

## 2018-10-21 RX ORDER — IRBESARTAN 300 MG/1
TABLET ORAL
Qty: 30 TAB | Refills: 0 | Status: SHIPPED | OUTPATIENT
Start: 2018-10-21 | End: 2018-11-27 | Stop reason: SDUPTHER

## 2018-10-22 NOTE — TELEPHONE ENCOUNTER
Pt Last seen >7 months ago  Med refill 1 month until f/u  pls call for F/u appointment    thx    Radha Gabriel MD  4/3/2018

## 2018-11-02 ENCOUNTER — OFFICE VISIT (OUTPATIENT)
Dept: INTERNAL MEDICINE CLINIC | Facility: CLINIC | Age: 58
End: 2018-11-02

## 2018-11-02 VITALS
SYSTOLIC BLOOD PRESSURE: 126 MMHG | HEIGHT: 66 IN | RESPIRATION RATE: 16 BRPM | WEIGHT: 209 LBS | TEMPERATURE: 98.3 F | OXYGEN SATURATION: 96 % | HEART RATE: 64 BPM | BODY MASS INDEX: 33.59 KG/M2 | DIASTOLIC BLOOD PRESSURE: 75 MMHG

## 2018-11-02 DIAGNOSIS — R17 ELEVATED BILIRUBIN: ICD-10-CM

## 2018-11-02 DIAGNOSIS — D50.9 IRON DEFICIENCY ANEMIA, UNSPECIFIED IRON DEFICIENCY ANEMIA TYPE: Primary | ICD-10-CM

## 2018-11-02 DIAGNOSIS — M79.605 CHRONIC PAIN OF LEFT LOWER EXTREMITY: ICD-10-CM

## 2018-11-02 DIAGNOSIS — I10 ESSENTIAL HYPERTENSION: ICD-10-CM

## 2018-11-02 DIAGNOSIS — N18.9 CHRONIC KIDNEY DISEASE, UNSPECIFIED CKD STAGE: ICD-10-CM

## 2018-11-02 DIAGNOSIS — G89.29 CHRONIC PAIN OF LEFT LOWER EXTREMITY: ICD-10-CM

## 2018-11-02 PROBLEM — M43.16 SPONDYLOLISTHESIS OF LUMBAR REGION: Status: ACTIVE | Noted: 2017-06-08

## 2018-11-02 PROBLEM — Z78.0 POSTMENOPAUSAL: Status: ACTIVE | Noted: 2018-11-02

## 2018-11-02 PROBLEM — M54.42 LOW BACK PAIN WITH LEFT-SIDED SCIATICA: Status: ACTIVE | Noted: 2017-06-08

## 2018-11-02 LAB — HBA1C MFR BLD HPLC: 7.4 %

## 2018-11-02 RX ORDER — PREGABALIN 75 MG/1
CAPSULE ORAL
COMMUNITY
End: 2019-05-07

## 2018-11-02 RX ORDER — DULOXETIN HYDROCHLORIDE 30 MG/1
30 CAPSULE, DELAYED RELEASE ORAL DAILY
Qty: 30 CAP | Refills: 2 | Status: SHIPPED | OUTPATIENT
Start: 2018-11-02 | End: 2019-02-04 | Stop reason: DRUGHIGH

## 2018-11-02 NOTE — PROGRESS NOTES
Subjective:      Brenna Florez is a 62 y.o. female who presents today for chronic care follow up. She had colonoscopy that was normal. She states she had dilated eye exam last week. Colonoscopy done and normal, records to be scanned. Anemia: she has iron def anemia, she is following with . She will go back in December. Bilateral leg pain: left is worse than her right, she states this started a year ago. She has had back surgery in her back 3 times, she has had injections. She sees Dr. Taylor Archibald orthopedics. Cardiovascular Review  The patient has hypertension. Since last visit: weight has increased. She reports taking medications as instructed, no medication side effects noted, no TIA's, no chest pain on exertion, no dyspnea on exertion, no swelling of ankles. Diet and Lifestyle: generally follows a low fat low cholesterol diet, generally follows a low sodium diet, no formal exercise but active during the day. Labs: labs pending. Endocrine Review  She is seen for diabetes. Since last visit she reports: no significant changes. Home glucose monitoring: is performed sporadically. She states they are running around 125. She reports medication compliance: compliant all of the time. Medication side effects: none. Diabetic diet compliance: noncompliant some of the time. Lab review: A1c at 7.4. She will follow up with her endocrinologist at [facility] this month. Body mass index is 33.73 kg/m². 7lb weight gain noted.    Wt Readings from Last 3 Encounters:   11/02/18 209 lb (94.8 kg)   09/21/18 202 lb 9.6 oz (91.9 kg)   08/30/18 202 lb 9.6 oz (91.9 kg)         Patient Active Problem List    Diagnosis Date Noted    Iron deficiency anemia due to chronic blood loss 09/05/2018    Eczema 08/24/2018    Elevated bilirubin 05/11/2018    CKD (chronic kidney disease) 05/11/2018    Primary osteoarthritis of left knee 03/02/2018    Type 2 diabetes mellitus with nephropathy ([facility] [address] 12/14/2017    Type 2 diabetes, uncontrolled, with neuropathy ([facility] 01/23/2017    Patient non adherence 01/23/2017    Essential hypertension 06/28/2016    Encounter for long-term (current) use of medications 06/28/2016    Breast cancer screening 06/28/2016    Sickle cell trait ([facility] 10/08/2015    Hypercholesteremia 08/06/2015    Chronic lower back pain 08/06/2015     Current Outpatient Medications   Medication Sig Dispense Refill    pregabalin (LYRICA) 75 mg capsule Take  by mouth.  irbesartan (AVAPRO) 300 mg tablet TAKE ONE TABLET BY MOUTH DAILY 30 Tab 0    VITAMIN D2 50,000 unit capsule TAKE ONE CAPSULE BY MOUTH EVERY 7 DAYS AS DIRECTED BY PRESCRIBER 4 Cap 0    amLODIPine (NORVASC) 5 mg tablet Take 1 Tab by mouth daily. 30 Tab 5    rosuvastatin (CRESTOR) 5 mg tablet Take 1 Tab by mouth nightly. 30 Tab 5    ferrous sulfate 325 mg (65 mg iron) tablet Take 1 Tab by mouth Daily (before breakfast). 30 Tab 5    Fenofibrate 120 mg tab Take 145 mg by mouth.  empagliflozin (JARDIANCE) 10 mg tablet Take  by mouth daily.  insulin detemir U-100 (LEVEMIR FLEXTOUCH) 100 unit/mL (3 mL) inpn 35 Units by SubCUTAneous route.  fish oil-omega-3 fatty acids 300-1,000 mg cap TAKE ONE CAPSULE BY MOUTH TWICE A DAY 60 Cap 5    DULoxetine (CYMBALTA) 20 mg capsule TAKE ONE CAPSULE BY MOUTH DAILY 30 Cap 3    insulin lispro (HUMALOG KWIKPEN INSULIN) 100 unit/mL kwikpen   30 Units, SQ, ac tid, take when eating food. if not eating do NOT take., # 35 mL, 3 Refills, Pharmacy: [id number]      chlorthalidone (HYGROTEN) 25 mg tablet TAKE ONE TABLET BY MOUTH DAILY 30 Tab 5    omega 3-dha-epa-fish oil (FISH OIL) 60- mg cap TAKE ONE CAPSULE BY MOUTH TWICE A DAY      metFORMIN ER (GLUCOPHAGE XR) 500 mg tablet Take 500 mg by mouth two (2) times a day. Indications: 500 mg tab taking 2 tabs daily      aspirin 81 mg chewable tablet Take 81 mg by mouth.       spironolactone (ALDACTONE) 25 mg tablet   25 mg = 1 tab each dose, PO, daily, # 30 tab, 11 Refills, [facility] #[id number]     Insulin Needles, Disposable, 32 gauge x 5/32\" ndle       LANCETS, SUPER THIN misc       ONETOUCH ULTRA TEST strip       GLUCOMETER,PRE-LOADED STRIPS (GLUCOSE METER, DISP & STRIPS)   Glucose meter, See Instructions, #: 1 EA, 0 Refill(s), Pharm[facility]: [facility] #[id number]      carvedilol (COREG) 25 mg tablet Take 25 mg by mouth two (2) times a day.  insulin detemir (LEVEMIR FLEXTOUCH) 100 unit/mL (3 mL) inpn 42 Units by SubCUTAneous route two (2) times a day. (Patient taking differently: 35 Units by SubCUTAneous route two (2) times a day.) 6 Adjustable Dose Pre-filled Pen Syringe 2    triamcinolone acetonide (KENALOG) 0.5 % ointment Apply  to affected area two (2) times a day. use thin layer 30 g 0    gabapentin (NEURONTIN) 300 mg capsule TAKE TWO CAPSULES BY MOUTH THREE TIMES A  Cap 0    meloxicam (MOBIC) 15 mg tablet Take 15 mg by mouth daily.        Allergies   Allergen Reactions    Pravachol [Pravastatin] Myalgia     Past Medical History:   Diagnosis Date    Arthritis     Back pain     Breast cancer screening 6/28/2016    Chronic bilateral low back pain without sciatica 6/28/2016    Diabetes ([address]     Encounter for long-term (current) use of medications 6/28/2016    Essential hypertension with goal blood pressure less than 140/90 6/28/2016    Hypercholesteremia 8/6/2015    Hypercholesterolemia     Hypertension     Ill-defined condition     hyperlipidemia    Other ill-defined conditions(799.89)     high cholesterol    Patient non adherence 1/23/2017    Primary osteoarthritis of left knee 3/2/2018    Type 2 diabetes, uncontrolled, with neuropathy ([address] 1/23/2017    Type II diabetes mellitus, uncontrolled ([address] 8/6/2015     Past Surgical History:   Procedure Laterality Date    CARDIAC SURG PROCEDURE UNLIST      murmur- PT DENIES SURGERY AND MURMUR 6/20/17    HX CARPAL TUNNEL RELEASE      HX GYN      hysterectomy  HX HEENT      Ear surgery    HX HEENT      Left eye    HX ORTHOPAEDIC      left wrist & left elbow    HX ORTHOPAEDIC      Back Surgery x 3    HX ORTHOPAEDIC      right thumb at [address] HX OTHER SURGICAL      back surgery X 2    HX OTHER SURGICAL      Collapsed left lung    HX OTHER SURGICAL      axillary sweat glands removed    HX OVARIAN CYST REMOVAL      UT OTOPLASTY PROTRUDING EAR W/WO SIZE RDCTJ          Review of Systems    A comprehensive review of systems was negative except for that written in the HPI. Objective:     Visit Vitals  /75 (BP 1 Location: Right arm, BP Patient Position: Sitting)   Pulse 64   Temp 98.3 °F (36.8 °C) (Oral)   Resp 16   Ht 5' 6\" (1.676 m)   Wt 209 lb (94.8 kg)   SpO2 96%   BMI 33.73 kg/m²     General appearance: alert, cooperative, no distress, appears stated age, obese  Head: Normocephalic, without obvious abnormality, atraumatic  Eyes: negative  Lungs: clear to auscultation bilaterally  Heart: regular rate and rhythm, S1, S2 normal, no murmur, click, rub or gallop  Extremities: extremities normal, atraumatic, no cyanosis or edema  Pulses: 2+ and symmetric  Skin: Skin color, texture, turgor normal. No rashes or lesions  Neurologic: Alert and oriented X 3, normal strength and tone. Normal symmetric reflexes. Normal coordination and gait  Psych: appropriate mood, speech, affect  Nursing note and vitals reviewed  Assessment/Plan:       ICD-10-CM ICD-9-CM    1. Iron deficiency anemia, unspecified iron deficiency anemia type D50.9 280.9 CANCELED: ANEMIA PROFILE B   2. Chronic kidney disease, unspecified CKD stage S52.3 604.2 METABOLIC PANEL, COMPREHENSIVE      CANCELED: METABOLIC PANEL, COMPREHENSIVE   3. Elevated bilirubin X26 550.5 METABOLIC PANEL, COMPREHENSIVE      CANCELED: METABOLIC PANEL, COMPREHENSIVE   4. Essential hypertension M45 310.8 METABOLIC PANEL, COMPREHENSIVE      CANCELED: METABOLIC PANEL, COMPREHENSIVE   5.  Type 2 diabetes, uncontrolled, with neuropathy ([facility] [address] E11.40 250.62 AMB POC HEMOGLOBIN A1C    E11.65 357.2 CANCELED: HEMOGLOBIN A1C W/O EAG   6. Chronic pain of left lower extremity M79.605 729.5 DULoxetine (CYMBALTA) 30 mg capsule    G89.29 338.29        Follow-up Disposition:  Return in about 3 months (around 2/2/2019) for Chronic care. Advised her to call back or return to office if symptoms worsen/change/persist.  Discussed expected course/resolution/complications of diagnosis in detail with patient. Medication risks/benefits/costs/interactions/alternatives discussed with patient. She was given an after visit summary which includes diagnoses, current medications, & vitals. She expressed understanding with the diagnosis and plan.

## 2018-11-02 NOTE — PROGRESS NOTES
Identified pt with two pt identifiers(name and ). Reviewed record in preparation for visit and have obtained necessary documentation. Chief Complaint   Patient presents with    Leg Pain     left leg        Health Maintenance Due   Topic    Shingrix Vaccine Age 50> (1 of 2)    EYE EXAM RETINAL OR DILATED Q1     Influenza Age 5 to Adult     BREAST CANCER SCRN MAMMOGRAM     FOOT EXAM Q1     MICROALBUMIN Q1        Coordination of Care Questionnaire:  :   1) Have you been to an emergency room, urgent care, or hospitalized since your last visit?   no   If yes, where when, and reason for visit? 2. Have seen or consulted any other health care provider since your last visit? NO  If yes, where when, and reason for visit? Patient is accompanied by self I have received verbal consent from Ho Bowles to discuss any/all medical information while they are present in the room.     Visit Vitals  /75 (BP 1 Location: Right arm, BP Patient Position: Sitting)   Pulse 64   Temp 98.3 °F (36.8 °C) (Oral)   Resp 16   Ht 5' 6\" (1.676 m)   Wt 209 lb (94.8 kg)   SpO2 96%   BMI 33.73 kg/m²     Leana Carrillo LPN

## 2018-11-03 LAB
ALBUMIN SERPL-MCNC: 4.7 G/DL (ref 3.5–5.5)
ALBUMIN/GLOB SERPL: 1.8 {RATIO} (ref 1.2–2.2)
ALP SERPL-CCNC: 60 IU/L (ref 39–117)
ALT SERPL-CCNC: 27 IU/L (ref 0–32)
AST SERPL-CCNC: 24 IU/L (ref 0–40)
BILIRUB SERPL-MCNC: <0.2 MG/DL (ref 0–1.2)
BUN SERPL-MCNC: 20 MG/DL (ref 6–24)
BUN/CREAT SERPL: 16 (ref 9–23)
CALCIUM SERPL-MCNC: 9.3 MG/DL (ref 8.7–10.2)
CHLORIDE SERPL-SCNC: 104 MMOL/L (ref 96–106)
CO2 SERPL-SCNC: 20 MMOL/L (ref 20–29)
CREAT SERPL-MCNC: 1.26 MG/DL (ref 0.57–1)
GLOBULIN SER CALC-MCNC: 2.6 G/DL (ref 1.5–4.5)
GLUCOSE SERPL-MCNC: 169 MG/DL (ref 65–99)
POTASSIUM SERPL-SCNC: 4.3 MMOL/L (ref 3.5–5.2)
PROT SERPL-MCNC: 7.3 G/DL (ref 6–8.5)
SODIUM SERPL-SCNC: 140 MMOL/L (ref 134–144)

## 2018-11-12 RX ORDER — ERGOCALCIFEROL 1.25 MG/1
CAPSULE ORAL
Qty: 4 CAP | Refills: 0 | Status: SHIPPED | OUTPATIENT
Start: 2018-11-12 | End: 2018-12-14 | Stop reason: SDUPTHER

## 2018-11-26 RX ORDER — IRBESARTAN 300 MG/1
TABLET ORAL
Qty: 30 TAB | Refills: 0 | OUTPATIENT
Start: 2018-11-26

## 2018-11-27 RX ORDER — IRBESARTAN 300 MG/1
TABLET ORAL
Qty: 30 TAB | Refills: 0 | Status: SHIPPED | OUTPATIENT
Start: 2018-11-27 | End: 2019-05-07

## 2018-11-27 NOTE — TELEPHONE ENCOUNTER
Pt last seen > 8 months ago    Meds refill refused. Appointment needed    Thanks.      Drake Castaneda MD  11/26/2018

## 2018-11-27 NOTE — TELEPHONE ENCOUNTER
Pt Last seen >9 months ago  Med refill 1 month until f/u  pls call for F/u appointment    thx    Hannah Villanueva MD  4/3/2018

## 2018-11-28 DIAGNOSIS — G89.29 CHRONIC LOW BACK PAIN, UNSPECIFIED BACK PAIN LATERALITY, WITH SCIATICA PRESENCE UNSPECIFIED: Primary | ICD-10-CM

## 2018-11-28 DIAGNOSIS — M54.5 CHRONIC LOW BACK PAIN, UNSPECIFIED BACK PAIN LATERALITY, WITH SCIATICA PRESENCE UNSPECIFIED: Primary | ICD-10-CM

## 2018-12-14 RX ORDER — ERGOCALCIFEROL 1.25 MG/1
CAPSULE ORAL
Qty: 4 CAP | Refills: 0 | Status: SHIPPED | OUTPATIENT
Start: 2018-12-14 | End: 2019-02-04 | Stop reason: ALTCHOICE

## 2019-02-04 ENCOUNTER — OFFICE VISIT (OUTPATIENT)
Dept: INTERNAL MEDICINE CLINIC | Facility: CLINIC | Age: 59
End: 2019-02-04

## 2019-02-04 VITALS
HEART RATE: 68 BPM | HEIGHT: 66 IN | OXYGEN SATURATION: 97 % | BODY MASS INDEX: 32.47 KG/M2 | TEMPERATURE: 97.6 F | SYSTOLIC BLOOD PRESSURE: 117 MMHG | RESPIRATION RATE: 18 BRPM | WEIGHT: 202 LBS | DIASTOLIC BLOOD PRESSURE: 74 MMHG

## 2019-02-04 DIAGNOSIS — N18.9 CHRONIC KIDNEY DISEASE, UNSPECIFIED CKD STAGE: ICD-10-CM

## 2019-02-04 DIAGNOSIS — Z12.39 BREAST CANCER SCREENING: ICD-10-CM

## 2019-02-04 DIAGNOSIS — G89.29 CHRONIC PAIN OF LEFT LOWER EXTREMITY: ICD-10-CM

## 2019-02-04 DIAGNOSIS — E78.00 HYPERCHOLESTEREMIA: ICD-10-CM

## 2019-02-04 DIAGNOSIS — M79.605 CHRONIC PAIN OF LEFT LOWER EXTREMITY: ICD-10-CM

## 2019-02-04 DIAGNOSIS — I10 ESSENTIAL HYPERTENSION: ICD-10-CM

## 2019-02-04 LAB
ALBUMIN UR QL STRIP: 10 MG/L
CREATININE, URINE POC: 100 MG/DL
MICROALBUMIN/CREAT RATIO POC: <30 MG/G

## 2019-02-04 RX ORDER — GEMFIBROZIL 600 MG/1
600 TABLET, FILM COATED ORAL 2 TIMES DAILY
COMMUNITY
End: 2019-05-07

## 2019-02-04 RX ORDER — DULOXETIN HYDROCHLORIDE 60 MG/1
60 CAPSULE, DELAYED RELEASE ORAL DAILY
Qty: 30 CAP | Refills: 5 | Status: SHIPPED | OUTPATIENT
Start: 2019-02-04 | End: 2019-08-09 | Stop reason: SDUPTHER

## 2019-02-04 NOTE — PROGRESS NOTES
Subjective:  
  
Nii Vincent is a 62 y.o. female who presents today for follow up. Cardiovascular Review The patient has hypertension and hyperlipidemia. Since last visit: she stopped the crestor because her cholesterol was too low. She reports taking medications as instructed, no medication side effects noted, no TIA's, no chest pain on exertion, no dyspnea on exertion, no swelling of ankles, no palpitations. Diet and Lifestyle: generally follows a low fat low cholesterol diet, generally follows a low sodium diet, no formal exercise but active during the day. Labs: reviewed labs from the fall. Endocrine Review She is seen for diabetes. Since last visit she reports: no polyuria or polydipsia, no chest pain or dyspnea, no chest pain, dyspnea or TIA's, no unusual visual symptoms, no hypoglycemia. Her biggest complaint is that she has night time peripheral burning to her feet. Home glucose monitoring: is performed regularly. She states the average is around 118. She reports medication compliance: compliant all of the time. Medication side effects: none. Diabetic diet compliance: compliant most of the time. Lab review: last A1c was excellent, labs reviewed and discussed with patient. Will run urine microalbumine today Foot pain: noted bilaterally and only at night. This is not new, she has previously been on lyrica, gabapentin without improvement. Discussed increased duloxetine. Patient Active Problem List  
 Diagnosis Date Noted  Postmenopausal 11/02/2018  Iron deficiency anemia due to chronic blood loss 09/05/2018  Eczema 08/24/2018  Elevated bilirubin 05/11/2018  CKD (chronic kidney disease) 05/11/2018  Primary osteoarthritis of left knee 03/02/2018  Type 2 diabetes mellitus with nephropathy ([facility] 12/14/2017  Spondylolisthesis of lumbar region 06/08/2017  Low back pain with left-sided sciatica 06/08/2017  Type 2 diabetes, uncontrolled, with neuropathy ([facility] 01/23/2017  Patient non adherence 01/23/2017  Essential hypertension 06/28/2016  Encounter for long-term (current) use of medications 06/28/2016  Breast cancer screening 06/28/2016  Sickle cell trait ([facility] 10/08/2015  Hypercholesteremia 08/06/2015  Chronic lower back pain 08/06/2015 Current Outpatient Medications Medication Sig Dispense Refill  gemfibrozil (LOPID) 600 mg tablet Take 600 mg by mouth two (2) times a day.  irbesartan (AVAPRO) 300 mg tablet TAKE ONE TABLET BY MOUTH DAILY 30 Tab 0  
 amLODIPine (NORVASC) 5 mg tablet Take 1 Tab by mouth daily. 30 Tab 5  empagliflozin (JARDIANCE) 10 mg tablet Take  by mouth daily.  insulin detemir U-100 (LEVEMIR FLEXTOUCH) 100 unit/mL (3 mL) inpn 35 Units by SubCUTAneous route.  fish oil-omega-3 fatty acids 300-1,000 mg cap TAKE ONE CAPSULE BY MOUTH TWICE A DAY 60 Cap 5  
 insulin lispro (HUMALOG KWIKPEN INSULIN) 100 unit/mL kwikpen 30 Units, SQ, ac tid, take when eating food. if not eating do NOT take., # 35 mL, 3 Refills, [facility]acy: [id number]  chlorthalidone (HYGROTEN) 25 mg tablet TAKE ONE TABLET BY MOUTH DAILY 30 Tab 5  
 omega 3-dha-epa-fish oil (FISH OIL) 60- mg cap TAKE ONE CAPSULE BY MOUTH TWICE A DAY  metFORMIN ER (GLUCOPHAGE XR) 500 mg tablet Take 500 mg by mouth two (2) times a day. Indications: 500 mg tab taking 2 tabs daily  aspirin 81 mg chewable tablet Take 81 mg by mouth.  spironolactone (ALDACTONE) 25 mg tablet 25 mg = 1 tab each dose, PO, daily, # 30 tab, 11 Refills, Pharmacy: [facility]/pharmacy #[id number]  Insulin Needles, Disposable, 32 gauge x 5/32\" ndle  LANCETS, SUPER THIN [facility]  GLUCOMETER,PRE-LOADED STRIPS (GLUCOSE METER, DISP & STRIPS) Glucose meter, See Instructions, #: 1 EA, 0 Refill(s), Pharmacy: [facility]/pharmacy #[id number]  carvedilol (COREG) 25 mg tablet Take 25 mg by mouth two (2) times a day.  pregabalin (LYRICA) 75 mg capsule Take  by mouth.  ferrous sulfate 325 mg (65 mg iron) tablet Take 1 Tab by mouth Daily (before breakfast). 30 Tab 5  
 ONETOUCH ULTRA TEST strip  insulin detemir (LEVEMIR FLEXTOUCH) 100 unit/mL (3 mL) inpn 42 Units by SubCUTAneous route two (2) times a day. (Patient taking differently: 35 Units by SubCUTAneous route two (2) times a day.) 6 Adjustable Dose Pre-filled Pen Syringe 2 Allergies Allergen Reactions  Pravachol [Pravastatin] Myalgia Past Medical History:  
Diagnosis Date  Arthritis  Back pain  Breast cancer screening 6/28/2016  Chronic bilateral low back pain without sciatica 6/28/2016  Diabetes ([facility]  Encounter for long-term (current) use of medications 6/28/2016  Essential hypertension with goal blood pressure less than 140/90 6/28/2016  Hypercholesteremia 8/6/2015  Hypercholesterolemia  Hypertension  Ill-defined condition   
 hyperlipidemia  Other ill-defined conditions(799.89)   
 high cholesterol  Patient non adherence 1/23/2017  Primary osteoarthritis of left knee 3/2/2018  Type 2 diabetes, uncontrolled, with neuropathy ([address] 1/23/2017  Type II diabetes mellitus, uncontrolled ([address] 8/6/2015 Past Surgical History:  
Procedure Laterality Date  CARDIAC SURG PROCEDURE UNLIST    
 murmur- PT DENIES SURGERY AND MURMUR 6/20/17  HX CARPAL TUNNEL RELEASE  HX GYN    
 hysterectomy  HX HEENT Ear surgery  HX HEENT Left eye  HX ORTHOPAEDIC    
 left wrist & left elbow  HX ORTHOPAEDIC Back Surgery x 3  
 HX ORTHOPAEDIC    
 right thumb at [facility]  HX OTHER SURGICAL    
 back surgery X 2  
 HX OTHER SURGICAL Collapsed left lung  HX OTHER SURGICAL    
 axillary sweat glands removed  HX OVARIAN CYST REMOVAL  TN OTOPLASTY PROTRUDING EAR W/WO SIZE RDCTJ Review of Systems A comprehensive review of systems was negative except for that written in the HPI. Objective:  
 
Visit Vitals /74 Pulse 68 Temp 97.6 °F (36.4 °C) (Oral) Resp 18 Ht 5' 6\" (1.676 m) Wt 202 lb (91.6 kg) SpO2 97% BMI 32.60 kg/m² General appearance: alert, cooperative, no distress, appears stated age Head: Normocephalic, without obvious abnormality, atraumatic Eyes: negative Back: symmetric, no curvature. ROM normal. No CVA tenderness. Lungs: clear to auscultation bilaterally Heart: regular rate and rhythm, S1, S2 normal, no murmur, click, rub or gallop Extremities: extremities normal, atraumatic, no cyanosis or edema Pulses: 2+ and symmetric Skin: Skin color, texture, turgor normal. No rashes or lesions Neurologic: Alert and oriented X 3, normal strength and tone. Normal symmetric reflexes. Normal coordination and gait Psych: appropriate mood, speech, affect Nursing note and vitals reviewed Assessment/Plan: ICD-10-CM ICD-9-CM 1. Type 2 diabetes, uncontrolled, with neuropathy (HCC) E11.40 250.62 AMB POC URINE, MICROALBUMIN, SEMIQUANT (3 RESULTS) E11.65 357.2 2. Hypercholesteremia E78.00 272.0 3. Essential hypertension I10 401.9 4. Chronic kidney disease, unspecified CKD stage N18.9 585.9 AMB POC URINE, MICROALBUMIN, SEMIQUANT (3 RESULTS) 5. Breast cancer screening Z12.31 V76.10 [facility] MAMMO BI SCREENING INCL CAD 6. Chronic pain of left lower extremity M79.605 729.5 DULoxetine (CYMBALTA) 60 mg capsule G89.29 338.29 REFERRAL TO PODIATRY  
duloxetine increased, referral to podiatry placed for diabetic foot exam 
 
Follow-up Disposition: 
Return in about 3 months (around 5/4/2019) for Get fasting labs drawn beforehand. Advised her to call back or return to office if symptoms worsen/change/persist. 
Discussed expected course/resolution/complications of diagnosis in detail with patient. Medication risks/benefits/costs/interactions/alternatives discussed with patient. She was given an after visit summary which includes diagnoses, current medications, & vitals. She expressed understanding with the diagnosis and plan.

## 2019-02-04 NOTE — Clinical Note
Please call patient and let her know I put in referral for podiatry so she can have a comprehensive foot exam. I want to make sure there are no issues

## 2019-02-04 NOTE — PROGRESS NOTES
Bel Rosenberg is a 62 y.o. female Chief Complaint Patient presents with  Routine Visit Vitals /74 Pulse 68 Temp 97.6 °F (36.4 °C) (Oral) Resp 18 Ht 5' 6\" (1.676 m) Wt 202 lb (91.6 kg) SpO2 97% BMI 32.60 kg/m² 1. Have you been to the ER, urgent care clinic since your last visit? Hospitalized since your last visit? No  
 
2. Have you seen or consulted any other health care providers outside of the [address] [place] [address] since your last visit? Include any pap smears or colon screening.   No

## 2019-05-07 ENCOUNTER — OFFICE VISIT (OUTPATIENT)
Dept: INTERNAL MEDICINE CLINIC | Facility: CLINIC | Age: 59
End: 2019-05-07

## 2019-05-07 VITALS
WEIGHT: 194 LBS | SYSTOLIC BLOOD PRESSURE: 125 MMHG | HEIGHT: 66 IN | HEART RATE: 80 BPM | TEMPERATURE: 98.6 F | BODY MASS INDEX: 31.18 KG/M2 | DIASTOLIC BLOOD PRESSURE: 82 MMHG | RESPIRATION RATE: 16 BRPM

## 2019-05-07 DIAGNOSIS — I10 ESSENTIAL HYPERTENSION: ICD-10-CM

## 2019-05-07 DIAGNOSIS — E78.00 HYPERCHOLESTEREMIA: ICD-10-CM

## 2019-05-07 DIAGNOSIS — D50.0 IRON DEFICIENCY ANEMIA DUE TO CHRONIC BLOOD LOSS: ICD-10-CM

## 2019-05-07 DIAGNOSIS — G89.29 CHRONIC BACK PAIN, UNSPECIFIED BACK LOCATION, UNSPECIFIED BACK PAIN LATERALITY: ICD-10-CM

## 2019-05-07 DIAGNOSIS — M54.9 CHRONIC BACK PAIN, UNSPECIFIED BACK LOCATION, UNSPECIFIED BACK PAIN LATERALITY: ICD-10-CM

## 2019-05-07 DIAGNOSIS — M25.562 CHRONIC PAIN OF LEFT KNEE: ICD-10-CM

## 2019-05-07 DIAGNOSIS — G89.29 CHRONIC PAIN OF LEFT KNEE: ICD-10-CM

## 2019-05-07 RX ORDER — CYCLOBENZAPRINE HCL 10 MG
TABLET ORAL
COMMUNITY
End: 2019-05-07

## 2019-05-07 RX ORDER — CYCLOBENZAPRINE HCL 10 MG
TABLET ORAL
COMMUNITY
End: 2020-01-27

## 2019-05-07 RX ORDER — IBUPROFEN 800 MG/1
TABLET ORAL
COMMUNITY
End: 2019-05-07

## 2019-05-07 RX ORDER — LOSARTAN POTASSIUM 25 MG/1
25 TABLET ORAL
COMMUNITY
End: 2022-01-06 | Stop reason: SDUPTHER

## 2019-05-07 NOTE — PROGRESS NOTES
Subjective:      Edwina Banks is a 62 y.o. female who presents today for chronic care follow up . Cardiovascular Review  The patient has hypertension and hyperlipidemia. Since last visit: no changes. She reports taking medications as instructed, no medication side effects noted. Diet and Lifestyle: generally follows a low fat low cholesterol diet, generally follows a low sodium diet, no formal exercise but active during the day. Labs: no lab studies available for review at time of visit. Body mass index is 31.31 kg/m². Weight loss encouraged! Wt Readings from Last 3 Encounters:   05/07/19 194 lb (88 kg)   02/04/19 202 lb (91.6 kg)   11/02/18 209 lb (94.8 kg)     Endocrine Review  She is seen for diabetes. Since last visit she reports: no polyuria or polydipsia, no chest pain or dyspnea, no chest pain, dyspnea or TIA's, no numbness, tingling or pain in extremities, no unusual visual symptoms, no hypoglycemia, no significant changes. Diabetic diet compliance: compliant all of the time. Lab review: she is seen by endocrine at [facility], she states her last A1c was done last week and 6.5, labs reviewed and discussed with patient. Left knee numbness: she notes chronic left knee pain and numbness. She has been seeing ortho at [facility] who recommended surgery, this would be her 4th surgery on her back. She wants to get a second opinion.      Patient Active Problem List    Diagnosis Date Noted    Postmenopausal 11/02/2018    Iron deficiency anemia due to chronic blood loss 09/05/2018    Eczema 08/24/2018    Elevated bilirubin 05/11/2018    CKD (chronic kidney disease) 05/11/2018    Primary osteoarthritis of left knee 03/02/2018    Type 2 diabetes mellitus with nephropathy ([address] 12/14/2017    Spondylolisthesis of lumbar region 06/08/2017    Low back pain with left-sided sciatica 06/08/2017    Type 2 diabetes, uncontrolled, with neuropathy ([address] 01/23/2017    Patient non adherence 01/23/2017    Essential hypertension 06/28/2016    Encounter for long-term (current) use of medications 06/28/2016    Breast cancer screening 06/28/2016    Sickle cell trait ([facility] 10/08/2015    Hypercholesteremia 08/06/2015    Chronic lower back pain 08/06/2015     Current Outpatient Medications   Medication Sig Dispense Refill    losartan (COZAAR) 25 mg tablet Take  by mouth daily.  cyclobenzaprine (FLEXERIL) 10 mg tablet Take  by mouth three (3) times daily as needed for Muscle Spasm(s).  DULoxetine (CYMBALTA) 60 mg capsule Take 1 Cap by mouth daily. 30 Cap 5    amLODIPine (NORVASC) 5 mg tablet Take 1 Tab by mouth daily. 30 Tab 5    empagliflozin (JARDIANCE) 10 mg tablet Take  by mouth daily.  fish oil-omega-3 fatty acids 300-1,000 mg cap TAKE ONE CAPSULE BY MOUTH TWICE A DAY 60 Cap 5    insulin lispro (HUMALOG KWIKPEN INSULIN) 100 unit/mL kwikpen   30 Units, SQ, ac tid, take when eating food. if not eating do NOT take., # 35 mL, 3 Refills, Pharmacy: [id number]      chlorthalidone (HYGROTEN) 25 mg tablet TAKE ONE TABLET BY MOUTH DAILY 30 Tab 5    metFORMIN ER (GLUCOPHAGE XR) 500 mg tablet Take 500 mg by mouth two (2) times a day. Indications: 500 mg tab taking 2 tabs daily      aspirin 81 mg chewable tablet Take 81 mg by mouth.  spironolactone (ALDACTONE) 25 mg tablet   25 mg = 1 tab each dose, PO, daily, # 30 tab, 11 Refills, Pharmacy: [facility]/pharmacy #[id number]     Insulin Needles, Disposable, 32 gauge x 5/32\" ndle       LANCETS, SUPER THIN [facility]       ONETOUCH ULTRA TEST strip       GLUCOMETER,PRE-LOADED STRIPS (GLUCOSE METER, DISP & STRIPS)   Glucose meter, See Instructions, #: 1 EA, 0 Refill(s), Pharmacy: [facility]/pharmacy #[id number]      carvedilol (COREG) 25 mg tablet Take 25 mg by mouth two (2) times a day.  insulin detemir (LEVEMIR FLEXTOUCH) 100 unit/mL (3 mL) inpn 42 Units by SubCUTAneous route two (2) times a day.  (Patient taking differently: 35 Units by SubCUTAneous route two (2) times a day.) 6 Adjustable Dose Pre-filled Pen Syringe 2    ferrous sulfate 325 mg (65 mg iron) tablet Take 1 Tab by mouth Daily (before breakfast). 30 Tab 5     Allergies   Allergen Reactions    Pravachol [Pravastatin] Myalgia     Past Medical History:   Diagnosis Date    Arthritis     Back pain     Breast cancer screening 6/28/2016    Chronic bilateral low back pain without sciatica 6/28/2016    Diabetes ([address]     Encounter for long-term (current) use of medications 6/28/2016    Essential hypertension with goal blood pressure less than 140/90 6/28/2016    Hypercholesteremia 8/6/2015    Hypercholesterolemia     Hypertension     Ill-defined condition     hyperlipidemia    Other ill-defined conditions(799.89)     high cholesterol    Patient non adherence 1/23/2017    Primary osteoarthritis of left knee 3/2/2018    Type 2 diabetes, uncontrolled, with neuropathy ([address] 1/23/2017    Type II diabetes mellitus, uncontrolled ([address] 8/6/2015     Past Surgical History:   Procedure Laterality Date    CARDIAC SURG PROCEDURE UNLIST      murmur- PT DENIES SURGERY AND MURMUR 6/20/17    HX CARPAL TUNNEL RELEASE      HX GYN      hysterectomy    HX HEENT      Ear surgery    HX HEENT      Left eye    HX ORTHOPAEDIC      left wrist & left elbow    HX ORTHOPAEDIC      Back Surgery x 3    HX ORTHOPAEDIC      right thumb at [address] HX OTHER SURGICAL      back surgery X 2    HX OTHER SURGICAL      Collapsed left lung    HX OTHER SURGICAL      axillary sweat glands removed    HX OVARIAN CYST REMOVAL      ND OTOPLASTY PROTRUDING EAR W/WO SIZE RDCTJ          Review of Systems    A comprehensive review of systems was negative except for that written in the HPI.      Objective:     Visit Vitals  /82   Pulse 80   Temp 98.6 °F (37 °C) (Oral)   Resp 16   Ht 5' 6\" (1.676 m)   Wt 194 lb (88 kg)   BMI 31.31 kg/m²     General appearance: alert, cooperative, no distress, appears stated age  Head: Normocephalic, without obvious abnormality, atraumatic  Eyes: negative  Back: symmetric, no curvature. ROM normal. No CVA tenderness. Lungs: clear to auscultation bilaterally  Heart: regular rate and rhythm, S1, S2 normal, no murmur, click, rub or gallop  Extremities: extremities normal, atraumatic, no cyanosis or edema. Left knee normal, no pain with palpation, no edema  Pulses: 2+ and symmetric  Skin: Skin color, texture, turgor normal. No rashes or lesions  Neurologic: Alert and oriented X 3, normal strength and tone. Normal symmetric reflexes. Normal coordination and gait  Psych: appropriate mood, speech, affect  Nursing note and vitals reviewed  Assessment/Plan:       ICD-10-CM ICD-9-CM    1. Type 2 diabetes, uncontrolled, with neuropathy (HCC) E11.40 250.62 HEMOGLOBIN A1C W/O EAG    E11.65 357.2    2. Essential hypertension I10 401.9 LIPID PANEL      METABOLIC PANEL, COMPREHENSIVE   3. Hypercholesteremia E78.00 272.0 LIPID PANEL   4. Iron deficiency anemia due to chronic blood loss D50.0 280.0 CBC WITH AUTOMATED DIFF   5. Chronic pain of left knee M25.562 719.46 REFERRAL TO ORTHOPEDIC SURGERY    G89.29 338.29    6. Chronic back pain, unspecified back location, unspecified back pain laterality M54.9 724.5 REFERRAL TO ORTHOPEDIC SURGERY    G89.29 338.29        Follow-up and Dispositions    · Return in about 3 months (around 8/7/2019), or chronic care. Advised her to call back or return to office if symptoms worsen/change/persist.  Discussed expected course/resolution/complications of diagnosis in detail with patient. Medication risks/benefits/costs/interactions/alternatives discussed with patient. She was given an after visit summary which includes diagnoses, current medications, & vitals. She expressed understanding with the diagnosis and plan.

## 2019-05-07 NOTE — PROGRESS NOTES
Chief Complaint   Patient presents with    Diabetes     1. Have you been to the ER, urgent care clinic since your last visit? Hospitalized since your last visit? Yes When: 4/13/19 [facility] Reason for visit: Back Pain    2. Have you seen or consulted any other health care providers outside of the [address] [place] [address] since your last visit? Include any pap smears or colon screening.  Yes When: Last week  Where: Endocrinology Reason for visit: Diabetes

## 2019-05-24 DIAGNOSIS — E78.00 HYPERCHOLESTEREMIA: Primary | ICD-10-CM

## 2019-05-24 LAB
ALBUMIN SERPL-MCNC: 4.9 G/DL (ref 3.5–5.5)
ALBUMIN/GLOB SERPL: 1.6 {RATIO} (ref 1.2–2.2)
ALP SERPL-CCNC: 55 IU/L (ref 39–117)
ALT SERPL-CCNC: 27 IU/L (ref 0–32)
AST SERPL-CCNC: 26 IU/L (ref 0–40)
BASOPHILS # BLD AUTO: 0 X10E3/UL (ref 0–0.2)
BASOPHILS NFR BLD AUTO: 1 %
BILIRUB SERPL-MCNC: 0.3 MG/DL (ref 0–1.2)
BUN SERPL-MCNC: 27 MG/DL (ref 6–24)
BUN/CREAT SERPL: 25 (ref 9–23)
CALCIUM SERPL-MCNC: 9.9 MG/DL (ref 8.7–10.2)
CHLORIDE SERPL-SCNC: 103 MMOL/L (ref 96–106)
CHOLEST SERPL-MCNC: 188 MG/DL (ref 100–199)
CO2 SERPL-SCNC: 19 MMOL/L (ref 20–29)
CREAT SERPL-MCNC: 1.06 MG/DL (ref 0.57–1)
EOSINOPHIL # BLD AUTO: 0.1 X10E3/UL (ref 0–0.4)
EOSINOPHIL NFR BLD AUTO: 4 %
ERYTHROCYTE [DISTWIDTH] IN BLOOD BY AUTOMATED COUNT: 15.8 % (ref 12.3–15.4)
GLOBULIN SER CALC-MCNC: 3.1 G/DL (ref 1.5–4.5)
GLUCOSE SERPL-MCNC: 136 MG/DL (ref 65–99)
HBA1C MFR BLD: 6.2 % (ref 4.8–5.6)
HCT VFR BLD AUTO: 37.6 % (ref 34–46.6)
HDLC SERPL-MCNC: 28 MG/DL
HGB BLD-MCNC: 11.3 G/DL (ref 11.1–15.9)
IMM GRANULOCYTES # BLD AUTO: 0 X10E3/UL (ref 0–0.1)
IMM GRANULOCYTES NFR BLD AUTO: 0 %
LDLC SERPL CALC-MCNC: 132 MG/DL (ref 0–99)
LYMPHOCYTES # BLD AUTO: 1.7 X10E3/UL (ref 0.7–3.1)
LYMPHOCYTES NFR BLD AUTO: 41 %
MCH RBC QN AUTO: 22.7 PG (ref 26.6–33)
MCHC RBC AUTO-ENTMCNC: 30.1 G/DL (ref 31.5–35.7)
MCV RBC AUTO: 76 FL (ref 79–97)
MONOCYTES # BLD AUTO: 0.4 X10E3/UL (ref 0.1–0.9)
MONOCYTES NFR BLD AUTO: 9 %
NEUTROPHILS # BLD AUTO: 1.8 X10E3/UL (ref 1.4–7)
NEUTROPHILS NFR BLD AUTO: 45 %
PLATELET # BLD AUTO: 331 X10E3/UL (ref 150–450)
POTASSIUM SERPL-SCNC: 5.3 MMOL/L (ref 3.5–5.2)
PROT SERPL-MCNC: 8 G/DL (ref 6–8.5)
RBC # BLD AUTO: 4.98 X10E6/UL (ref 3.77–5.28)
SODIUM SERPL-SCNC: 139 MMOL/L (ref 134–144)
TRIGL SERPL-MCNC: 141 MG/DL (ref 0–149)
VLDLC SERPL CALC-MCNC: 28 MG/DL (ref 5–40)
WBC # BLD AUTO: 4 X10E3/UL (ref 3.4–10.8)

## 2019-05-24 RX ORDER — ROSUVASTATIN CALCIUM 5 MG/1
5 TABLET, COATED ORAL
Qty: 30 TAB | Refills: 2 | Status: SHIPPED | OUTPATIENT
Start: 2019-05-24 | End: 2020-02-05

## 2019-05-24 NOTE — PROGRESS NOTES
Anemia shows some improvement, continue iron supplements and take 2 times daily. A1c shows dramatic improvement. Cholesterol is higher than it has ever been so I will send a new medication for this. Potassium is a little high, avoid high potassium foods for now.  Kidney issues are stable

## 2019-07-17 NOTE — TELEPHONE ENCOUNTER
1 [employer]     Just wanted to report the hydrocodone script came through w/1 refill and you have to have a new script each time Pt. with hyperphosphatemia in setting of ESRD. Recommend starting Renvela 800 mg TID with meals. Low phosphorus diet advised. Monitor serum phosphorus

## 2019-09-30 RX ORDER — AMOXICILLIN 500 MG
CAPSULE ORAL
Qty: 60 CAP | Refills: 5 | Status: SHIPPED | OUTPATIENT
Start: 2019-09-30 | End: 2020-03-30

## 2019-10-29 ENCOUNTER — OFFICE VISIT (OUTPATIENT)
Dept: INTERNAL MEDICINE CLINIC | Age: 59
End: 2019-10-29

## 2019-10-29 VITALS
RESPIRATION RATE: 16 BRPM | BODY MASS INDEX: 30.79 KG/M2 | OXYGEN SATURATION: 96 % | DIASTOLIC BLOOD PRESSURE: 84 MMHG | TEMPERATURE: 98.2 F | SYSTOLIC BLOOD PRESSURE: 120 MMHG | HEART RATE: 76 BPM | HEIGHT: 66 IN | WEIGHT: 191.6 LBS

## 2019-10-29 DIAGNOSIS — M72.0 DUPUYTREN'S CONTRACTURE OF BOTH HANDS: Primary | ICD-10-CM

## 2019-10-29 RX ORDER — GEMFIBROZIL 600 MG/1
600 TABLET, FILM COATED ORAL 2 TIMES DAILY
COMMUNITY
End: 2020-01-27

## 2019-10-29 NOTE — PROGRESS NOTES
HISTORY OF PRESENT ILLNESS  Nano Acosta is a 61 y.o. female. Patient reports increased pain and decreased range of motion of bilateral ring fingers worsening over the past week. No swelling, but pain with palpation. Unable to straighten finger completely at times. Patient has not tried anything for symptoms. Denies any known trigger. Visit Vitals  /84 (BP 1 Location: Right arm, BP Patient Position: Sitting)   Pulse 76   Temp 98.2 °F (36.8 °C) (Oral)   Resp 16   Ht 5' 6\" (1.676 m)   Wt 191 lb 9.6 oz (86.9 kg)   SpO2 96%   BMI 30.93 kg/m²       HPI    Review of Systems   Musculoskeletal:        Bilateral ring finger pain and decreased range of motion       Physical Exam   Constitutional: She is oriented to person, place, and time. She appears well-developed and well-nourished. Musculoskeletal:   Ring fingers of both hands partially flexed and unable to fully extend; less than pea-sized firm nodule noted at MCP joint of right ring finger; pain with palpation   Neurological: She is alert and oriented to person, place, and time. Skin: Skin is warm and dry. Psychiatric: She has a normal mood and affect. ASSESSMENT and PLAN    ICD-10-CM ICD-9-CM    1.  Dupuytren's contracture of both hands M72.0 728.6 REFERRAL TO ORTHOPEDICS     Orders Placed This Encounter    REFERRAL TO ORTHOPEDICS    gemfibrozil (LOPID) 600 mg tablet   referral to ortho

## 2019-10-29 NOTE — PROGRESS NOTES
Chief Complaint   Patient presents with    Hand Pain     left and right ring finger  x 1 wk    Knee Pain     several years getting worse     Reviewed record in preparation for visit and have obtained necessary documentation. Identified pt with two pt identifiers(name and ). Health Maintenance Due   Topic    Shingrix Vaccine Age 50> (1 of 2)    EYE EXAM RETINAL OR DILATED     Pneumococcal 0-64 years (3 of 3 - PCV13)    BREAST CANCER SCRN MAMMOGRAM     FOOT EXAM Q1     Influenza Age 5 to Adult     HEMOGLOBIN A1C Q6M          Chief Complaint   Patient presents with    Hand Pain     left and right ring finger  x 1 wk    Knee Pain     several years getting worse        Wt Readings from Last 3 Encounters:   10/29/19 191 lb 9.6 oz (86.9 kg)   19 194 lb (88 kg)   19 202 lb (91.6 kg)     Temp Readings from Last 3 Encounters:   10/29/19 98.2 °F (36.8 °C) (Oral)   19 98.6 °F (37 °C) (Oral)   19 97.6 °F (36.4 °C) (Oral)     BP Readings from Last 3 Encounters:   10/29/19 120/84   19 125/82   19 117/74     Pulse Readings from Last 3 Encounters:   10/29/19 76   19 80   19 68           Learning Assessment:  :     Learning Assessment 2018   PRIMARY LEARNER Patient Patient   HIGHEST LEVEL OF EDUCATION - PRIMARY LEARNER  - GRADUATED HIGH SCHOOL OR GED   BARRIERS PRIMARY LEARNER - NONE   CO-LEARNER CAREGIVER - No   CO-LEARNER NAME - n/a   PRIMARY LANGUAGE ENGLISH ENGLISH   LEARNER PREFERENCE PRIMARY OTHER (COMMENT) LISTENING   ANSWERED BY patient patient   RELATIONSHIP SELF SELF       Depression Screening:  :     3 most recent PHQ Screens 2019   Little interest or pleasure in doing things Not at all   Feeling down, depressed, irritable, or hopeless Not at all   Total Score PHQ 2 0       Fall Risk Assessment:  :     No flowsheet data found. Abuse Screening:  :     Abuse Screening Questionnaire 12/15/2017   Do you ever feel afraid of your partner?  Marshal Rubio Are you in a relationship with someone who physically or mentally threatens you? N       Coordination of Care Questionnaire:  :     1) Have you been to an emergency room, urgent care clinic since your last visit? no   Hospitalized since your last visit? no             2) Have you seen or consulted any other health care providers outside of [address] [place] [address] since your last visit? yes  (Include any pap smears or colon screenings in this section.)    3) Do you have an Advance Directive on file? no    4) Are you interested in receiving information on Advance Directives? NO      Patient is accompanied by son I have received verbal consent from Tatyana Morris to discuss any/all medical information while they are present in the room. Reviewed record  In preparation for visit and have obtained necessary documentation.

## 2020-01-27 ENCOUNTER — OFFICE VISIT (OUTPATIENT)
Dept: INTERNAL MEDICINE CLINIC | Age: 60
End: 2020-01-27

## 2020-01-27 VITALS
SYSTOLIC BLOOD PRESSURE: 131 MMHG | DIASTOLIC BLOOD PRESSURE: 84 MMHG | TEMPERATURE: 98.2 F | WEIGHT: 184 LBS | HEART RATE: 74 BPM | HEIGHT: 66 IN | OXYGEN SATURATION: 100 % | RESPIRATION RATE: 18 BRPM | BODY MASS INDEX: 29.57 KG/M2

## 2020-01-27 DIAGNOSIS — I10 ESSENTIAL HYPERTENSION: ICD-10-CM

## 2020-01-27 DIAGNOSIS — Z01.818 PREOP EXAMINATION: Primary | ICD-10-CM

## 2020-01-27 NOTE — PROGRESS NOTES
Subjective:      Mckay Carney is a 61 y.o. female who presents today for a preop. HPI:  Kristofer Mac is 61 y.o. female (1960) who presents for preoperative evaluation. Procedure/Surgery: 2/19/20 for revision lumbar laminectomy   Surgeon: Dr. Duc Taveras  Primary Physician: Sonya Hernandez NP       Reason for surgery: continued chronic back pain    Latex Allergy: none  Anesthesia Complications: None  History of abnormal bleeding : None  History of Blood Transfusions: no  Health Care Directive or Living Will: no  Recent use of: ASA  Tetanus up to date: last tetanus booster within 10 years      EKG: EKG FINDINGS - she will get this done with   Labs: will be done with pretesting      After reviewing the patient's history & exam she is low risk for cardiac complications. No contraindications to planned surgery depending on EKG and labs. She has preop testing scheduled for 2/5    ---------------------------------------     Prior to Admission medications    Medication Sig Start Date End Date Taking? Authorizing Provider   gemfibrozil (LOPID) 600 mg tablet Take 600 mg by mouth two (2) times a day. Provider, Historical   fish oil-omega-3 fatty acids 300-1,000 mg cap TAKE ONE CAPSULE BY MOUTH TWICE A DAY 9/30/19   Skiff, Toniann Brim, NP   DULoxetine (CYMBALTA) 60 mg capsule TAKE ONE CAPSULE BY MOUTH DAILY 8/9/19   Skiff, Toniann Brim, NP   rosuvastatin (CRESTOR) 5 mg tablet Take 1 Tab by mouth nightly. 5/24/19   Skiff, Toniann Brim, NP   losartan (COZAAR) 25 mg tablet Take  by mouth daily. Provider, Historical   cyclobenzaprine (FLEXERIL) 10 mg tablet Take  by mouth three (3) times daily as needed for Muscle Spasm(s). Provider, Historical   amLODIPine (NORVASC) 5 mg tablet Take 1 Tab by mouth daily. 8/24/18   Willi Langston PA-C   ferrous sulfate 325 mg (65 mg iron) tablet Take 1 Tab by mouth Daily (before breakfast).  8/7/18   Skiff, Toniann Brim, NP   empagliflozin (JARDIANCE) 10 mg tablet Take  by mouth daily. Provider, Historical   insulin lispro (HUMALOG KWIKPEN INSULIN) 100 unit/mL kwikpen 20 Units Before breakfast, lunch, and dinner. 11/29/17   Provider, Historical   chlorthalidone (HYGROTEN) 25 mg tablet TAKE ONE TABLET BY MOUTH DAILY 2/18/18   Clemente Henley MD   metFORMIN ER (GLUCOPHAGE XR) 500 mg tablet Take 500 mg by mouth two (2) times a day. Indications: 500 mg tab taking 2 tabs daily 11/29/17   Provider, Historical   aspirin 81 mg chewable tablet Take 81 mg by mouth. Provider, Historical   spironolactone (ALDACTONE) 25 mg tablet   25 mg = 1 tab each dose, PO, daily, # 30 tab, 11 Refills, Pharmacy: [facility]/pharmacy #[id number]/11/16   Provider, Historical   Insulin Needles, Disposable, 32 gauge x 5/32\" ndle  11/18/17   Provider, Historical   LANCETS, SUPER THIN misc  11/9/17   Provider, Historical   ONETOUCH ULTRA TEST strip  11/8/17   Provider, Historical   GLUCOMETER,PRE-LOADED STRIPS (GLUCOSE METER, DISP & STRIPS)   Glucose meter, See Instructions, #: 1 EA, 0 Refill(s), Pharmacy: [facility]/pharmacy #[id number] 12/15/15   Provider, Historical   carvedilol (COREG) 25 mg tablet Take 25 mg by mouth two (2) times a day. 9/30/17   Provider, Historical   insulin detemir (LEVEMIR FLEXTOUCH) 100 unit/mL (3 mL) inpn 42 Units by SubCUTAneous route two (2) times a day. Patient taking differently: 30 Units by SubCUTAneous route two (2) times a day.  10/11/17   Clemente Henley MD          Allergies   Allergen Reactions    Pravachol [Pravastatin] Myalgia        Patient Active Problem List    Diagnosis Date Noted    Postmenopausal 11/02/2018    Iron deficiency anemia due to chronic blood loss 09/05/2018    Eczema 08/24/2018    Elevated bilirubin 05/11/2018    CKD (chronic kidney disease) 05/11/2018    Primary osteoarthritis of left knee 03/02/2018    Type 2 diabetes mellitus with nephropathy ([facility] [address] 12/14/2017    Spondylolisthesis of lumbar region 06/08/2017    Low back pain with left-sided sciatica 06/08/2017    Type 2 diabetes, uncontrolled, with neuropathy ([facility] [address] 2017    Patient non adherence 2017    Essential hypertension 2016    Encounter for long-term (current) use of medications 2016    Sickle cell trait ([facility] [address] 10/08/2015    Hypercholesteremia 2015    Chronic lower back pain 2015        Past Medical History:   Diagnosis Date    Arthritis     Back pain     Breast cancer screening 2016    Chronic bilateral low back pain without sciatica 2016    Diabetes ([facility] [address]     Encounter for long-term (current) use of medications 2016    Essential hypertension with goal blood pressure less than 140/90 2016    Hypercholesteremia 2015    Hypercholesterolemia     Hypertension     Ill-defined condition     hyperlipidemia    Other ill-defined conditions(799.89)     high cholesterol    Patient non adherence 2017    Primary osteoarthritis of left knee 3/2/2018    Type 2 diabetes, uncontrolled, with neuropathy ([facility] [address] 2017    Type II diabetes mellitus, uncontrolled ([facility] [address] 2015       Past Surgical History:   Procedure Laterality Date    CARDIAC SURG PROCEDURE UNLIST      murmur- PT DENIES SURGERY AND MURMUR 17    HX CARPAL TUNNEL RELEASE      HX GYN      hysterectomy    HX HEENT      Ear surgery    HX HEENT      Left eye    HX ORTHOPAEDIC      left wrist & left elbow    HX ORTHOPAEDIC      Back Surgery x 3    HX ORTHOPAEDIC      right thumb at [address] HX OTHER SURGICAL      back surgery X 2    HX OTHER SURGICAL      Collapsed left lung    HX OTHER SURGICAL      axillary sweat glands removed    HX OVARIAN CYST REMOVAL      IA OTOPLASTY PROTRUDING EAR W/WO SIZE RDCTJ         Social History     Tobacco Use    Smoking status: Former Smoker     Packs/day: 1.00     Years: 30.00     Pack years: 30.00     Last attempt to quit: 2005     Years since quittin.8    Smokeless tobacco: Never Used   Substance Use Topics    Alcohol use: No         --------------------------------------      Family History   Problem Relation Age of Onset    Heart Disease Mother     Heart Attack Mother     No Known Problems Father     Cancer Sister         UNKNOWN    Diabetes Son     Sickle Cell Anemia Daughter     Anesth Problems Son         CHEST PAIN     Social History     Tobacco Use    Smoking status: Former Smoker     Packs/day: 1.00     Years: 30.00     Pack years: 30.00     Last attempt to quit: 2005     Years since quittin.8    Smokeless tobacco: Never Used   Substance Use Topics    Alcohol use: No        Review of Systems    A comprehensive review of systems was negative. Objective:     Visit Vitals  /84 (BP 1 Location: Left arm)   Pulse 74   Temp 98.2 °F (36.8 °C) (Oral)   Resp 18   Ht 5' 6\" (1.676 m)   Wt 184 lb (83.5 kg)   SpO2 100%   BMI 29.70 kg/m²     General:  Alert, cooperative, no distress, appears stated age. Head:  Normocephalic, without obvious abnormality, atraumatic. Eyes:  Conjunctivae/corneas clear. PERRL, EOMs intact. Nose: Nares normal. Septum midline. Mucosa normal. No drainage or sinus tenderness. Throat: Lips, mucosa, and tongue normal. Teeth and gums normal.   Neck: Supple, symmetrical, trachea midline, no adenopathy, thyroid: no enlargement/tenderness/nodules, no carotid bruit and no JVD. Back:   Symmetric, no curvature. ROM normal. No CVA tenderness. Lungs:   Clear to auscultation bilaterally. Chest wall:  No tenderness or deformity. Heart:  Regular rate and rhythm, S1, S2 normal, no murmur, click, rub or gallop. Abdomen:   Soft, non-tender. Bowel sounds normal. No masses,  No organomegaly. Extremities: Extremities normal, atraumatic, no cyanosis or edema. Pulses: 2+ and symmetric all extremities. Skin: Skin color, texture, turgor normal. No rashes or lesions. Lymph nodes: Cervical, supraclavicular, and axillary nodes normal.   Neurologic: CNII-XII intact.  Normal strength, sensation and reflexes throughout. Nursing note and vitals reviewed  Assessment/Plan:       ICD-10-CM ICD-9-CM    1. Preop examination Z01.818 V72.84 CANCELED: CBC WITH AUTOMATED DIFF      CANCELED: METABOLIC PANEL, COMPREHENSIVE      CANCELED: HEMOGLOBIN A1C W/O EAG      CANCELED: PROTHROMBIN TIME + INR      CANCELED: URINALYSIS W/ RFLX MICROSCOPIC      CANCELED: AMB POC EKG ROUTINE W/ 12 LEADS, INTER & REP   2. Type 2 diabetes, uncontrolled, with neuropathy (HCC) E11.40 250.62     E11.65 357.2    3. Essential hypertension I10 401.9 CANCELED: EKG, 12 LEAD, INITIAL          Follow-up and Dispositions    · Return in about 6 months (around 7/27/2020) for chronic care. Advised her to call back or return to office if symptoms worsen/change/persist.  Discussed expected course/resolution/complications of diagnosis in detail with patient. Medication risks/benefits/costs/interactions/alternatives discussed with patient. She was given an after visit summary which includes diagnoses, current medications, & vitals. She expressed understanding with the diagnosis and plan.

## 2020-01-29 ENCOUNTER — TELEPHONE (OUTPATIENT)
Dept: INTERNAL MEDICINE CLINIC | Age: 60
End: 2020-01-29

## 2020-01-29 NOTE — TELEPHONE ENCOUNTER
Patient history and physical/clearance exam form faxed and office note faxed to [phone]. Confirmation received.

## 2020-02-05 ENCOUNTER — HOSPITAL ENCOUNTER (OUTPATIENT)
Dept: PREADMISSION TESTING | Age: 60
Discharge: HOME | End: 2020-02-05
Payer: MEDICAID

## 2020-02-05 VITALS
HEART RATE: 78 BPM | RESPIRATION RATE: 16 BRPM | OXYGEN SATURATION: 97 % | BODY MASS INDEX: 29.89 KG/M2 | SYSTOLIC BLOOD PRESSURE: 137 MMHG | DIASTOLIC BLOOD PRESSURE: 82 MMHG | WEIGHT: 186 LBS | HEIGHT: 66 IN | TEMPERATURE: 98.4 F

## 2020-02-05 LAB
ABO + RH BLD: NORMAL
ANION GAP SERPL CALC-SCNC: 6 MMOL/L (ref 5–15)
APPEARANCE UR: CLEAR
ATRIAL RATE: 68 BPM
BACTERIA URNS QL MICRO: NEGATIVE /HPF
BILIRUB UR QL: NEGATIVE
BLOOD GROUP ANTIBODIES SERPL: NORMAL
BUN SERPL-MCNC: 17 MG/DL (ref 6–20)
BUN/CREAT SERPL: 19 (ref 12–20)
CALCIUM SERPL-MCNC: 9.3 MG/DL (ref 8.5–10.1)
CALCULATED P AXIS, ECG09: 57 DEGREES
CALCULATED R AXIS, ECG10: -18 DEGREES
CALCULATED T AXIS, ECG11: 10 DEGREES
CHLORIDE SERPL-SCNC: 107 MMOL/L (ref 97–108)
CO2 SERPL-SCNC: 25 MMOL/L (ref 21–32)
COLOR UR: ABNORMAL
CREAT SERPL-MCNC: 0.9 MG/DL (ref 0.55–1.02)
DIAGNOSIS, 93000: NORMAL
EPITH CASTS URNS QL MICRO: ABNORMAL /LPF
ERYTHROCYTE [DISTWIDTH] IN BLOOD BY AUTOMATED COUNT: 14.3 % (ref 11.5–14.5)
EST. AVERAGE GLUCOSE BLD GHB EST-MCNC: 171 MG/DL
GLUCOSE SERPL-MCNC: 84 MG/DL (ref 65–100)
GLUCOSE UR STRIP.AUTO-MCNC: NEGATIVE MG/DL
HBA1C MFR BLD: 7.6 % (ref 4–5.6)
HCT VFR BLD AUTO: 37.7 % (ref 35–47)
HGB BLD-MCNC: 11.3 G/DL (ref 11.5–16)
HGB UR QL STRIP: NEGATIVE
HYALINE CASTS URNS QL MICRO: ABNORMAL /LPF (ref 0–5)
INR PPP: 1 (ref 0.9–1.1)
KETONES UR QL STRIP.AUTO: NEGATIVE MG/DL
LEUKOCYTE ESTERASE UR QL STRIP.AUTO: ABNORMAL
MCH RBC QN AUTO: 21.8 PG (ref 26–34)
MCHC RBC AUTO-ENTMCNC: 30 G/DL (ref 30–36.5)
MCV RBC AUTO: 72.6 FL (ref 80–99)
NITRITE UR QL STRIP.AUTO: NEGATIVE
NRBC # BLD: 0 K/UL (ref 0–0.01)
NRBC BLD-RTO: 0 PER 100 WBC
P-R INTERVAL, ECG05: 198 MS
PH UR STRIP: 6.5 [PH] (ref 5–8)
PLATELET # BLD AUTO: 365 K/UL (ref 150–400)
PMV BLD AUTO: 9.8 FL (ref 8.9–12.9)
POTASSIUM SERPL-SCNC: 4.2 MMOL/L (ref 3.5–5.1)
PROT UR STRIP-MCNC: NEGATIVE MG/DL
PROTHROMBIN TIME: 10.2 SEC (ref 9–11.1)
Q-T INTERVAL, ECG07: 398 MS
QRS DURATION, ECG06: 86 MS
QTC CALCULATION (BEZET), ECG08: 423 MS
RBC # BLD AUTO: 5.19 M/UL (ref 3.8–5.2)
RBC #/AREA URNS HPF: ABNORMAL /HPF (ref 0–5)
SODIUM SERPL-SCNC: 138 MMOL/L (ref 136–145)
SP GR UR REFRACTOMETRY: 1.01 (ref 1–1.03)
SPECIMEN EXP DATE BLD: NORMAL
UA: UC IF INDICATED,UAUC: ABNORMAL
UROBILINOGEN UR QL STRIP.AUTO: 1 EU/DL (ref 0.2–1)
VENTRICULAR RATE, ECG03: 68 BPM
WBC # BLD AUTO: 4.8 K/UL (ref 3.6–11)
WBC URNS QL MICRO: ABNORMAL /HPF (ref 0–4)

## 2020-02-05 PROCEDURE — 80048 BASIC METABOLIC PNL TOTAL CA: CPT

## 2020-02-05 PROCEDURE — 85610 PROTHROMBIN TIME: CPT

## 2020-02-05 PROCEDURE — 87086 URINE CULTURE/COLONY COUNT: CPT

## 2020-02-05 PROCEDURE — 83036 HEMOGLOBIN GLYCOSYLATED A1C: CPT

## 2020-02-05 PROCEDURE — 86900 BLOOD TYPING SEROLOGIC ABO: CPT

## 2020-02-05 PROCEDURE — 36415 COLL VENOUS BLD VENIPUNCTURE: CPT

## 2020-02-05 PROCEDURE — 85027 COMPLETE CBC AUTOMATED: CPT

## 2020-02-05 PROCEDURE — 81001 URINALYSIS AUTO W/SCOPE: CPT

## 2020-02-05 PROCEDURE — 93005 ELECTROCARDIOGRAM TRACING: CPT

## 2020-02-05 RX ORDER — AMLODIPINE BESYLATE 10 MG/1
10 TABLET ORAL DAILY
COMMUNITY
End: 2022-01-06

## 2020-02-05 RX ORDER — ROSUVASTATIN CALCIUM 10 MG/1
10 TABLET, COATED ORAL
COMMUNITY

## 2020-02-05 RX ORDER — INSULIN ASPART 100 [IU]/ML
15 INJECTION, SOLUTION INTRAVENOUS; SUBCUTANEOUS
COMMUNITY
End: 2022-01-06

## 2020-02-05 NOTE — PERIOP NOTES
PREOPERATIVE INSTRUCTIONS REVIEWED WITH PATIENT. WRITTEN INSTRUCTIONS HANDED ALSO. PATIENT GIVEN 2 BOTTLES OF CHG SOLUTION. WRITTEN INSTRUCTIONS HANDED TO PT.  INSTRUCTIONS REVIEWED IN CLASS      PATIENT GIVEN SSI INFECTION SHEET. MRSA/MSSA TREATMENT INSTRUCTION SHEET GIVEN WITH AN EXPLANATION TO PATIENT THAT THEY WILL BE NOTIFIED IF TREATMENT INSTRUCTIONS NEED TO BE INITIATED. PATIENT WAS GIVEN THE  OPPORTUNITY TO ASK QUESTIONS ON THE INFORMATION PROVIDED.

## 2020-02-05 NOTE — PERIOP NOTES
Requested notes from Endocrinology, & Cardiology at [facility].  Pt does not know the names of her Cardiologist nor her Endocrinologist.

## 2020-02-06 LAB
BACTERIA SPEC CULT: NORMAL
SERVICE CMNT-IMP: NORMAL
SERVICE CMNT-IMP: NORMAL

## 2020-02-06 NOTE — PERIOP NOTES
Faxed PAT testing reports to Dr. Flores Found. Voicemail message left for Jazlyn/Dr. Bowen's office RE: HgbA1c 7.6, abnormal UA, urine culture pending. Referral to DTC completed in [facility].  Pat testing results electronically faxed to primary care - Yanci Humphries NP.

## 2020-02-11 DIAGNOSIS — R94.31 ABNORMAL EKG: Primary | ICD-10-CM

## 2020-02-19 ENCOUNTER — APPOINTMENT (OUTPATIENT)
Dept: GENERAL RADIOLOGY | Age: 60
DRG: 304 | End: 2020-02-19
Attending: ORTHOPAEDIC SURGERY
Payer: MEDICAID

## 2020-02-19 ENCOUNTER — HOSPITAL ENCOUNTER (INPATIENT)
Age: 60
LOS: 4 days | Discharge: HOME HEALTH | DRG: 304 | End: 2020-02-23
Attending: ORTHOPAEDIC SURGERY | Admitting: ORTHOPAEDIC SURGERY
Payer: MEDICAID

## 2020-02-19 ENCOUNTER — ANESTHESIA (OUTPATIENT)
Dept: SURGERY | Age: 60
DRG: 304 | End: 2020-02-19
Payer: MEDICAID

## 2020-02-19 ENCOUNTER — ANESTHESIA EVENT (OUTPATIENT)
Dept: SURGERY | Age: 60
DRG: 304 | End: 2020-02-19
Payer: MEDICAID

## 2020-02-19 DIAGNOSIS — M48.061 SPINAL STENOSIS OF LUMBAR REGION, UNSPECIFIED WHETHER NEUROGENIC CLAUDICATION PRESENT: Primary | ICD-10-CM

## 2020-02-19 PROBLEM — M48.00 SPINAL STENOSIS: Status: ACTIVE | Noted: 2020-02-19

## 2020-02-19 LAB
GLUCOSE BLD STRIP.AUTO-MCNC: 144 MG/DL (ref 65–100)
GLUCOSE BLD STRIP.AUTO-MCNC: 88 MG/DL (ref 65–100)
SERVICE CMNT-IMP: ABNORMAL
SERVICE CMNT-IMP: NORMAL

## 2020-02-19 PROCEDURE — 76210000017 HC OR PH I REC 1.5 TO 2 HR: Performed by: ORTHOPAEDIC SURGERY

## 2020-02-19 PROCEDURE — 82962 GLUCOSE BLOOD TEST: CPT

## 2020-02-19 PROCEDURE — 77030034475 HC MISC IMPL SPN: Performed by: ORTHOPAEDIC SURGERY

## 2020-02-19 PROCEDURE — 74011636637 HC RX REV CODE- 636/637: Performed by: PHYSICIAN ASSISTANT

## 2020-02-19 PROCEDURE — 77030038600 HC TU BPLR IRR DISP STRY -B: Performed by: ORTHOPAEDIC SURGERY

## 2020-02-19 PROCEDURE — 74011250636 HC RX REV CODE- 250/636: Performed by: ORTHOPAEDIC SURGERY

## 2020-02-19 PROCEDURE — 77030040361 HC SLV COMPR DVT MDII -B: Performed by: ORTHOPAEDIC SURGERY

## 2020-02-19 PROCEDURE — C1713 ANCHOR/SCREW BN/BN,TIS/BN: HCPCS | Performed by: ORTHOPAEDIC SURGERY

## 2020-02-19 PROCEDURE — 77030014007 HC SPNG HEMSTAT J&J -B: Performed by: ORTHOPAEDIC SURGERY

## 2020-02-19 PROCEDURE — 77030041394 HC GRFT BN PROT GRWTH FCTR BSYC -I1: Performed by: ORTHOPAEDIC SURGERY

## 2020-02-19 PROCEDURE — 76060000039 HC ANESTHESIA 4 TO 4.5 HR: Performed by: ORTHOPAEDIC SURGERY

## 2020-02-19 PROCEDURE — 74011000250 HC RX REV CODE- 250: Performed by: NURSE ANESTHETIST, CERTIFIED REGISTERED

## 2020-02-19 PROCEDURE — 0SG10K1 FUSION OF 2 OR MORE LUMBAR VERTEBRAL JOINTS WITH NONAUTOLOGOUS TISSUE SUBSTITUTE, POSTERIOR APPROACH, POSTERIOR COLUMN, OPEN APPROACH: ICD-10-PCS | Performed by: ORTHOPAEDIC SURGERY

## 2020-02-19 PROCEDURE — 77030031139 HC SUT VCRL2 J&J -A: Performed by: ORTHOPAEDIC SURGERY

## 2020-02-19 PROCEDURE — 74011250636 HC RX REV CODE- 250/636: Performed by: NURSE ANESTHETIST, CERTIFIED REGISTERED

## 2020-02-19 PROCEDURE — 77030026438 HC STYL ET INTUB CARD -A: Performed by: ANESTHESIOLOGY

## 2020-02-19 PROCEDURE — 0SG30K1 FUSION OF LUMBOSACRAL JOINT WITH NONAUTOLOGOUS TISSUE SUBSTITUTE, POSTERIOR APPROACH, POSTERIOR COLUMN, OPEN APPROACH: ICD-10-PCS | Performed by: ORTHOPAEDIC SURGERY

## 2020-02-19 PROCEDURE — 77030037728 HC GRFT BN FBR CORT 3DEMIN 30CC BACT -I: Performed by: ORTHOPAEDIC SURGERY

## 2020-02-19 PROCEDURE — 77030014650 HC SEAL MTRX FLOSEL BAXT -C: Performed by: ORTHOPAEDIC SURGERY

## 2020-02-19 PROCEDURE — 65270000029 HC RM PRIVATE

## 2020-02-19 PROCEDURE — 01NR0ZZ RELEASE SACRAL NERVE, OPEN APPROACH: ICD-10-PCS | Performed by: ORTHOPAEDIC SURGERY

## 2020-02-19 PROCEDURE — 77030005513 HC CATH URETH FOL11 MDII -B: Performed by: ORTHOPAEDIC SURGERY

## 2020-02-19 PROCEDURE — 01NB0ZZ RELEASE LUMBAR NERVE, OPEN APPROACH: ICD-10-PCS | Performed by: ORTHOPAEDIC SURGERY

## 2020-02-19 PROCEDURE — 77030039267 HC ADH SKN EXOFIN S2SG -B: Performed by: ORTHOPAEDIC SURGERY

## 2020-02-19 PROCEDURE — 77030018836 HC SOL IRR NACL ICUM -A: Performed by: ORTHOPAEDIC SURGERY

## 2020-02-19 PROCEDURE — 77030029099 HC BN WAX SSPC -A: Performed by: ORTHOPAEDIC SURGERY

## 2020-02-19 PROCEDURE — 77030038552 HC DRN WND MDII -A: Performed by: ORTHOPAEDIC SURGERY

## 2020-02-19 PROCEDURE — 77030008771 HC TU NG SALEM SUMP -A: Performed by: ANESTHESIOLOGY

## 2020-02-19 PROCEDURE — 74011250637 HC RX REV CODE- 250/637: Performed by: PHYSICIAN ASSISTANT

## 2020-02-19 PROCEDURE — 74011250637 HC RX REV CODE- 250/637: Performed by: ANESTHESIOLOGY

## 2020-02-19 PROCEDURE — 77030036946 HC GRFT BN FBR CORT 3DEMIN 10CC BACT -G: Performed by: ORTHOPAEDIC SURGERY

## 2020-02-19 PROCEDURE — 77030004391 HC BUR FLUT MEDT -C: Performed by: ORTHOPAEDIC SURGERY

## 2020-02-19 PROCEDURE — 74011250636 HC RX REV CODE- 250/636: Performed by: PHYSICIAN ASSISTANT

## 2020-02-19 PROCEDURE — 74011000250 HC RX REV CODE- 250: Performed by: ORTHOPAEDIC SURGERY

## 2020-02-19 PROCEDURE — 72020 X-RAY EXAM OF SPINE 1 VIEW: CPT

## 2020-02-19 PROCEDURE — 77030008684 HC TU ET CUF COVD -B: Performed by: ANESTHESIOLOGY

## 2020-02-19 PROCEDURE — P9045 ALBUMIN (HUMAN), 5%, 250 ML: HCPCS | Performed by: NURSE ANESTHETIST, CERTIFIED REGISTERED

## 2020-02-19 PROCEDURE — 76010000175 HC OR TIME 4 TO 4.5 HR INTENSV-TIER 1: Performed by: ORTHOPAEDIC SURGERY

## 2020-02-19 PROCEDURE — 74011000250 HC RX REV CODE- 250: Performed by: PHYSICIAN ASSISTANT

## 2020-02-19 DEVICE — IMPLANTABLE DEVICE: Type: IMPLANTABLE DEVICE | Site: SPINE LUMBAR | Status: FUNCTIONAL

## 2020-02-19 DEVICE — GRAFT BNE 3D 30 CC CORTICAL FIBER: Type: IMPLANTABLE DEVICE | Site: SPINE LUMBAR | Status: FUNCTIONAL

## 2020-02-19 RX ORDER — ALBUMIN HUMAN 50 G/1000ML
SOLUTION INTRAVENOUS AS NEEDED
Status: DISCONTINUED | OUTPATIENT
Start: 2020-02-19 | End: 2020-02-19 | Stop reason: HOSPADM

## 2020-02-19 RX ORDER — LIDOCAINE HYDROCHLORIDE 10 MG/ML
0.1 INJECTION, SOLUTION EPIDURAL; INFILTRATION; INTRACAUDAL; PERINEURAL AS NEEDED
Status: DISCONTINUED | OUTPATIENT
Start: 2020-02-19 | End: 2020-02-19 | Stop reason: HOSPADM

## 2020-02-19 RX ORDER — SODIUM CHLORIDE 0.9 % (FLUSH) 0.9 %
5-40 SYRINGE (ML) INJECTION AS NEEDED
Status: DISCONTINUED | OUTPATIENT
Start: 2020-02-19 | End: 2020-02-19 | Stop reason: HOSPADM

## 2020-02-19 RX ORDER — SODIUM CHLORIDE, SODIUM LACTATE, POTASSIUM CHLORIDE, CALCIUM CHLORIDE 600; 310; 30; 20 MG/100ML; MG/100ML; MG/100ML; MG/100ML
INJECTION, SOLUTION INTRAVENOUS
Status: DISCONTINUED | OUTPATIENT
Start: 2020-02-19 | End: 2020-02-19 | Stop reason: HOSPADM

## 2020-02-19 RX ORDER — SODIUM CHLORIDE 0.9 % (FLUSH) 0.9 %
5-40 SYRINGE (ML) INJECTION EVERY 8 HOURS
Status: DISCONTINUED | OUTPATIENT
Start: 2020-02-19 | End: 2020-02-19 | Stop reason: HOSPADM

## 2020-02-19 RX ORDER — SODIUM CHLORIDE 0.9 % (FLUSH) 0.9 %
5-40 SYRINGE (ML) INJECTION AS NEEDED
Status: DISCONTINUED | OUTPATIENT
Start: 2020-02-19 | End: 2020-02-23 | Stop reason: HOSPADM

## 2020-02-19 RX ORDER — MAGNESIUM SULFATE 100 %
4 CRYSTALS MISCELLANEOUS AS NEEDED
Status: DISCONTINUED | OUTPATIENT
Start: 2020-02-19 | End: 2020-02-23 | Stop reason: HOSPADM

## 2020-02-19 RX ORDER — INSULIN LISPRO 100 [IU]/ML
15 INJECTION, SOLUTION INTRAVENOUS; SUBCUTANEOUS
Status: DISCONTINUED | OUTPATIENT
Start: 2020-02-20 | End: 2020-02-20

## 2020-02-19 RX ORDER — ACETAMINOPHEN 325 MG/1
650 TABLET ORAL
Status: DISCONTINUED | OUTPATIENT
Start: 2020-02-19 | End: 2020-02-23 | Stop reason: HOSPADM

## 2020-02-19 RX ORDER — HYDROMORPHONE HYDROCHLORIDE 1 MG/ML
INJECTION, SOLUTION INTRAMUSCULAR; INTRAVENOUS; SUBCUTANEOUS AS NEEDED
Status: DISCONTINUED | OUTPATIENT
Start: 2020-02-19 | End: 2020-02-19 | Stop reason: HOSPADM

## 2020-02-19 RX ORDER — SPIRONOLACTONE 25 MG/1
25 TABLET ORAL DAILY
Status: DISCONTINUED | OUTPATIENT
Start: 2020-02-20 | End: 2020-02-23 | Stop reason: HOSPADM

## 2020-02-19 RX ORDER — INSULIN LISPRO 100 [IU]/ML
INJECTION, SOLUTION INTRAVENOUS; SUBCUTANEOUS
Status: DISCONTINUED | OUTPATIENT
Start: 2020-02-19 | End: 2020-02-23 | Stop reason: HOSPADM

## 2020-02-19 RX ORDER — ONDANSETRON 2 MG/ML
4 INJECTION INTRAMUSCULAR; INTRAVENOUS AS NEEDED
Status: DISCONTINUED | OUTPATIENT
Start: 2020-02-19 | End: 2020-02-19 | Stop reason: HOSPADM

## 2020-02-19 RX ORDER — CEFAZOLIN SODIUM/WATER 2 G/20 ML
2 SYRINGE (ML) INTRAVENOUS EVERY 8 HOURS
Status: COMPLETED | OUTPATIENT
Start: 2020-02-20 | End: 2020-02-20

## 2020-02-19 RX ORDER — ONDANSETRON 2 MG/ML
INJECTION INTRAMUSCULAR; INTRAVENOUS AS NEEDED
Status: DISCONTINUED | OUTPATIENT
Start: 2020-02-19 | End: 2020-02-19 | Stop reason: HOSPADM

## 2020-02-19 RX ORDER — ROCURONIUM BROMIDE 10 MG/ML
INJECTION, SOLUTION INTRAVENOUS AS NEEDED
Status: DISCONTINUED | OUTPATIENT
Start: 2020-02-19 | End: 2020-02-19 | Stop reason: HOSPADM

## 2020-02-19 RX ORDER — KETAMINE HYDROCHLORIDE 10 MG/ML
INJECTION, SOLUTION INTRAMUSCULAR; INTRAVENOUS AS NEEDED
Status: DISCONTINUED | OUTPATIENT
Start: 2020-02-19 | End: 2020-02-19 | Stop reason: HOSPADM

## 2020-02-19 RX ORDER — MIDAZOLAM HYDROCHLORIDE 1 MG/ML
INJECTION, SOLUTION INTRAMUSCULAR; INTRAVENOUS AS NEEDED
Status: DISCONTINUED | OUTPATIENT
Start: 2020-02-19 | End: 2020-02-19 | Stop reason: HOSPADM

## 2020-02-19 RX ORDER — DIPHENHYDRAMINE HYDROCHLORIDE 50 MG/ML
12.5 INJECTION, SOLUTION INTRAMUSCULAR; INTRAVENOUS
Status: DISPENSED | OUTPATIENT
Start: 2020-02-19 | End: 2020-02-20

## 2020-02-19 RX ORDER — DEXTROSE 50 % IN WATER (D50W) INTRAVENOUS SYRINGE
25-50 AS NEEDED
Status: DISCONTINUED | OUTPATIENT
Start: 2020-02-19 | End: 2020-02-20 | Stop reason: SDUPTHER

## 2020-02-19 RX ORDER — OXYCODONE HYDROCHLORIDE 5 MG/1
10 TABLET ORAL
Status: DISCONTINUED | OUTPATIENT
Start: 2020-02-19 | End: 2020-02-23 | Stop reason: HOSPADM

## 2020-02-19 RX ORDER — SODIUM CHLORIDE 0.9 % (FLUSH) 0.9 %
5-40 SYRINGE (ML) INJECTION EVERY 8 HOURS
Status: DISCONTINUED | OUTPATIENT
Start: 2020-02-19 | End: 2020-02-23 | Stop reason: HOSPADM

## 2020-02-19 RX ORDER — NALOXONE HYDROCHLORIDE 0.4 MG/ML
0.4 INJECTION, SOLUTION INTRAMUSCULAR; INTRAVENOUS; SUBCUTANEOUS AS NEEDED
Status: DISCONTINUED | OUTPATIENT
Start: 2020-02-19 | End: 2020-02-23 | Stop reason: HOSPADM

## 2020-02-19 RX ORDER — DEXMEDETOMIDINE HYDROCHLORIDE 100 UG/ML
INJECTION, SOLUTION INTRAVENOUS AS NEEDED
Status: DISCONTINUED | OUTPATIENT
Start: 2020-02-19 | End: 2020-02-19 | Stop reason: HOSPADM

## 2020-02-19 RX ORDER — AMOXICILLIN 250 MG
1 CAPSULE ORAL 2 TIMES DAILY
Status: DISCONTINUED | OUTPATIENT
Start: 2020-02-20 | End: 2020-02-23 | Stop reason: HOSPADM

## 2020-02-19 RX ORDER — POLYETHYLENE GLYCOL 3350 17 G/17G
17 POWDER, FOR SOLUTION ORAL DAILY
Status: DISCONTINUED | OUTPATIENT
Start: 2020-02-20 | End: 2020-02-23 | Stop reason: HOSPADM

## 2020-02-19 RX ORDER — HYDROMORPHONE HCL/0.9% NACL/PF 0.5 MG/ML
PLASTIC BAG, INJECTION (ML) INTRAVENOUS
Status: DISPENSED
Start: 2020-02-19 | End: 2020-02-20

## 2020-02-19 RX ORDER — SUCCINYLCHOLINE CHLORIDE 20 MG/ML
INJECTION INTRAMUSCULAR; INTRAVENOUS AS NEEDED
Status: DISCONTINUED | OUTPATIENT
Start: 2020-02-19 | End: 2020-02-19 | Stop reason: HOSPADM

## 2020-02-19 RX ORDER — SODIUM CHLORIDE 9 MG/ML
125 INJECTION, SOLUTION INTRAVENOUS CONTINUOUS
Status: DISPENSED | OUTPATIENT
Start: 2020-02-19 | End: 2020-02-20

## 2020-02-19 RX ORDER — DEXAMETHASONE SODIUM PHOSPHATE 4 MG/ML
INJECTION, SOLUTION INTRA-ARTICULAR; INTRALESIONAL; INTRAMUSCULAR; INTRAVENOUS; SOFT TISSUE AS NEEDED
Status: DISCONTINUED | OUTPATIENT
Start: 2020-02-19 | End: 2020-02-19 | Stop reason: HOSPADM

## 2020-02-19 RX ORDER — LOSARTAN POTASSIUM 25 MG/1
25 TABLET ORAL
Status: DISCONTINUED | OUTPATIENT
Start: 2020-02-19 | End: 2020-02-23 | Stop reason: HOSPADM

## 2020-02-19 RX ORDER — DIAZEPAM 5 MG/1
5 TABLET ORAL
Status: DISCONTINUED | OUTPATIENT
Start: 2020-02-19 | End: 2020-02-23 | Stop reason: HOSPADM

## 2020-02-19 RX ORDER — CEFAZOLIN SODIUM 1 G/3ML
INJECTION, POWDER, FOR SOLUTION INTRAMUSCULAR; INTRAVENOUS AS NEEDED
Status: DISCONTINUED | OUTPATIENT
Start: 2020-02-19 | End: 2020-02-19 | Stop reason: HOSPADM

## 2020-02-19 RX ORDER — LIDOCAINE HYDROCHLORIDE 20 MG/ML
INJECTION, SOLUTION EPIDURAL; INFILTRATION; INTRACAUDAL; PERINEURAL AS NEEDED
Status: DISCONTINUED | OUTPATIENT
Start: 2020-02-19 | End: 2020-02-19 | Stop reason: HOSPADM

## 2020-02-19 RX ORDER — HYDROMORPHONE HYDROCHLORIDE 1 MG/ML
0.2 INJECTION, SOLUTION INTRAMUSCULAR; INTRAVENOUS; SUBCUTANEOUS
Status: DISCONTINUED | OUTPATIENT
Start: 2020-02-19 | End: 2020-02-19 | Stop reason: HOSPADM

## 2020-02-19 RX ORDER — METFORMIN HYDROCHLORIDE 500 MG/1
500 TABLET, EXTENDED RELEASE ORAL 2 TIMES DAILY
Status: DISCONTINUED | OUTPATIENT
Start: 2020-02-20 | End: 2020-02-20

## 2020-02-19 RX ORDER — FENTANYL CITRATE 50 UG/ML
INJECTION, SOLUTION INTRAMUSCULAR; INTRAVENOUS AS NEEDED
Status: DISCONTINUED | OUTPATIENT
Start: 2020-02-19 | End: 2020-02-19 | Stop reason: HOSPADM

## 2020-02-19 RX ORDER — OXYCODONE HYDROCHLORIDE 5 MG/1
5 TABLET ORAL
Status: DISCONTINUED | OUTPATIENT
Start: 2020-02-19 | End: 2020-02-23 | Stop reason: HOSPADM

## 2020-02-19 RX ORDER — GLYCOPYRROLATE 0.2 MG/ML
INJECTION INTRAMUSCULAR; INTRAVENOUS AS NEEDED
Status: DISCONTINUED | OUTPATIENT
Start: 2020-02-19 | End: 2020-02-19 | Stop reason: HOSPADM

## 2020-02-19 RX ORDER — ONDANSETRON 2 MG/ML
4 INJECTION INTRAMUSCULAR; INTRAVENOUS
Status: DISCONTINUED | OUTPATIENT
Start: 2020-02-19 | End: 2020-02-23 | Stop reason: HOSPADM

## 2020-02-19 RX ORDER — FACIAL-BODY WIPES
10 EACH TOPICAL DAILY PRN
Status: DISCONTINUED | OUTPATIENT
Start: 2020-02-21 | End: 2020-02-23 | Stop reason: HOSPADM

## 2020-02-19 RX ORDER — HYDROMORPHONE HCL/0.9% NACL/PF 0.5 MG/ML
PLASTIC BAG, INJECTION (ML) INTRAVENOUS CONTINUOUS
Status: DISCONTINUED | OUTPATIENT
Start: 2020-02-19 | End: 2020-02-20

## 2020-02-19 RX ORDER — DULOXETIN HYDROCHLORIDE 60 MG/1
60 CAPSULE, DELAYED RELEASE ORAL
Status: DISCONTINUED | OUTPATIENT
Start: 2020-02-19 | End: 2020-02-23 | Stop reason: HOSPADM

## 2020-02-19 RX ORDER — AMLODIPINE BESYLATE 5 MG/1
10 TABLET ORAL DAILY
Status: DISCONTINUED | OUTPATIENT
Start: 2020-02-20 | End: 2020-02-23 | Stop reason: HOSPADM

## 2020-02-19 RX ORDER — MIDAZOLAM HYDROCHLORIDE 1 MG/ML
1 INJECTION, SOLUTION INTRAMUSCULAR; INTRAVENOUS AS NEEDED
Status: DISCONTINUED | OUTPATIENT
Start: 2020-02-19 | End: 2020-02-19 | Stop reason: HOSPADM

## 2020-02-19 RX ORDER — ACETAMINOPHEN 325 MG/1
650 TABLET ORAL ONCE
Status: COMPLETED | OUTPATIENT
Start: 2020-02-19 | End: 2020-02-19

## 2020-02-19 RX ORDER — FENTANYL CITRATE 50 UG/ML
25 INJECTION, SOLUTION INTRAMUSCULAR; INTRAVENOUS
Status: DISCONTINUED | OUTPATIENT
Start: 2020-02-19 | End: 2020-02-19 | Stop reason: HOSPADM

## 2020-02-19 RX ORDER — VANCOMYCIN HYDROCHLORIDE 1 G/20ML
INJECTION, POWDER, LYOPHILIZED, FOR SOLUTION INTRAVENOUS AS NEEDED
Status: DISCONTINUED | OUTPATIENT
Start: 2020-02-19 | End: 2020-02-19 | Stop reason: HOSPADM

## 2020-02-19 RX ORDER — ROSUVASTATIN CALCIUM 10 MG/1
10 TABLET, COATED ORAL
Status: DISCONTINUED | OUTPATIENT
Start: 2020-02-19 | End: 2020-02-23 | Stop reason: HOSPADM

## 2020-02-19 RX ORDER — FENTANYL CITRATE 50 UG/ML
50 INJECTION, SOLUTION INTRAMUSCULAR; INTRAVENOUS AS NEEDED
Status: DISCONTINUED | OUTPATIENT
Start: 2020-02-19 | End: 2020-02-19 | Stop reason: HOSPADM

## 2020-02-19 RX ORDER — NEOSTIGMINE METHYLSULFATE 1 MG/ML
INJECTION INTRAVENOUS AS NEEDED
Status: DISCONTINUED | OUTPATIENT
Start: 2020-02-19 | End: 2020-02-19 | Stop reason: HOSPADM

## 2020-02-19 RX ORDER — PROPOFOL 10 MG/ML
INJECTION, EMULSION INTRAVENOUS AS NEEDED
Status: DISCONTINUED | OUTPATIENT
Start: 2020-02-19 | End: 2020-02-19 | Stop reason: HOSPADM

## 2020-02-19 RX ORDER — SODIUM CHLORIDE, SODIUM LACTATE, POTASSIUM CHLORIDE, CALCIUM CHLORIDE 600; 310; 30; 20 MG/100ML; MG/100ML; MG/100ML; MG/100ML
50 INJECTION, SOLUTION INTRAVENOUS CONTINUOUS
Status: DISCONTINUED | OUTPATIENT
Start: 2020-02-19 | End: 2020-02-19 | Stop reason: HOSPADM

## 2020-02-19 RX ADMIN — DEXMEDETOMIDINE HYDROCHLORIDE 10 MCG: 100 INJECTION, SOLUTION, CONCENTRATE INTRAVENOUS at 19:45

## 2020-02-19 RX ADMIN — FENTANYL CITRATE 75 MCG: 50 INJECTION, SOLUTION INTRAMUSCULAR; INTRAVENOUS at 16:48

## 2020-02-19 RX ADMIN — PHENYLEPHRINE HYDROCHLORIDE 30 MCG/MIN: 10 INJECTION INTRAVENOUS at 17:58

## 2020-02-19 RX ADMIN — Medication 10 ML: at 22:56

## 2020-02-19 RX ADMIN — DEXAMETHASONE SODIUM PHOSPHATE 4 MG: 4 INJECTION, SOLUTION INTRAMUSCULAR; INTRAVENOUS at 16:56

## 2020-02-19 RX ADMIN — ONDANSETRON HYDROCHLORIDE 4 MG: 2 INJECTION, SOLUTION INTRAMUSCULAR; INTRAVENOUS at 20:01

## 2020-02-19 RX ADMIN — CEFAZOLIN 2 G: 330 INJECTION, POWDER, FOR SOLUTION INTRAMUSCULAR; INTRAVENOUS at 17:00

## 2020-02-19 RX ADMIN — KETAMINE HYDROCHLORIDE 10 MG: 10 INJECTION, SOLUTION INTRAMUSCULAR; INTRAVENOUS at 18:00

## 2020-02-19 RX ADMIN — FENTANYL CITRATE 75 MCG: 50 INJECTION, SOLUTION INTRAMUSCULAR; INTRAVENOUS at 16:51

## 2020-02-19 RX ADMIN — DULOXETINE HYDROCHLORIDE 60 MG: 60 CAPSULE, DELAYED RELEASE ORAL at 22:55

## 2020-02-19 RX ADMIN — HYDROMORPHONE HYDROCHLORIDE 0.5 MG: 1 INJECTION, SOLUTION INTRAMUSCULAR; INTRAVENOUS; SUBCUTANEOUS at 18:01

## 2020-02-19 RX ADMIN — Medication: at 21:03

## 2020-02-19 RX ADMIN — ROCURONIUM BROMIDE 35 MG: 10 SOLUTION INTRAVENOUS at 16:52

## 2020-02-19 RX ADMIN — NEOSTIGMINE METHYLSULFATE 4 MG: 1 INJECTION, SOLUTION INTRAMUSCULAR; INTRAVENOUS; SUBCUTANEOUS at 20:01

## 2020-02-19 RX ADMIN — HYDROMORPHONE HYDROCHLORIDE 0.5 MG: 1 INJECTION, SOLUTION INTRAMUSCULAR; INTRAVENOUS; SUBCUTANEOUS at 18:47

## 2020-02-19 RX ADMIN — SODIUM CHLORIDE 125 ML/HR: 900 INJECTION, SOLUTION INTRAVENOUS at 21:18

## 2020-02-19 RX ADMIN — PROPOFOL 100 MG: 10 INJECTION, EMULSION INTRAVENOUS at 17:44

## 2020-02-19 RX ADMIN — SODIUM CHLORIDE, POTASSIUM CHLORIDE, SODIUM LACTATE AND CALCIUM CHLORIDE: 600; 310; 30; 20 INJECTION, SOLUTION INTRAVENOUS at 20:05

## 2020-02-19 RX ADMIN — ROCURONIUM BROMIDE 10 MG: 10 SOLUTION INTRAVENOUS at 17:15

## 2020-02-19 RX ADMIN — ACETAMINOPHEN 650 MG: 325 TABLET ORAL at 14:34

## 2020-02-19 RX ADMIN — ROCURONIUM BROMIDE 20 MG: 10 SOLUTION INTRAVENOUS at 17:44

## 2020-02-19 RX ADMIN — HUMAN INSULIN 13 UNITS: 100 INJECTION, SUSPENSION SUBCUTANEOUS at 22:56

## 2020-02-19 RX ADMIN — SODIUM CHLORIDE, POTASSIUM CHLORIDE, SODIUM LACTATE AND CALCIUM CHLORIDE: 600; 310; 30; 20 INJECTION, SOLUTION INTRAVENOUS at 16:45

## 2020-02-19 RX ADMIN — LOSARTAN POTASSIUM 25 MG: 25 TABLET ORAL at 22:55

## 2020-02-19 RX ADMIN — ROCURONIUM BROMIDE 10 MG: 10 SOLUTION INTRAVENOUS at 18:47

## 2020-02-19 RX ADMIN — SUCCINYLCHOLINE CHLORIDE 140 MG: 20 INJECTION, SOLUTION INTRAMUSCULAR; INTRAVENOUS at 16:49

## 2020-02-19 RX ADMIN — GLYCOPYRROLATE 0.8 MG: 0.2 INJECTION, SOLUTION INTRAMUSCULAR; INTRAVENOUS at 20:01

## 2020-02-19 RX ADMIN — DEXMEDETOMIDINE HYDROCHLORIDE 10 MCG: 100 INJECTION, SOLUTION, CONCENTRATE INTRAVENOUS at 19:53

## 2020-02-19 RX ADMIN — ALBUMIN (HUMAN) 250 ML: 12.5 INJECTION, SOLUTION INTRAVENOUS at 18:37

## 2020-02-19 RX ADMIN — MIDAZOLAM 2 MG: 1 INJECTION INTRAMUSCULAR; INTRAVENOUS at 16:45

## 2020-02-19 RX ADMIN — ROCURONIUM BROMIDE 5 MG: 10 SOLUTION INTRAVENOUS at 16:48

## 2020-02-19 RX ADMIN — LIDOCAINE HYDROCHLORIDE 60 MG: 20 INJECTION, SOLUTION EPIDURAL; INFILTRATION; INTRACAUDAL; PERINEURAL at 16:49

## 2020-02-19 RX ADMIN — KETAMINE HYDROCHLORIDE 30 MG: 10 INJECTION, SOLUTION INTRAMUSCULAR; INTRAVENOUS at 16:49

## 2020-02-19 RX ADMIN — ROSUVASTATIN 10 MG: 10 TABLET, FILM COATED ORAL at 22:55

## 2020-02-19 RX ADMIN — KETAMINE HYDROCHLORIDE 10 MG: 10 INJECTION, SOLUTION INTRAMUSCULAR; INTRAVENOUS at 18:20

## 2020-02-19 RX ADMIN — PROPOFOL 200 MG: 10 INJECTION, EMULSION INTRAVENOUS at 16:49

## 2020-02-19 NOTE — ANESTHESIA PREPROCEDURE EVALUATION
Anesthetic History   No history of anesthetic complications            Review of Systems / Medical History  Patient summary reviewed, nursing notes reviewed and pertinent labs reviewed    Pulmonary  Within defined limits                 Neuro/Psych   Within defined limits           Cardiovascular    Hypertension          Hyperlipidemia    Exercise tolerance: >4 METS  Comments: EKG NSR   GI/Hepatic/Renal  Within defined limits              Endo/Other    Diabetes    Arthritis     Other Findings              Physical Exam    Airway  Mallampati: III  TM Distance: 4 - 6 cm  Neck ROM: normal range of motion   Mouth opening: Normal     Cardiovascular    Rhythm: regular  Rate: normal         Dental    Dentition: Full upper dentures and Poor dentition     Pulmonary  Breath sounds clear to auscultation               Abdominal  Abdominal exam normal       Other Findings            Anesthetic Plan    ASA: 2  Anesthesia type: general          Induction: Intravenous  Anesthetic plan and risks discussed with: Patient

## 2020-02-20 LAB
ANION GAP SERPL CALC-SCNC: 8 MMOL/L (ref 5–15)
BUN SERPL-MCNC: 18 MG/DL (ref 6–20)
BUN/CREAT SERPL: 16 (ref 12–20)
CALCIUM SERPL-MCNC: 8.4 MG/DL (ref 8.5–10.1)
CHLORIDE SERPL-SCNC: 108 MMOL/L (ref 97–108)
CO2 SERPL-SCNC: 22 MMOL/L (ref 21–32)
CREAT SERPL-MCNC: 1.13 MG/DL (ref 0.55–1.02)
GLUCOSE BLD STRIP.AUTO-MCNC: 103 MG/DL (ref 65–100)
GLUCOSE BLD STRIP.AUTO-MCNC: 123 MG/DL (ref 65–100)
GLUCOSE BLD STRIP.AUTO-MCNC: 147 MG/DL (ref 65–100)
GLUCOSE BLD STRIP.AUTO-MCNC: 151 MG/DL (ref 65–100)
GLUCOSE SERPL-MCNC: 163 MG/DL (ref 65–100)
HGB BLD-MCNC: 9.2 G/DL (ref 11.5–16)
POTASSIUM SERPL-SCNC: 4.5 MMOL/L (ref 3.5–5.1)
SERVICE CMNT-IMP: ABNORMAL
SODIUM SERPL-SCNC: 138 MMOL/L (ref 136–145)

## 2020-02-20 PROCEDURE — 97166 OT EVAL MOD COMPLEX 45 MIN: CPT

## 2020-02-20 PROCEDURE — 74011636637 HC RX REV CODE- 636/637: Performed by: PHYSICIAN ASSISTANT

## 2020-02-20 PROCEDURE — 80048 BASIC METABOLIC PNL TOTAL CA: CPT

## 2020-02-20 PROCEDURE — 65270000029 HC RM PRIVATE

## 2020-02-20 PROCEDURE — 74011636637 HC RX REV CODE- 636/637: Performed by: NURSE PRACTITIONER

## 2020-02-20 PROCEDURE — 36415 COLL VENOUS BLD VENIPUNCTURE: CPT

## 2020-02-20 PROCEDURE — 74011250636 HC RX REV CODE- 250/636: Performed by: PHYSICIAN ASSISTANT

## 2020-02-20 PROCEDURE — 97530 THERAPEUTIC ACTIVITIES: CPT | Performed by: PHYSICAL THERAPIST

## 2020-02-20 PROCEDURE — 97116 GAIT TRAINING THERAPY: CPT | Performed by: PHYSICAL THERAPIST

## 2020-02-20 PROCEDURE — 97161 PT EVAL LOW COMPLEX 20 MIN: CPT | Performed by: PHYSICAL THERAPIST

## 2020-02-20 PROCEDURE — 82962 GLUCOSE BLOOD TEST: CPT

## 2020-02-20 PROCEDURE — 97535 SELF CARE MNGMENT TRAINING: CPT

## 2020-02-20 PROCEDURE — 85018 HEMOGLOBIN: CPT

## 2020-02-20 PROCEDURE — 74011250637 HC RX REV CODE- 250/637: Performed by: PHYSICIAN ASSISTANT

## 2020-02-20 RX ORDER — DEXTROSE MONOHYDRATE 100 MG/ML
0-250 INJECTION, SOLUTION INTRAVENOUS AS NEEDED
Status: DISCONTINUED | OUTPATIENT
Start: 2020-02-20 | End: 2020-02-23 | Stop reason: HOSPADM

## 2020-02-20 RX ORDER — INSULIN LISPRO 100 [IU]/ML
0.1 INJECTION, SOLUTION INTRAVENOUS; SUBCUTANEOUS
Status: DISCONTINUED | OUTPATIENT
Start: 2020-02-20 | End: 2020-02-23 | Stop reason: HOSPADM

## 2020-02-20 RX ADMIN — SPIRONOLACTONE 25 MG: 25 TABLET ORAL at 09:54

## 2020-02-20 RX ADMIN — METFORMIN HYDROCHLORIDE 500 MG: 500 TABLET, EXTENDED RELEASE ORAL at 09:54

## 2020-02-20 RX ADMIN — ROSUVASTATIN 10 MG: 10 TABLET, FILM COATED ORAL at 21:11

## 2020-02-20 RX ADMIN — DIPHENHYDRAMINE HYDROCHLORIDE 12.5 MG: 50 INJECTION, SOLUTION INTRAMUSCULAR; INTRAVENOUS at 17:55

## 2020-02-20 RX ADMIN — LOSARTAN POTASSIUM 25 MG: 25 TABLET ORAL at 21:11

## 2020-02-20 RX ADMIN — Medication 10 ML: at 15:48

## 2020-02-20 RX ADMIN — SENNOSIDES AND DOCUSATE SODIUM 1 TABLET: 8.6; 5 TABLET ORAL at 09:54

## 2020-02-20 RX ADMIN — POLYETHYLENE GLYCOL 3350 17 G: 17 POWDER, FOR SOLUTION ORAL at 09:54

## 2020-02-20 RX ADMIN — Medication 2 G: at 01:38

## 2020-02-20 RX ADMIN — OXYCODONE 10 MG: 5 TABLET ORAL at 15:47

## 2020-02-20 RX ADMIN — Medication 10 ML: at 07:08

## 2020-02-20 RX ADMIN — INSULIN LISPRO 8 UNITS: 100 INJECTION, SOLUTION INTRAVENOUS; SUBCUTANEOUS at 12:45

## 2020-02-20 RX ADMIN — HUMAN INSULIN 13 UNITS: 100 INJECTION, SUSPENSION SUBCUTANEOUS at 12:45

## 2020-02-20 RX ADMIN — Medication 2 G: at 09:54

## 2020-02-20 RX ADMIN — INSULIN LISPRO 2 UNITS: 100 INJECTION, SOLUTION INTRAVENOUS; SUBCUTANEOUS at 07:09

## 2020-02-20 RX ADMIN — OXYCODONE 10 MG: 5 TABLET ORAL at 21:20

## 2020-02-20 RX ADMIN — OXYCODONE 10 MG: 5 TABLET ORAL at 11:38

## 2020-02-20 RX ADMIN — DULOXETINE HYDROCHLORIDE 60 MG: 60 CAPSULE, DELAYED RELEASE ORAL at 21:12

## 2020-02-20 RX ADMIN — SENNOSIDES AND DOCUSATE SODIUM 1 TABLET: 8.6; 5 TABLET ORAL at 17:56

## 2020-02-20 RX ADMIN — Medication 10 ML: at 21:12

## 2020-02-20 RX ADMIN — AMLODIPINE BESYLATE 10 MG: 5 TABLET ORAL at 09:54

## 2020-02-20 RX ADMIN — INSULIN LISPRO 15 UNITS: 100 INJECTION, SOLUTION INTRAVENOUS; SUBCUTANEOUS at 07:09

## 2020-02-20 NOTE — PROGRESS NOTES
Primary Nurse Spencer Mullen and Odalis Castillo RN performed a dual skin assessment on this patient.  Macho score is 20

## 2020-02-20 NOTE — PERIOP NOTES
Patient: Ronaldo Moran MRN: [id number]  SSN: [id number]   YOB: 1960  Age: 61 y.o. Sex: female     Patient is status post Procedure(s):  REVISION LUMBAR LAMINECTOMY WITH POSTERIOR SPINAL FUSION L2-5. Surgeon(s) and Role:     * Shelbie Griggs MD - Primary    Local/Dose/Irrigation:  None                  Peripheral IV 02/19/20 Left Hand (Active)          Hemovac Right; Lower Back (Active)      Airway - Endotracheal Tube 02/19/20 Oral (Active)                   Dressing/Packing:  Wound Back-Dressing Type: Topical skin adhesive/glue (02/19/20 1900)

## 2020-02-20 NOTE — DIABETES MGMT
CYNDIE MANDUJANO  CLINICAL NURSE SPECIALIST CONSULT  PROGRAM FOR DIABETES HEALTH    Presentation   Merlin Meyer is a 61 y.o. female admitted after a revision of a lumbar laminectomy with posterior spinal fusion L2-L5. Current clinical course has been uncomplicated. Patient has known diabetes. Consulted by Provider for advanced diabetes nursing assessment and care, specifically related to   [x] Inpatient management strategy  [x] Home management assessment      Diabetes-related medical history  Acute complications: None  Neurological complications  Peripheral neuropathy  Microvascular disease  Retinopathy  Macrovascular disease: Unknown      Diabetes medication history  Drug class Currently in use Discontinued Never used   Biguanide 500 mg BID Metformin     DDP-4 inhibitor       Sulfonylurea      Thiazolidinedione      GLP-1 RA      SGLT-2 inhibitors      Basal insulin Levamir 25 units BID     Bolus insulin 15 units Humalog with meals       Subjective   Ms Hailey Thomas is a 61year old female who was admitted s/p a revision of a lumbar laminectomy with posterior spinal fusion L2-L5. She is now POD 1 and major complaint is pain. She has a \"long history\" of type two diabetes, managed with [facility] endocrinology. Her admitting A1C is 7.6% with blood glucose ranging from . Patient reports the following home diabetes self-care practices:  Eating pattern  [x] Breakfast Grits, sausage   [x] Lunch  [place] and chips  [x] Dinner  Spaghetti or fish and fries  [x] Beverages Water or diet soda  Physical activity pattern  None  Monitoring pattern  Does not monitor  Taking medications pattern  [x] Consistent administration  [x] Affordable      Objective   Physical exam  General Alert, oriented and no acute pain. Conversant and cooperative  Vital Signs   Visit Vitals  /70   Pulse 96   Temp 99 °F (37.2 °C)   Resp 16   Ht 5' 6\" (1.676 m)   Wt 84.4 kg (186 lb)   SpO2 100%   BMI 30.02 kg/m²   . Skin  Warm and dry.  No Acanthosis noted along neckline. No lipohypertrophy or lipoatrophy noted at injection sites   Heart   Regular rate and rhythm. No murmurs, rubs or gallops  Lungs  Clear without rales or rhonchi  Extremities Diabetic foot exam:    Left Foot     Visual Exam: normal    Pulse DP: 2+ (normal)   Filament test: diminished   Vibratory sensation: diminished  Right Foot   Visual Exam: normal    Pulse DP: 2+ (normal)   Filament test: reduced sensation    Vibratory sensation: diminished DP & PT pulses +2. Laboratory  Lab Results   Component Value Date/Time    Hemoglobin A1c 7.6 (H) 02/05/2020 12:22 PM    Hemoglobin A1c (POC) 7.4 11/02/2018 10:44 AM     Lab Results   Component Value Date/Time    LDL, calculated 132 (H) 05/23/2019 11:51 AM     Lab Results   Component Value Date/Time    Creatinine (POC) 0.8 06/21/2017 12:18 PM    Creatinine 1.13 (H) 02/20/2020 01:41 AM     Lab Results   Component Value Date/Time    Sodium 138 02/20/2020 01:41 AM    Potassium 4.5 02/20/2020 01:41 AM    Chloride 108 02/20/2020 01:41 AM    CO2 22 02/20/2020 01:41 AM    Anion gap 8 02/20/2020 01:41 AM    Glucose 163 (H) 02/20/2020 01:41 AM    BUN 18 02/20/2020 01:41 AM    Creatinine 1.13 (H) 02/20/2020 01:41 AM    BUN/Creatinine ratio 16 02/20/2020 01:41 AM    GFR est AA 60 (L) 02/20/2020 01:41 AM    GFR est non-AA 49 (L) 02/20/2020 01:41 AM    Calcium 8.4 (L) 02/20/2020 01:41 AM    Bilirubin, total 0.3 05/23/2019 11:51 AM    AST (SGOT) 26 05/23/2019 11:51 AM    Alk.  phosphatase 55 05/23/2019 11:51 AM    Protein, total 8.0 05/23/2019 11:51 AM    Albumin 4.9 05/23/2019 11:51 AM    Globulin 3.8 04/17/2017 05:24 PM    A-G Ratio 1.6 05/23/2019 11:51 AM    ALT (SGPT) 27 05/23/2019 11:51 AM     Lab Results   Component Value Date/Time    ALT (SGPT) 27 05/23/2019 11:51 AM       Blood glucose pattern        Assessment and Plan   Nursing Diagnosis Risk for unstable blood glucose pattern   Nursing Intervention Domain 6381 Decision-making Support   Nursing Interventions Examined current inpatient diabetes control   Explored factors facilitating and impeding inpatient management  Identified self-management practices impeding diabetes control  Explored corrective strategies with patient and responsible inpatient provider   Informed patient of rational for insulin strategy while hospitalized     Evaluation   This woman, with type 2 diabetes, has achieved inpatient blood glucose target of 100-180mg/dl on NPH and Humalog. I suspect she does miss a few doses on short or long acting insulin. Poor dentition. Inpatient blood glucose management has been impacted by  [x] Erratic meal consumption- consumed about 50% of her breakfast today. Recommendations   Recommend:  1. Start Basal insulin   [x] 0.4 units/kg/D: 15 units NPH BID  If fasting blood glucose is less than 100, back off basal dose by 10%    2. Adjust bolus insulin to 8 units Humalog with meals  If patient consumes less than 50% of carbohydrates on meal tray, hold mealtime insulin. 3. Continue corrective insulin    4. Discontinue Metformin while in the inpatient setting. On Discharge:  May resume home diabetes regimen     Billing Code(s)     [x] 70172 IP subsequent hospital care - 39 minutes      BEN Soto  Access via VIR[place] [phone]

## 2020-02-20 NOTE — PROGRESS NOTES
Pt c/c itching, Benadryl IV given. Anila Mckay MD on call for patient's . Patient has scheduled Humalog and NPH. Scheduled insulins were given this lunch time for  (not 163).

## 2020-02-20 NOTE — PROGRESS NOTES
Problem: Falls - Risk of  Goal: *Absence of Falls  Description  Document Ryan Bashir Fall Risk and appropriate interventions in the flowsheet. Outcome: Progressing Towards Goal  Note: Fall Risk Interventions:  Mobility Interventions: Communicate number of staff needed for ambulation/transfer         Medication Interventions: Evaluate medications/consider consulting pharmacy    Elimination Interventions: Call light in reach    History of Falls Interventions: Evaluate medications/consider consulting pharmacy       Plan of care reviewed.

## 2020-02-20 NOTE — ANESTHESIA POSTPROCEDURE EVALUATION
Procedure(s):  REVISION LUMBAR LAMINECTOMY WITH POSTERIOR SPINAL FUSION L2-5.    general    Anesthesia Post Evaluation      Multimodal analgesia: multimodal analgesia used between 6 hours prior to anesthesia start to PACU discharge  Patient location during evaluation: PACU  Patient participation: complete - patient participated  Level of consciousness: awake  Pain score: 2  Pain management: adequate  Airway patency: patent  Anesthetic complications: no  Cardiovascular status: acceptable  Respiratory status: acceptable  Hydration status: acceptable  Comments: I have evaluated the patient and meets criteria for discharge from PACU. Allie June MD  Post anesthesia nausea and vomiting:  controlled      Vitals Value Taken Time   BP     Temp     Pulse 90 2/19/2020  9:00 PM   Resp 21 2/19/2020  9:00 PM   SpO2 96 % 2/19/2020  9:00 PM   Vitals shown include unvalidated device data.

## 2020-02-20 NOTE — PROGRESS NOTES
Problem: Self Care Deficits Care Plan (Adult)  Goal: *Acute Goals and Plan of Care (Insert Text)  Description  FUNCTIONAL STATUS PRIOR TO ADMISSION: Independent with all ADLs, takes care of grandchildren 9 and 8 y.o.s. HOME SUPPORT: The patient lived with son and grandchildren. He can provide assistance PRN, works full time. Occupational Therapy Goals  Initiated 2/20/2020    1. Patient will perform lower body dressing with modified independence using AE PRN within 4 days. 2.  Patient will perform toileting with modified independence using most appropriate DME within 4 days. 3.  Patient will perform gathering ADL items without cues back precautions with RW at modified independence within 4 days. 4.  Patient will don/doff back brace at modified independence within 4 days. 5.  Patient will verbalize/demonstrate 3/3 back precautions during ADL tasks without cues within 4 days. Outcome: Not Met   OCCUPATIONAL THERAPY EVALUATION  Patient: Edwina Banks (67 y.o. female)  Date: 2/20/2020  Primary Diagnosis: Spinal stenosis [M48.00]  Procedure(s) (LRB):  REVISION LUMBAR LAMINECTOMY WITH POSTERIOR SPINAL FUSION L2-5 (N/A) 1 Day Post-Op   Precautions:   Back, Fall, Other (comment)(brace when up)    ASSESSMENT  Based on the objective data described below, the patient presents with decreased LE strength, standing tolerance, standing balance and increased pain management, now back precautions. Current Level of Function Impacting Discharge (ADLs/self-care): moderate A lower body ADLs      Other factors to consider for discharge: has young children in the home she cares for and son works full time. States they have worked around this in the past during her surgery,  but this is the 4th spinal surgery. Patient will benefit from skilled therapy intervention to address the above noted impairments.        PLAN :  Recommendations and Planned Interventions: self care training, functional mobility training, therapeutic exercise, balance training, therapeutic activities, endurance activities, patient education, home safety training, and family training/education    Frequency/Duration: Patient will be followed by occupational therapy 5 times a week to address goals. Recommendation for discharge: (in order for the patient to meet his/her long term goals)  Occupational therapy at least 2 days/week in the home     This discharge recommendation:  Has not yet been discussed the attending provider and/or case management    IF patient discharges home will need the following DME: AE: long handled bathing, AE: long handled dressing, and shower chair       SUBJECTIVE:   Patient stated This is my 4th back surgery.  (However, patient with no plan as to how to address lower body ADLs).      OBJECTIVE DATA SUMMARY:   HISTORY:   Past Medical History:   Diagnosis Date    (HFpEF) heart failure with preserved ejection fraction (HCC)     Arrhythmia     HEART MURMUR -\"HAD IT FOR YEARS AS AN ADULT)    Arthritis     OSTEO    Back pain     Breast cancer screening 6/28/2016    Chronic bilateral low back pain without sciatica 6/28/2016    Diabetes ([facility]     Encounter for long-term (current) use of medications 6/28/2016    Essential hypertension with goal blood pressure less than 140/90 6/28/2016    Hypercholesteremia 8/6/2015    Hypercholesterolemia     Hypertension     CONTROLLED WITH MEDS    Ill-defined condition     hyperlipidemia    Other ill-defined conditions(799.89)     high cholesterol    Patient non adherence 1/23/2017    Primary osteoarthritis of left knee 3/2/2018    Type 2 diabetes, uncontrolled, with neuropathy ([address] 1/23/2017    Type II diabetes mellitus, uncontrolled ([address] 8/6/2015     Past Surgical History:   Procedure Laterality Date    CARDIAC SURG PROCEDURE UNLIST      murmur- PT DENIES SURGERY AND MURMUR 6/20/17    HX CARPAL TUNNEL RELEASE      HX GYN      hysterectomy    HX HEENT      Ear surgery    HX HEENT      Left eye HX ORTHOPAEDIC      left wrist & left elbow    HX ORTHOPAEDIC      Back Surgery x 3    HX ORTHOPAEDIC      right thumb at [address] OF LT FOOT CYST    HX OTHER SURGICAL      back surgery X 2    HX OTHER SURGICAL      Collapsed left lung    HX OTHER SURGICAL      axillary sweat glands removed    HX OVARIAN CYST REMOVAL      PA OTOPLASTY PROTRUDING EAR W/WO SIZE RDCTJ         Expanded or extensive additional review of patient history:     Home Situation  Home Environment: Private residence  # Steps to Enter: 0  One/Two Story Residence: One story  Living Alone: No  Support Systems: Family member(s)  Patient Expects to be Discharged to[de-identified] Private residence  Current DME Used/Available at Home: Eveleen Frames, straight, Shower chair(reacher)  Tub or Shower Type: Tub/Shower combination    Hand dominance: Right    EXAMINATION OF PERFORMANCE DEFICITS:  Cognitive/Behavioral Status:  Neurologic State: Alert  Orientation Level: Oriented X4  Cognition: Appropriate decision making; Appropriate for age attention/concentration; Appropriate safety awareness  Perception: Appears intact  Perseveration: No perseveration noted  Safety/Judgement: Awareness of environment;Good awareness of safety precautions    Skin: intact bandage and Hemovac    Edema: intact    Hearing: Auditory  Auditory Impairment: None    Vision/Perceptual:                           Acuity: Impaired near vision; Impaired far vision    Corrective Lenses: Glasses    Range of Motion:    AROM: Generally decreased, functional                         Strength:    Strength: Generally decreased, functional                Coordination:  Coordination: Within functional limits  Fine Motor Skills-Upper: Left Intact; Right Intact    Gross Motor Skills-Upper: Left Intact; Right Intact    Tone & Sensation:    Tone: Normal                         Balance:  Sitting: Intact  Standing: Impaired  Standing - Static: Constant support;Good  Standing - Dynamic : Fair    Functional Mobility and Transfers for ADLs:  Bed Mobility:  Rolling: Contact guard assistance  Supine to Sit: Minimum assistance; Additional time;Assist x1  Sit to Supine: Minimum assistance; Additional time;Assist x1  Scooting: Supervision; Additional time    Transfers:  Sit to Stand: Contact guard assistance  Stand to Sit: Contact guard assistance  Bathroom Mobility: Supervision/set up  Toilet Transfer : Supervision  Tub Transfer: Total assistance(not safe at this time)    ADL Assessment:  Feeding: Independent    Oral Facial Hygiene/Grooming: Supervision(standing at sink)    Bathing: Moderate assistance(A back, knees-feet) CHG wipes EOB    Upper Body Dressing: Supervision    Lower Body Dressing: Moderate assistance A knees-feet    Toileting: Minimum assistance                ADL Intervention and task modifications:  Patient instructed and demonstrated 3/3 back precautions with no cues. Cognitive Retraining  Safety/Judgement: Awareness of environment;Good awareness of safety precautions      Patient instruction and indicated understanding to not strain i.e. holding breath to bear down during a bowel movement, lifting/activity, and sexual activity. Standing: Patient instructed to increase amount of time standing in order to increase independence and tolerance with ADLs. During prolonged standing, can open cabinet door or place foot on stool to decrease spinal pressure/increase pain. Therapeutic Exercise:  Patient instructed on the benefits shoulder flexion exercises to increase independence with ADLs, active ROM, and strength of spine. Patient demonstrated 3 reps and 1 set(s) while supine with Supervision. Patient instructed and demonstrated techniques of activating abdomen and pelvic floor muscles.  Patient instructed and indicated understanding how to progress reps, sets, against gravity to then working up to 5 lbs until surgeon clears for increased weight, supine to sitting and then standing. Can use household items as weights. Pain Rating:  Received pain meds as entering room    Activity Tolerance:   requires rest breaks  Vitals:    02/20/20 0029 02/20/20 0140 02/20/20 0906 02/20/20 1016   BP: 109/69 128/82 115/70    BP 1 Location: Right arm Right arm     BP Patient Position: At rest At rest     Pulse: 80 85 96 100   Resp: 18 18 16    Temp: 98.1 °F (36.7 °C) 98.2 °F (36.8 °C) 99 °F (37.2 °C)    SpO2: 96% 100% 100% 100%   Weight:       Height:           Please refer to the flowsheet for vital signs taken during this treatment. After treatment patient left in no apparent distress:    Supine in bed, Call bell within reach, and Bed / chair alarm activated    COMMUNICATION/EDUCATION:   The patients plan of care was discussed with: Physical Therapist and Registered Nurse. Home safety education was provided and the patient/caregiver indicated understanding., Patient/family have participated as able in goal setting and plan of care. , and Patient/family agree to work toward stated goals and plan of care. This patients plan of care is appropriate for delegation to [facility].     Thank you for this referral.  Jose A Singletary  Time Calculation: 41 mins

## 2020-02-20 NOTE — BRIEF OP NOTE
BRIEF OPERATIVE NOTE    Date of Procedure: 2/19/2020   Preoperative Diagnosis: SEVERE SPINAL STENOSIS L2-5, DEGENERATIVE SPONDYLOLISTHESIS L4-5  Postoperative Diagnosis: SEVERE SPINAL STENOSIS L2-5, DEGENERATIVE SPONDYLOLISTHESIS L4-5    Procedure(s):  REVISION LUMBAR LAMINECTOMY WITH POSTERIOR SPINAL FUSION L2-5  Surgeon(s) and Role:     * Teodora Llanos MD - Primary         Surgical Assistant: Gibson Brown PA-C    Surgical Staff:  Cell Saver : Arnoldo Morrissey: Che Ireland RN  Circ-2: Carlos Chaves RN  Circ-Relief: Laura Mcleod RN; Hai Burgos RN  Physician Assistant: Javed Cramer PA-C  Scrub Tech-1: Regulo Sung  Scrub Tech-Relief: Anitha Dawkins RN-Relief: Laura Mcleod RN  Event Time In Time Out   Incision Start 1719    Incision Close 2025      Anesthesia: General   Estimated Blood Loss: 150 ml  Specimens: * No specimens in log *   Findings: SEVERE SPINAL STENOSIS L2-5, DEGENERATIVE SPONDYLOLISTHESIS I3-8  Complications: None  Implants:   Implant Name Type Inv.  Item Serial No.  Lot No. LRB No. Used Action   Proteios Growth Factor   [id number]  N/A N/A 1 Implanted   GRAFT FIBERS BNE TANIA 10CC -- 3DEMIN - [id number]  GRAFT FIBERS BNE TANIA 10CC -- 3DEMIN [id number] [employer] N/A N/A 1 Implanted   GRAFT BNE TANIA FIBERS 30CC -- 3DEMIN - [id number]  GRAFT BNE TANIA FIBERS 30CC -- 3DEMIN [id number] [employer] N/A N/A 1 Implanted   GRAFT FIBERS BNE TANIA 10CC -- 3DEMIN - [id number]  GRAFT FIBERS BNE TANIA 10CC -- 3DEMIN [id number] [employer] N/A N/A 1 Implanted   GRAFT BNE TANIA FIBERS 30CC -- 3DEMIN - [id number]  GRAFT BNE TANIA FIBERS 30CC -- 3DEMIN [id number] [employer] N/A N/A 1 Implanted   screw 6.5x40    N/A [employer] N/A N/A 4 Implanted   screw 6.5x45   N/A TIMOTHY SPINE HOW N/A N/A 4 Implanted   caps   N/A TIMOTHY SPINE HOWM N/A N/A 8 Implanted   CHARLY SPNE CONTRD 5.5X95MM -- LONA - SN/A  CHARLY SPNE CONTRD 5.5X95MM -- LONA N/A TIMOTHY SPINE HOW N/A N/A 2 Implanted

## 2020-02-20 NOTE — PROGRESS NOTES
AIDEN:  1. HH choice pending. 2. Transportation; Family       Care Management Interventions  PCP Verified by CM: Yes  Palliative Care Criteria Met (RRAT>21 & CHF Dx)?: No  Mode of Transport at Discharge: Other (see comment)  Transition of Care Consult (CM Consult): Discharge Planning  MyChart Signup: Yes  Discharge Durable Medical Equipment: No  Health Maintenance Reviewed: Yes  Physical Therapy Consult: Yes  Occupational Therapy Consult: Yes  Speech Therapy Consult: No  Current Support Network: Family Lives Nearby(Her grandchildren live with her. )  Confirm Follow Up Transport: Family  The Plan for Transition of Care is Related to the Following Treatment Goals : Home with home health  The Patient and/or Patient Representative was Provided with a Choice of Provider and Agrees with the Discharge Plan?: Yes  Freedom of Choice List was Provided with Basic Dialogue that Supports the Patient's Individualized Plan of Care/Goals, Treatment Preferences and Shares the Quality Data Associated with the Providers?: Yes   Resource Information Provided?: No  Discharge Location  Discharge Placement: Home with home health    Reason for Admission:   Revision lumbar laminectomy with posterior spinal fusion                   RUR Score:          12%           Plan for utilizing home health:      Choice is pending. Current Advanced Directive/Advance Care Plan:  Not on file, she is politely refused. Transition of Care Plan:                    Reviewed chart for transitions of care,and discussed in rounds. CM met with patient at bedside to explain role and offer support. Patient is alert and oriented x4, and confirmed demographics. She confirmed that her grandchildren live with her, and she is independent with ADLs and IADLs prior to admission. CM provided patient with home health listing, and she will review for choice. Cm to follow-up tomorrow morning with choice.  Her family plans to provide transportation home at discharge.     [facility]

## 2020-02-20 NOTE — OP NOTES
[address]   OPERATIVE REPORT    Name:  Nathaly Chung  MR#:  [id number]  :  1960  ACCOUNT #:  [de-identified]  DATE OF SERVICE:  2020      PREOPERATIVE DIAGNOSES:  1. Lumbar spinal stenosis, L2-L5. 2.  Degenerative spondylolisthesis, L4-L5. 3.  Status post lumbar laminectomy, L3-S1. POSTOPERATIVE DIAGNOSES:  1. Lumbar spinal stenosis, L2-L5. 2.  Degenerative spondylolisthesis, L4-L5. 3.  Status post lumbar laminectomy, L3-S1. PROCEDURES PERFORMED:  1. Revision bilateral laminectomy, L2-S1; bilateral decompression of L2, L3, L4, L5, and S1 nerve roots. 2.  Bilateral lateral fusion, L3-L5 using K2M spinal instrumentation supplemented with cancellous allograft and Proteus. SURGEON:  Milena Sanchez MD    ASSISTANT:  Orquidea Garner PA-C    ANESTHESIA:  General.    COMPLICATIONS:  None    SPECIMENS REMOVED:  None    IMPLANTS:   Documented on Brief Op Note    ESTIMATED BLOOD LOSS:  150 ml    INDICATIONS:  The patient is a 27-year-old female with symptomatic lumbar stenosis and a predominant left-sided radiculopathy and what appeared to be mostly an L4 distribution. This lady had undergone a prior surgery in her back with a laminectomy L3 to L5. She has pain in the left lower extremity. Routine conservative measures failed. Imaging demonstrates notable decrease of narrowing centrally, but as well in the neuroforaminal narrowing, L3 to S1. She has degenerative spondylolisthesis. Given the extent of the degeneration and those no signs clinically and radiologically, a revision decompression with spinal fusion offered. Potential benefits and complications reviewed. PROCEDURE:  The patient was brought to the operating room in the supine position and had general anesthetic administered. The patient was placed in the prone position on the Javon type frame and the low back region was then prepped and draped in normal fashion.   Preoperative antibiotics were administered. Thigh-high GENI hose and sequential pumps applied to the patient's lower extremity. An incision made extending from L1 down to the sacrum. Subcutaneous tissue was then divided in line with the incision down to the level of the fascia. The fascia was incised in the midline and a subperiosteal dissection completed over the spinous process and laminar surfaces bilaterally. An x-ray was eventually taken to verify the level. A complete and revision bilateral laminectomy completed at L2 to S1. The L2-S1 nerve roots were then identified and decompressed. There was lot of adhesions in the areas where the patient had prior surgery before. We did not encounter any spinal fluid leaks. Partial laminectomy was completed over each of those nerve roots. Once completed, the roots thought to be free and mobile. Ball probe was passed well into the neuroforaminal zone without difficulty. In a meticulous fashion, I cleaned up the lateral gutters bilaterally. The facet joint capsule and osteophytes about the facet joint resected. I eventually placed drill holes into the pedicle of L3 to L5. Pedicles were cannulated. Markers were placed in the pedicles and x-ray was taken to verify position and direction. A large amount of bone graft was prepared on the side table and soaked in the Proteus material.  The bone graft was then finger packed over the decorticated surfaces to include the transverse processes, the pars interarticularis, the facet joint at each level. Screws were then placed, measuring 6.5 mm in diameter, 40-45 mm in length with good purchase. These were connected with a simone. There was appropriate contour, secured using a locking cap and final  device. Final x-ray taken showing good position of the instrumentation. The wound had been irrigated. The drain was placed. The fascia was closed using #1 Vicryl. Subcutaneous tissues and skin closed using 2-0 and 3-0 Vicryl.   The patient was awoken from the anesthetic, extubated, and taken to recovery in stable condition. It should be noted that Ernesto Jhonny was my [de-identified] assistant throughout the operation. He worked as my primary assistant as there are no interns or residents available in this hospital.  There were no available surgical assistants in the hospital to help me. Rasheed Dress was available for all aspects of the surgery to include the exposure, laminectomy fusion, and wound closure.         MD YNES Garcia/[email or web address]/[email or web address]  D:  02/19/2020 19:57  T:  02/20/2020 5:17  JOB #:  [id number]

## 2020-02-20 NOTE — PROGRESS NOTES
Ortho Spine Daily Progress Note    Date of Surgery:  2020      Patient: Gonzalo Love   YOB: 1960  Age: 61 y.o. SUBJECTIVE:   1 Day Post-Op following REVISION LUMBAR LAMINECTOMY WITH POSTERIOR SPINAL FUSION L2-5    The patient states moderate back pain this morning. The patient states that their pre-operative lower extremity symptoms appear to be improved. The patient rates their current level of pain as 7/10. The patient's post operative pain is adequately controlled. The patient denies CP, SOB, N/V, dizziness, abdominal pain, HA. OBJECTIVE:     Vital Signs:    Temp (24hrs), Av.2 °F (36.8 °C), Min:97.8 °F (36.6 °C), Max:98.8 °F (37.1 °C)      Patient Vitals for the past 24 hrs:   BP Temp Pulse Resp SpO2 Height Weight   20 0140 128/82 98.2 °F (36.8 °C) 85 18 100 % -- --   20 0029 109/69 98.1 °F (36.7 °C) 80 18 96 % -- --   20 2333 121/77 98.2 °F (36.8 °C) 78 18 100 % -- --   20 2241 116/69 97.9 °F (36.6 °C) 79 16 100 % -- --   20 2215 108/84 -- 82 14 100 % -- --   20 2200 130/64 98.1 °F (36.7 °C) 85 15 98 % -- --   20 2145 124/71 -- 85 13 98 % -- --   200 127/69 -- 85 20 93 % -- --   20 112/66 -- 86 15 93 % -- --   205 127/77 -- (!) 104 17 97 % -- --   20 118/69 -- 90 21 96 % -- --   20 125/67 98.8 °F (37.1 °C) 96 18 92 % -- --   20 119/68 -- 89 -- (!) 88 % -- --   20 2054 125/67 -- -- -- (!) 80 % -- --   20 1415 121/82 97.8 °F (36.6 °C) 83 16 98 % 5' 6\" (1.676 m) 84.4 kg (186 lb)           Physical Exam:  General: A&Ox3, NAD. Respiratory: Respirations are unlabored.   Abdomen: S/NT  Surgical site: C,D and I.  Musculoskeletal: Calves are S/NT, Wayne dorsi/plantar flexion/EHL/quads/hip flexion intact, Wayne DP 2+  Neurological:  Wayne LE's NVI    Laboratory Values:             Recent Labs     20  0141   HGB 9.2*   CREA 1.13*   *     Lab Results   Component Value Date/Time    Sodium 138 02/20/2020 01:41 AM    Potassium 4.5 02/20/2020 01:41 AM    Chloride 108 02/20/2020 01:41 AM    CO2 22 02/20/2020 01:41 AM    Glucose 163 (H) 02/20/2020 01:41 AM    BUN 18 02/20/2020 01:41 AM    Creatinine 1.13 (H) 02/20/2020 01:41 AM    Calcium 8.4 (L) 02/20/2020 01:41 AM       Hemovac Output:  380 ml in the last 12 hours. PLAN:     S/P REVISION LUMBAR LAMINECTOMY WITH POSTERIOR SPINAL FUSION L2-5 Mobilize with PT/nursing until discharged. Spine precautions. TLSO when OOB. Will need to contact [facility] to deliver TLSO. May work with PT prior to brace arrival.      Hemodynamics Post op anemia. Tolerating well thus far. Will continue to monitor. Wound Continue dressing changes PRN. Continue hemovac drain and document output. Post Operative Pain Pain Control: D/C PCA, oxycodone PO for pain control. DVT Prophylaxis Continue with SCD'S, Encourage Ankle Pump Exercises, Mobilize. Discharge Disposition Discharge plan: Will focus on pain control and mobilizing with PT/nursing. Case Management for discharge planning. May require Inpt Rehab. Will continue to monitor progress closely. The patient was seen and evaluated by Dr Hodan Lanza who agrees with the findings and treatment plan as outlined above.         Signed By: Lamont Tripp PA-C  February 20, 2020 8:01 AM

## 2020-02-20 NOTE — PERIOP NOTES
TRANSFER - OUT REPORT:    Verbal report given to Myra Altamirano(name) on Jennifere Sessions  being transferred to 536(unit) for routine post - op       Report consisted of patients Situation, Background, Assessment and   Recommendations(SBAR). Time Pre op antibiotic given:1700  Anesthesia Stop time: 2056  Gaspar Present on Transfer to floor:YES  Order for Gaspar on Chart:YES  Discharge Prescriptions with Chart:NO    Information from the following report(s) SBAR, OR Summary, Cardiac Rhythm NSR and Alarm Parameters  was reviewed with the receiving nurse. Opportunity for questions and clarification was provided. Is the patient on 02? YES       L/Min 3       Other 0    Is the patient on a monitor? NO    Is the nurse transporting with the patient? YES    Surgical Waiting Area notified of patient's transfer from PACU? NO      The following personal items collected during your admission accompanied patient upon transfer:   Dental Appliance: Dental Appliances: Uppers(to pacu)  Vision: Visual Aid: Glasses  Hearing Aid:    Jewelry: Jewelry: None  Clothing: Clothing: (clothes to pacu)  Other Valuables:  Other Valuables: Eyeglasses(glasses to pacu)  Valuables sent to safe:

## 2020-02-20 NOTE — PROGRESS NOTES
Problem: Mobility Impaired (Adult and Pediatric)  Goal: *Acute Goals and Plan of Care (Insert Text)  Description  FUNCTIONAL STATUS PRIOR TO ADMISSION: Patient was independent and active without use of DME.    HOME SUPPORT PRIOR TO ADMISSION: Lives with grandchildren (9and 6year old) and son lives next door. She is primary caregiver for kids but son is able to assist.    Physical Therapy Goals  Initiated 2/20/2020    1. Patient will move from supine to sit and sit to supine  in bed with modified independence within 4 days. 2. Patient will perform sit to stand with modified independence within 4 days. 3. Patient will ambulate with modified independence for 200 feet with the least restrictive device within 4 days. 4. Patient will verbalize and demonstrate understanding of spinal precautions (No bending, lifting greater than 5 lbs, or twisting; log-roll technique; frequent repositioning as instructed) within 4 days. Outcome: Progressing Towards Goal  PHYSICAL THERAPY EVALUATION  Patient: Dung Forrester (76 y.o. female)  Date: 2/20/2020  Primary Diagnosis: Spinal stenosis [M48.00]  Procedure(s) (LRB):  REVISION LUMBAR LAMINECTOMY WITH POSTERIOR SPINAL FUSION L2-5 (N/A) 1 Day Post-Op   Precautions:   Back, Fall, Other (comment)(brace when up)      ASSESSMENT  Based on the objective data described below, the patient presents with decreased functional mobility from baseline level of function. Patient currently limited by pain but otherwise moving well. Overall needing [place]-CGA for mobility. Requires cues to maintain back precautions and is awaiting brace arrival.  Patient will likely be safe to DC home with family support at time of DC. Current Level of Function Impacting Discharge (mobility/balance):  CGA for amb without device      Other factors to consider for discharge: at risk for falls, cares for small children at home     Patient will benefit from skilled therapy intervention to address the above noted impairments. PLAN :  Recommendations and Planned Interventions: bed mobility training, transfer training, gait training, therapeutic exercises, patient and family training/education, and therapeutic activities      Frequency/Duration: Patient will be followed by physical therapy:  twice daily to address goals. Recommendation for discharge: (in order for the patient to meet his/her long term goals)  Physical therapy at least 2 days/week in the home VS no services pending progress      IF patient discharges home will need the following DME: none         SUBJECTIVE:   Patient stated I am used to this.     OBJECTIVE DATA SUMMARY:   HISTORY:    Past Medical History:   Diagnosis Date    (HFpEF) heart failure with preserved ejection fraction (HCC)     Arrhythmia     HEART MURMUR -\"HAD IT FOR YEARS AS AN ADULT)    Arthritis     OSTEO    Back pain     Breast cancer screening 6/28/2016    Chronic bilateral low back pain without sciatica 6/28/2016    Diabetes ([address]     Encounter for long-term (current) use of medications 6/28/2016    Essential hypertension with goal blood pressure less than 140/90 6/28/2016    Hypercholesteremia 8/6/2015    Hypercholesterolemia     Hypertension     CONTROLLED WITH MEDS    Ill-defined condition     hyperlipidemia    Other ill-defined conditions(799.89)     high cholesterol    Patient non adherence 1/23/2017    Primary osteoarthritis of left knee 3/2/2018    Type 2 diabetes, uncontrolled, with neuropathy ([address] 1/23/2017    Type II diabetes mellitus, uncontrolled ([address] 8/6/2015     Past Surgical History:   Procedure Laterality Date    CARDIAC SURG PROCEDURE UNLIST      murmur- PT DENIES SURGERY AND MURMUR 6/20/17    HX CARPAL TUNNEL RELEASE      HX GYN      hysterectomy    HX HEENT      Ear surgery    HX HEENT      Left eye    HX ORTHOPAEDIC      left wrist & left elbow    HX ORTHOPAEDIC      Back Surgery x 3    HX ORTHOPAEDIC      right thumb at [address] [facility] REMOVAL OF LT FOOT CYST    HX OTHER SURGICAL      back surgery X 2    HX OTHER SURGICAL      Collapsed left lung    HX OTHER SURGICAL      axillary sweat glands removed    HX OVARIAN CYST REMOVAL      LA OTOPLASTY PROTRUDING EAR W/WO SIZE RDCTJ         Personal factors and/or comorbidities impacting plan of care:     Home Situation  Home Environment: Private residence  # Steps to Enter: 0  One/Two Story Residence: One story  Living Alone: No  Support Systems: Family member(s)  Patient Expects to be Discharged to[de-identified] Private residence  Current DME Used/Available at Home: None    EXAMINATION/PRESENTATION/DECISION MAKING:   Critical Behavior:  Neurologic State: Alert  Orientation Level: Oriented X4  Cognition: Appropriate decision making, Appropriate for age attention/concentration, Appropriate safety awareness     Hearing: Auditory  Auditory Impairment: None    Range Of Motion:  AROM: Generally decreased, functional                       Strength:    Strength: Generally decreased, functional       Functional Mobility:  Bed Mobility:  Rolling: Contact guard assistance  Supine to Sit: Minimum assistance; Additional time;Assist x1  Sit to Supine: Minimum assistance; Additional time;Assist x1  Scooting: Supervision; Additional time  Transfers:  Sit to Stand: Contact guard assistance  Stand to Sit: Contact guard assistance                       Balance:   Sitting: Intact  Standing: Impaired  Standing - Static: Constant support;Good  Standing - Dynamic : Fair  Ambulation/Gait Training:  Distance (ft): 100 Feet (ft)  Assistive Device: Gait belt  Ambulation - Level of Assistance: Contact guard assistance     Gait Description (WDL): Exceptions to WDL  Gait Abnormalities: Decreased step clearance        Base of Support: Widened     Speed/Lisy: Pace decreased (<100 feet/min); Slow  Step Length: Left shortened;Right shortened       Pain Rating:  Reports 3/10 pain in back    Activity Tolerance:   Good and requires rest breaks  Please refer to the flowsheet for vital signs taken during this treatment. After treatment patient left in no apparent distress:   Supine in bed, Call bell within reach, and Caregiver / family present    COMMUNICATION/EDUCATION:   The patients plan of care was discussed with: Physical Therapist, Occupational Therapist, and Registered Nurse. Fall prevention education was provided and the patient/caregiver indicated understanding., Patient/family have participated as able in goal setting and plan of care. , and Patient/family agree to work toward stated goals and plan of care.     Thank you for this referral.  Gray Simmonds, CASANDRA, DPT   Time Calculation: 21 mins

## 2020-02-20 NOTE — PROGRESS NOTES
Problem: Mobility Impaired (Adult and Pediatric)  Goal: *Acute Goals and Plan of Care (Insert Text)  Description  FUNCTIONAL STATUS PRIOR TO ADMISSION: Patient was independent and active without use of DME.    HOME SUPPORT PRIOR TO ADMISSION: Lives with grandchildren (9and 6year old) and son lives next door. She is primary caregiver for kids but son is able to assist.    Physical Therapy Goals  Initiated 2/20/2020    1. Patient will move from supine to sit and sit to supine  in bed with modified independence within 4 days. 2. Patient will perform sit to stand with modified independence within 4 days. 3. Patient will ambulate with modified independence for 200 feet with the least restrictive device within 4 days. 4. Patient will verbalize and demonstrate understanding of spinal precautions (No bending, lifting greater than 5 lbs, or twisting; log-roll technique; frequent repositioning as instructed) within 4 days. 2/20/2020 1516 by David Alva, PT, DPT  Outcome: Progressing Towards Goal  2/20/2020 0949 by David Alva, PT, DPT  Outcome: Progressing Towards Goal  PHYSICAL THERAPY TREATMENT  Patient: Padmini Lara (61 y.o. female)  Date: 2/20/2020  Diagnosis: Spinal stenosis [M48.00]   <principal problem not specified>  Procedure(s) (LRB):  REVISION LUMBAR LAMINECTOMY WITH POSTERIOR SPINAL FUSION L2-5 (N/A) 1 Day Post-Op  Precautions: Back, Fall, Other (comment)(brace when up) No bending, no lifting greater than 5 lbs, no twisting, log-roll technique, repositioning every 20-30 min except when sleeping, brace when OOB (if ordered)  Chart, physical therapy assessment, plan of care and goals were reviewed. ASSESSMENT  Patient continues with skilled PT services and is progressing towards goals. Patient limited mainly by pain but otherwise moving well. Now has brace and has been up in chair. Able to recall all back precautions, to include sitting restriction, without cues.   Amb approx 150 feet with CGA with no overt LOB but moves slowly. Anticipate she will be ready for stair training next session. Current Level of Function Impacting Discharge (mobility/balance): CGA for mobility    Other factors to consider for discharge: at risk for falls         PLAN :  Patient continues to benefit from skilled intervention to address the above impairments. Continue treatment per established plan of care. to address goals. Recommendation for discharge: (in order for the patient to meet his/her long term goals)  Physical therapy at least 2 days/week in the home       IF patient discharges home will need the following DME: none       SUBJECTIVE:   Patient stated I don't want to lay down too much. That was making my back hurt.     OBJECTIVE DATA SUMMARY:   Critical Behavior:  Neurologic State: Alert  Orientation Level: Oriented X4  Cognition: Appropriate decision making, Appropriate for age attention/concentration, Appropriate safety awareness  Safety/Judgement: Awareness of environment, Good awareness of safety precautions    Spinal diagnosis intervention:  The patient stated 3/3 back precautions when prompted. Reviewed all 3 back precautions, log roll technique, and sitting for 30 minutes at a time. Functional Mobility Training:    Bed Mobility:  Log Rolling: Contact guard assistance  Supine to Sit: Minimum assistance; Additional time;Assist x1  Sit to Supine: Minimum assistance; Additional time;Assist x1  Scooting: Supervision; Additional time        Transfers:  Sit to Stand: Contact guard assistance  Stand to Sit: Contact guard assistance                             Balance:  Sitting: Intact  Standing: Impaired  Standing - Static: Good  Standing - Dynamic : Good  Ambulation/Gait Training:  Distance (ft): 150 Feet (ft)  Assistive Device: Gait belt  Ambulation - Level of Assistance: Contact guard assistance     Gait Description (WDL): Exceptions to WDL  Gait Abnormalities: Decreased step clearance        Base of Support: Widened     Speed/Lisy: Pace decreased (<100 feet/min); Shuffled; Slow  Step Length: Left shortened;Right shortened       Pain Ratin/10 pain in back    Activity Tolerance:   Good and requires rest breaks  Please refer to the flowsheet for vital signs taken during this treatment.     After treatment patient left in no apparent distress:   Sitting in chair and Call bell within reach    COMMUNICATION/COLLABORATION:   The patients plan of care was discussed with: Physical Therapist, Occupational Therapist, and Registered Nurse    Miguel Butler, PT, DPT   Time Calculation: 26 mins

## 2020-02-21 ENCOUNTER — HOME HEALTH ADMISSION (OUTPATIENT)
Dept: HOME HEALTH SERVICES | Facility: HOME HEALTH | Age: 60
End: 2020-02-21
Payer: MEDICAID

## 2020-02-21 LAB
GLUCOSE BLD STRIP.AUTO-MCNC: 119 MG/DL (ref 65–100)
GLUCOSE BLD STRIP.AUTO-MCNC: 131 MG/DL (ref 65–100)
GLUCOSE BLD STRIP.AUTO-MCNC: 146 MG/DL (ref 65–100)
GLUCOSE BLD STRIP.AUTO-MCNC: 166 MG/DL (ref 65–100)
HGB BLD-MCNC: 9.3 G/DL (ref 11.5–16)
SERVICE CMNT-IMP: ABNORMAL

## 2020-02-21 PROCEDURE — 74011636637 HC RX REV CODE- 636/637: Performed by: NURSE PRACTITIONER

## 2020-02-21 PROCEDURE — 77010033678 HC OXYGEN DAILY

## 2020-02-21 PROCEDURE — 74011636637 HC RX REV CODE- 636/637: Performed by: PHYSICIAN ASSISTANT

## 2020-02-21 PROCEDURE — 36415 COLL VENOUS BLD VENIPUNCTURE: CPT

## 2020-02-21 PROCEDURE — 65270000029 HC RM PRIVATE

## 2020-02-21 PROCEDURE — 51798 US URINE CAPACITY MEASURE: CPT

## 2020-02-21 PROCEDURE — 94760 N-INVAS EAR/PLS OXIMETRY 1: CPT

## 2020-02-21 PROCEDURE — 85018 HEMOGLOBIN: CPT

## 2020-02-21 PROCEDURE — 82962 GLUCOSE BLOOD TEST: CPT

## 2020-02-21 PROCEDURE — 74011250637 HC RX REV CODE- 250/637: Performed by: PHYSICIAN ASSISTANT

## 2020-02-21 RX ORDER — OXYCODONE HYDROCHLORIDE 5 MG/1
5 TABLET ORAL
Qty: 50 TAB | Refills: 0 | Status: SHIPPED | OUTPATIENT
Start: 2020-02-21 | End: 2020-02-28

## 2020-02-21 RX ORDER — POLYETHYLENE GLYCOL 3350 17 G/17G
17 POWDER, FOR SOLUTION ORAL
Qty: 30 PACKET | Refills: 0 | Status: SHIPPED | OUTPATIENT
Start: 2020-02-21 | End: 2022-01-06

## 2020-02-21 RX ORDER — AMOXICILLIN 250 MG
1 CAPSULE ORAL 2 TIMES DAILY
Qty: 60 TAB | Refills: 0 | Status: SHIPPED | OUTPATIENT
Start: 2020-02-21 | End: 2022-10-11

## 2020-02-21 RX ADMIN — AMLODIPINE BESYLATE 10 MG: 5 TABLET ORAL at 10:07

## 2020-02-21 RX ADMIN — LOSARTAN POTASSIUM 25 MG: 25 TABLET ORAL at 21:44

## 2020-02-21 RX ADMIN — Medication 10 ML: at 07:02

## 2020-02-21 RX ADMIN — OXYCODONE 10 MG: 5 TABLET ORAL at 23:02

## 2020-02-21 RX ADMIN — HUMAN INSULIN 13 UNITS: 100 INJECTION, SUSPENSION SUBCUTANEOUS at 10:07

## 2020-02-21 RX ADMIN — OXYCODONE 10 MG: 5 TABLET ORAL at 06:55

## 2020-02-21 RX ADMIN — INSULIN LISPRO 8 UNITS: 100 INJECTION, SOLUTION INTRAVENOUS; SUBCUTANEOUS at 17:17

## 2020-02-21 RX ADMIN — SENNOSIDES AND DOCUSATE SODIUM 1 TABLET: 8.6; 5 TABLET ORAL at 17:17

## 2020-02-21 RX ADMIN — HUMAN INSULIN 10 UNITS: 100 INJECTION, SUSPENSION SUBCUTANEOUS at 21:43

## 2020-02-21 RX ADMIN — POLYETHYLENE GLYCOL 3350 17 G: 17 POWDER, FOR SOLUTION ORAL at 10:07

## 2020-02-21 RX ADMIN — INSULIN LISPRO 8 UNITS: 100 INJECTION, SOLUTION INTRAVENOUS; SUBCUTANEOUS at 07:01

## 2020-02-21 RX ADMIN — Medication 5 ML: at 14:00

## 2020-02-21 RX ADMIN — SENNOSIDES AND DOCUSATE SODIUM 1 TABLET: 8.6; 5 TABLET ORAL at 10:07

## 2020-02-21 RX ADMIN — Medication 10 ML: at 21:47

## 2020-02-21 RX ADMIN — INSULIN LISPRO 3 UNITS: 100 INJECTION, SOLUTION INTRAVENOUS; SUBCUTANEOUS at 06:49

## 2020-02-21 RX ADMIN — OXYCODONE 10 MG: 5 TABLET ORAL at 17:17

## 2020-02-21 RX ADMIN — SPIRONOLACTONE 25 MG: 25 TABLET ORAL at 10:07

## 2020-02-21 RX ADMIN — INSULIN LISPRO 8 UNITS: 100 INJECTION, SOLUTION INTRAVENOUS; SUBCUTANEOUS at 12:00

## 2020-02-21 RX ADMIN — DULOXETINE HYDROCHLORIDE 60 MG: 60 CAPSULE, DELAYED RELEASE ORAL at 21:44

## 2020-02-21 RX ADMIN — ROSUVASTATIN 10 MG: 10 TABLET, FILM COATED ORAL at 21:44

## 2020-02-21 NOTE — PROGRESS NOTES
Physical Therapy  2/21/2020    Chart reviewed and followed up w/ from AM.  Pt received in bed w/ HOB elevated. Encouraged OOB activity this afternoon, however pt declining at this time d/t discomfort/pain. Pt reported she was preparing to turn to her side and declined assist from writer when offered. WIll f/u tomorrow for PT. Encourage pt to be OOB>chair w/ NSG assist for all meals over weekend.      Alexandria Means, PTA

## 2020-02-21 NOTE — PROGRESS NOTES
Physical Therapy    Chart reviewed. Attempted to see pt for treatment. Per OT, pt up all night toileting and is tired. Will follow up later this morning/afternoon as able and appropriate.      Beatrice Welch, INESSA

## 2020-02-21 NOTE — PROGRESS NOTES
Bedside and Verbal shift change report given to Yakelin Burgess RN (oncoming nurse) by Jd Escamilla RN (offgoing nurse).  Report included the following information SBAR, Kardex, Intake/Output, MAR and Recent Results.

## 2020-02-21 NOTE — PROGRESS NOTES
Problem: Falls - Risk of  Goal: *Absence of Falls  Description  Document Patricia Latin Fall Risk and appropriate interventions in the flowsheet. Outcome: Progressing Towards Goal  Note: Fall Risk Interventions:  Mobility Interventions: Communicate number of staff needed for ambulation/transfer         Medication Interventions: Evaluate medications/consider consulting pharmacy    Elimination Interventions: Call light in reach    History of Falls Interventions: Evaluate medications/consider consulting pharmacy    Plan of care reviewed.

## 2020-02-21 NOTE — DIABETES MGMT
CYNDIE MANDUJANO  CLINICAL NURSE SPECIALIST   Followup Progress Note    Presentation   Camila Mccatrhy is a 61 y.o. female admitted with s/p a revision of a lumbar laminectomy with posterior spinal fusion L2-L5 and consulted by Provider for advanced specialty nursing care related to inpatient diabetes management. Hyperglycemia management order set is in place. Subjective   \"I am in pain today and I'm eating so-so. \"    Objective   Physical exam    General Alert, oriented and in acute pain. Conversant and cooperative  Vital Signs   Visit Vitals  /73   Pulse 93   Temp 98.8 °F (37.1 °C)   Resp 18   Ht 5' 6\" (1.676 m)   Wt 84.4 kg (186 lb)   SpO2 92%   BMI 30.02 kg/m²     Skin  Warm and dry  Heart   Regular rate and rhythm. No murmurs, rubs or gallops  Lungs  Clear to auscultation without rales or rhonchi  Extremities No foot wounds      Blood glucose pattern      Assessment and Plan   Nursing Diagnosis Risk for unstable blood glucose pattern   Nursing Intervention Domain 5258 Decision-making Support   Nursing Interventions Examined current inpatient diabetes control   Explored factors facilitating and impeding inpatient management  Identified self-management practices impeding diabetes control  Explored corrective strategies with patient and responsible inpatient provider   Informed patient of rational for basal bolus insulin strategy while hospitalized     Evaluation   This woman, with controlled type 2 diabetes on levemir and Humalog, has achieved inpatient blood glucose target of 100-180mg/dl on 13 units NPH and Humalog. I suspect she does miss a few doses on short or long acting insulin. She was started on a lower dose of NPH- 15 units BID as compared to her home dose of 25 Levamir BID. Her evening NPH dose was held for a blood glucose of 103. Her fasting blood glucose was 147 today.       Inpatient blood glucose management has been impacted by  [x]?         Erratic meal consumption- consumed about 50% of her breakfast today. Recommendations     Recommend:  1. Adjust Basal insulin   [x]? 10 units NPH BID  If fasting blood glucose is less than 100, back off basal dose by 10%  Ok to give with a normal blood glucose. Blood glucose elevation of 45 points over night with no long acting coverage- every dose of NPH will cover for 12 hours. Blood glucose goal 100-180.       2. Adjust bolus insulin to 8 units Humalog with meals  If patient consumes less than 50% of carbohydrates on meal tray, hold mealtime insulin. Ok to give a snack before bed.      3. Continue corrective insulin     4. Discontinue Metformin while in the inpatient setting.     On Discharge:  May resume home diabetes regimen   Billing Code(s)     [address] [facility], [facility]

## 2020-02-21 NOTE — DISCHARGE INSTRUCTIONS
After Hospital Care Plan:  Discharge Instructions Lumbar Fusion Surgery   Dr. Delores Dakins  Patient Name:  Shantanu Nuñez    Date of procedure:  2/19/2020  Date of discharge: 2/23/2020    Procedure:  Procedure(s):  REVISION LUMBAR LAMINECTOMY WITH POSTERIOR SPINAL FUSION L2-5    PCP: Lacho James NP    Follow up appointments  -Follow up with Dr. Delores Dakins in 2 weeks. Call Dr. Baldomero Smith at [phone], Ext [phone], once you get home from the hospital to make an appointment for your 2 week postoperative visit. Home Health Agency: ____________________   phone: _______________________  The agency will contact you to arrange dates/times for visits. Please call them if you do not hear from them within 24 hours after you are discharged  Physical therapy 3 times a week for 3 weeks  Nursing-initial assessment and as needed    When to call your Orthopaedic Surgeon:  -Signs of infection-if your incision is red; continues to have drainage; drainage has a foul odor or if you have a persistent fever over 101 degrees for 24 hours  -Nausea or vomiting, severe headache  -Loss of bowel or bladder function, inability to urinate  -Changes in sensation in your arms or legs (numbness, tingling, loss of color)  -Increased weakness-greater than before your surgery  -Severe pain or pain not relieved by medications  -Signs of a blood clot in your leg-calf pain, tenderness, redness, swelling of lower leg    When to call your Primary Care Physician:  -Concerns about medical conditions such as diabetes, high blood pressure, asthma, congestive heart failure  -Call if blood sugars are elevated, persistent headache or dizziness, coughing or congestion, constipation or diarrhea, burning with urination, abnormal heart rate    When to call 911 and go to the nearest emergency room:  -Acute onset of chest pain, shortness of breath, difficulty breathing    Activity  -You are going home a well person, be as active as possible. Your only exercise should be walking. Start with short frequent walks and increase your walking distance each day.  -Limit the amount of time you sit to 20-30 minute intervals. Sitting for prolonged periods of time will be uncomfortable for you following surgery.  -Do NOT lift anything over 5 pounds  -Do NOT do any straining, twisting or bending  -When you are in bed, you may lay on your back or on either side. Do NOT lie on your stomach    Brace  -If you have a back brace, you should wear your brace at all times when you are out of bed. Do not wear the brace while in bed or showering.  -Remember to always wear a cotton t-shirt underneath your brace.  -Do not bend or twist when your brace is off. Diet  -Resume usual diet; drink plenty of fluids; eat foods high in fiber.  -It is important to have regular bowel movements. Pain medications may cause constipation. You may want to take a stool softener (such as Senokot-S or Colace) to prevent constipation.   -If constipation occurs, take a laxative (such as Dulcolax tablets, Milk of Magnesia, or a suppository). Laxatives should only be used if the above preventable measures have failed and you still have not had a bowel movement after three days. Driving  -You may not drive or return to work until instructed by your physician. However, you may ride in the car for short periods of time. Incision Care  -You may take brief showers but do not run the water run directly onto the wound. After showering or bathing, remove the wet dressing and place a new dry dressing.  -Do not rub or apply any lotions or ointments to your incision site.   -Do not soak or scrub your wound  -A new dressing has been applied to your incision prior to discharge. This dressing needs to stay in place for 6 days after being discharged from the hospital.  After 6 days, the dressing may be removed and the incision left open to the air.   Have your caregiver wash their hands thoroughly before changing your dressing. If the dressing becomes saturated, please call the office. Showering  -You may shower in approximately 4 days after your surgery.    -Leave the dressing on during your shower. Do NOT allow the water to run directly onto your dressing. Once you get out of the shower, place a new, dry dressing.  -Reminder- your brace can be removed while showering. Remember to not bend or twist while your brace is off.    -Do not take a tub bath. Preventing blood clots  -You have been given T.E.D. stockings to wear. Continue to wear these for 7 days after your discharge. Put them on in the morning and take them off at night.    -They are used to increase your circulation and prevent blood clots from forming in your legs  -T. E.D. stockings can be machine washed, temperature not to exceed 160° F (71°C) and machine dried for 15 to 20 minutes, temperature not to exceed 250° F (121°C). Pain management  -Take pain medication as prescribed; decrease the amount you use as your pain lessens. -DO not wait until you are in extreme pain to take your medication.  -Avoid alcoholic beverages while taking pain medication    Pain Medication Safety  DO:  -Read the Medication Guide   -Take your medicine exactly as prescribed   -Store your medicine away from children and in a safe place   -Flush unused medicine down the toilet   -Call your healthcare provider for medical advice about side effects. You may report side effects to FDA at 3-854-FDA-8693.   -Please be aware that many medications contain Tylenol. We do not want you to over medicate so please read the information below as a guide. Do not take more than 4 Grams of Tylenol in a 24 hour period.   (There are 1000 milligrams in one Gram) Percocet contains 325 mg of Tylenol per tablet (do not take more than 12 tablets in 24 hours)  Lortab contains 500 mg of Tylenol per tablet (do not take more than 8 tablets in 24 hours)  Norco contains 325 mg of Tylenol per tablet (do not take more than 12 tablets in 24 hours). DO NOT:  -Do not give your medicine to others   -Do not take medicine unless it was prescribed for you   -Do not stop taking your medicine without talking to your healthcare provider   -Do not break, chew, crush, dissolve, or inject your medicine. If you cannot swallow your medicine whole, talk to your healthcare provider.  -Do not drink alcohol while taking this medicine  -Do not take anti-inflammatory medications or aspirin unless instructed by your     physician.

## 2020-02-21 NOTE — PROGRESS NOTES
Ortho Spine Daily Progress Note    Date of Surgery:  2020      Patient: Ronaldo Moran   YOB: 1960  Age: 61 y.o. SUBJECTIVE:   2 Days Post-Op following REVISION LUMBAR LAMINECTOMY WITH POSTERIOR SPINAL FUSION L2-5    The patient states significant back pain this morning. The patient states that their pre-operative lower extremity symptoms appear to be improved. The patient rates their current level of pain as 8/10. The patient's post operative pain is adequately controlled. Has been making steady gains mobilizing with PT. The patient denies CP, SOB, N/V, dizziness, abdominal pain, HA. OBJECTIVE:     Vital Signs:    Temp (24hrs), Av.1 °F (37.3 °C), Min:98.5 °F (36.9 °C), Max:99.9 °F (37.7 °C)      Patient Vitals for the past 24 hrs:   BP Temp Pulse Resp SpO2   20 0752 110/73 98.8 °F (37.1 °C) 93 18 92 %   20 0225 122/72 99.4 °F (37.4 °C) (!) 105 18 90 %   20 2031 120/69 99.9 °F (37.7 °C) 100 18 92 %   20 1500 119/73 98.5 °F (36.9 °C) 92 18 93 %   20 1016 -- -- 100 -- 100 %   20 0906 115/70 99 °F (37.2 °C) 96 16 100 %           Physical Exam:  General: A&Ox3, NAD. Respiratory: Respirations are unlabored. Abdomen: S/NT  Surgical site: C,D and I.  Musculoskeletal: Calves are S/NT, Wayne dorsi/plantar flexion/EHL/quads/hip flexion intact, Wayne DP 2+  Neurological:  Wayne LE's NVI    Laboratory Values:             Recent Labs     20  0331 20  0141   HGB 9.3* 9.2*   CREA  --  1.13*   GLU  --  163*     Lab Results   Component Value Date/Time    Sodium 138 2020 01:41 AM    Potassium 4.5 2020 01:41 AM    Chloride 108 2020 01:41 AM    CO2 22 2020 01:41 AM    Glucose 163 (H) 2020 01:41 AM    BUN 18 2020 01:41 AM    Creatinine 1.13 (H) 2020 01:41 AM    Calcium 8.4 (L) 2020 01:41 AM       Hemovac Output:   30 ml in the last 12 hours.     PLAN:     S/P REVISION LUMBAR LAMINECTOMY WITH POSTERIOR SPINAL FUSION L2-5 Mobilize with PT/nursing until discharged. Spine precautions. TLSO when OOB. Hemodynamics Post op anemia. Tolerating well thus far. Will continue to monitor. Wound Continue dressing changes PRN. D/C hemovac. Post Operative Pain Pain Control: Adequate, continue current regimen. DVT Prophylaxis Continue with SCD'S, Encourage Ankle Pump Exercises, Mobilize. Discharge Disposition Discharge plan: Will focus on pain control and mobilizing with PT/nursing. Case Management for discharge planning. Plan on home with HH/OT on Sunday of progressing well and pain adequately controlled. Will continue to monitor progress closely.                  Signed By: Mary Lopes PA-C  February 21, 2020 8:02 AM

## 2020-02-21 NOTE — PROGRESS NOTES
AIEDN:  1. [facility] accepted. 2. Transportation; Family. Patient provided cm with home health choice of [address]. Referral sent, they can accept patient.     Mel Zambrano [facility]

## 2020-02-22 LAB
GLUCOSE BLD STRIP.AUTO-MCNC: 105 MG/DL (ref 65–100)
GLUCOSE BLD STRIP.AUTO-MCNC: 127 MG/DL (ref 65–100)
GLUCOSE BLD STRIP.AUTO-MCNC: 144 MG/DL (ref 65–100)
GLUCOSE BLD STRIP.AUTO-MCNC: 84 MG/DL (ref 65–100)
SERVICE CMNT-IMP: ABNORMAL
SERVICE CMNT-IMP: NORMAL

## 2020-02-22 PROCEDURE — 97535 SELF CARE MNGMENT TRAINING: CPT

## 2020-02-22 PROCEDURE — 65270000029 HC RM PRIVATE

## 2020-02-22 PROCEDURE — 82962 GLUCOSE BLOOD TEST: CPT

## 2020-02-22 PROCEDURE — 74011636637 HC RX REV CODE- 636/637: Performed by: PHYSICIAN ASSISTANT

## 2020-02-22 PROCEDURE — 51798 US URINE CAPACITY MEASURE: CPT

## 2020-02-22 PROCEDURE — 74011636637 HC RX REV CODE- 636/637: Performed by: NURSE PRACTITIONER

## 2020-02-22 PROCEDURE — 97116 GAIT TRAINING THERAPY: CPT

## 2020-02-22 PROCEDURE — 74011250637 HC RX REV CODE- 250/637: Performed by: PHYSICIAN ASSISTANT

## 2020-02-22 RX ADMIN — POLYETHYLENE GLYCOL 3350 17 G: 17 POWDER, FOR SOLUTION ORAL at 09:48

## 2020-02-22 RX ADMIN — HUMAN INSULIN 5 UNITS: 100 INJECTION, SUSPENSION SUBCUTANEOUS at 22:46

## 2020-02-22 RX ADMIN — INSULIN LISPRO 8 UNITS: 100 INJECTION, SOLUTION INTRAVENOUS; SUBCUTANEOUS at 06:47

## 2020-02-22 RX ADMIN — DULOXETINE HYDROCHLORIDE 60 MG: 60 CAPSULE, DELAYED RELEASE ORAL at 22:47

## 2020-02-22 RX ADMIN — INSULIN LISPRO 8 UNITS: 100 INJECTION, SOLUTION INTRAVENOUS; SUBCUTANEOUS at 12:37

## 2020-02-22 RX ADMIN — INSULIN LISPRO 8 UNITS: 100 INJECTION, SOLUTION INTRAVENOUS; SUBCUTANEOUS at 18:44

## 2020-02-22 RX ADMIN — LOSARTAN POTASSIUM 25 MG: 25 TABLET ORAL at 22:47

## 2020-02-22 RX ADMIN — Medication 10 ML: at 06:36

## 2020-02-22 RX ADMIN — SPIRONOLACTONE 25 MG: 25 TABLET ORAL at 09:47

## 2020-02-22 RX ADMIN — SENNOSIDES AND DOCUSATE SODIUM 1 TABLET: 8.6; 5 TABLET ORAL at 18:43

## 2020-02-22 RX ADMIN — OXYCODONE 10 MG: 5 TABLET ORAL at 02:54

## 2020-02-22 RX ADMIN — INSULIN LISPRO 3 UNITS: 100 INJECTION, SOLUTION INTRAVENOUS; SUBCUTANEOUS at 06:46

## 2020-02-22 RX ADMIN — Medication 10 ML: at 15:28

## 2020-02-22 RX ADMIN — HUMAN INSULIN 10 UNITS: 100 INJECTION, SUSPENSION SUBCUTANEOUS at 09:51

## 2020-02-22 RX ADMIN — AMLODIPINE BESYLATE 10 MG: 5 TABLET ORAL at 09:47

## 2020-02-22 RX ADMIN — OXYCODONE 10 MG: 5 TABLET ORAL at 15:23

## 2020-02-22 RX ADMIN — OXYCODONE 10 MG: 5 TABLET ORAL at 06:35

## 2020-02-22 RX ADMIN — SENNOSIDES AND DOCUSATE SODIUM 1 TABLET: 8.6; 5 TABLET ORAL at 09:47

## 2020-02-22 RX ADMIN — Medication 10 ML: at 22:48

## 2020-02-22 NOTE — PROGRESS NOTES
Ortho Spine Daily Progress Note    Date of Surgery:  2020      Patient: Tracie Burgos   YOB: 1960  Age: 61 y.o. SUBJECTIVE:   3 Days Post-Op following REVISION LUMBAR LAMINECTOMY WITH POSTERIOR SPINAL FUSION L2-5    Back pain improved today. The patient's post operative pain is adequately controlled. Has been making steady gains mobilizing with PT. The patient denies CP, SOB, N/V, dizziness, abdominal pain, HA. OBJECTIVE:     Vital Signs:    Temp (24hrs), Av.6 °F (37.6 °C), Min:98.8 °F (37.1 °C), Max:100.4 °F (38 °C)      Patient Vitals for the past 24 hrs:   BP Temp Pulse Resp SpO2   20 0236 116/73 99.6 °F (37.6 °C) (!) 116 18 91 %   207 123/69 100.4 °F (38 °C) (!) 105 18 97 %   20 1442 110/68 99.5 °F (37.5 °C) (!) 113 18 94 %   20 0752 110/73 98.8 °F (37.1 °C) 93 18 92 %           Physical Exam:  General: A&Ox3, NAD. Respiratory: Respirations are unlabored. Abdomen: S/NT  Surgical site: C,D and I.  Musculoskeletal: Calves are S/NT, Wayne dorsi/plantar flexion/EHL/quads/hip flexion intact, Wayne DP 2+  Neurological:  Wayne LE's NVI    Laboratory Values:             Recent Labs     20  0331 20  0141   HGB 9.3* 9.2*   CREA  --  1.13*   GLU  --  163*     Lab Results   Component Value Date/Time    Sodium 138 2020 01:41 AM    Potassium 4.5 2020 01:41 AM    Chloride 108 2020 01:41 AM    CO2 22 2020 01:41 AM    Glucose 163 (H) 2020 01:41 AM    BUN 18 2020 01:41 AM    Creatinine 1.13 (H) 2020 01:41 AM    Calcium 8.4 (L) 2020 01:41 AM         PLAN:     S/P REVISION LUMBAR LAMINECTOMY WITH POSTERIOR SPINAL FUSION L2-5 Mobilize with PT/nursing until discharged. Spine precautions. TLSO when OOB. Hemodynamics Post op anemia. Tolerating well thus far. Will continue to monitor. Wound Continue dressing changes PRN. Hemovac DCd. Post Operative Pain Pain Control: Adequate, continue current regimen. DVT Prophylaxis Continue with SCD'S, Encourage Ankle Pump Exercises, Mobilize. Discharge Disposition Discharge plan: Will focus on pain control and mobilizing with PT/nursing. Case Management for discharge planning. Plan on home with HH/OT on Sunday of progressing well and pain adequately controlled. Will continue to monitor progress closely.                  Signed By: Leona Hebert DO  February 22, 2020 8:02 AM

## 2020-02-22 NOTE — PROGRESS NOTES
Bedside shift change report given to [facility] (oncoming nurse) by Efrem Bateman (offgoing nurse). Report included the following information Kardex, MAR and Recent Results.

## 2020-02-22 NOTE — PROGRESS NOTES
Occupational Therapy  OT tx completed at S level with AE; pain 8/10 but declining pain meds. Full note to follow.  Kana Nunes OTR/L

## 2020-02-22 NOTE — PROGRESS NOTES
Problem: Self Care Deficits Care Plan (Adult)  Goal: *Acute Goals and Plan of Care (Insert Text)  Description  FUNCTIONAL STATUS PRIOR TO ADMISSION: Independent with all ADLs, takes care of grandchildren 9 and 8 y.o.s. HOME SUPPORT: The patient lived with son and grandchildren. He can provide assistance PRN, works full time. Occupational Therapy Goals  Initiated 2/20/2020    1. Patient will perform lower body dressing with modified independence using AE PRN within 4 days. 2.  Patient will perform toileting with modified independence using most appropriate DME within 4 days. 3.  Patient will perform gathering ADL items without cues back precautions with RW at modified independence within 4 days. 4.  Patient will don/doff back brace at modified independence within 4 days. 5.  Patient will verbalize/demonstrate 3/3 back precautions during ADL tasks without cues within 4 days. Outcome: Progressing Towards Goal   OCCUPATIONAL THERAPY TREATMENT  Patient: [facility] (20 y.o. female)  Date: 2/22/2020  Diagnosis: Spinal stenosis [M48.00]   <principal problem not specified>  Procedure(s) (LRB):  REVISION LUMBAR LAMINECTOMY WITH POSTERIOR SPINAL FUSION L2-5 (N/A) 3 Days Post-Op  Precautions: Fall, Spinal, Contact(Brace when up)  Chart, occupational therapy assessment, plan of care, and goals were reviewed. ASSESSMENT  Patient continues with skilled OT services and is progressing towards goals. Sleeping soundly upon approach but willing to work with therapy briefly despite pain and fatigue.  On target for discharge to home with [facility] to maximize safety with back precautions during ADLs and IADLs     Current Level of Function Impacting Discharge (ADLs): able to initiate training LE AE including reacher, LSH, sock aide and dressing stick; recommend full hip kit, shower bench and BSC to maximize safety and back precautions with lum dawson.     Other factors to consider for discharge: good awareness of back precautions         PLAN :  Patient continues to benefit from skilled intervention to address the above impairments. Continue treatment per established plan of care. to address goals. Recommend with staff: out of bed all meals, walk to bathroom with RW and staff    Recommend next OT session: toileting safety training with back precautions and tub shower transfer recommendations    Recommendation for discharge: (in order for the patient to meet his/her long term goals)  Occupational therapy at least 2 days/week in the home     This discharge recommendation:  Has not yet been discussed the attending provider and/or case management    IF patient discharges home will need the following DME: bedside commode, transfer bench, and walker: rolling; hip kit; toilet aide       SUBJECTIVE:   Patient stated I am really tired.     OBJECTIVE DATA SUMMARY:   Cognitive/Behavioral Status:  Neurologic State: Alert  Orientation Level: Oriented X4  Cognition: Appropriate decision making; Appropriate for age attention/concentration; Appropriate safety awareness; Follows commands  Perception: Appears intact  Perseveration: No perseveration noted  Safety/Judgement: Fall prevention; Insight into deficits; Awareness of environment    Functional Mobility and Transfers for ADLs:  Bed Mobility:  Rolling: Supervision  Supine to Sit: Supervision; Additional time  Sit to Supine: Supervision; Additional time    Transfers:  Sit to Stand: Contact guard assistance          Balance:  Sitting: Intact  Standing: Intact; With support  Standing - Static: Good;Constant support  Standing - Dynamic : Good;Constant support(RW)    ADL Intervention:                           Lower Body Dressing Assistance  Dressing Assistance: Supervision  Socks: Supervision(use of sock aide)  Position Performed: Seated edge of bed         Cognitive Retraining  Attention to Task: Single task  Maintains Attention For (Time): Greater than 10 minutes  Following Commands: Follows one step commands/directions; Follows two step commands/directions  Safety/Judgement: Fall prevention; Insight into deficits; Awareness of environment    The patient recalled and demonstrated 3/3 back precautions. Reviewed all 3 with patient. Bathing: Patient instructed and indicated understanding when bathing to not submerge wound in water, stand to shower or sponge bathe, cover wound with plastic and tape to ensure no water reaches bandage/wound without cues. Dressing brace: Patient instructed and demonstrated to don/doff velcro on brace using dominant side, keeping non-dominant side intact. Patient instructed and demonstrated in meantime of being able to stand with back against wall to don/doff brace, to don/doff seated using lap and bed/chair surface to support brace while manipulating. Dressing lower body: Patient instructed to don brace first and on the benefits to remain seated to don all clothing to increase independence with precautions and pain management. Toileting: Patient instructed on the benefits of using flushable wet wipes and toilet tongs if decreased reach or pain for clark care. Also, the benefits of a reacher to aid in clothing management. Patient instructed and indicated understanding the benefits of maintaining activity tolerance, functional mobility, and independence with self care tasks during acute stay  to ensure safe return home and to baseline. Encouraged patient to increase frequency and duration OOB, not sitting longer than 30 mins without marching/walking with staff, be out of bed for all meals, perform daily ADLs (as approved by RN/MD regarding bathing etc), and performing functional mobility to/from bathroom.  Patient instruction and indicated understanding on body mechanics, ergonomics and gravitational force on the spine during different body positions to plan activities in prep for return home to complete instrumental ADLs and back to work safely. Pain:  5/10, lumbar dawson site; pain management reviewed    Activity Tolerance:   Fair  Please refer to the flowsheet for vital signs taken during this treatment.     After treatment patient left in no apparent distress:   Supine in bed, Call bell within reach, and Side rails x 3    COMMUNICATION/COLLABORATION:   The patients plan of care was discussed with: Physical Therapist and Registered Nurse    Claudeen Curl OTR/L  Time Calculation: 33 mins

## 2020-02-22 NOTE — PROGRESS NOTES
PHYSICAL THERAPY TREATMENT  Patient: Sam Alexandre (71 y.o. female)  Date: 2/22/2020  Diagnosis: Spinal stenosis [M48.00]   <principal problem not specified>  Procedure(s) (LRB):  REVISION LUMBAR LAMINECTOMY WITH POSTERIOR SPINAL FUSION L2-5 (N/A) 3 Days Post-Op  Precautions: Back, Fall, Other (comment)(brace when up) No bending, no lifting greater than 5 lbs, no twisting, log-roll technique, repositioning every 20-30 min except when sleeping, brace when OOB (if ordered)  Chart, physical therapy assessment, plan of care and goals were reviewed. ASSESSMENT  Patient continues with skilled PT services and is progressing towards goals. Patient with noted improvement in functional mobility on this date. Patient requires cga for bed mobility. Attempted to have patient sit to stand from EOB while pushing up with UEs however she chose to pull up from RW stating this was how she preferred to do it. Physical therapist educated patient that it was safer to push up from bed vs pull on RW. She verbalized understanding. Gait training x 175 ft with RW and cga. Patient ambulates with slow jefesron but appears steadier than previous therapy sessions. Patient initially reported 9/10 pain but rated pain as 5/10 after activity. Patient educated on importance of frequent movement especially when she returns home inorder to prevent stiffness and increased pain. She verbalized understanding. Current Level of Function Impacting Discharge (mobility/balance): cga for bed mobility, transfers and gait training    Other factors to consider for discharge: Pt lives with her grandchildren but her son will be available to assist as needed          PLAN :  Patient continues to benefit from skilled intervention to address the above impairments. Continue treatment per established plan of care. to address goals.     Recommendation for discharge: (in order for the patient to meet his/her long term goals)  Physical therapy at least 2 days/week in the home AND ensure assist and/or supervision for safety with functional mobility      This discharge recommendation:  Has been made in collaboration with the attending provider and/or case management    IF patient discharges home will need the following DME: rolling walker- states she has one at home that she is borrowing        SUBJECTIVE:   Patient stated I feel better when I am up and moving.     OBJECTIVE DATA SUMMARY:   Critical Behavior:  Neurologic State: Alert  Orientation Level: Oriented X4  Cognition: Follows commands  Safety/Judgement: Awareness of environment, Good awareness of safety precautions    Spinal diagnosis intervention:  The patient stated 3/3 back precautions when prompted. Reviewed all 3 back precautions, log roll technique, and sitting for 30 minutes at a time. Functional Mobility Training:    Bed Mobility:  Log    Supine to Sit: Supervision; Additional time              Transfers:  Sit to Stand: Contact guard assistance                                Balance:  Sitting: Intact  Standing: Intact; With support  Ambulation/Gait Training:  Distance (ft): 175 Feet (ft)  Assistive Device: Gait belt;Walker, rolling  Ambulation - Level of Assistance: Contact guard assistance        Gait Abnormalities: Decreased step clearance        Base of Support: Widened     Speed/Lisy: Slow                       Stairs: Therapeutic Exercises:     Pain Rating:      Activity Tolerance:   Fair  Please refer to the flowsheet for vital signs taken during this treatment.     After treatment patient left in no apparent distress:   Sitting in chair educated to call the nurse when she wanted to return to bed, educated patient on need for continued assistance and supervision during all mobility at this time      COMMUNICATION/COLLABORATION:   The patients plan of care was discussed with: Registered Nurse    Magy Welsh, PT   Time Calculation: 18 mins

## 2020-02-23 VITALS
SYSTOLIC BLOOD PRESSURE: 113 MMHG | HEIGHT: 66 IN | RESPIRATION RATE: 20 BRPM | BODY MASS INDEX: 29.89 KG/M2 | DIASTOLIC BLOOD PRESSURE: 67 MMHG | WEIGHT: 186 LBS | TEMPERATURE: 98.2 F | HEART RATE: 105 BPM | OXYGEN SATURATION: 94 %

## 2020-02-23 LAB
GLUCOSE BLD STRIP.AUTO-MCNC: 141 MG/DL (ref 65–100)
GLUCOSE BLD STRIP.AUTO-MCNC: 169 MG/DL (ref 65–100)
SERVICE CMNT-IMP: ABNORMAL
SERVICE CMNT-IMP: ABNORMAL

## 2020-02-23 PROCEDURE — 82962 GLUCOSE BLOOD TEST: CPT

## 2020-02-23 PROCEDURE — 97116 GAIT TRAINING THERAPY: CPT

## 2020-02-23 PROCEDURE — 74011250637 HC RX REV CODE- 250/637: Performed by: PHYSICIAN ASSISTANT

## 2020-02-23 PROCEDURE — 74011636637 HC RX REV CODE- 636/637: Performed by: PHYSICIAN ASSISTANT

## 2020-02-23 PROCEDURE — 74011636637 HC RX REV CODE- 636/637: Performed by: NURSE PRACTITIONER

## 2020-02-23 RX ADMIN — SENNOSIDES AND DOCUSATE SODIUM 1 TABLET: 8.6; 5 TABLET ORAL at 09:10

## 2020-02-23 RX ADMIN — INSULIN LISPRO 2 UNITS: 100 INJECTION, SOLUTION INTRAVENOUS; SUBCUTANEOUS at 07:09

## 2020-02-23 RX ADMIN — Medication 10 ML: at 07:03

## 2020-02-23 RX ADMIN — AMLODIPINE BESYLATE 10 MG: 5 TABLET ORAL at 09:10

## 2020-02-23 RX ADMIN — POLYETHYLENE GLYCOL 3350 17 G: 17 POWDER, FOR SOLUTION ORAL at 09:10

## 2020-02-23 RX ADMIN — ROSUVASTATIN 10 MG: 10 TABLET, FILM COATED ORAL at 00:06

## 2020-02-23 RX ADMIN — SPIRONOLACTONE 25 MG: 25 TABLET ORAL at 09:10

## 2020-02-23 RX ADMIN — Medication 10 ML: at 00:07

## 2020-02-23 RX ADMIN — Medication 10 ML: at 07:04

## 2020-02-23 RX ADMIN — OXYCODONE 10 MG: 5 TABLET ORAL at 07:03

## 2020-02-23 RX ADMIN — INSULIN LISPRO 3 UNITS: 100 INJECTION, SOLUTION INTRAVENOUS; SUBCUTANEOUS at 12:00

## 2020-02-23 RX ADMIN — INSULIN LISPRO 8 UNITS: 100 INJECTION, SOLUTION INTRAVENOUS; SUBCUTANEOUS at 11:58

## 2020-02-23 RX ADMIN — INSULIN LISPRO 8 UNITS: 100 INJECTION, SOLUTION INTRAVENOUS; SUBCUTANEOUS at 07:09

## 2020-02-23 NOTE — PROGRESS NOTES
Ortho Spine Daily Progress Note    Date of Surgery:  2020      Patient: Padmini Lara   YOB: 1960  Age: 61 y.o. SUBJECTIVE:   4 Days Post-Op following REVISION LUMBAR LAMINECTOMY WITH POSTERIOR SPINAL FUSION L2-5    Doing well. The patient's post operative pain is adequately controlled. Has been making steady gains mobilizing with PT. Has help at home. Plan to DC with home care. The patient denies CP, SOB, N/V, dizziness, abdominal pain, HA. OBJECTIVE:     Vital Signs:    Temp (24hrs), Av.8 °F (37.1 °C), Min:98.2 °F (36.8 °C), Max:99.6 °F (37.6 °C)      Patient Vitals for the past 24 hrs:   BP Temp Pulse Resp SpO2   20 0822 115/75 98.2 °F (36.8 °C) 95 20 93 %   20 0222 113/75 98.8 °F (37.1 °C) (!) 108 22 94 %   20 2038 120/71 98.5 °F (36.9 °C) 100 24 91 %   20 1521 114/71 99.6 °F (37.6 °C) (!) 110 24 91 %   20 0940 109/72 99 °F (37.2 °C) 98 16 95 %           Physical Exam:  General: A&Ox3, NAD. Respiratory: Respirations are unlabored. Abdomen: S/NT  Surgical site: C,D and I.  Musculoskeletal: Calves are S/NT, Wayne dorsi/plantar flexion/EHL/quads/hip flexion intact, Wayne DP 2+  Neurological:  Wayne LE's NVI    Laboratory Values:             Recent Labs     20  0331   HGB 9.3*     Lab Results   Component Value Date/Time    Sodium 138 2020 01:41 AM    Potassium 4.5 2020 01:41 AM    Chloride 108 2020 01:41 AM    CO2 22 2020 01:41 AM    Glucose 163 (H) 2020 01:41 AM    BUN 18 2020 01:41 AM    Creatinine 1.13 (H) 2020 01:41 AM    Calcium 8.4 (L) 2020 01:41 AM         PLAN:     S/P REVISION LUMBAR LAMINECTOMY WITH POSTERIOR SPINAL FUSION L2-5 Mobilize with PT/nursing until discharged. Spine precautions. TLSO when OOB. Hemodynamics Post op anemia. Tolerating well thus far. Will continue to monitor. Wound Continue dressing changes PRN. Hemovac DCd.      Post Operative Pain Pain Control: Adequate, continue current regimen. DVT Prophylaxis Continue with SCD'S, Encourage Ankle Pump Exercises, Mobilize. Discharge Disposition Discharge plan: Will focus on pain control and mobilizing with PT/nursing. DC home with HH/OT today. Follow up with Dr Yadiel Chow in 2 weeks.                Signed By: Perfecto Irby DO  February 23, 2020 8:02 AM

## 2020-02-23 NOTE — PROGRESS NOTES
Problem: Mobility Impaired (Adult and Pediatric)  Goal: *Acute Goals and Plan of Care (Insert Text)  Description  FUNCTIONAL STATUS PRIOR TO ADMISSION: Patient was independent and active without use of DME.    HOME SUPPORT PRIOR TO ADMISSION: Lives with grandchildren (9and 6year old) and son lives next door. She is primary caregiver for kids but son is able to assist.    Physical Therapy Goals  Initiated 2/20/2020    1. Patient will move from supine to sit and sit to supine  in bed with modified independence within 4 days. 2. Patient will perform sit to stand with modified independence within 4 days. 3. Patient will ambulate with modified independence for 200 feet with the least restrictive device within 4 days. 4. Patient will verbalize and demonstrate understanding of spinal precautions (No bending, lifting greater than 5 lbs, or twisting; log-roll technique; frequent repositioning as instructed) within 4 days. Outcome: Progressing Towards Goal     PHYSICAL THERAPY TREATMENT  Patient: Padmini Lara (30 y.o. female)  Date: 2/23/2020  Diagnosis: Spinal stenosis [M48.00]   <principal problem not specified>  Procedure(s) (LRB):  REVISION LUMBAR LAMINECTOMY WITH POSTERIOR SPINAL FUSION L2-5 (N/A) 4 Days Post-Op  Precautions: Fall, Spinal, Contact(Brace when up)  Chart, physical therapy assessment, plan of care and goals were reviewed. ASSESSMENT  Patient continues with skilled PT services and is progressing towards goals. Patient is fairly independent with mobility though tends to rely on unsafe technique (I.e., hands on walker during sit<->stand versus on the sitting surface; lifts walker off the floor when turning during ambulation). She is knowledgeable re: back precautions and brace wear times. She will have assistance of her son at home. Current Level of Function Impacting Discharge (mobility/balance):  Modified indep/ supervision only for cues to correct transfer and walker technique which she does not follow through with. Other factors to consider for discharge:          PLAN :  Patient continues to benefit from skilled intervention to address the above impairments. Continue treatment per established plan of care. to address goals. Recommendation for discharge: (in order for the patient to meet his/her long term goals)  Physical therapy at least 2 days/week in the home AND ensure assist and/or supervision for safety with mobility     This discharge recommendation:  Has been made in collaboration with the attending provider and/or case management    IF patient discharges home will need the following DME: patient owns DME required for discharge. Reviewed appropriate walker height - recommended to the patient that she check her walker at home to ensure upright posture when walking       SUBJECTIVE:   Patient stated I like to do it this way.  (referring to lifting the walker when turning)    OBJECTIVE DATA SUMMARY:   Critical Behavior:  Neurologic State: Alert  Orientation Level: Oriented X4  Cognition: Follows commands  Safety/Judgement: Fall prevention, Insight into deficits, Awareness of environment  Functional Mobility Training:  Bed Mobility:     Supine to Sit: Supervision(HOB elevated today, pt did not want to repeat w/ bed flat)     Scooting: Modified independent        Transfers:  Sit to Stand: Assist x1; Modified independent  Stand to Sit: Assist x1; Modified independent                             Balance:  Sitting: Intact  Standing: Intact; With support  Ambulation/Gait Training:  Distance (ft): 180 Feet (ft)  Assistive Device: Walker, rolling  Ambulation - Level of Assistance: Supervision;Modified independent                 Base of Support: Widened     Speed/Lisy: Slow             Pain Rating:  Voiced 4-5 pain    Activity Tolerance:   Good  Please refer to the flowsheet for vital signs taken during this treatment.     After treatment patient left in no apparent distress:   Sitting in chair and Call bell within reach    COMMUNICATION/COLLABORATION:   The patients plan of care was discussed with: Registered Nurse    Joy Casper PT   Time Calculation: 20 mins

## 2020-02-24 ENCOUNTER — HOME CARE VISIT (OUTPATIENT)
Dept: SCHEDULING | Facility: HOME HEALTH | Age: 60
End: 2020-02-24
Payer: MEDICAID

## 2020-02-24 VITALS
HEART RATE: 89 BPM | WEIGHT: 189 LBS | HEIGHT: 66 IN | BODY MASS INDEX: 30.37 KG/M2 | SYSTOLIC BLOOD PRESSURE: 121 MMHG | RESPIRATION RATE: 16 BRPM | TEMPERATURE: 98.1 F | DIASTOLIC BLOOD PRESSURE: 70 MMHG | OXYGEN SATURATION: 96 %

## 2020-02-24 PROCEDURE — G0151 HHCP-SERV OF PT,EA 15 MIN: HCPCS

## 2020-02-24 PROCEDURE — 400013 HH SOC

## 2020-02-24 NOTE — ADT AUTH CERT NOTES
Lumbar Laminectomy - Care Day 4 (2020) by Mónica An RN         Review Status Review Entered   Completed 2020 13:57       Criteria Review      Care Day: 4 Care Date: 2020 Level of Care: Inpatient Floor    Guideline Day 2    Level Of Care    (X) Floor to discharge    2020 13:57:53 EST by Iqra Maldonado      Floor    Clinical Status    (X) * Procedure completed    2020 13:57:53 EST by Iqra Maldonado      Procedure done    ( ) * Hemodynamic stability    ( ) * No new neurologic deficits    ( ) * Voiding ability at baseline    ( ) * No evidence of infection    ( ) * Pain absent or managed    ( ) * Discharge plans and education understood    Activity    ( ) * Ambulatory    Routes    ( ) * Oral hydration, medications, and diet    Medications    ( ) * PCA absent [D]    * Milestone   Additional Notes   SUBJECTIVE:   3 Days Post-Op following REVISION LUMBAR LAMINECTOMY WITH POSTERIOR SPINAL FUSION L2-5       Back pain improved today. The patient's post operative pain is adequately controlled. Has been making steady gains mobilizing with PT. The patient denies CP, SOB, N/V, dizziness, abdominal pain, HA.        OBJECTIVE:       Vital Signs:    Temp (24hrs), Av.6 °F (37.6 °C), Min:98.8 °F (37.1 °C), Max:100.4 °F (38 °C)                              Patient Vitals for the past 24 hrs:     BP Temp Pulse Resp SpO2   20 0236 116/73 99.6 °F (37.6 °C) (!) 116 18 91 %      Glucose 144      Physical Exam:   General: A&Ox3, NAD. Respiratory: Respirations are unlabored.    Abdomen: S/NT   Surgical site: C,D and I.   Musculoskeletal: Calves are S/NT, Wayne dorsi/plantar flexion/EHL/quads/hip flexion intact, Wayne DP 2+   Neurological:  Wayne LE's NVI      Medications,   spironolactone (ALDACTONE) tablet 25 mg    Dose: 25 mg   Freq: DAILY Route: PO   Start: 20 0900 End: 20 1633      oxyCODONE IR (ROXICODONE) tablet 10 mg    Dose: 10 mg   Freq: EVERY 3 HOURS AS NEEDED Route: PO   PRN Reason: Severe Pain   PRN Comment: For severe pain or may be given pre-      losartan (COZAAR) tablet 25 mg    Dose: 25 mg   Freq: EVERY BEDTIME Route: PO      insulin NPH (NOVOLIN N, HUMULIN N) injection 10 Units    Dose: 10 Units   Freq: 2 TIMES DAILY Route: SC   Start: 02/21/20 2100 End: 02/23/20 1633      amLODIPine (NORVASC) tablet 10 mg    Dose: 10 mg   Freq: DAILY Route: PO   Indications of Use: hypertension            PLAN:          S/P REVISION LUMBAR LAMINECTOMY WITH POSTERIOR SPINAL FUSION L2-5 Mobilize with PT/nursing until discharged. Spine precautions. TLSO when OOB.          Hemodynamics Post op anemia. Tolerating well thus far. Will continue to monitor.          Wound Continue dressing changes PRN. Hemovac DCd.        Post Operative Pain Pain Control: Adequate, continue current regimen.          DVT Prophylaxis Continue with SCD'S, Encourage Ankle Pump Exercises, Mobilize.            Discharge Disposition Discharge plan: Will focus on pain control and mobilizing with PT/nursing. Case Management for discharge planning.  Plan on home with HH/OT on Sunday of progressing well and pain adequately controlled.  Will continue to monitor progress closely.                                               Lumbar Laminectomy - Care Day 3 (2/21/2020) by Kya López RN         Review Status Review Entered   Completed 2/24/2020 13:53       Criteria Review      Care Day: 3 Care Date: 2/21/2020 Level of Care: Inpatient Floor    Guideline Day 2    Level Of Care    (X) Floor to discharge    2/24/2020 13:53:10 EST by Asiya Hanna      floor    Clinical Status    (X) * Procedure completed    2/24/2020 13:53:10 EST by Asiya Hanna      Procedure done    ( ) * Hemodynamic stability    ( ) * No new neurologic deficits    ( ) * Voiding ability at baseline    ( ) * No evidence of infection    ( ) * Pain absent or managed    ( ) * Discharge plans and education understood    Activity    ( ) * Ambulatory    Routes ( ) * Oral hydration, medications, and diet    Medications    ( ) * PCA absent [D]    * Milestone   Additional Notes   SUBJECTIVE:   2 Days Post-Op following REVISION LUMBAR LAMINECTOMY WITH POSTERIOR SPINAL FUSION L2-5       The patient states significant back pain this morning. The patient states that their pre-operative lower extremity symptoms appear to be improved. The patient rates their current level of pain as 8/10. The patient's post operative pain is adequately controlled. Has been making steady gains mobilizing with PT.  The patient denies CP, SOB, N/V, dizziness, abdominal pain, HA.        OBJECTIVE:       Vital Signs:    Temp (24hrs), Av.1 °F (37.3 °C), Min:98.5 °F (36.9 °C), Max:99.9 °F (37.7 °C)                              Patient Vitals for the past 24 hrs:     BP Temp Pulse Resp SpO2   20 0752 110/73 98.8 °F (37.1 °C) 93 18 92 %   20 0225 122/72 99.4 °F (37.4 °C) (!) 105 18 90 %   Glucose    Physical Exam:   General: A&Ox3, NAD. Respiratory: Respirations are unlabored.    Abdomen: S/NT   Surgical site: C,D and I.   Musculoskeletal: Calves are S/NT, Wayne dorsi/plantar flexion/EHL/quads/hip flexion intact, Wayne DP 2+   Neurological:  Wayne LE's NVI       Laboratory Values:              Recent Labs     20   0331 20   0141   HGB 9.3* 9.2*   CREA -- 1.13*   GLU -- 163*      Glucose 146      Medications,   amLODIPine (NORVASC) tablet 10 mg    Dose: 10 mg   Freq: DAILY Route: PO   Indications of Use: hypertension   Start: 20 0900 End: 20      DULoxetine (CYMBALTA) capsule 60 mg    Dose: 60 mg   Freq: EVERY BEDTIME Route: PO   Start: 20 2300 End: 20      insulin lispro (HUMALOG) injection    Freq: 4 TIMES DAILY BEFORE MEALS & NIGHTLY Route: SC   Start: 20 2200 End: 20    Admin Instructions:      insulin lispro (HUMALOG) injection 8 Units    Dose: 0.1 Units/kg   Weight Dosing Info: 84.4 kg   Freq: 3 TIMES DAILY WITH MEALS Route: SC insulin NPH (NOVOLIN N, HUMULIN N) injection 10 Units    Dose: 10 Units   Freq: 2 TIMES DAILY Route: SC   Start: 02/21/20 2100 End: 02/23/20 1633      insulin NPH (NOVOLIN N, HUMULIN N) injection 13 Units    Dose: 0.15 Units/kg   Weight Dosing Info: 84.4 kg   Freq: 2 TIMES DAILY Route: SC      losartan (COZAAR) tablet 25 mg    Dose: 25 mg   Freq: EVERY BEDTIME Route: PO   Indications of Use: hypertension      oxyCODONE IR (ROXICODONE) tablet 10 mg    Dose: 10 mg   Freq: EVERY 3 HOURS AS NEEDED Route: PO   PRN Reason: Severe Pain   PRN Comment: For severe pain or may be given pre      spironolactone (ALDACTONE) tablet 25 mg    Dose: 25 mg   Freq: DAILY Route: PO   Start: 02/20/20 0900 End: 02/23/20 1633      PLAN:       S/P REVISION LUMBAR LAMINECTOMY WITH POSTERIOR SPINAL FUSION L2-5 Mobilize with PT/nursing until discharged. Spine precautions. TLSO when OOB.       Hemodynamics Post op anemia. Tolerating well thus far. Will continue to monitor.       Wound Continue dressing changes PRN. D/C hemovac.       Post Operative Pain Pain Control: Adequate, continue current regimen.       DVT Prophylaxis Continue with SCD'S, Encourage Ankle Pump Exercises, Mobilize.         Discharge Disposition Discharge plan: Will focus on pain control and mobilizing with PT/nursing. Case Management for discharge planning. Plan on home with HH/OT on Sunday of progressing well and pain adequately controlled.  Will continue to monitor progress closely.

## 2020-02-25 ENCOUNTER — HOME CARE VISIT (OUTPATIENT)
Dept: SCHEDULING | Facility: HOME HEALTH | Age: 60
End: 2020-02-25
Payer: MEDICAID

## 2020-02-25 PROCEDURE — G0152 HHCP-SERV OF OT,EA 15 MIN: HCPCS

## 2020-02-26 ENCOUNTER — HOME CARE VISIT (OUTPATIENT)
Dept: SCHEDULING | Facility: HOME HEALTH | Age: 60
End: 2020-02-26
Payer: MEDICAID

## 2020-02-26 VITALS
RESPIRATION RATE: 18 BRPM | TEMPERATURE: 98.7 F | OXYGEN SATURATION: 98 % | SYSTOLIC BLOOD PRESSURE: 122 MMHG | DIASTOLIC BLOOD PRESSURE: 60 MMHG | HEART RATE: 70 BPM

## 2020-02-26 PROCEDURE — G0151 HHCP-SERV OF PT,EA 15 MIN: HCPCS

## 2020-02-26 NOTE — DISCHARGE SUMMARY
Discharge Summary     Patient: Jen Ortiz MRN: [id number]  SSN: [id number]    YOB: 1960  Age: 61 y.o.   Sex: female       Admit Date: 2/19/2020    Discharge Date: 2/26/2020      Admission Diagnoses: Spinal stenosis [M48.00]    Discharge Diagnoses:   Problem List as of 2/23/2020 Date Reviewed: 2/19/2020          Codes Class Noted - Resolved    Spinal stenosis ICD-10-CM: M48.00  ICD-9-CM: 724.00  2/19/2020 - Present        Postmenopausal ICD-10-CM: Z78.0  ICD-9-CM: V49.81  11/2/2018 - Present        Iron deficiency anemia due to chronic blood loss ICD-10-CM: D50.0  ICD-9-CM: 280.0  9/5/2018 - Present        Eczema ICD-10-CM: L30.9  ICD-9-CM: 692.9  8/24/2018 - Present        Elevated bilirubin ICD-10-CM: R17  ICD-9-CM: 277.4  5/11/2018 - Present        CKD (chronic kidney disease) ICD-10-CM: N18.9  ICD-9-CM: 585.9  5/11/2018 - Present        Primary osteoarthritis of left knee ICD-10-CM: M17.12  ICD-9-CM: 715.16  3/2/2018 - Present        Type 2 diabetes mellitus with nephropathy ([facility] ICD-10-CM: E11.21  ICD-9-CM: 250.40, 583.81  12/14/2017 - Present        Spondylolisthesis of lumbar region ICD-10-CM: M43.16  ICD-9-CM: 738.4  6/8/2017 - Present        Low back pain with left-sided sciatica ICD-10-CM: M54.42  ICD-9-CM: 724.3  6/8/2017 - Present        Type 2 diabetes, uncontrolled, with neuropathy ([facility] ICD-10-CM: E11.40, E11.65  ICD-9-CM: 250.62, 357.2  1/23/2017 - Present        Patient non adherence ICD-10-CM: Z91.19  ICD-9-CM: V15.81  1/23/2017 - Present        Essential hypertension ICD-10-CM: I10  ICD-9-CM: 401.9  6/28/2016 - Present        Encounter for long-term (current) use of medications ICD-10-CM: Z79.899  ICD-9-CM: V58.69  6/28/2016 - Present        Sickle cell trait ([facility] ICD-10-CM: D57.3  ICD-9-CM: 282.5  10/8/2015 - Present        Hypercholesteremia ICD-10-CM: E78.00  ICD-9-CM: 272.0  8/6/2015 - Present        Chronic lower back pain ICD-10-CM: M54.5, G89.29  ICD-9-CM: 724.2, 338.29  8/6/2015 - Present        RESOLVED: Breast cancer screening ICD-10-CM: Z12.39  ICD-9-CM: V76.10  6/28/2016 - 9/26/2019                Discharge Condition: Good    Hospital Course: Pt underwent REVISION LUMBAR LAMINECTOMY WITH POSTERIOR SPINAL FUSION Z2-5 without complications. Her course was uneventful. Her pain was controlled. She mobilized well and met criteria for DC on POD 4. She was [facility] home with home care and will follow up as scheduled. Consults: PT/OT    Significant Diagnostic Studies: none    Disposition: home with home health care    Discharge Medications:   Cannot display discharge medications since this patient is not currently admitted. Activity: See surgical instructions  Diet: Regular Diet  Wound Care: As directed    Follow-up Appointments   Procedures    FOLLOW UP VISIT Appointment in: Two Weeks     Standing Status:   Standing     Number of Occurrences:   1     Order Specific Question:   Appointment in     Answer:    Two Weeks       Signed By: Brandyn Pérez DO     February 26, 2020

## 2020-02-27 VITALS
DIASTOLIC BLOOD PRESSURE: 75 MMHG | TEMPERATURE: 98.8 F | HEART RATE: 81 BPM | SYSTOLIC BLOOD PRESSURE: 121 MMHG | OXYGEN SATURATION: 99 % | RESPIRATION RATE: 16 BRPM

## 2020-02-28 ENCOUNTER — HOME CARE VISIT (OUTPATIENT)
Dept: SCHEDULING | Facility: HOME HEALTH | Age: 60
End: 2020-02-28
Payer: MEDICAID

## 2020-02-28 PROCEDURE — G0151 HHCP-SERV OF PT,EA 15 MIN: HCPCS

## 2020-02-29 VITALS
SYSTOLIC BLOOD PRESSURE: 128 MMHG | OXYGEN SATURATION: 99 % | DIASTOLIC BLOOD PRESSURE: 76 MMHG | TEMPERATURE: 98.3 F | HEART RATE: 90 BPM | RESPIRATION RATE: 16 BRPM

## 2020-03-02 ENCOUNTER — HOME CARE VISIT (OUTPATIENT)
Dept: SCHEDULING | Facility: HOME HEALTH | Age: 60
End: 2020-03-02
Payer: MEDICAID

## 2020-03-02 VITALS
OXYGEN SATURATION: 99 % | TEMPERATURE: 97.8 F | RESPIRATION RATE: 16 BRPM | SYSTOLIC BLOOD PRESSURE: 110 MMHG | DIASTOLIC BLOOD PRESSURE: 68 MMHG | HEART RATE: 79 BPM

## 2020-03-02 PROCEDURE — G0151 HHCP-SERV OF PT,EA 15 MIN: HCPCS

## 2020-03-02 PROCEDURE — G0152 HHCP-SERV OF OT,EA 15 MIN: HCPCS

## 2020-03-03 VITALS
OXYGEN SATURATION: 99 % | RESPIRATION RATE: 16 BRPM | SYSTOLIC BLOOD PRESSURE: 110 MMHG | DIASTOLIC BLOOD PRESSURE: 68 MMHG | TEMPERATURE: 97.8 F | HEART RATE: 79 BPM

## 2020-03-04 ENCOUNTER — HOME CARE VISIT (OUTPATIENT)
Dept: SCHEDULING | Facility: HOME HEALTH | Age: 60
End: 2020-03-04
Payer: MEDICAID

## 2020-03-04 PROCEDURE — G0152 HHCP-SERV OF OT,EA 15 MIN: HCPCS

## 2020-03-04 PROCEDURE — G0151 HHCP-SERV OF PT,EA 15 MIN: HCPCS

## 2020-03-05 VITALS
TEMPERATURE: 97.9 F | DIASTOLIC BLOOD PRESSURE: 70 MMHG | HEART RATE: 85 BPM | OXYGEN SATURATION: 98 % | RESPIRATION RATE: 16 BRPM | SYSTOLIC BLOOD PRESSURE: 126 MMHG

## 2020-03-06 ENCOUNTER — HOME CARE VISIT (OUTPATIENT)
Dept: SCHEDULING | Facility: HOME HEALTH | Age: 60
End: 2020-03-06
Payer: MEDICAID

## 2020-03-06 PROCEDURE — G0151 HHCP-SERV OF PT,EA 15 MIN: HCPCS

## 2020-03-07 VITALS
SYSTOLIC BLOOD PRESSURE: 135 MMHG | RESPIRATION RATE: 16 BRPM | OXYGEN SATURATION: 99 % | HEART RATE: 80 BPM | TEMPERATURE: 98.4 F | DIASTOLIC BLOOD PRESSURE: 75 MMHG

## 2020-03-09 ENCOUNTER — HOME CARE VISIT (OUTPATIENT)
Dept: SCHEDULING | Facility: HOME HEALTH | Age: 60
End: 2020-03-09
Payer: MEDICAID

## 2020-03-09 VITALS
DIASTOLIC BLOOD PRESSURE: 70 MMHG | OXYGEN SATURATION: 98 % | RESPIRATION RATE: 18 BRPM | SYSTOLIC BLOOD PRESSURE: 122 MMHG | HEART RATE: 70 BPM | TEMPERATURE: 98.7 F

## 2020-03-09 PROCEDURE — G0152 HHCP-SERV OF OT,EA 15 MIN: HCPCS

## 2020-03-10 ENCOUNTER — HOME CARE VISIT (OUTPATIENT)
Dept: SCHEDULING | Facility: HOME HEALTH | Age: 60
End: 2020-03-10
Payer: MEDICAID

## 2020-03-10 VITALS
TEMPERATURE: 98.6 F | RESPIRATION RATE: 18 BRPM | OXYGEN SATURATION: 98 % | SYSTOLIC BLOOD PRESSURE: 132 MMHG | HEART RATE: 70 BPM | DIASTOLIC BLOOD PRESSURE: 60 MMHG

## 2020-03-10 PROCEDURE — G0152 HHCP-SERV OF OT,EA 15 MIN: HCPCS

## 2020-03-15 DIAGNOSIS — M79.605 CHRONIC PAIN OF LEFT LOWER EXTREMITY: ICD-10-CM

## 2020-03-15 DIAGNOSIS — G89.29 CHRONIC PAIN OF LEFT LOWER EXTREMITY: ICD-10-CM

## 2020-03-16 RX ORDER — DULOXETIN HYDROCHLORIDE 60 MG/1
CAPSULE, DELAYED RELEASE ORAL
Qty: 30 CAP | Refills: 5 | Status: SHIPPED | OUTPATIENT
Start: 2020-03-16 | End: 2020-08-30 | Stop reason: SDUPTHER

## 2020-03-18 ENCOUNTER — TELEPHONE (OUTPATIENT)
Dept: INTERNAL MEDICINE CLINIC | Age: 60
End: 2020-03-18

## 2020-03-20 NOTE — TELEPHONE ENCOUNTER
Returned patient call. Patient was instructed to try throat lozenges and gargling. Patient stated she only has a scratchy throat, no fever.

## 2020-03-30 RX ORDER — AMOXICILLIN 500 MG
CAPSULE ORAL
Qty: 60 CAP | Refills: 4 | Status: SHIPPED | OUTPATIENT
Start: 2020-03-30 | End: 2020-08-30 | Stop reason: SDUPTHER

## 2020-04-07 ENCOUNTER — TELEPHONE (OUTPATIENT)
Dept: INTERNAL MEDICINE CLINIC | Age: 60
End: 2020-04-07

## 2020-04-07 NOTE — TELEPHONE ENCOUNTER
Pt said  When she scratch any part of her body  Get wilts   Pt said she tried every thing and it is not working can someone call pt at [phone]

## 2020-08-24 DIAGNOSIS — M79.605 CHRONIC PAIN OF LEFT LOWER EXTREMITY: ICD-10-CM

## 2020-08-24 DIAGNOSIS — G89.29 CHRONIC PAIN OF LEFT LOWER EXTREMITY: ICD-10-CM

## 2020-08-30 RX ORDER — DULOXETIN HYDROCHLORIDE 60 MG/1
CAPSULE, DELAYED RELEASE ORAL
Qty: 30 CAP | Refills: 4 | Status: SHIPPED | OUTPATIENT
Start: 2020-08-30 | End: 2021-01-21

## 2020-08-30 RX ORDER — AMOXICILLIN 500 MG
CAPSULE ORAL
Qty: 60 CAP | Refills: 3 | Status: SHIPPED | OUTPATIENT
Start: 2020-08-30 | End: 2020-12-22

## 2020-12-22 RX ORDER — AMOXICILLIN 500 MG
CAPSULE ORAL
Qty: 60 CAP | Refills: 2 | Status: SHIPPED | OUTPATIENT
Start: 2020-12-22 | End: 2021-03-22

## 2021-01-21 DIAGNOSIS — M79.605 CHRONIC PAIN OF LEFT LOWER EXTREMITY: ICD-10-CM

## 2021-01-21 DIAGNOSIS — G89.29 CHRONIC PAIN OF LEFT LOWER EXTREMITY: ICD-10-CM

## 2021-01-21 RX ORDER — DULOXETIN HYDROCHLORIDE 60 MG/1
CAPSULE, DELAYED RELEASE ORAL
Qty: 30 CAP | Refills: 3 | Status: SHIPPED | OUTPATIENT
Start: 2021-01-21 | End: 2021-05-15

## 2021-01-25 ENCOUNTER — OFFICE VISIT (OUTPATIENT)
Dept: INTERNAL MEDICINE CLINIC | Age: 61
End: 2021-01-25
Payer: MEDICAID

## 2021-01-25 VITALS
SYSTOLIC BLOOD PRESSURE: 150 MMHG | HEART RATE: 80 BPM | RESPIRATION RATE: 16 BRPM | OXYGEN SATURATION: 100 % | DIASTOLIC BLOOD PRESSURE: 80 MMHG | HEIGHT: 66 IN | TEMPERATURE: 97.1 F | WEIGHT: 175.4 LBS | BODY MASS INDEX: 28.19 KG/M2

## 2021-01-25 DIAGNOSIS — M54.42 CHRONIC MIDLINE LOW BACK PAIN WITH LEFT-SIDED SCIATICA: ICD-10-CM

## 2021-01-25 DIAGNOSIS — E78.00 HYPERCHOLESTEREMIA: ICD-10-CM

## 2021-01-25 DIAGNOSIS — E11.21 TYPE 2 DIABETES MELLITUS WITH NEPHROPATHY (HCC): ICD-10-CM

## 2021-01-25 DIAGNOSIS — G89.29 CHRONIC MIDLINE LOW BACK PAIN WITH LEFT-SIDED SCIATICA: ICD-10-CM

## 2021-01-25 DIAGNOSIS — I10 ESSENTIAL HYPERTENSION: Primary | ICD-10-CM

## 2021-01-25 PROCEDURE — 99214 OFFICE O/P EST MOD 30 MIN: CPT | Performed by: NURSE PRACTITIONER

## 2021-01-25 RX ORDER — LIRAGLUTIDE 6 MG/ML
1.2 INJECTION SUBCUTANEOUS DAILY
COMMUNITY
End: 2022-04-11 | Stop reason: SDUPTHER

## 2021-01-25 RX ORDER — INSULIN GLARGINE 100 [IU]/ML
20 INJECTION, SOLUTION SUBCUTANEOUS DAILY
COMMUNITY
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
## 2021-03-22 RX ORDER — AMOXICILLIN 500 MG
CAPSULE ORAL
Qty: 60 CAP | Refills: 1 | Status: SHIPPED | OUTPATIENT
Start: 2021-03-22 | End: 2021-05-15

## 2021-04-22 ENCOUNTER — TRANSCRIBE ORDER (OUTPATIENT)
Dept: SCHEDULING | Age: 61
End: 2021-04-22

## 2021-04-22 DIAGNOSIS — M48.061 LUMBAR FORAMINAL STENOSIS: ICD-10-CM

## 2021-04-22 DIAGNOSIS — M54.16 LEFT LUMBAR RADICULOPATHY: ICD-10-CM

## 2021-04-22 DIAGNOSIS — M54.50 LOW BACK PAIN: ICD-10-CM

## 2021-04-22 DIAGNOSIS — M43.16 SPONDYLOLISTHESIS OF LUMBAR REGION: Primary | ICD-10-CM

## 2021-04-22 DIAGNOSIS — Z98.1 S/P SPINAL FUSION: ICD-10-CM

## 2021-05-15 DIAGNOSIS — M79.605 CHRONIC PAIN OF LEFT LOWER EXTREMITY: ICD-10-CM

## 2021-05-15 DIAGNOSIS — G89.29 CHRONIC PAIN OF LEFT LOWER EXTREMITY: ICD-10-CM

## 2021-05-15 RX ORDER — AMOXICILLIN 500 MG
CAPSULE ORAL
Qty: 60 CAP | Refills: 0 | Status: SHIPPED | OUTPATIENT
Start: 2021-05-15 | End: 2021-05-21 | Stop reason: SDUPTHER

## 2021-05-15 RX ORDER — DULOXETIN HYDROCHLORIDE 60 MG/1
CAPSULE, DELAYED RELEASE ORAL
Qty: 30 CAP | Refills: 2 | Status: SHIPPED | OUTPATIENT
Start: 2021-05-15 | End: 2021-07-12

## 2021-05-21 RX ORDER — AMOXICILLIN 500 MG
CAPSULE ORAL
Qty: 60 CAPSULE | Refills: 0 | Status: SHIPPED | OUTPATIENT
Start: 2021-05-21 | End: 2021-07-12

## 2021-07-10 DIAGNOSIS — M79.605 CHRONIC PAIN OF LEFT LOWER EXTREMITY: ICD-10-CM

## 2021-07-10 DIAGNOSIS — G89.29 CHRONIC PAIN OF LEFT LOWER EXTREMITY: ICD-10-CM

## 2021-07-12 RX ORDER — DULOXETIN HYDROCHLORIDE 60 MG/1
CAPSULE, DELAYED RELEASE ORAL
Qty: 30 CAPSULE | Refills: 5 | Status: SHIPPED | OUTPATIENT
Start: 2021-07-12 | End: 2021-12-13 | Stop reason: SDUPTHER

## 2021-07-12 RX ORDER — AMOXICILLIN 500 MG
CAPSULE ORAL
Qty: 60 CAPSULE | Refills: 11 | Status: SHIPPED | OUTPATIENT
Start: 2021-07-12

## 2021-10-06 LAB — HBA1C MFR BLD HPLC: 6.9 %

## 2021-11-10 ENCOUNTER — TELEPHONE (OUTPATIENT)
Dept: ORTHOPEDIC SURGERY | Age: 61
End: 2021-11-10

## 2021-11-11 ENCOUNTER — APPOINTMENT (OUTPATIENT)
Dept: GENERAL RADIOLOGY | Age: 61
End: 2021-11-11
Attending: STUDENT IN AN ORGANIZED HEALTH CARE EDUCATION/TRAINING PROGRAM
Payer: MEDICAID

## 2021-11-11 ENCOUNTER — HOSPITAL ENCOUNTER (EMERGENCY)
Age: 61
Discharge: HOME | End: 2021-11-11
Attending: EMERGENCY MEDICINE
Payer: MEDICAID

## 2021-11-11 VITALS
HEART RATE: 78 BPM | RESPIRATION RATE: 16 BRPM | DIASTOLIC BLOOD PRESSURE: 91 MMHG | HEIGHT: 66 IN | WEIGHT: 189 LBS | BODY MASS INDEX: 30.37 KG/M2 | SYSTOLIC BLOOD PRESSURE: 152 MMHG | TEMPERATURE: 97.7 F | OXYGEN SATURATION: 99 %
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
## 2021-11-11 DIAGNOSIS — M54.50 ACUTE MIDLINE LOW BACK PAIN WITHOUT SCIATICA: Primary | ICD-10-CM

## 2021-11-11 PROCEDURE — 74011250637 HC RX REV CODE- 250/637: Performed by: STUDENT IN AN ORGANIZED HEALTH CARE EDUCATION/TRAINING PROGRAM

## 2021-11-11 PROCEDURE — 99283 EMERGENCY DEPT VISIT LOW MDM: CPT

## 2021-11-11 PROCEDURE — 72100 X-RAY EXAM L-S SPINE 2/3 VWS: CPT

## 2021-11-11 RX ORDER — HYDROCODONE BITARTRATE AND ACETAMINOPHEN 5; 325 MG/1; MG/1
1 TABLET ORAL ONCE
Status: DISCONTINUED | OUTPATIENT
Start: 2021-11-11 | End: 2021-11-11

## 2021-11-11 RX ORDER — HYDROCODONE BITARTRATE AND ACETAMINOPHEN 5; 325 MG/1; MG/1
TABLET ORAL
COMMUNITY
Start: 2021-10-19 | End: 2022-01-06

## 2021-11-11 RX ORDER — DICLOFENAC SODIUM 75 MG/1
TABLET, DELAYED RELEASE ORAL
COMMUNITY
Start: 2021-10-19 | End: 2021-11-12

## 2021-11-11 RX ORDER — ACETAMINOPHEN 500 MG
1000 TABLET ORAL ONCE
Status: COMPLETED | OUTPATIENT
Start: 2021-11-11 | End: 2021-11-11

## 2021-11-11 RX ADMIN — ACETAMINOPHEN 1000 MG: 500 TABLET ORAL at 14:17

## 2021-11-11 NOTE — ED PROVIDER NOTES
Patient reports that 3 days ago she was standing on the couch to hand something up and when she went to step down she fell, landing on her low back. She has history of back pain s/p several procedures. She reports that she has been trying to manage her pain at home with pain medication prescribed by Dr. Gage Johnson but it has not improved. Denies any numbness, tingling, bowel/ bladder incontinence, or fever. The history is provided by the patient. Fall  This is a new problem. The current episode started more than 2 days ago. The problem has not changed since onset. Pertinent negatives include no chest pain, no headaches and no shortness of breath. The symptoms are aggravated by bending, twisting and walking. The symptoms are relieved by narcotics, acetaminophen and rest. She has tried acetaminophen and rest for the symptoms. The treatment provided mild relief.         Past Medical History:   Diagnosis Date    (HFpEF) heart failure with preserved ejection fraction (HCC)     Arrhythmia     HEART MURMUR -\"HAD IT FOR YEARS AS AN ADULT)    Arthritis     OSTEO    Back pain     Breast cancer screening 6/28/2016    Chronic bilateral low back pain without sciatica 6/28/2016    Diabetes ([address]     Encounter for long-term (current) use of medications 6/28/2016    Essential hypertension with goal blood pressure less than 140/90 6/28/2016    Hypercholesteremia 8/6/2015    Hypercholesterolemia     Hypertension     CONTROLLED WITH MEDS    Ill-defined condition     hyperlipidemia    Other ill-defined conditions(799.89)     high cholesterol    Patient non adherence 1/23/2017    Primary osteoarthritis of left knee 3/2/2018    Type 2 diabetes, uncontrolled, with neuropathy ([address] 1/23/2017    Type II diabetes mellitus, uncontrolled ([address] 8/6/2015       Past Surgical History:   Procedure Laterality Date    HX CARPAL TUNNEL RELEASE      HX GYN      hysterectomy    HX HEENT      Ear surgery    HX HEENT      Left eye    HX ORTHOPAEDIC      left wrist & left elbow    HX ORTHOPAEDIC      Back Surgery x 3    HX ORTHOPAEDIC      right thumb at [facility] FOOT CYST    HX OTHER SURGICAL      back surgery X 2    HX OTHER SURGICAL      Collapsed left lung    HX OTHER SURGICAL      axillary sweat glands removed    HX OVARIAN CYST REMOVAL      VT CARDIAC SURG PROCEDURE UNLIST      murmur- PT DENIES SURGERY AND MURMUR 17    VT OTOPLASTY PROTRUDING EAR W/WO SIZE RDCTJ           Family History:   Problem Relation Age of Onset    Heart Disease Mother     Heart Attack Mother     No Known Problems Father     Cancer Sister         UNKNOWN    Diabetes Son     Sickle Cell Anemia Daughter     Anesth Problems Son         CHEST PAIN       Social History     Socioeconomic History    Marital status: SINGLE     Spouse name: Not on file    Number of children: Not on file    Years of education: Not on file    Highest education level: Not on file   Occupational History    Not on file   Tobacco Use    Smoking status: Former Smoker     Packs/day: 1.00     Years: 30.00     Pack years: 30.00     Quit date: 2005     Years since quittin.6    Smokeless tobacco: Never Used   Substance and Sexual Activity    Alcohol use: No    Drug use: No    Sexual activity: Never   Other Topics Concern    Not on file   Social History Narrative    ** Merged History Encounter **          Social Determinants of Health     Financial Resource Strain:     Difficulty of Paying Living Expenses: Not on file   Food Insecurity:     Worried About Running Out of Food in the Last Year: Not on file    Gay of Food in the Last Year: Not on file   Transportation Needs:     Lack of Transportation (Medical): Not on file    Lack of Transportation (Non-Medical):  Not on file   Physical Activity:     Days of Exercise per Week: Not on file    Minutes of Exercise per Session: Not on file   Stress:     Feeling of Stress : Not on file   Social Connections:     Frequency of Communication with Friends and Family: Not on file    Frequency of Social Gatherings with Friends and Family: Not on file    Attends Restorationism Services: Not on file    Active Member of Clubs or Organizations: Not on file    Attends Club or Organization Meetings: Not on file    Marital Status: Not on file   Intimate Partner Violence:     Fear of Current or Ex-Partner: Not on file    Emotionally Abused: Not on file    Physically Abused: Not on file    Sexually Abused: Not on file   Housing Stability:     Unable to Pay for Housing in the Last Year: Not on file    Number of [place] in the Last Year: Not on file    Unstable Housing in the Last Year: Not on file         ALLERGIES: Pravachol [pravastatin]    Review of Systems   Constitutional: Negative. Respiratory: Negative for shortness of breath. Cardiovascular: Negative for chest pain. Gastrointestinal: Negative. Genitourinary: Negative. Musculoskeletal: Positive for back pain. Negative for neck pain and neck stiffness. Skin: Negative. Neurological: Negative. Negative for headaches. Psychiatric/Behavioral: Negative. Vitals:    11/11/21 1257   BP: (!) 152/91   Pulse: 78   Resp: 16   Temp: 97.7 °F (36.5 °C)   SpO2: 99%   Weight: 85.7 kg (189 lb)   Height: 5' 6\" (1.676 m)            Physical Exam  Constitutional:       Appearance: Normal appearance. She is obese. Musculoskeletal:      Cervical back: Normal.      Thoracic back: Normal.      Lumbar back: Tenderness and bony tenderness present. Decreased range of motion. Back:    Neurological:      Mental Status: She is alert. MDM  Number of Diagnoses or Management Options  Acute midline low back pain without sciatica  Fall, initial encounter  Diagnosis management comments: Lumbar X-ray negative for any acute fracture but did indicate some possible loosening of screws and worsening atherlisthesis. Recommended conservative management at home and close follow-up with ortho and PCP.        Amount and/or Complexity of Data Reviewed  Tests in the radiology section of CPT®: ordered and reviewed           Procedures

## 2021-11-11 NOTE — ED TRIAGE NOTES
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 She has had continued lower back/coccxy pain since.

## 2021-11-12 RX ORDER — DICLOFENAC SODIUM 75 MG/1
TABLET, DELAYED RELEASE ORAL
Qty: 60 TABLET | Refills: 1 | Status: SHIPPED | OUTPATIENT
Start: 2021-11-12 | End: 2022-04-19 | Stop reason: ALTCHOICE

## 2021-12-13 DIAGNOSIS — G89.29 CHRONIC PAIN OF LEFT LOWER EXTREMITY: ICD-10-CM

## 2021-12-13 DIAGNOSIS — M79.605 CHRONIC PAIN OF LEFT LOWER EXTREMITY: ICD-10-CM

## 2021-12-13 NOTE — TELEPHONE ENCOUNTER
Requested Prescriptions     Pending Prescriptions Disp Refills    DULoxetine (CYMBALTA) 60 mg capsule 30 Capsule 5     Sig: Take 1 Capsule by mouth daily.      [facility] [id number] - [facility], [address] [place] [address]  [phone]

## 2021-12-14 RX ORDER — DULOXETIN HYDROCHLORIDE 60 MG/1
60 CAPSULE, DELAYED RELEASE ORAL DAILY
Qty: 30 CAPSULE | Refills: 0 | Status: SHIPPED | OUTPATIENT
Start: 2021-12-14

## 2022-01-04 DIAGNOSIS — M79.605 CHRONIC PAIN OF LEFT LOWER EXTREMITY: ICD-10-CM

## 2022-01-04 DIAGNOSIS — G89.29 CHRONIC PAIN OF LEFT LOWER EXTREMITY: ICD-10-CM

## 2022-01-04 RX ORDER — DULOXETIN HYDROCHLORIDE 60 MG/1
60 CAPSULE, DELAYED RELEASE ORAL DAILY
Qty: 30 CAPSULE | Refills: 0 | OUTPATIENT
Start: 2022-01-04

## 2022-01-06 ENCOUNTER — OFFICE VISIT (OUTPATIENT)
Dept: FAMILY MEDICINE CLINIC | Age: 62
End: 2022-01-06
Payer: MEDICAID

## 2022-01-06 VITALS
SYSTOLIC BLOOD PRESSURE: 150 MMHG | HEIGHT: 66 IN | BODY MASS INDEX: 27.97 KG/M2 | DIASTOLIC BLOOD PRESSURE: 95 MMHG | WEIGHT: 174 LBS | TEMPERATURE: 98.3 F | HEART RATE: 77 BPM | OXYGEN SATURATION: 98 % | RESPIRATION RATE: 16 BRPM

## 2022-01-06 DIAGNOSIS — E11.59 HYPERTENSION ASSOCIATED WITH DIABETES (HCC): Primary | ICD-10-CM

## 2022-01-06 DIAGNOSIS — Z23 ENCOUNTER FOR IMMUNIZATION: ICD-10-CM

## 2022-01-06 DIAGNOSIS — Z12.31 ENCOUNTER FOR SCREENING MAMMOGRAM FOR MALIGNANT NEOPLASM OF BREAST: ICD-10-CM

## 2022-01-06 DIAGNOSIS — E11.42 DIABETIC POLYNEUROPATHY ASSOCIATED WITH TYPE 2 DIABETES MELLITUS (HCC): ICD-10-CM

## 2022-01-06 DIAGNOSIS — I15.2 HYPERTENSION ASSOCIATED WITH DIABETES (HCC): Primary | ICD-10-CM

## 2022-01-06 PROCEDURE — 99203 OFFICE O/P NEW LOW 30 MIN: CPT | Performed by: STUDENT IN AN ORGANIZED HEALTH CARE EDUCATION/TRAINING PROGRAM

## 2022-01-06 PROCEDURE — 90686 IIV4 VACC NO PRSV 0.5 ML IM: CPT | Performed by: STUDENT IN AN ORGANIZED HEALTH CARE EDUCATION/TRAINING PROGRAM

## 2022-01-06 RX ORDER — LOSARTAN POTASSIUM 25 MG/1
25 TABLET ORAL
Qty: 90 TABLET | Refills: 3 | Status: SHIPPED | OUTPATIENT
Start: 2022-01-06

## 2022-01-06 RX ORDER — ZINC GLUCONATE 50 MG
TABLET ORAL
COMMUNITY
Start: 2021-11-13

## 2022-01-06 RX ORDER — MELATONIN
COMMUNITY
Start: 2021-12-21

## 2022-01-06 NOTE — PROGRESS NOTES
[address] [facility]  [address], [address] [place] [address]  [phone]    C/C: HTN,    HPI:    Renetta Tavera is a 64 y.o. female who presents to clinic today for evaluation of the issues listed above. Pt is new to the practice. Previous pcp: Skiff, Stevenson Perla, NP ([facility]). States that she received a letter stating that she will no longer be seeing her. Pt has had multiple providers in the past including Dr. Kelvin Ren, Dr. Cindy Stone, Dr. Blake Nava etc    Subjective;  HTN: Out of her Losartan. Also takes Aldactone. Managed previously by cards at [facility]. DM type II with neuropathy:  Managed by Endocrinology (Mallory Rogers MD  ) at [facility]. Lantus: 20 units in the AM  Victoza: 1.2mg in the AM  Jardiance 25mg daily  Metformin: 500mg BID, higher dose gave her side effects. Also takes Cymbalta 60mg daily. Last A1C 6.9% (10/06/2020). Use to see Nephrology in the past as pcp thought numbers were off. Pt states that she followed with nephrology for a while but was told to follow with pcp as her kidney is ok. Had eye exam recently and it was ok per her report. Chronic back pain:   She had a revision of lumbar laminectomy in the past.   Follows with Ortho. Pt denies any  fever, chill, chest pain, SOB, WISEMAN, PND, abdominal pain, n/v/d, melena, hematuria, HA or dizziness. Other Health Habits and social history:  Smoking history: Former smoker but quit years ago. Alcohol history: no  Occupation: unemployed  Marital status: Patient is currently single and has one son. Had 3 children but 2 passed away. Has 2 grandkids (5 and 7 yo)    Other Specialists/providers:  Endocrinology  Cardiology - reason unclear per patient  Ortho    Allergies- reviewed:    Allergies   Allergen Reactions    Pravachol [Pravastatin] Myalgia       Past Medical History- reviewed:  Past Medical History:   Diagnosis Date    (HFpEF) heart failure with preserved ejection fraction [facility]  Arrhythmia     HEART MURMUR -\"HAD IT FOR YEARS AS AN ADULT)    Arthritis     OSTEO    Back pain     Breast cancer screening 2016    Chronic bilateral low back pain without sciatica 2016    Diabetes [facility]     Encounter for long-term (current) use of medications 2016    Essential hypertension with goal blood pressure less than 140/90 2016    Hypercholesteremia 2015    Hypercholesterolemia     Hypertension     CONTROLLED WITH MEDS    Ill-defined condition     hyperlipidemia    Other ill-defined conditions(799.89)     high cholesterol    Patient non adherence 2017    Primary osteoarthritis of left knee 3/2/2018    Type 2 diabetes, uncontrolled, with neuropathy ([facility] 2017    Type II diabetes mellitus, uncontrolled ([facility] 2015       Family History - reviewed:  Family History   Problem Relation Age of Onset    Heart Disease Mother     Heart Attack Mother     No Known Problems Father     Cancer Sister         UNKNOWN    Diabetes Son     Sickle Cell Anemia Daughter     Anesth Problems Son         CHEST PAIN       Social History - reviewed:  Social History     Socioeconomic History    Marital status: SINGLE     Spouse name: Not on file    Number of children: Not on file    Years of education: Not on file    Highest education level: Not on file   Occupational History    Not on file   Tobacco Use    Smoking status: Former Smoker     Packs/day: 1.00     Years: 30.00     Pack years: 30.00     Quit date: 2005     Years since quittin.7    Smokeless tobacco: Never Used   Substance and Sexual Activity    Alcohol use: No    Drug use: No    Sexual activity: Never   Other Topics Concern    Not on file   Social History Narrative    ** Merged History Encounter **          Social Determinants of Health     Financial Resource Strain:     Difficulty of Paying Living Expenses: Not on file   Food Insecurity:     Worried About Running Out of Food in the Last Year: Not on file    Ran Out of Food in the Last Year: Not on file   Transportation Needs:     Lack of Transportation (Medical): Not on file    Lack of Transportation (Non-Medical): Not on file   Physical Activity:     Days of Exercise per Week: Not on file    Minutes of Exercise per Session: Not on file   Stress:     Feeling of Stress : Not on file   Social Connections:     Frequency of Communication with Friends and Family: Not on file    Frequency of Social Gatherings with Friends and Family: Not on file    Attends Scientology Services: Not on file    Active Member of [address] [place] [address] c-crowd or Organizations: Not on file    Attends Club or Organization Meetings: Not on file    Marital Status: Not on file   Intimate Partner Violence:     Fear of Current or Ex-Partner: Not on file    Emotionally Abused: Not on file    Physically Abused: Not on file    Sexually Abused: Not on file   Housing Stability:     Unable to Pay for Housing in the Last Year: Not on file    Number of [place] in the Last Year: Not on file    Unstable Housing in the Last Year: Not on file       Depression screening:  3 most recent [facility] 1/6/2022   Little interest or pleasure in doing things Not at all   Feeling down, depressed, irritable, or hopeless Not at all   Total Score PHQ 2 0         Review of systems:     A comprehensive review of systems was negative except for that written in the History of Present Illness. Visit Vitals  BP (!) 150/95 (BP 1 Location: Left upper arm, BP Patient Position: Sitting, BP Cuff Size: Large adult)   Pulse 77   Temp 98.3 °F (36.8 °C) (Oral)   Resp 16   Ht 5' 6\" (1.676 m)   Wt 174 lb (78.9 kg)   SpO2 98%   BMI 28.08 kg/m²       General: Alert and oriented, in no acute distress. Well nourished. EYE: PERRL. Sclera and conjuctival clear. Extraocular movements intact. EARS: External normal, canals clear, tympanic membranes normal.   NOSE: Mucosa healthy without drainage or ulceration.   OROPHARYNX: No suspicious lesions, normal dentition, pharynx, tongue and tonsils normal.  NECK: Supple; no masses; thyroid normal.  LUNGS: Respirations unlabored; clear to auscultation bilaterally. CARDIOVASCULAR: Regular, rate, and rhythm without murmurs, gallops or rubs. ABDOMEN: Soft; nontender; nondistended; normoactive bowel sounds; no masses or organomegaly. MUSCULOSKELETAL: FROM in all extremities     EXT: No edema. Neurovascularlly intact. Normal gait. SKIN: No rash. No suspicious lesions or moles. Neuro: Mental Status: Pt is alert and oriented to person, place, and time. Assessment/Plan       ICD-10-CM ICD-9-CM    1. Hypertension associated with diabetes ([facility]  E11.59 250.80 losartan (COZAAR) 25 mg tablet    I15.2 401.9    2. Diabetic polyneuropathy associated with type 2 diabetes mellitus (HCC)  E11.42 250.60  DIABETES FOOT EXAM     357.2    3. Encounter for screening mammogram for malignant neoplasm of breast  Z12.31 V76.12 [facility] MAMMO BI SCREENING INCL CAD   4. Encounter for immunization  Z23 V03.89 INFLUENZA VIRUS VAC QUAD,SPLIT,PRESV FREE SYRINGE IM      GA IMMUNIZ ADMIN,1 SINGLE/COMB VAC/TOXOID     1. Hypertension associated with diabetes ([facility]    - Resume losartan (COZAAR) 25 mg tablet; Take 1 Tablet by mouth nightly. - continue Aldactone 25mg daily     2. Diabetic polyneuropathy associated with type 2 diabetes mellitus ([facility]): Managed by Endocrinology at [facility] (Lon De La Paz MD  )  Last A1C 6.9% (10/06/2020). Continue the following;    Lantus: 20 units in the AM  Victoza: 1.2mg in the AM  Jardiance 25mg daily  Metformin: 500mg BID, higher dose gave her side effects. Cymbalta 60mg daily. -  DIABETES FOOT EXAM    3. Encounter for screening mammogram for malignant neoplasm of breast  - [facility] MAMMO BI SCREENING INCL CAD; Future    4.  Encounter for immunization  - INFLUENZA VIRUS VAC QUAD,SPLIT,PRESV FREE SYRINGE IM  - GA IMMUNIZ ADMIN,1 SINGLE/COMB VAC/TOXOID    Follow up: 3 months    On this date 01/06/22 I have spent 35 minutes reviewing previous notes, test results and face to face  with the patient discussing the diagnosis and importance of compliance with the treatment plan as well as documenting on the day of the visit. I have discussed the diagnosis with the patient and the intended plan as seen in the above orders. The patient has received an after-visit summary and questions were answered concerning future plans. I have discussed medication side effects and warnings with the patient as well. Informed patient to return to the office if new symptoms arise.     Signed By: Lopez Fleming MD     January 6, 2022

## 2022-01-18 ENCOUNTER — OFFICE VISIT (OUTPATIENT)
Dept: ORTHOPEDIC SURGERY | Age: 62
End: 2022-01-18
Payer: MEDICAID

## 2022-01-18 VITALS — HEIGHT: 66 IN | BODY MASS INDEX: 28.12 KG/M2 | WEIGHT: 175 LBS

## 2022-01-18 DIAGNOSIS — M48.062 SPINAL STENOSIS OF LUMBAR REGION WITH NEUROGENIC CLAUDICATION: ICD-10-CM

## 2022-01-18 DIAGNOSIS — M25.552 HIP PAIN, ACUTE, LEFT: ICD-10-CM

## 2022-01-18 DIAGNOSIS — M54.16 LUMBAR RADICULOPATHY: ICD-10-CM

## 2022-01-18 DIAGNOSIS — Z98.1 HISTORY OF LUMBAR SPINAL FUSION: Primary | ICD-10-CM

## 2022-01-18 PROCEDURE — 99214 OFFICE O/P EST MOD 30 MIN: CPT | Performed by: PHYSICIAN ASSISTANT

## 2022-01-18 RX ORDER — HYDROCODONE BITARTRATE AND ACETAMINOPHEN 5; 325 MG/1; MG/1
1 TABLET ORAL
Qty: 30 TABLET | Refills: 0 | Status: SHIPPED | OUTPATIENT
Start: 2022-01-18 | End: 2022-02-01

## 2022-01-18 NOTE — PROGRESS NOTES
Korene Dakin (: 1960) is a 64 y.o. female, established patient, here for evaluation of the following chief complaint(s):  Back Pain and Hip Pain       SUBJECTIVE/OBJECTIVE:  Korene Dakin (: 1960) is a 64 y.o. female. Revision Lumbar Laminectomy With Posterior Spinal Fusion L2-5 2020 with Dr. Tristan Mark. Patient presents for evaluation of lumbar and hip and buttock pain which initially began secondary to a fall 2 months ago. 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 Patient states immediate sharp severe pain in the left buttock with radiation of pain to the left thigh. Patient describes numbness and tingling in the left thigh to the level of the knee. Patient states weakness in the left leg and pain with walking, sitting and standing. Patient states she has been taking duloxetine and Advil for pain relief and rates her average pain level as a 9/10. Patient patient denies saddle paresthesia/anesthesia. Allergies   Allergen Reactions    Pravachol [Pravastatin] Myalgia       Current Outpatient Medications   Medication Sig    HYDROcodone-acetaminophen (NORCO) 5-325 mg per tablet Take 1 Tablet by mouth every six (6) hours as needed for Pain for up to 14 days. Max Daily Amount: 4 Tablets. Take 1-2 tabs every 4-6 hours as needed for severe pain. MAX 8 tabs in 24 hours.  cholecalciferol (VITAMIN D3) (1000 Units /25 mcg) tablet     Vitamin B-12 1,000 mcg tablet     losartan (COZAAR) 25 mg tablet Take 1 Tablet by mouth nightly.  DULoxetine (CYMBALTA) 60 mg capsule Take 1 Capsule by mouth daily.  diclofenac EC (VOLTAREN) 75 mg EC tablet TAKE ONE TABLET BY MOUTH EVERY 12 HOURS    omega-3 fatty acids/fish oil (fish oil-omega-3 fatty acids) 300-1,000 mg cap TAKE ONE CAPSULE BY MOUTH TWICE A DAY    empagliflozin (Jardiance) 25 mg tablet Take 25 mg by mouth daily.     insulin glargine (Lantus Solostar U-100 Insulin) 100 unit/mL (3 mL) inpn 20 Units by SubCUTAneous route daily.  liraglutide (Victoza 2-Gilbert) 0.6 mg/0.1 mL (18 mg/3 mL) pnij 1.2 mg by SubCUTAneous route daily.  senna-docusate (PERICOLACE) 8.6-50 mg per tablet Take 1 Tab by mouth two (2) times a day. Indications: constipation    rosuvastatin (CRESTOR) 10 mg tablet Take 10 mg by mouth nightly.  metFORMIN ER (GLUCOPHAGE XR) 500 mg tablet Take 500 mg by mouth two (2) times a day. Indications: 500 mg tab taking 2 tabs daily    aspirin 81 mg chewable tablet Take 81 mg by mouth daily.  spironolactone (ALDACTONE) 25 mg tablet 12.5 mg.    Insulin Needles, Disposable, 32 gauge x \" ndle     LANCETS, SUPER THIN misc     ONETOUCH ULTRA TEST strip      No current facility-administered medications for this visit. Social History     Socioeconomic History    Marital status: SINGLE     Spouse name: Not on file    Number of children: Not on file    Years of education: Not on file    Highest education level: Not on file   Occupational History    Not on file   Tobacco Use    Smoking status: Former Smoker     Packs/day: 1.00     Years: 30.00     Pack years: 30.00     Quit date: 2005     Years since quittin.8    Smokeless tobacco: Never Used   Substance and Sexual Activity    Alcohol use: No    Drug use: No    Sexual activity: Never   Other Topics Concern    Not on file   Social History Narrative    ** Merged History Encounter **          Social Determinants of Health     Financial Resource Strain:     Difficulty of Paying Living Expenses: Not on file   Food Insecurity:     Worried About Running Out of Food in the Last Year: Not on file    Gay of Food in the Last Year: Not on file   Transportation Needs:     Lack of Transportation (Medical): Not on file    Lack of Transportation (Non-Medical):  Not on file   Physical Activity:     Days of Exercise per Week: Not on file    Minutes of Exercise per Session: Not on file   Stress:     Feeling of Stress : Not on file   Social Connections:     Frequency of Communication with Friends and Family: Not on file    Frequency of Social Gatherings with Friends and Family: Not on file    Attends Rastafari Services: Not on file    Active Member of Clubs or Organizations: Not on file    Attends Club or Organization Meetings: Not on file    Marital Status: Not on file   Intimate Partner Violence:     Fear of Current or Ex-Partner: Not on file    Emotionally Abused: Not on file    Physically Abused: Not on file    Sexually Abused: Not on file   Housing Stability:     Unable to Pay for Housing in the Last Year: Not on file    Number of [place] in the Last Year: Not on file    Unstable Housing in the Last Year: Not on file       Past Surgical History:   Procedure Laterality Date    HX CARPAL TUNNEL RELEASE      HX GYN      hysterectomy    HX HEENT      Ear surgery    HX HEENT      Left eye    HX ORTHOPAEDIC      left wrist & left elbow    HX ORTHOPAEDIC      Back Surgery x 3    HX ORTHOPAEDIC      right thumb at [address]    HX OTHER SURGICAL      back surgery X 2    HX OTHER SURGICAL      Collapsed left lung    HX OTHER SURGICAL      axillary sweat glands removed    HX OVARIAN CYST REMOVAL      [place] CARDIAC SURG PROCEDURE UNLIST      murmur- PT DENIES SURGERY AND MURMUR 17    [place] OTOPLASTY PROTRUDING EAR W/WO SIZE [facility]         Family History   Problem Relation Age of Onset    Heart Disease Mother     Heart Attack Mother     No Known Problems Father     Cancer Sister         UNKNOWN    Diabetes Son     Sickle Cell Anemia Daughter     Anesth Problems Son         CHEST PAIN        OB History        0    Para   0    Term   0       0    AB   0    Living           SAB   0    IAB   0    Ectopic   0    Molar        Multiple        Live Births REVIEW OF SYSTEMS:  ROS    Patient denies any recent fever, chills, nausea, vomiting, chest pain, or shortness of breath. Vitals:  Ht 5' 6\" (1.676 m)   Wt 175 lb (79.4 kg)   BMI 28.25 kg/m²    Body mass index is 28.25 kg/m². PHYSICAL EXAM:  The patient is alert and oriented x3 and in no acute distress. Patient ambulates with a normal gait without gait aids. Sensory testing in all major nerve distributions in the lower extremities is intact and symmetric. Manual motor testing of the major muscle groups of the lower extremities including dorsiflexion/EHL/plantarflexion, knee flexion/extension, hip flexion/extension/abduction/adduction is intact and symmetric in the bilateral lower extremities with the exception of left hip flexion/abduction which is weak and graded 4/5, left knee extension/flexion is weak and graded 4/5. Paullette Rakes Deep tendon reflexes +1/4 and symmetric in the bilateral knees and ankles. Negative straight leg raise bilaterally. No evidence of clonus bilaterally. Babinski's downgoing and symmetric bilaterally. Distal pulses 2+ and symmetric bilaterally. There is tenderness to palpation of the left buttock, lateral hip, lumbar left sacral region. IMAGING:    Results from Hospital Encounter encounter on 11/11/21    XR SPINE LUMB 2 OR 3 V    Narrative  EXAM: XR SPINE LUMB 2 OR 3 V    INDICATION: Fall    COMPARISON: Lumbosacral spine radiographs 2/19/2020. TECHNIQUE: Lumbosacral spine, 3 views    FINDINGS:  Redemonstrated L2-L5 posterior spinal instrumentation and laminectomies. Lucencies around the bilateral L2 and L5 screws (L5 worsening L2), concerning  for loosening. Worsening grade 1/2 anterolisthesis of L4 on L5. Vertebral body  heights are grossly maintained. Redemonstrated endplate sclerosis and subtle  irregularity at L2-L3. Multilevel degenerative endplate osteophytes. Impression  1. No definite acute compression fracture identified.  However, there is  worsening grade 1/2 anterolisthesis of L4 on L5 compared to 2/19/2020 exam; this  is indeterminate and may be related to chronic motion versus trauma. 2.  Lucencies around the bilateral L2 and L5 screws are concerning for  loosening. XR Results (most recent):  Results from Appointment encounter on 01/18/22    XR SPINE LUMB MIN 4 V    Narrative  4 view radiographs of the lumbar spine obtained in the office today were reviewed and demonstrate L2-L5 posterior spinal fusion. There is evidence of radiolucencies around the pedicle screws in the L5 vertebral body bilaterally. There has been no gross interval change from previous radiographs. No acute bony abnormalities. Orders Placed This Encounter    XR PELV 1 OR 2 V     Room 2A     Standing Status:   Future     Number of Occurrences:   1     Standing Expiration Date:   1/18/2023    XR SPINE LUMB MIN 4 V     Standing Status:   Future     Number of Occurrences:   1     Standing Expiration Date:   1/19/2023    HYDROcodone-acetaminophen (NORCO) 5-325 mg per tablet     Sig: Take 1 Tablet by mouth every six (6) hours as needed for Pain for up to 14 days. Max Daily Amount: 4 Tablets. Take 1-2 tabs every 4-6 hours as needed for severe pain. MAX 8 tabs in 24 hours. Dispense:  30 Tablet     Refill:  0          ASSESSMENT/PLAN:      1. History of lumbar spinal fusion  -     XR SPINE LUMB MIN 4 V; Future  2. Lumbar radiculopathy  -     XR SPINE LUMB MIN 4 V; Future  3. Spinal stenosis of lumbar region with neurogenic claudication  -     HYDROcodone-acetaminophen (NORCO) 5-325 mg per tablet; Take 1 Tablet by mouth every six (6) hours as needed for Pain for up to 14 days. Max Daily Amount: 4 Tablets. Take 1-2 tabs every 4-6 hours as needed for severe pain. MAX 8 tabs in 24 hours. , Normal, Disp-30 Tablet, R-0  4. Hip pain, acute, left  -     XR PELV 1 OR 2 V; Future  -     HYDROcodone-acetaminophen (NORCO) 5-325 mg per tablet;  Take 1 Tablet by mouth every six (6) hours as needed for Pain for up to 14 days. Max Daily Amount: 4 Tablets. Take 1-2 tabs every 4-6 hours as needed for severe pain. MAX 8 tabs in 24 hours. , Normal, Disp-30 Tablet, R-0        Below is the assessment and plan developed based on review of pertinent history, physical exam, labs, studies, and medications. Have discussed the diagnosis and radiographic findings at length and answered all patient questions to her satisfaction. Have sent a prescription for pain medication electronically to the patient's preferred pharmacy. Patient declines outpatient PT. Will plan on seeing the patient back for reevaluation in 4 to 6 weeks time should symptoms persist or worsen. The patient understands and agrees to the treatment plan as outlined above. Return if symptoms worsen or fail to improve. Dr. Hallie Ramos was available for immediate consultation as needed. An electronic signature was used to authenticate this note.   -- Dinesh Yancey PA-C

## 2022-01-31 ENCOUNTER — HOSPITAL ENCOUNTER (OUTPATIENT)
Dept: MAMMOGRAPHY | Age: 62
Discharge: HOME | End: 2022-01-31
Attending: STUDENT IN AN ORGANIZED HEALTH CARE EDUCATION/TRAINING PROGRAM
Payer: MEDICAID

## 2022-01-31 DIAGNOSIS — Z12.31 ENCOUNTER FOR SCREENING MAMMOGRAM FOR MALIGNANT NEOPLASM OF BREAST: ICD-10-CM

## 2022-01-31 PROCEDURE — 77067 SCR MAMMO BI INCL CAD: CPT

## 2022-02-11 ENCOUNTER — OFFICE VISIT (OUTPATIENT)
Dept: ORTHOPEDIC SURGERY | Age: 62
End: 2022-02-11
Payer: MEDICAID

## 2022-02-11 VITALS — BODY MASS INDEX: 28.12 KG/M2 | WEIGHT: 175 LBS | HEIGHT: 66 IN

## 2022-02-11 DIAGNOSIS — S62.639A AVULSION FRACTURE OF DISTAL PHALANX OF FINGER, CLOSED, INITIAL ENCOUNTER: Primary | ICD-10-CM

## 2022-02-11 DIAGNOSIS — M79.644 PAIN IN FINGER OF RIGHT HAND: ICD-10-CM

## 2022-02-11 PROCEDURE — 99203 OFFICE O/P NEW LOW 30 MIN: CPT | Performed by: PHYSICIAN ASSISTANT

## 2022-02-11 NOTE — PROGRESS NOTES
HPI: Sandeep Gallagher (: 1960) is a 64 y.o. female, patient, here for evaluation of the following chief complaint(s): Patient presents with complaint of right ring finger pain at the DIP joint which occurred approximately 1 month ago when her finger got caught in a grocery cart. She had immediate swelling and bruising. She was not seen for the injury until 2022 when she presented to [facility] and was told that she has a fracture of the finger. She was placed in a splint and referred to us. X-rays available for review in PACS. Finger Pain (Right ring finger pain after getting caught in grocery cart)       Vitals:  Ht 5' 6\" (1.676 m)   Wt 175 lb (79.4 kg)   BMI 28.25 kg/m²    Body mass index is 28.25 kg/m². Allergies   Allergen Reactions    Pravachol [Pravastatin] Myalgia       Current Outpatient Medications   Medication Sig    cholecalciferol (VITAMIN D3) (1000 Units /25 mcg) tablet     Vitamin B-12 1,000 mcg tablet     losartan (COZAAR) 25 mg tablet Take 1 Tablet by mouth nightly.  DULoxetine (CYMBALTA) 60 mg capsule Take 1 Capsule by mouth daily.  diclofenac EC (VOLTAREN) 75 mg EC tablet TAKE ONE TABLET BY MOUTH EVERY 12 HOURS    omega-3 fatty acids/fish oil (fish oil-omega-3 fatty acids) 300-1,000 mg cap TAKE ONE CAPSULE BY MOUTH TWICE A DAY    empagliflozin (Jardiance) 25 mg tablet Take 25 mg by mouth daily.  insulin glargine (Lantus Solostar U-100 Insulin) 100 unit/mL (3 mL) inpn 20 Units by SubCUTAneous route daily.  liraglutide (Victoza 2-Gilbert) 0.6 mg/0.1 mL (18 mg/3 mL) pnij 1.2 mg by SubCUTAneous route daily.  senna-docusate (PERICOLACE) 8.6-50 mg per tablet Take 1 Tab by mouth two (2) times a day. Indications: constipation    rosuvastatin (CRESTOR) 10 mg tablet Take 10 mg by mouth nightly.  metFORMIN ER (GLUCOPHAGE XR) 500 mg tablet Take 500 mg by mouth two (2) times a day.  Indications: 500 mg tab taking 2 tabs daily    aspirin 81 mg chewable tablet Take 81 mg by mouth daily.  spironolactone (ALDACTONE) 25 mg tablet 12.5 mg.    Insulin Needles, Disposable, 32 gauge x 5/32\" ndle     LANCETS, SUPER THIN misc     ONETOUCH ULTRA TEST strip      No current facility-administered medications for this visit.        Past Medical History:   Diagnosis Date    (HFpEF) heart failure with preserved ejection fraction (HCC)     Arrhythmia     HEART MURMUR -\"HAD IT FOR YEARS AS AN ADULT)    Arthritis     OSTEO    Back pain     Breast cancer screening 6/28/2016    Chronic bilateral low back pain without sciatica 6/28/2016    Diabetes ([facility] [address]     Encounter for long-term (current) use of medications 6/28/2016    Essential hypertension with goal blood pressure less than 140/90 6/28/2016    Hypercholesteremia 8/6/2015    Hypercholesterolemia     Hypertension     CONTROLLED WITH MEDS    Ill-defined condition     hyperlipidemia    Other ill-defined conditions(799.89)     high cholesterol    Patient non adherence 1/23/2017    Primary osteoarthritis of left knee 3/2/2018    Type 2 diabetes, uncontrolled, with neuropathy ([address] 1/23/2017    Type II diabetes mellitus, uncontrolled ([address] 8/6/2015        Past Surgical History:   Procedure Laterality Date    HX CARPAL TUNNEL RELEASE      HX GYN      hysterectomy    HX HEENT      Ear surgery    HX HEENT      Left eye    HX ORTHOPAEDIC      left wrist & left elbow    HX ORTHOPAEDIC      Back Surgery x 3    HX ORTHOPAEDIC      right thumb at [address] HX ORTHOPAEDIC      REMOVAL OF LT FOOT CYST    HX OTHER SURGICAL      back surgery X 2    HX OTHER SURGICAL      Collapsed left lung    HX OTHER SURGICAL      axillary sweat glands removed    HX OVARIAN CYST REMOVAL      [place] CARDIAC SURG PROCEDURE UNLIST      murmur- PT DENIES SURGERY AND MURMUR 6/20/17    [place] OTOPLASTY PROTRUDING EAR W/WO SIZE RDCTJ         Family History   Problem Relation Age of Onset    Heart Disease Mother     Heart Attack Mother     No Known Problems Father     Cancer Sister         UNKNOWN    Diabetes Son     Sickle Cell Anemia Daughter     Anesth Problems Son         CHEST PAIN        Social History     Tobacco Use    Smoking status: Former Smoker     Packs/day: 1.00     Years: 30.00     Pack years: 30.00     Quit date: 2005     Years since quittin.8    Smokeless tobacco: Never Used   Substance Use Topics    Alcohol use: No    Drug use: No        Review of Systems    Constitutional: No fevers, chills, night sweats, excessive fatigue or weight loss. Musculoskeletal: No joint pain, swelling or redness. No decreased range of motion. Neurologic: No headache, blurred vision, and no areas of focal weakness or numbness. Normal gait. No sensory problems. Respiratory: No dyspnea on exertion, orthopnea, chest pain, cough or hemoptysis. Cardiovascular: No anginal chest pain, irregular heart beat, tachycardia, palpitations or orthopnea  Integumentary: No chronic rashes, inflammation, ulcerations, pruritus, petechiae, purpura, ecchymoses, or skin changes    ROS     Positive for: Musculoskeletal    Last edited by Ray Hoyt on 2022 10:15 AM. (History)             Physical Exam    General: Alert, cooperative, no distress  Musculosketal: Right hand - Very slight droop to the right ring distal phalanx. Tenderness to palpation at the right ring finger DIP joint. Limited range of motion. Mild swelling and erythema. No ecchymosis. Neurologic:  CNII-XII intact, Normal strength, sensation, and reflexes throughout    Imaging:  Avulsion fracture of the distal phalanx of the right ring finger. ASSESSMENT/PLAN:  Below is the assessment and plan developed based on review of pertinent history, physical exam, labs, studies, and medications. Right ring finger pain at the DIP joint which occurred approximately 1 month ago when her finger got caught in a grocery cart. She had immediate swelling and bruising.   She was not seen for the injury until 2/9/2022 when she presented to [facility] and was told that she has a fracture of the finger. She was placed in a splint and referred to us. She has an avulsion fracture of the distal phalanx of the right ring finger. She is a good candidate for an ORIF of the right ring distal phalanx avulsion fracture. She would like to proceed with the surgery. I reviewed risks that include but are not limited to stiffness, pain, nerve or tendon damage and overall incomplete relief of pain. Arrangements can be made for this to be performed on an outpatient basis at her convenience. 1. Avulsion fracture of distal phalanx of finger, closed, initial encounter  2. Pain in finger of right hand      Return in about 4 weeks (around 3/11/2022). Dr. Tiera Saunders was available for immediate consult during this encounter. An electronic signature was used to authenticate this note.   -- Bhumika Lake PA-C

## 2022-02-16 DIAGNOSIS — S62.639A AVULSION FRACTURE OF DISTAL PHALANX OF FINGER, CLOSED, INITIAL ENCOUNTER: Primary | ICD-10-CM

## 2022-03-16 DIAGNOSIS — S62.639A AVULSION FRACTURE OF DISTAL PHALANX OF FINGER, CLOSED, INITIAL ENCOUNTER: Primary | ICD-10-CM

## 2022-03-16 RX ORDER — HYDROCODONE BITARTRATE AND ACETAMINOPHEN 5; 325 MG/1; MG/1
1 TABLET ORAL
Qty: 15 TABLET | Refills: 0 | Status: SHIPPED | OUTPATIENT
Start: 2022-03-16 | End: 2022-03-19

## 2022-03-18 PROBLEM — E11.21 TYPE 2 DIABETES MELLITUS WITH NEPHROPATHY (HCC): Status: ACTIVE | Noted: 2017-12-14

## 2022-03-19 PROBLEM — M79.644 PAIN IN FINGER OF RIGHT HAND: Status: ACTIVE | Noted: 2022-02-11

## 2022-03-19 PROBLEM — M54.42 LOW BACK PAIN WITH LEFT-SIDED SCIATICA: Status: ACTIVE | Noted: 2017-06-08

## 2022-03-19 PROBLEM — Z78.0 POSTMENOPAUSAL: Status: ACTIVE | Noted: 2018-11-02

## 2022-03-19 PROBLEM — M17.12 PRIMARY OSTEOARTHRITIS OF LEFT KNEE: Status: ACTIVE | Noted: 2018-03-02

## 2022-03-19 PROBLEM — M48.00 SPINAL STENOSIS: Status: ACTIVE | Noted: 2020-02-19

## 2022-03-19 PROBLEM — M43.16 SPONDYLOLISTHESIS OF LUMBAR REGION: Status: ACTIVE | Noted: 2017-06-08

## 2022-03-19 PROBLEM — L30.9 ECZEMA: Status: ACTIVE | Noted: 2018-08-24

## 2022-03-19 PROBLEM — S62.639A: Status: ACTIVE | Noted: 2022-02-11

## 2022-03-19 PROBLEM — N18.9 CKD (CHRONIC KIDNEY DISEASE): Status: ACTIVE | Noted: 2018-05-11

## 2022-03-19 PROBLEM — Z91.199 PATIENT NON ADHERENCE: Status: ACTIVE | Noted: 2017-01-23

## 2022-03-19 PROBLEM — R17 ELEVATED BILIRUBIN: Status: ACTIVE | Noted: 2018-05-11

## 2022-03-20 PROBLEM — D50.0 IRON DEFICIENCY ANEMIA DUE TO CHRONIC BLOOD LOSS: Status: ACTIVE | Noted: 2018-09-05

## 2022-03-22 NOTE — PROGRESS NOTES
HPI: Rodrigo Singh (: 1960) is a 64 y.o. female, patient, here for evaluation of the following chief complaint(s): Patient presents with complaint of right ring finger pain at the DIP joint which occurred approximately 1 month ago when her finger got caught in a grocery cart. She had immediate swelling and bruising. She was not seen for the injury until 2022 when she presented to [facility] and was told that she has a fracture of the finger. 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 An attempt at open reduction internal fixation was performed but effectively the fragment was excised and the terminal extensor tendon was repaired with transarticular DIP pinning on 3/17/2022. No chief complaint on file. Vitals: There were no vitals taken for this visit. There is no height or weight on file to calculate BMI. Allergies   Allergen Reactions    Pravachol [Pravastatin] Myalgia       Current Outpatient Medications   Medication Sig    cholecalciferol (VITAMIN D3) (1000 Units /25 mcg) tablet     Vitamin B-12 1,000 mcg tablet     losartan (COZAAR) 25 mg tablet Take 1 Tablet by mouth nightly.  DULoxetine (CYMBALTA) 60 mg capsule Take 1 Capsule by mouth daily.  diclofenac EC (VOLTAREN) 75 mg EC tablet TAKE ONE TABLET BY MOUTH EVERY 12 HOURS    omega-3 fatty acids/fish oil (fish oil-omega-3 fatty acids) 300-1,000 mg cap TAKE ONE CAPSULE BY MOUTH TWICE A DAY    empagliflozin (Jardiance) 25 mg tablet Take 25 mg by mouth daily.  insulin glargine (Lantus Solostar U-100 Insulin) 100 unit/mL (3 mL) inpn 20 Units by SubCUTAneous route daily.  liraglutide (Victoza 2-Gilbert) 0.6 mg/0.1 mL (18 mg/3 mL) pnij 1.2 mg by SubCUTAneous route daily.  senna-docusate (PERICOLACE) 8.6-50 mg per tablet Take 1 Tab by mouth two (2) times a day. Indications: constipation    rosuvastatin (CRESTOR) 10 mg tablet Take 10 mg by mouth nightly.     metFORMIN ER (GLUCOPHAGE XR) 500 mg tablet Take 500 mg by mouth two (2) times a day. Indications: 500 mg tab taking 2 tabs daily    aspirin 81 mg chewable tablet Take 81 mg by mouth daily.  spironolactone (ALDACTONE) 25 mg tablet 12.5 mg.    Insulin Needles, Disposable, 32 gauge x 5/32\" ndle     LANCETS, SUPER THIN misc     ONETOUCH ULTRA TEST strip      No current facility-administered medications for this visit.        Past Medical History:   Diagnosis Date    (HFpEF) heart failure with preserved ejection fraction (HCC)     Arrhythmia     HEART MURMUR -\"HAD IT FOR YEARS AS AN ADULT)    Arthritis     OSTEO    Back pain     Breast cancer screening 6/28/2016    Chronic bilateral low back pain without sciatica 6/28/2016    Diabetes ([facility]     Encounter for long-term (current) use of medications 6/28/2016    Essential hypertension with goal blood pressure less than 140/90 6/28/2016    Hypercholesteremia 8/6/2015    Hypercholesterolemia     Hypertension     CONTROLLED WITH MEDS    Ill-defined condition     hyperlipidemia    Other ill-defined conditions(799.89)     high cholesterol    Patient non adherence 1/23/2017    Primary osteoarthritis of left knee 3/2/2018    Type 2 diabetes, uncontrolled, with neuropathy ([address] 1/23/2017    Type II diabetes mellitus, uncontrolled ([address] 8/6/2015        Past Surgical History:   Procedure Laterality Date    HX CARPAL TUNNEL RELEASE      HX GYN      hysterectomy    HX HEENT      Ear surgery    HX HEENT      Left eye    HX ORTHOPAEDIC      left wrist & left elbow    HX ORTHOPAEDIC      Back Surgery x 3    HX ORTHOPAEDIC      right thumb at [address] HX ORTHOPAEDIC      REMOVAL OF LT FOOT CYST    HX OTHER SURGICAL      back surgery X 2    HX OTHER SURGICAL      Collapsed left lung    HX OTHER SURGICAL      axillary sweat glands removed    HX OVARIAN CYST REMOVAL      GA CARDIAC SURG PROCEDURE UNLIST      murmur- PT DENIES SURGERY AND MURMUR 6/20/17    NV OTOPLASTY PROTRUDING EAR W/WO SIZE RDCTJ         Family History   Problem Relation Age of Onset    Heart Disease Mother     Heart Attack Mother     No Known Problems Father     Cancer Sister         UNKNOWN    Diabetes Son     Sickle Cell Anemia Daughter     Anesth Problems Son         CHEST PAIN        Social History     Tobacco Use    Smoking status: Former Smoker     Packs/day: 1.00     Years: 30.00     Pack years: 30.00     Quit date: 2005     Years since quittin.9    Smokeless tobacco: Never Used   Substance Use Topics    Alcohol use: No    Drug use: No        Review of Systems   All other systems reviewed and are negative. Constitutional: No fevers, chills, night sweats, excessive fatigue or weight loss. Musculoskeletal: No joint pain, swelling or redness. No decreased range of motion. Neurologic: No headache, blurred vision, and no areas of focal weakness or numbness. Normal gait. No sensory problems. Respiratory: No dyspnea on exertion, orthopnea, chest pain, cough or hemoptysis. Cardiovascular: No anginal chest pain, irregular heart beat, tachycardia, palpitations or orthopnea  Integumentary: No chronic rashes, inflammation, ulcerations, pruritus, petechiae, purpura, ecchymoses, or skin changes           Physical Exam    Overall the patient's wound appears to be healing well with no redness drainage or sign of infection. The pin site appears clean and dry. The dorsal wound is healing well to the right ring finger. The wound was covered with a Band-Aid and Coban was utilized to wrap around the pin to prevent it from moving distally. Then the operative splint was reapplied and contoured appropriately wrapped with additional Coban. Imaging:  Avulsion fracture of the distal phalanx of the right ring finger previously noted on preoperative x-ray nonunited.       ASSESSMENT/PLAN:  Below is the assessment and plan developed based on review of pertinent history, physical exam, labs, studies, and medications. Right ring finger pain at the DIP joint which occurred approximately 1 month ago when her finger got caught in a grocery cart. She had immediate swelling and bruising. She was not seen for the injury until 2/9/2022 when she presented to [facility] and was told that she has a fracture of the finger. She was placed in a splint and referred to us. She has an avulsion fracture of the distal phalanx of the right ring finger. Surgery was considered early on but she was not able to proceed with surgery for another 6 weeks. By that time the fracture was nonunited and despite intraoperative efforts it was not repairable. The fracture fragment was excised and the extensor tendon repaired with transarticular retrograde DIP pinning on 3/17/2022. I explained that the pin will likely be left in place for 4 to 6 weeks. I will see her back in half that time to check her progress and continue with protective splinting of the digit. 1. 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 Pain in finger of right hand  3. Rupture of extensor tendon of finger      No follow-ups on file.

## 2022-03-24 ENCOUNTER — OFFICE VISIT (OUTPATIENT)
Dept: ORTHOPEDIC SURGERY | Age: 62
End: 2022-03-24
Payer: MEDICAID

## 2022-03-24 DIAGNOSIS — M79.644 PAIN IN FINGER OF RIGHT HAND: ICD-10-CM

## 2022-03-24 DIAGNOSIS — S56.419A RUPTURE OF EXTENSOR TENDON OF FINGER: ICD-10-CM

## 2022-03-24 DIAGNOSIS — S62.639K: Primary | ICD-10-CM

## 2022-03-24 PROCEDURE — 99024 POSTOP FOLLOW-UP VISIT: CPT | Performed by: ORTHOPAEDIC SURGERY

## 2022-03-24 NOTE — LETTER
3/24/2022    Patient: Christiano Raphael   YOB: 1960   Date of Visit: 3/24/2022     Ana Hogan MD  [address] [address]  Via In Basket    Dear Ana Hogan MD,      Thank you for referring Ms. Valente Black to [facility] for evaluation. My notes for this consultation are attached. If you have questions, please do not hesitate to call me. I look forward to following your patient along with you.       Sincerely,    Susana Macario MD

## 2022-04-11 ENCOUNTER — OFFICE VISIT (OUTPATIENT)
Dept: FAMILY MEDICINE CLINIC | Age: 62
End: 2022-04-11
Payer: MEDICAID

## 2022-04-11 VITALS
WEIGHT: 177 LBS | TEMPERATURE: 98.3 F | SYSTOLIC BLOOD PRESSURE: 119 MMHG | HEART RATE: 81 BPM | RESPIRATION RATE: 16 BRPM | OXYGEN SATURATION: 96 % | DIASTOLIC BLOOD PRESSURE: 73 MMHG | BODY MASS INDEX: 28.45 KG/M2 | HEIGHT: 66 IN

## 2022-04-11 DIAGNOSIS — E11.59 HYPERTENSION ASSOCIATED WITH DIABETES (HCC): Primary | ICD-10-CM

## 2022-04-11 DIAGNOSIS — Z79.4 DIABETES MELLITUS DUE TO UNDERLYING CONDITION WITH DIABETIC NEUROPATHY, WITH LONG-TERM CURRENT USE OF INSULIN (HCC): ICD-10-CM

## 2022-04-11 DIAGNOSIS — E08.40 DIABETES MELLITUS DUE TO UNDERLYING CONDITION WITH DIABETIC NEUROPATHY, WITH LONG-TERM CURRENT USE OF INSULIN (HCC): ICD-10-CM

## 2022-04-11 DIAGNOSIS — I15.2 HYPERTENSION ASSOCIATED WITH DIABETES (HCC): Primary | ICD-10-CM

## 2022-04-11 PROBLEM — I50.32 CHRONIC HEART FAILURE WITH PRESERVED EJECTION FRACTION (HCC): Status: ACTIVE | Noted: 2022-04-11

## 2022-04-11 PROCEDURE — 99213 OFFICE O/P EST LOW 20 MIN: CPT | Performed by: STUDENT IN AN ORGANIZED HEALTH CARE EDUCATION/TRAINING PROGRAM

## 2022-04-11 RX ORDER — LIRAGLUTIDE 6 MG/ML
1.8 INJECTION SUBCUTANEOUS DAILY
Qty: 12 ADJUSTABLE DOSE PRE-FILLED PEN SYRINGE | Refills: 0
Start: 2022-04-11

## 2022-04-11 NOTE — PROGRESS NOTES
[address] [place], [place] [facility] [address]  [phone]    C/C: HTN,    HPI:    Guru Holland is a 64 y.o. female who presents to blood pressure management. Subjective;  HTN: Out of her Losartan 25 mg and aldactone 25 mg  Compliant and BP at goal.  No side effects from medications. DM type II with neuropathy:  Managed by Endocrinology (Soledad Arrieta MD  ) at [facility], last appointment on 4/6/22. A1C of 7.8% per chart review. Victoza increased to 1. 8units daily    Lantus: 20 units in the AM  Victoza: 1.8mg in the AM,   Jardiance 25mg daily  Metformin: 500mg BID, higher dose gave her side effects. Also takes Cymbalta 60mg daily. Pt denies any  fever, chill, chest pain, SOB, PND, abdominal pain, n/v/d,HA or dizziness. Other Health Habits and social history:  Smoking history: Former smoker but quit years ago. Alcohol history: no  Occupation: unemployed  Marital status: Patient is currently single and has one son. Had 3 children but 2 passed away. Has 2 grandkids (5 and 9 yo)    Other Specialists/providers:  Endocrinology  Cardiology - HFpEF  Ortho    Allergies- reviewed:    Allergies   Allergen Reactions    Pravachol [Pravastatin] Myalgia       Past Medical History- reviewed:  Past Medical History:   Diagnosis Date    (HFpEF) heart failure with preserved ejection fraction (HCC)     Arrhythmia     HEART MURMUR -\"HAD IT FOR YEARS AS AN ADULT)    Arthritis     OSTEO    Back pain     Breast cancer screening 6/28/2016    Chronic bilateral low back pain without sciatica 6/28/2016    Diabetes ([address]     Encounter for long-term (current) use of medications 6/28/2016    Essential hypertension with goal blood pressure less than 140/90 6/28/2016    Hypercholesteremia 8/6/2015    Hypercholesterolemia     Hypertension     CONTROLLED WITH MEDS    Ill-defined condition     hyperlipidemia    Other ill-defined conditions(799.89)     high cholesterol    Patient non adherence 2017    Primary osteoarthritis of left knee 3/2/2018    Type 2 diabetes, uncontrolled, with neuropathy ([facility] 2017    Type II diabetes mellitus, uncontrolled ([facility] 2015       Family History - reviewed:  Family History   Problem Relation Age of Onset    Heart Disease Mother     Heart Attack Mother     No Known Problems Father     Cancer Sister         UNKNOWN    Diabetes Son     Sickle Cell Anemia Daughter     Anesth Problems Son         CHEST PAIN       Social History - reviewed:  Social History     Socioeconomic History    Marital status: SINGLE     Spouse name: Not on file    Number of children: Not on file    Years of education: Not on file    Highest education level: Not on file   Occupational History    Not on file   Tobacco Use    Smoking status: Former Smoker     Packs/day: 1.00     Years: 30.00     Pack years: 30.00     Quit date: 2005     Years since quittin.0    Smokeless tobacco: Never Used   Substance and Sexual Activity    Alcohol use: No    Drug use: No    Sexual activity: Never   Other Topics Concern    Not on file   Social History Narrative    ** Merged History Encounter **          Social Determinants of Health     Financial Resource Strain:     Difficulty of Paying Living Expenses: Not on file   Food Insecurity:     Worried About Running Out of Food in the Last Year: Not on file    Gay of Food in the Last Year: Not on file   Transportation Needs:     Lack of Transportation (Medical): Not on file    Lack of Transportation (Non-Medical):  Not on file   Physical Activity:     Days of Exercise per Week: Not on file    Minutes of Exercise per Session: Not on file   Stress:     Feeling of Stress : Not on file   Social Connections:     Frequency of Communication with Friends and Family: Not on file    Frequency of Social Gatherings with Friends and Family: Not on file    Attends Gnosticism Services: Not on file  Active Member of Clubs or Organizations: Not on file    Attends Club or Organization Meetings: Not on file    Marital Status: Not on file   Intimate Partner Violence:     Fear of Current or Ex-Partner: Not on file    Emotionally Abused: Not on file    Physically Abused: Not on file    Sexually Abused: Not on file   Housing Stability:     Unable to Pay for Housing in the Last Year: Not on file    Number of [place] in the Last Year: Not on file    Unstable Housing in the Last Year: Not on file       Depression screening:  3 most recent [address] [facility] [facility] 1/6/2022   Little interest or pleasure in doing things Not at all   Feeling down, depressed, irritable, or hopeless Not at all   Total Score PHQ 2 0         Review of systems:     A comprehensive review of systems was negative except for that written in the History of Present Illness. Visit Vitals  /73 (BP 1 Location: Right arm, BP Patient Position: Sitting, BP Cuff Size: Adult)   Pulse 81   Temp 98.3 °F (36.8 °C) (Oral)   Resp 16   Ht 5' 6\" (1.676 m)   Wt 177 lb (80.3 kg)   SpO2 96%   BMI 28.57 kg/m²       General: Alert and oriented, in no acute distress. Well nourished. EYE: PERRL. Sclera and conjuctival clear. Extraocular movements intact. EARS: External normal, canals clear, tympanic membranes normal.   NOSE: Mucosa healthy without drainage or ulceration. OROPHARYNX: No suspicious lesions, normal dentition, pharynx, tongue and tonsils normal.  NECK: Supple; no masses; thyroid normal.  LUNGS: Respirations unlabored; clear to auscultation bilaterally. CARDIOVASCULAR: Regular, rate, and rhythm without murmurs, gallops or rubs. ABDOMEN: Soft; nontender; nondistended; normoactive bowel sounds; no masses or organomegaly. MUSCULOSKELETAL: FROM in all extremities     EXT: No edema. Neurovascularlly intact. Normal gait. SKIN: No rash. No suspicious lesions or moles.   Neuro: Mental Status: Pt is alert and oriented to person, place, and time.      Assessment/Plan       ICD-10-CM ICD-9-CM    1. Hypertension associated with diabetes ([facility]  E11.59 250.80     I15.2 401.9    2. Diabetes mellitus due to underlying condition with diabetic neuropathy, with long-term current use of insulin ([facility])  E08.40 249.60 liraglutide (Victoza 2-Gilbert) 0.6 mg/0.1 mL (18 mg/3 mL) pnij    Z79.4 357.2      V58.67        1. Hypertension associated with diabetes ([facility]  - Continue losartan (COZAAR) 25 mg tablet; Take 1 Tablet by mouth nightly. - continue Aldactone 25mg daily     2. Diabetes mellitus due to underlying condition with diabetic neuropathy, with long-term current use of insulin ([facility])  Last A1C (4/6/2022) -7.8%    Continue; Lantus: 20 units in the AM  Victoza: 1.8mg in the AM  Jardiance 25mg daily  Metformin: 500mg BID, higher dose gave her side effects. Cymbalta 60mg daily. Sees Ophthalmology once yearly    Follow up: 6 months or sooner if needed    I have discussed the diagnosis with the patient and the intended plan as seen in the above orders. The patient has received an after-visit summary and questions were answered concerning future plans. I have discussed medication side effects and warnings with the patient as well. Informed patient to return to the office if new symptoms arise.     Signed By: Landen Jaeger MD     April 11, 2022

## 2022-04-11 NOTE — PROGRESS NOTES
Name and  Verified. Pharmacy verified    Chief Complaint   Patient presents with    Follow-up     3 months 2022 Hypertension         Had HA1C completed 2022 6.9% Orthopedic Surgery Dr. Isaac Barbosa. Patient ok to have labs joaquim today. 1. Have you been to the ER, urgent care clinic since your last visit? Hospitalized since your last visit? No    2. Have you seen or consulted any other health care providers outside of the [address] [place] [address] since your last visit? Include any pap smears or colon screening.      Yes  2022  [facility] Endocrinology  Diabetes      Health Maintenance Due   Topic Date Due    Shingrix Vaccine Age 49> (1 of 2) Never done    Eye Exam Retinal or Dilated  2018    MICROALBUMIN Q1  2022    Lipid Screen  2022

## 2022-04-18 NOTE — PROGRESS NOTES
HPI: Santos Marques (: 1960) is a 64 y.o. female, patient, here for evaluation of the following chief complaint(s): Patient presents with complaint of right ring finger pain at the DIP joint which occurred approximately 1 month ago when her finger got caught in a grocery cart. She had immediate swelling and bruising. She was not seen for the injury until 2022 when she presented to [facility] and was told that she has a fracture of the finger. 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 An attempt at open reduction internal fixation was performed but effectively the fragment was excised and the terminal extensor tendon was repaired with transarticular DIP pinning on 3/17/2022. She returned for pin removal on 2022. Surgical Follow-up (Open treatment right ring finger distal phalanx fracture with fracture fragment excision on 3/17/22.)       Vitals: There were no vitals taken for this visit. There is no height or weight on file to calculate BMI. Allergies   Allergen Reactions    Pravachol [Pravastatin] Myalgia       Current Outpatient Medications   Medication Sig    liraglutide (Victoza 2-Gilbert) 0.6 mg/0.1 mL (18 mg/3 mL) pnij 1.8 mg by SubCUTAneous route daily.  cholecalciferol (VITAMIN D3) (1000 Units /25 mcg) tablet     Vitamin B-12 1,000 mcg tablet     losartan (COZAAR) 25 mg tablet Take 1 Tablet by mouth nightly.  DULoxetine (CYMBALTA) 60 mg capsule Take 1 Capsule by mouth daily.  diclofenac EC (VOLTAREN) 75 mg EC tablet TAKE ONE TABLET BY MOUTH EVERY 12 HOURS    omega-3 fatty acids/fish oil (fish oil-omega-3 fatty acids) 300-1,000 mg cap TAKE ONE CAPSULE BY MOUTH TWICE A DAY    empagliflozin (Jardiance) 25 mg tablet Take 25 mg by mouth daily.  insulin glargine (Lantus Solostar U-100 Insulin) 100 unit/mL (3 mL) inpn 20 Units by SubCUTAneous route daily.     senna-docusate (PERICOLACE) 8.6-50 mg per tablet Take 1 Tab by mouth two (2) times a day. Indications: constipation    rosuvastatin (CRESTOR) 10 mg tablet Take 10 mg by mouth nightly.  metFORMIN ER (GLUCOPHAGE XR) 500 mg tablet Take 500 mg by mouth two (2) times a day. Indications: 500 mg tab taking 2 tabs daily    aspirin 81 mg chewable tablet Take 81 mg by mouth daily.  spironolactone (ALDACTONE) 25 mg tablet 12.5 mg.    Insulin Needles, Disposable, 32 gauge x 5/32\" ndle     LANCETS, SUPER THIN misc     ONETOUCH ULTRA TEST strip      No current facility-administered medications for this visit.        Past Medical History:   Diagnosis Date    (HFpEF) heart failure with preserved ejection fraction (HCC)     Arrhythmia     HEART MURMUR -\"HAD IT FOR YEARS AS AN ADULT)    Arthritis     OSTEO    Back pain     Breast cancer screening 6/28/2016    Chronic bilateral low back pain without sciatica 6/28/2016    Diabetes ([facility]     Encounter for long-term (current) use of medications 6/28/2016    Essential hypertension with goal blood pressure less than 140/90 6/28/2016    Hypercholesteremia 8/6/2015    Hypercholesterolemia     Hypertension     CONTROLLED WITH MEDS    Ill-defined condition     hyperlipidemia    Other ill-defined conditions(799.89)     high cholesterol    Patient non adherence 1/23/2017    Primary osteoarthritis of left knee 3/2/2018    Type 2 diabetes, uncontrolled, with neuropathy 1/23/2017    Type II diabetes mellitus, uncontrolled 8/6/2015        Past Surgical History:   Procedure Laterality Date    HX CARPAL TUNNEL RELEASE      HX GYN      hysterectomy    HX HEENT      Ear surgery    HX HEENT      Left eye    HX ORTHOPAEDIC      left wrist & left elbow    HX ORTHOPAEDIC      Back Surgery x 3    HX ORTHOPAEDIC      [facility] [address] [facility] [address] HX ORTHOPAEDIC      REMOVAL OF LT FOOT CYST    HX OTHER SURGICAL      back surgery X 2    HX OTHER SURGICAL      Collapsed left lung    HX OTHER SURGICAL      axillary sweat glands removed    HX OVARIAN CYST REMOVAL      NE CARDIAC SURG PROCEDURE UNLIST      murmur- PT DENIES SURGERY AND MURMUR 17    NE OTOPLASTY PROTRUDING EAR W/WO SIZE RDCTJ         Family History   Problem Relation Age of Onset    Heart Disease Mother     Heart Attack Mother     No Known Problems Father     Cancer Sister         UNKNOWN    Diabetes Son     Sickle Cell Anemia Daughter     Anesth Problems Son         CHEST PAIN        Social History     Tobacco Use    Smoking status: Former Smoker     Packs/day: 1.00     Years: 30.00     Pack years: 30.00     Quit date: 2005     Years since quittin.0    Smokeless tobacco: Never Used   Substance Use Topics    Alcohol use: No    Drug use: No        Review of Systems   All other systems reviewed and are negative. Constitutional: No fevers, chills, night sweats, excessive fatigue or weight loss. Musculoskeletal: No joint pain, swelling or redness. No decreased range of motion. Neurologic: No headache, blurred vision, and no areas of focal weakness or numbness. Normal gait. No sensory problems. Respiratory: No dyspnea on exertion, orthopnea, chest pain, cough or hemoptysis. Cardiovascular: No anginal chest pain, irregular heart beat, tachycardia, palpitations or orthopnea  Integumentary: No chronic rashes, inflammation, ulcerations, pruritus, petechiae, purpura, ecchymoses, or skin changes    ROS     Positive for: Musculoskeletal    Last edited by Kishan Martin on 2022  2:43 PM. (History)             Physical Exam    Overall the patient's wound appears to be healing well with no redness drainage or sign of infection. The pin site appears clean and dry. The dorsal wound is healing well to the right ring finger. Imaging:  Avulsion fracture of the distal phalanx of the right ring finger previously noted on preoperative x-ray nonunited.       ASSESSMENT/PLAN:  Below is the assessment and plan developed based on review of pertinent history, physical exam, labs, studies, and medications. Right ring finger pain at the DIP joint which occurred approximately 1 month ago when her finger got caught in a grocery cart. She had immediate swelling and bruising. She was not seen for the injury until 2/9/2022 when she presented to [facility] and was told that she has a fracture of the finger. She was placed in a splint and referred to us. She has an avulsion fracture of the distal phalanx of the right ring finger. Surgery was considered early on but she was not able to proceed with surgery for another 6 weeks. By that time the fracture was nonunited and despite intraoperative efforts it was not repairable. The fracture fragment was excised and the extensor tendon repaired with transarticular retrograde DIP pinning on 3/17/2022. I explained that the pin will likely be left in place for 4 to 6 weeks. Return for pin removal on 4/19/2022. This was performed without difficulty at the bedside and then she was placed into a dorsal DIP extension splint. I recommended additional DIP splinting for comfort and support and follow-up in 3 to 4 weeks. 1. Closed avulsion fracture of distal phalanx of finger with nonunion, subsequent encounter  -     XR 4TH FINGER RT MIN 2 V; Future  2. Rupture of extensor tendon of finger  -     FINGER SPLINT, STATIC  -     DC APPLY FINGER SPLINT,STATIC  -     DC REMOVAL SUPERFICIAL IMPLANT  3. Pain in finger of right hand      Return in about 4 weeks (around 5/17/2022). Informed consent was obtained. The patient underwent removal of her transarticular retrograde DIP joint pin from the right ring finger. She then had a new dressing and dorsal aluminum splint applied across the DIP joint in full extension.

## 2022-04-19 ENCOUNTER — OFFICE VISIT (OUTPATIENT)
Dept: ORTHOPEDIC SURGERY | Age: 62
End: 2022-04-19
Payer: MEDICAID

## 2022-04-19 ENCOUNTER — OFFICE VISIT (OUTPATIENT)
Dept: ORTHOPEDIC SURGERY | Age: 62
End: 2022-04-19

## 2022-04-19 VITALS — BODY MASS INDEX: 29.66 KG/M2 | WEIGHT: 178 LBS | HEIGHT: 65 IN

## 2022-04-19 DIAGNOSIS — M54.50 LUMBAR BACK PAIN: Primary | ICD-10-CM

## 2022-04-19 DIAGNOSIS — S62.639K: Primary | ICD-10-CM

## 2022-04-19 DIAGNOSIS — M54.16 LUMBAR RADICULOPATHY: ICD-10-CM

## 2022-04-19 DIAGNOSIS — S32.009K PSEUDOARTHROSIS OF LUMBAR SPINE: ICD-10-CM

## 2022-04-19 DIAGNOSIS — S56.419A RUPTURE OF EXTENSOR TENDON OF FINGER: ICD-10-CM

## 2022-04-19 DIAGNOSIS — Z98.1 HISTORY OF LUMBAR SPINAL FUSION: ICD-10-CM

## 2022-04-19 DIAGNOSIS — M79.644 PAIN IN FINGER OF RIGHT HAND: ICD-10-CM

## 2022-04-19 DIAGNOSIS — M48.062 SPINAL STENOSIS OF LUMBAR REGION WITH NEUROGENIC CLAUDICATION: ICD-10-CM

## 2022-04-19 PROCEDURE — 99024 POSTOP FOLLOW-UP VISIT: CPT | Performed by: ORTHOPAEDIC SURGERY

## 2022-04-19 PROCEDURE — 20670 REMOVAL IMPLANT SUPERFICIAL: CPT | Performed by: ORTHOPAEDIC SURGERY

## 2022-04-19 PROCEDURE — 29130 APPL FINGER SPLINT STATIC: CPT | Performed by: ORTHOPAEDIC SURGERY

## 2022-04-19 PROCEDURE — 99214 OFFICE O/P EST MOD 30 MIN: CPT | Performed by: PHYSICIAN ASSISTANT

## 2022-04-19 RX ORDER — HYDROCODONE BITARTRATE AND ACETAMINOPHEN 5; 325 MG/1; MG/1
1 TABLET ORAL
Qty: 30 TABLET | Refills: 0 | Status: SHIPPED | OUTPATIENT
Start: 2022-04-19 | End: 2022-04-26

## 2022-04-19 RX ORDER — DICLOFENAC SODIUM 75 MG/1
75 TABLET, DELAYED RELEASE ORAL 2 TIMES DAILY WITH MEALS
Qty: 60 TABLET | Refills: 0 | Status: SHIPPED | OUTPATIENT
Start: 2022-04-19 | End: 2022-07-21 | Stop reason: ALTCHOICE

## 2022-04-19 NOTE — LETTER
4/19/2022    Patient: Yaritza Tinoco   YOB: 1960   Date of Visit: 4/19/2022     Neri Chilel MD  [address] [place] [address]  Via In Basket    Dear Neri Chilel MD,      Thank you for referring Ms. Chen Rivera to [facility] for evaluation. My notes for this consultation are attached. If you have questions, please do not hesitate to call me. I look forward to following your patient along with you.       Sincerely,    Manuel Barraza MD

## 2022-04-19 NOTE — PROGRESS NOTES
Annmarie Zelaya (: 1960) is a 64 y.o. female, established  patient, here for evaluation of the following chief complaint(s):  Back Pain and Hip Pain           SUBJECTIVE/OBJECTIVE:    Annmarie Zelaya (: 1960) is a 64 y.o. female who presents for evaluation of lumbar back pain without radiation to the lower extremities. Symptoms have been present for the past 3-4 weeks. The patient denies any history of trauma or changes in activities which may have precipitated his current symptoms. The patient localizes pain in the left lower lumbar region with radiation to the left lateral hip and groin. The patient describes weakness in the left lower extremity. The patient states that symptoms are worse with standing, walking, bending, twisting and lifting. Symptoms are improved with sitting and rest. The patient is currently taking OTC NSAIDs and Tylenol and rates their current level of pain as a 8-9/10. The patient denies saddle paraesthesias/ anaesthesia. Pain Assessment  2022   Location of Pain Hip;Back   Severity of Pain 8            ROS    The patient denies fevers, chills, chest pain, shortness of breath, nausea, vomiting. Positive for musculoskeletal issues as described in the HPI. Vitals:  Ht 5' 5\" (1.651 m)   Wt 178 lb (80.7 kg)   BMI 29.62 kg/m²    Body mass index is 29.62 kg/m². PHYSICAL EXAM:    The patient is alert and oriented x3 and in no acute distress. Patient ambulates with a normal gait without gait aids. Sensory testing in all major nerve distributions in the lower extremities is intact and symmetric. Manual motor testing of the major muscle groups of the lower extremities including dorsiflexion/EHL/ plantarflexion, knee flexion/extension, hip flexion/extension/abduction/ adduction is intact and symmetric in the bilateral lower extremities with the exception of left hip flexion which is weak 4/5 and left knee flexion/extension 4/5.  Deep tendon reflexes flat and symmetric in the bilateral knees and ankles. Hip range of motion is pain-free on the right and elicits back pain on the left. Negative straight leg raise maneuver on the right and is considered positive on the left. No evidence of clonus bilaterally. Babinski's downgoing and symmetric bilaterally. Distal pulses intact and symmetric bilaterally. There is tenderness to palpation of the left lower lumbar and lateral hip. IMAGING:    XR Results (maximum last 3): Results from Appointment encounter on 04/19/22    XR PELV 1 OR 2 V    Narrative  AP pelvis digital radiograph of the left/right/bilateral hip obtained in the office today was reviewed and demonstrate moderate of femoral acetabular joint space with no evidence of acute bony abnormality. XR SPINE LUMB MIN 4 V    Narrative  AP, lateral, flexion and extension view digital radiographs of the lumbar spine obtained in the office today were reviewed and demonstrate posterior instrumentation L2-L5. There is radiolucency around both the pedicle screws in the L5 vertebral body. At L4-L5 there is grade 1 anterior listhesis which appears to be mobile on flexion/extension views. The adjacent segment above the proximal fusion level at L1-L2 remains maintained. There is a 15 degree levo scoliosis curvature with the apex of curvature at the L1-L2 disc level. There is no evidence of fracture, lytic lesion or acute bony abnormality. Allergies   Allergen Reactions    Pravachol [Pravastatin] Myalgia         Current Outpatient Medications   Medication Sig    liraglutide (Victoza 2-Gilbert) 0.6 mg/0.1 mL (18 mg/3 mL) pnij 1.8 mg by SubCUTAneous route daily.  cholecalciferol (VITAMIN D3) (1000 Units /25 mcg) tablet     Vitamin B-12 1,000 mcg tablet     losartan (COZAAR) 25 mg tablet Take 1 Tablet by mouth nightly.  DULoxetine (CYMBALTA) 60 mg capsule Take 1 Capsule by mouth daily.     diclofenac EC (VOLTAREN) 75 mg EC tablet TAKE ONE TABLET BY MOUTH EVERY 12 HOURS    omega-3 fatty acids/fish oil (fish oil-omega-3 fatty acids) 300-1,000 mg cap TAKE ONE CAPSULE BY MOUTH TWICE A DAY    empagliflozin (Jardiance) 25 mg tablet Take 25 mg by mouth daily.  insulin glargine (Lantus Solostar U-100 Insulin) 100 unit/mL (3 mL) inpn 20 Units by SubCUTAneous route daily.  senna-docusate (PERICOLACE) 8.6-50 mg per tablet Take 1 Tab by mouth two (2) times a day. Indications: constipation    rosuvastatin (CRESTOR) 10 mg tablet Take 10 mg by mouth nightly.  metFORMIN ER (GLUCOPHAGE XR) 500 mg tablet Take 500 mg by mouth two (2) times a day. Indications: 500 mg tab taking 2 tabs daily    aspirin 81 mg chewable tablet Take 81 mg by mouth daily.  spironolactone (ALDACTONE) 25 mg tablet 12.5 mg.    Insulin Needles, Disposable, 32 gauge x 5/32\" ndle     LANCETS, SUPER THIN [employer]     ONETOUCH ULTRA TEST strip      No current facility-administered medications for this visit.          Past Medical History:   Diagnosis Date    (HFpEF) heart failure with preserved ejection fraction (HCC)     Arrhythmia     HEART MURMUR -\"HAD IT FOR YEARS AS AN ADULT)    Arthritis     OSTEO    Back pain     Breast cancer screening 6/28/2016    Chronic bilateral low back pain without sciatica 6/28/2016    Diabetes ([facility] [address]     Encounter for long-term (current) use of medications 6/28/2016    Essential hypertension with goal blood pressure less than 140/90 6/28/2016    Hypercholesteremia 8/6/2015    Hypercholesterolemia     Hypertension     CONTROLLED WITH MEDS    Ill-defined condition     hyperlipidemia    Other ill-defined conditions(799.89)     high cholesterol    Patient non adherence 1/23/2017    Primary osteoarthritis of left knee 3/2/2018    Type 2 diabetes, uncontrolled, with neuropathy 1/23/2017    Type II diabetes mellitus, uncontrolled 8/6/2015          Past Surgical History:   Procedure Laterality Date    HX CARPAL TUNNEL RELEASE      HX GYN      hysterectomy    HX HEENT      Ear surgery    HX HEENT      Left eye    HX ORTHOPAEDIC      left wrist & left elbow    HX ORTHOPAEDIC      Back Surgery x 3    HX ORTHOPAEDIC      right thumb at [address] HX ORTHOPAEDIC      REMOVAL OF LT FOOT CYST    HX OTHER SURGICAL      back surgery X 2    HX OTHER SURGICAL      Collapsed left lung    HX OTHER SURGICAL      axillary sweat glands removed    HX OVARIAN CYST REMOVAL      [place] CARDIAC SURG PROCEDURE UNLIST      murmur- PT DENIES SURGERY AND MURMUR 17    [place] OTOPLASTY PROTRUDING EAR W/WO SIZE RDCTJ           Family History   Problem Relation Age of Onset    Heart Disease Mother     Heart Attack Mother     No Known Problems Father     Cancer Sister         UNKNOWN    Diabetes Son     Sickle Cell Anemia Daughter     Anesth Problems Son         CHEST PAIN          Social History     Tobacco Use    Smoking status: Former Smoker     Packs/day: 1.00     Years: 30.00     Pack years: 30.00     Quit date: 2005     Years since quittin.0    Smokeless tobacco: Never Used   Substance Use Topics    Alcohol use: No    Drug use: No            ASSESSMENT/PLAN:      1. Lumbar back pain  -     XR SPINE LUMB MIN 4 V; Future  -     XR PELV 1 OR 2 V; Future  2. History of lumbar spinal fusion  3. Spinal stenosis of lumbar region with neurogenic claudication  4. Lumbar radiculopathy        Below is the assessment and plan developed based on review of pertinent history, physical exam, labs, studies, and medications. Have discussed the patients diagnosis and radiographic findings at length and have answered all patient questions to her questions to her satisfaction. Have sent a prescription for pain medication and an anti-inflammatory electronically to the patient's preferred pharmacy. Patient states she does not tolerate steroids secondary to insulin-dependent diabetes mellitus. Patient declines outpatient physical therapy at this time.  Will plan on seeing the patient back for reevaluation in 4 to 6 weeks time should symptoms persist or worsen. The patient understands and agrees to the treatment plan as outlined above. No follow-ups on file. Dr. Cora Whelan was available for immediate consult during this encounter. An electronic signature was used to authenticate this note.     -- Vicki Raya PA-C

## 2022-05-17 NOTE — PROGRESS NOTES
HPI: Guru Holland (: 1960) is a 64 y.o. female, patient, here for evaluation of the following chief complaint(s): Patient presents with complaint of right ring finger pain at the DIP joint which occurred approximately 1 month ago when her finger got caught in a grocery cart. She had immediate swelling and bruising. She was not seen for the injury until 2022 when she presented to [facility] and was told that she has a fracture of the finger. 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 An attempt at open reduction internal fixation was performed but effectively the fragment was excised and the terminal extensor tendon was repaired with transarticular DIP pinning on 3/17/2022. She returned for pin removal on 2022. Surgical Follow-up (Open treatment right ring finger distal phalanx fracture with fracture fragment excision on 3/17/22. Pins were removed on 22.)       Vitals: There were no vitals taken for this visit. There is no height or weight on file to calculate BMI. Allergies   Allergen Reactions    Pravachol [Pravastatin] Myalgia       Current Outpatient Medications   Medication Sig    diclofenac EC (VOLTAREN) 75 mg EC tablet Take 1 Tablet by mouth two (2) times daily (with meals). Take 1 tablet by mouth 2 times daily after meals as needed for pain. Indications: PRN pain    liraglutide (Victoza 2-Gilbert) 0.6 mg/0.1 mL (18 mg/3 mL) pnij 1.8 mg by SubCUTAneous route daily.  cholecalciferol (VITAMIN D3) (1000 Units /25 mcg) tablet     Vitamin B-12 1,000 mcg tablet     losartan (COZAAR) 25 mg tablet Take 1 Tablet by mouth nightly.  DULoxetine (CYMBALTA) 60 mg capsule Take 1 Capsule by mouth daily.  omega-3 fatty acids/fish oil (fish oil-omega-3 fatty acids) 300-1,000 mg cap TAKE ONE CAPSULE BY MOUTH TWICE A DAY    empagliflozin (Jardiance) 25 mg tablet Take 25 mg by mouth daily.     insulin glargine (Lantus Solostar U-100 Insulin) 100 unit/mL (3 mL) inpn 20 Units by SubCUTAneous route daily.  senna-docusate (PERICOLACE) 8.6-50 mg per tablet Take 1 Tab by mouth two (2) times a day. Indications: constipation    rosuvastatin (CRESTOR) 10 mg tablet Take 10 mg by mouth nightly.  metFORMIN ER (GLUCOPHAGE XR) 500 mg tablet Take 500 mg by mouth two (2) times a day. Indications: 500 mg tab taking 2 tabs daily    aspirin 81 mg chewable tablet Take 81 mg by mouth daily.  spironolactone (ALDACTONE) 25 mg tablet 12.5 mg.    Insulin Needles, Disposable, 32 gauge x 5/32\" ndle     LANCETS, SUPER THIN [employer]     ONETOUCH ULTRA TEST strip      No current facility-administered medications for this visit.        Past Medical History:   Diagnosis Date    (HFpEF) heart failure with preserved ejection fraction (HCC)     Arrhythmia     HEART MURMUR -\"HAD IT FOR YEARS AS AN ADULT)    Arthritis     OSTEO    Back pain     Breast cancer screening 6/28/2016    Chronic bilateral low back pain without sciatica 6/28/2016    Diabetes ([facility] [address]     Encounter for long-term (current) use of medications 6/28/2016    Essential hypertension with goal blood pressure less than 140/90 6/28/2016    Hypercholesteremia 8/6/2015    Hypercholesterolemia     Hypertension     CONTROLLED WITH MEDS    Ill-defined condition     hyperlipidemia    Other ill-defined conditions(799.89)     high cholesterol    Patient non adherence 1/23/2017    Primary osteoarthritis of left knee 3/2/2018    Type 2 diabetes, uncontrolled, with neuropathy 1/23/2017    Type II diabetes mellitus, uncontrolled 8/6/2015        Past Surgical History:   Procedure Laterality Date    HX CARPAL TUNNEL RELEASE      HX GYN      hysterectomy    HX HEENT      Ear surgery    HX HEENT      Left eye    HX ORTHOPAEDIC      left wrist & left elbow    HX ORTHOPAEDIC      Back Surgery x 3    HX ORTHOPAEDIC      right thumb at [facility] FOOT CYST  HX OTHER SURGICAL      back surgery X 2    HX OTHER SURGICAL      Collapsed left lung    HX OTHER SURGICAL      axillary sweat glands removed    HX OVARIAN CYST REMOVAL      [place] CARDIAC SURG PROCEDURE UNLIST      murmur- PT DENIES SURGERY AND MURMUR 17    [place] OTOPLASTY PROTRUDING EAR W/WO SIZE RDCTJ         Family History   Problem Relation Age of Onset    Heart Disease Mother     Heart Attack Mother     No Known Problems Father     Cancer Sister         UNKNOWN    Diabetes Son     Sickle Cell Anemia Daughter     Anesth Problems Son         CHEST PAIN        Social History     Tobacco Use    Smoking status: Former Smoker     Packs/day: 1.00     Years: 30.00     Pack years: 30.00     Quit date: 2005     Years since quittin.1    Smokeless tobacco: Never Used   Substance Use Topics    Alcohol use: No    Drug use: No        Review of Systems   All other systems reviewed and are negative. Constitutional: No fevers, chills, night sweats, excessive fatigue or weight loss. Musculoskeletal: No joint pain, swelling or redness. No decreased range of motion. Neurologic: No headache, blurred vision, and no areas of focal weakness or numbness. Normal gait. No sensory problems. Respiratory: No dyspnea on exertion, orthopnea, chest pain, cough or hemoptysis. Cardiovascular: No anginal chest pain, irregular heart beat, tachycardia, palpitations or orthopnea  Integumentary: No chronic rashes, inflammation, ulcerations, pruritus, petechiae, purpura, ecchymoses, or skin changes    ROS     Positive for: Musculoskeletal    Last edited by Joleen Galdamez on 2022  9:45 AM. (History)             Physical Exam    Overall the patient's wound appears to be healing well with no redness drainage or sign of infection. The DIP motion to the ring finger was from -10 to 50 degrees of flexion. The dorsal wound is healing well to the right ring finger.     Imaging:  Avulsion fracture of the distal phalanx of the right ring finger previously noted on preoperative x-ray nonunited. XR Results (most recent):  Results from Appointment encounter on 05/18/22    XR 4TH FINGER RT MIN 2 V    Narrative  AP, lateral oblique x-ray of the right ring finger shows the DIP joint staying in good extension position and alignment post pin removal.  There has been some loss of bone at the dorsal base of the distal phalanx from a prior fracture. ASSESSMENT/PLAN:  Below is the assessment and plan developed based on review of pertinent history, physical exam, labs, studies, and medications. Right ring finger pain at the DIP joint which occurred approximately 1 month ago when her finger got caught in a grocery cart. She had immediate swelling and bruising. She was not seen for the injury until 2/9/2022 when she presented to [facility] and was told that she has a fracture of the finger. She was placed in a splint and referred to us. She has an avulsion fracture of the distal phalanx of the right ring finger. Surgery was considered early on but she was not able to proceed with surgery for another 6 weeks. By that time the fracture was nonunited and despite intraoperative efforts it was not repairable. The fracture fragment was excised and the extensor tendon repaired with transarticular retrograde DIP pinning on 3/17/2022. I explained that the pin will likely be left in place for 4 to 6 weeks. Return for pin removal on 4/19/2022. She already is demonstrating motion of the DIP joint and thus far it appears stable clinically and radiographically. She can flex the DIP joint 50 degrees and is pleased with her recovery thus far. However, she certainly understands that if she develops subluxation more stiffness or significant droop she may be better served with a DIP fusion.   Her ring finger was immobilized into a stack splint size 3 for comfort and support to be worn especially at night and during the day when needed. Follow-up in 4 to 6 weeks sooner if needed. 1. Closed avulsion fracture of distal phalanx of finger with nonunion, subsequent encounter  -     XR 4TH FINGER RT MIN 2 V; Future  2. Rupture of extensor tendon of finger  -     SPLINT  3. Pain in finger of right hand      Return in about 6 weeks (around 6/29/2022).

## 2022-05-18 ENCOUNTER — OFFICE VISIT (OUTPATIENT)
Dept: ORTHOPEDIC SURGERY | Age: 62
End: 2022-05-18
Payer: MEDICAID

## 2022-05-18 DIAGNOSIS — M79.644 PAIN IN FINGER OF RIGHT HAND: ICD-10-CM

## 2022-05-18 DIAGNOSIS — S62.639K: Primary | ICD-10-CM

## 2022-05-18 DIAGNOSIS — S56.419A RUPTURE OF EXTENSOR TENDON OF FINGER: ICD-10-CM

## 2022-05-18 PROCEDURE — A4570 SPLINT: HCPCS | Performed by: ORTHOPAEDIC SURGERY

## 2022-05-18 PROCEDURE — 99024 POSTOP FOLLOW-UP VISIT: CPT | Performed by: ORTHOPAEDIC SURGERY

## 2022-05-18 NOTE — LETTER
5/18/2022    Patient: Jerry Arzate   YOB: 1960   Date of Visit: 5/18/2022     Pratima Hi MD  [id number] [facility] [place] [id number]  Via In Basket    Dear Pratima Hi MD,      Thank you for referring Ms. Agustín Dotson to [facility] for evaluation. My notes for this consultation are attached. If you have questions, please do not hesitate to call me. I look forward to following your patient along with you.       Sincerely,    Gulshan Juarez MD

## 2022-06-27 NOTE — PROGRESS NOTES
HPI: Ana Luisa Cardoza (: 1960) is a 58 y.o. female, patient, here for evaluation of the following chief complaint(s): Patient presents with complaint of right ring finger pain at the DIP joint which occurred approximately 1 month ago when her finger got caught in a grocery cart. She had immediate swelling and bruising. She was not seen for the injury until 2022 when she presented to [facility] and was told that she has a fracture of the finger. 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 An attempt at open reduction internal fixation was performed but effectively the fragment was excised and the terminal extensor tendon was repaired with transarticular DIP pinning on 3/17/2022. She returned for pin removal on 2022. There is minimal articular cartilage that remains at the base of the distal phalanx and when she strikes the tip of the right ring finger she does have pain. No chief complaint on file. Vitals: There were no vitals taken for this visit. There is no height or weight on file to calculate BMI. Allergies   Allergen Reactions    Pravachol [Pravastatin] Myalgia       Current Outpatient Medications   Medication Sig    diclofenac EC (VOLTAREN) 75 mg EC tablet Take 1 Tablet by mouth two (2) times daily (with meals). Take 1 tablet by mouth 2 times daily after meals as needed for pain. Indications: PRN pain    liraglutide (Victoza 2-Gilbert) 0.6 mg/0.1 mL (18 mg/3 mL) pnij 1.8 mg by SubCUTAneous route daily.  cholecalciferol (VITAMIN D3) (1000 Units /25 mcg) tablet     Vitamin B-12 1,000 mcg tablet     losartan (COZAAR) 25 mg tablet Take 1 Tablet by mouth nightly.  DULoxetine (CYMBALTA) 60 mg capsule Take 1 Capsule by mouth daily.  omega-3 fatty acids/fish oil (fish oil-omega-3 fatty acids) 300-1,000 mg cap TAKE ONE CAPSULE BY MOUTH TWICE A DAY    empagliflozin (Jardiance) 25 mg tablet Take 25 mg by mouth daily.     insulin glargine (Lantus Solostar U-100 Insulin) 100 unit/mL (3 mL) inpn 20 Units by SubCUTAneous route daily.  senna-docusate (PERICOLACE) 8.6-50 mg per tablet Take 1 Tab by mouth two (2) times a day. Indications: constipation    rosuvastatin (CRESTOR) 10 mg tablet Take 10 mg by mouth nightly.  metFORMIN ER (GLUCOPHAGE XR) 500 mg tablet Take 500 mg by mouth two (2) times a day. Indications: 500 mg tab taking 2 tabs daily    aspirin 81 mg chewable tablet Take 81 mg by mouth daily.  spironolactone (ALDACTONE) 25 mg tablet 12.5 mg.    Insulin Needles, Disposable, 32 gauge x 5/32\" ndle     LANCETS, SUPER THIN [employer]     ONETOUCH ULTRA TEST strip      No current facility-administered medications for this visit.        Past Medical History:   Diagnosis Date    (HFpEF) heart failure with preserved ejection fraction (HCC)     Arrhythmia     HEART MURMUR -\"HAD IT FOR YEARS AS AN ADULT)    Arthritis     OSTEO    Back pain     Breast cancer screening 6/28/2016    Chronic bilateral low back pain without sciatica 6/28/2016    Diabetes ([facility] [address]     Encounter for long-term (current) use of medications 6/28/2016    Essential hypertension with goal blood pressure less than 140/90 6/28/2016    Hypercholesteremia 8/6/2015    Hypercholesterolemia     Hypertension     CONTROLLED WITH MEDS    Ill-defined condition     hyperlipidemia    Other ill-defined conditions(799.89)     high cholesterol    Patient non adherence 1/23/2017    Primary osteoarthritis of left knee 3/2/2018    Type 2 diabetes, uncontrolled, with neuropathy 1/23/2017    Type II diabetes mellitus, uncontrolled 8/6/2015        Past Surgical History:   Procedure Laterality Date    HX CARPAL TUNNEL RELEASE      HX GYN      hysterectomy    HX HEENT      Ear surgery    HX HEENT      Left eye    HX ORTHOPAEDIC      left wrist & left elbow    HX ORTHOPAEDIC      Back Surgery x 3    HX ORTHOPAEDIC      right thumb at [facility]      REMOVAL OF LT FOOT CYST    HX OTHER SURGICAL      back surgery X 2    HX OTHER SURGICAL      Collapsed left lung    HX OTHER SURGICAL      axillary sweat glands removed    HX OVARIAN CYST REMOVAL      CT CARDIAC SURG PROCEDURE UNLIST      murmur- PT DENIES SURGERY AND MURMUR 17    CT OTOPLASTY PROTRUDING EAR W/WO SIZE RDCTJ         Family History   Problem Relation Age of Onset    Heart Disease Mother     Heart Attack Mother     No Known Problems Father     Cancer Sister         UNKNOWN    Diabetes Son     Sickle Cell Anemia Daughter     Anesth Problems Son         CHEST PAIN        Social History     Tobacco Use    Smoking status: Former Smoker     Packs/day: 1.00     Years: 30.00     Pack years: 30.00     Quit date: 2005     Years since quittin.2    Smokeless tobacco: Never Used   Substance Use Topics    Alcohol use: No    Drug use: No        Review of Systems   All other systems reviewed and are negative. Constitutional: No fevers, chills, night sweats, excessive fatigue or weight loss. Musculoskeletal: No joint pain, swelling or redness. No decreased range of motion. Neurologic: No headache, blurred vision, and no areas of focal weakness or numbness. Normal gait. No sensory problems. Respiratory: No dyspnea on exertion, orthopnea, chest pain, cough or hemoptysis. Cardiovascular: No anginal chest pain, irregular heart beat, tachycardia, palpitations or orthopnea  Integumentary: No chronic rashes, inflammation, ulcerations, pruritus, petechiae, purpura, ecchymoses, or skin changes           Physical Exam    Right ring finger DIP motion charlie about -10 to 50 degrees of flexion but she does have pain throughout this arc of motion. There is not seem to be advanced volar subluxation to slight flexion. The dorsal wound is really a scar at this stage well-healed without sign of infection.   Imaging:  Avulsion fracture of the distal phalanx of the right ring finger previously noted on preoperative x-ray nonunited. XR Results (most recent):  Results from Appointment encounter on 05/18/22    XR 4TH FINGER RT MIN 2 V    Narrative  AP, lateral oblique x-ray of the right ring finger shows the DIP joint staying in good extension position and alignment post pin removal.  There has been some loss of bone at the dorsal base of the distal phalanx from a prior fracture. XR Results (most recent):  Results from Appointment encounter on 06/29/22    XR HAND RT MIN 3 V    Narrative  AP, lateral oblique x-ray of the right hand shows maintained congruency of the DIP joint of the ring finger despite the dorsal base distal phalanx bone loss. There is osteopenia but no fracture. ASSESSMENT/PLAN:  Below is the assessment and plan developed based on review of pertinent history, physical exam, labs, studies, and medications. Right ring finger pain at the DIP joint which occurred approximately 1 month ago when her finger got caught in a grocery cart. She had immediate swelling and bruising. She was not seen for the injury until 2/9/2022 when she presented to [facility] and was told that she has a fracture of the finger. She was placed in a splint and referred to us. She has an avulsion fracture of the distal phalanx of the right ring finger. Surgery was considered early on but she was not able to proceed with surgery for another 6 weeks. By that time the fracture was nonunited and despite intraoperative efforts it was not repairable. The fracture fragment was excised and the extensor tendon repaired with transarticular retrograde DIP pinning on 3/17/2022. I explained that the pin will likely be left in place for 4 to 6 weeks. Return for pin removal on 4/19/2022. She already is demonstrating motion of the DIP joint and thus far it appears stable clinically and radiographically. She can flex the DIP joint 50 degrees and is pleased with her recovery thus far. However, she certainly understands that if she develops subluxation more stiffness or significant droop she may be better served with a DIP fusion. She no longer requires the splints. She is giving stronger consideration to fuse the DIP joint as she states that whenever she has contact it results in pain. I reviewed the procedure in detail and answered her questions. I reviewed risks that include but not limited to stiffness, pain, nerve or tendon damage, nonunion, malunion the possible need for delayed hardware removal.  Arrangements may be made for this to be performed on an outpatient basis at her convenience. 1. 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 Rupture of extensor tendon of finger  3. Pain in finger of right hand      No follow-ups on file.

## 2022-06-29 ENCOUNTER — OFFICE VISIT (OUTPATIENT)
Dept: ORTHOPEDIC SURGERY | Age: 62
End: 2022-06-29
Payer: MEDICAID

## 2022-06-29 DIAGNOSIS — S56.419A RUPTURE OF EXTENSOR TENDON OF FINGER: ICD-10-CM

## 2022-06-29 DIAGNOSIS — M79.644 PAIN IN FINGER OF RIGHT HAND: ICD-10-CM

## 2022-06-29 DIAGNOSIS — S62.639K: Primary | ICD-10-CM

## 2022-06-29 PROCEDURE — 99212 OFFICE O/P EST SF 10 MIN: CPT | Performed by: ORTHOPAEDIC SURGERY

## 2022-06-29 NOTE — LETTER
6/29/2022    Patient: Tate Bautista   YOB: 1960   Date of Visit: 6/29/2022     Desirae Sterling MD  [address] [place] [address]  Via In Basket    Dear Desirae Sterling MD,      Thank you for referring Ms. Elmo Ponce to [facility] for evaluation. My notes for this consultation are attached. If you have questions, please do not hesitate to call me. I look forward to following your patient along with you.       Sincerely,    Lindsay Anne MD

## 2022-07-01 DIAGNOSIS — S62.639A AVULSION FRACTURE OF DISTAL PHALANX OF FINGER, CLOSED, INITIAL ENCOUNTER: Primary | ICD-10-CM

## 2022-07-01 RX ORDER — HYDROCODONE BITARTRATE AND ACETAMINOPHEN 5; 325 MG/1; MG/1
1 TABLET ORAL
Qty: 15 TABLET | Refills: 0 | Status: SHIPPED | OUTPATIENT
Start: 2022-07-01 | End: 2022-07-04

## 2022-07-11 NOTE — PROGRESS NOTES
HPI: Pratima Farooq (: 1960) is a 58 y.o. female, patient, here for evaluation of the following chief complaint(s): Patient presents with complaint of right ring finger pain at the DIP joint which occurred approximately 1 month ago when her finger got caught in a grocery cart. She had immediate swelling and bruising. She was not seen for the injury until 2022 when she presented to [facility] and was told that she has a fracture of the finger. 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 An attempt at open reduction internal fixation was performed but effectively the fragment was excised and the terminal extensor tendon was repaired with transarticular DIP pinning on 3/17/2022. She returned for pin removal on 2022. There is minimal articular cartilage that remains at the base of the distal phalanx and when she strikes the tip of the right ring finger she does have pain. She underwent a fusion of the right ring finger DIP joint on 2022. No chief complaint on file. Vitals: There were no vitals taken for this visit. There is no height or weight on file to calculate BMI. Allergies   Allergen Reactions    Pravachol [Pravastatin] Myalgia       Current Outpatient Medications   Medication Sig    diclofenac EC (VOLTAREN) 75 mg EC tablet Take 1 Tablet by mouth two (2) times daily (with meals). Take 1 tablet by mouth 2 times daily after meals as needed for pain. Indications: PRN pain    liraglutide (Victoza 2-Gilbert) 0.6 mg/0.1 mL (18 mg/3 mL) pnij 1.8 mg by SubCUTAneous route daily.  cholecalciferol (VITAMIN D3) (1000 Units /25 mcg) tablet     Vitamin B-12 1,000 mcg tablet     losartan (COZAAR) 25 mg tablet Take 1 Tablet by mouth nightly.  DULoxetine (CYMBALTA) 60 mg capsule Take 1 Capsule by mouth daily.     omega-3 fatty acids/fish oil (fish oil-omega-3 fatty acids) 300-1,000 mg cap TAKE ONE CAPSULE BY MOUTH TWICE A DAY  empagliflozin (Jardiance) 25 mg tablet Take 25 mg by mouth daily.  insulin glargine (Lantus Solostar U-100 Insulin) 100 unit/mL (3 mL) inpn 20 Units by SubCUTAneous route daily.  senna-docusate (PERICOLACE) 8.6-50 mg per tablet Take 1 Tab by mouth two (2) times a day. Indications: constipation    rosuvastatin (CRESTOR) 10 mg tablet Take 10 mg by mouth nightly.  metFORMIN ER (GLUCOPHAGE XR) 500 mg tablet Take 500 mg by mouth two (2) times a day. Indications: 500 mg tab taking 2 tabs daily    aspirin 81 mg chewable tablet Take 81 mg by mouth daily.  spironolactone (ALDACTONE) 25 mg tablet 12.5 mg.    Insulin Needles, Disposable, 32 gauge x 5/32\" ndle     LANCETS, SUPER THIN misc     ONETOUCH ULTRA TEST strip      No current facility-administered medications for this visit.        Past Medical History:   Diagnosis Date    (HFpEF) heart failure with preserved ejection fraction (HCC)     Arrhythmia     HEART MURMUR -\"HAD IT FOR YEARS AS AN ADULT)    Arthritis     OSTEO    Back pain     Breast cancer screening 6/28/2016    Chronic bilateral low back pain without sciatica 6/28/2016    Diabetes ([facility] [address]     Encounter for long-term (current) use of medications 6/28/2016    Essential hypertension with goal blood pressure less than 140/90 6/28/2016    Hypercholesteremia 8/6/2015    Hypercholesterolemia     Hypertension     CONTROLLED WITH MEDS    Ill-defined condition     hyperlipidemia    Other ill-defined conditions(799.89)     high cholesterol    Patient non adherence 1/23/2017    Primary osteoarthritis of left knee 3/2/2018    Type 2 diabetes, uncontrolled, with neuropathy 1/23/2017    Type II diabetes mellitus, uncontrolled 8/6/2015        Past Surgical History:   Procedure Laterality Date    HX CARPAL TUNNEL RELEASE      HX GYN      hysterectomy    HX HEENT      Ear surgery    HX HEENT      Left eye    HX ORTHOPAEDIC      left wrist & left elbow    HX ORTHOPAEDIC      Back Surgery x 3    HX ORTHOPAEDIC      right thumb at [address] [facility] HX ORTHOPAEDIC      REMOVAL OF LT FOOT CYST    HX OTHER SURGICAL      back surgery X 2    HX OTHER SURGICAL      Collapsed left lung    HX OTHER SURGICAL      axillary sweat glands removed    HX OVARIAN CYST REMOVAL      IL CARDIAC SURG PROCEDURE UNLIST      murmur- PT DENIES SURGERY AND MURMUR 17    IL OTOPLASTY PROTRUDING EAR W/WO SIZE RDCTJ         Family History   Problem Relation Age of Onset    Heart Disease Mother     Heart Attack Mother     No Known Problems Father     Cancer Sister         UNKNOWN    Diabetes Son     Sickle Cell Anemia Daughter     Anesth Problems Son         CHEST PAIN        Social History     Tobacco Use    Smoking status: Former Smoker     Packs/day: 1.00     Years: 30.00     Pack years: 30.00     Quit date: 2005     Years since quittin.2    Smokeless tobacco: Never Used   Substance Use Topics    Alcohol use: No    Drug use: No        Review of Systems   All other systems reviewed and are negative. Constitutional: No fevers, chills, night sweats, excessive fatigue or weight loss. Musculoskeletal: No joint pain, swelling or redness. No decreased range of motion. Neurologic: No headache, blurred vision, and no areas of focal weakness or numbness. Normal gait. No sensory problems. Respiratory: No dyspnea on exertion, orthopnea, chest pain, cough or hemoptysis. Cardiovascular: No anginal chest pain, irregular heart beat, tachycardia, palpitations or orthopnea  Integumentary: No chronic rashes, inflammation, ulcerations, pruritus, petechiae, purpura, ecchymoses, or skin changes           Physical Exam    Right ring finger wound is well-healed. No redness drainage or sign infection. Sutures are self absorbing. Good overall alignment. Imaging:  Avulsion fracture of the distal phalanx of the right ring finger previously noted on preoperative x-ray nonunited.     XR Results (most recent):  Results from Appointment encounter on 06/29/22    XR HAND RT MIN 3 V    Narrative  AP, lateral oblique x-ray of the right hand shows maintained congruency of the DIP joint of the ring finger despite the dorsal base distal phalanx bone loss. There is osteopenia but no fracture. XR Results (most recent):  Results from Appointment encounter on 07/12/22    XR 4TH FINGER RT MIN 2 V    Narrative  AP, lateral and oblique x-ray of the right ring finger shows the fusion of the DIP joint in good position alignment with a known slight bone loss to the dorsal base of the distal phalanx. Retrograde screw fixation unchanged. ASSESSMENT/PLAN:  Below is the assessment and plan developed based on review of pertinent history, physical exam, labs, studies, and medications. Right ring finger pain at the DIP joint which occurred approximately 1 month ago when her finger got caught in a grocery cart. She had immediate swelling and bruising. She was not seen for the injury until 2/9/2022 when she presented to [facility] and was told that she has a fracture of the finger. She was placed in a splint and referred to us. She has an avulsion fracture of the distal phalanx of the right ring finger. Surgery was considered early on but she was not able to proceed with surgery for another 6 weeks. By that time the fracture was nonunited and despite intraoperative efforts it was not repairable. The fracture fragment was excised and the extensor tendon repaired with transarticular retrograde DIP pinning on 3/17/2022. I explained that the pin will likely be left in place for 4 to 6 weeks. Return for pin removal on 4/19/2022. She already is demonstrating motion of the DIP joint and thus far it appears stable clinically and radiographically. She can flex the DIP joint 50 degrees and is pleased with her recovery thus far.   However, she certainly understands that if she develops subluxation more stiffness or significant droop she may be better served with a DIP fusion. She no longer requires the splints. She is giving stronger consideration to fuse the DIP joint as she states that whenever she has contact it results in pain. She indeed underwent a right ring finger DIP fusion on 7/5/2022. Continue with simple wound care, gentle motion and strength. Overall she is doing well and is pleased with the appearance. She wants to follow home exercise program.  Follow-up in 4-6 weeks. 1. 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 Rupture of extensor tendon of finger  3. Pain in finger of right hand  4. Status post finger joint fusion      No follow-ups on file.

## 2022-07-12 ENCOUNTER — OFFICE VISIT (OUTPATIENT)
Dept: ORTHOPEDIC SURGERY | Age: 62
End: 2022-07-12
Payer: MEDICAID

## 2022-07-12 DIAGNOSIS — Z98.1 STATUS POST FINGER JOINT FUSION: ICD-10-CM

## 2022-07-12 DIAGNOSIS — S62.639K: Primary | ICD-10-CM

## 2022-07-12 DIAGNOSIS — S56.419A RUPTURE OF EXTENSOR TENDON OF FINGER: ICD-10-CM

## 2022-07-12 DIAGNOSIS — M79.644 PAIN IN FINGER OF RIGHT HAND: ICD-10-CM

## 2022-07-12 PROCEDURE — 99024 POSTOP FOLLOW-UP VISIT: CPT | Performed by: ORTHOPAEDIC SURGERY

## 2022-07-12 NOTE — LETTER
7/12/2022    Patient: Brenna Bradford   YOB: 1960   Date of Visit: 7/12/2022     Rosann Meckel, MD  [address] [place] [address]  Via In Basket    Dear Rosann Meckel, MD,      Thank you for referring Ms. Rhoda Strong to [facility] for evaluation. My notes for this consultation are attached. If you have questions, please do not hesitate to call me. I look forward to following your patient along with you.       Sincerely,    Salomon Langley MD

## 2022-07-20 ENCOUNTER — OFFICE VISIT (OUTPATIENT)
Dept: ORTHOPEDIC SURGERY | Age: 62
End: 2022-07-20
Payer: MEDICAID

## 2022-07-20 VITALS — BODY MASS INDEX: 29.82 KG/M2 | WEIGHT: 179 LBS | HEIGHT: 65 IN

## 2022-07-20 DIAGNOSIS — M54.16 LUMBAR RADICULOPATHY: ICD-10-CM

## 2022-07-20 DIAGNOSIS — M48.061 LUMBAR FORAMINAL STENOSIS: ICD-10-CM

## 2022-07-20 DIAGNOSIS — M48.062 SPINAL STENOSIS OF LUMBAR REGION WITH NEUROGENIC CLAUDICATION: ICD-10-CM

## 2022-07-20 DIAGNOSIS — Z98.1 HISTORY OF LUMBAR SPINAL FUSION: ICD-10-CM

## 2022-07-20 DIAGNOSIS — M54.50 LUMBAR BACK PAIN: Primary | ICD-10-CM

## 2022-07-20 DIAGNOSIS — M41.50 DEGENERATIVE SCOLIOSIS IN ADULT PATIENT: ICD-10-CM

## 2022-07-20 PROCEDURE — 99214 OFFICE O/P EST MOD 30 MIN: CPT | Performed by: PHYSICIAN ASSISTANT

## 2022-07-20 NOTE — PROGRESS NOTES
Maureen Aguila (: 1960) is a 58 y.o. female, established  patient, here for evaluation of the following chief complaint(s):  Back Pain and Leg Pain         SUBJECTIVE/OBJECTIVE:    Maureen Aguila (: 1960) is a 58 y.o. female who presents for evaluation of lumbar back pain. Patient describes bilateral lower lumbar back pain with radiation to the left thigh, lower leg and foot. Patient describes left leg numbness, tingling and weakness. States walking and standing because severe pain. Patient states she when she walks her left foot slaps the ground. Symptoms tend to be improved with sitting and resting. The patient has been taking ibuprofen 600 mg 3 times daily for pain relief and rates her average daily pain level as a 9/10 with walking and standing. The patient has a history of revision lumbar laminectomy with posterior spinal fusion L2-L5 performed on 2020 with Dr. Mike Hirsch. The patient denies saddle paraesthesias/ anaesthesia. Pain Assessment  2022   Location of Pain Hip;Back   Severity of Pain 8          ROS    The patient denies fevers, chills, chest pain, shortness of breath, nausea, vomiting. Positive for musculoskeletal issues as described in the HPI. Vitals:  Ht 5' 5\" (1.651 m)   Wt 179 lb (81.2 kg)   BMI 29.79 kg/m²    Body mass index is 29.79 kg/m². PHYSICAL EXAM:    The patient is alert and oriented x3 and in no acute distress. Patient ambulates with mild left dropfoot gait without gait aids. Sensory testing in all major nerve distributions in the lower extremities is intact in the right lower extremity and is considered diminished in the left foot, lateral calf and thigh. . Manual motor testing of the major muscle groups of the lower extremities including dorsiflexion/EHL/ plantarflexion, knee flexion/extension, hip flexion/extension/abduction/ adduction is intact in the right lower extremity.   Manual motor testing in the left lower extremity demonstrates left dorsiflexion/EHL/eversion weakness 3/5, left knee is flexion/extension weakness 4/5, left hip flexion 4/5, otherwise intact. Deep tendon reflexes  and symmetric in the bilateral knees and ankles. Hip range of motion is pain-free bilaterally. Negative straight leg raise on the right and is considered positive on the left. No evidence of clonus bilaterally. Babinski's downgoing and symmetric bilaterally. Distal pulses intact and symmetric bilaterally. There is  tenderness to palpation of the left lower lumbar and sacral regions. IMAGING:    No new imaging obtained at today's visit. 4 view radiographs of the lumbar spine obtained at a previous office visit demonstrate posterior instrumented fusion L2-L5. There is radiolucency around the pedicle screws in the L5 vertebral body. At L4-L5 there is grade 1 anterolisthesis which is mobile on flexion/extension views. The adjacent segment above the proximal fusion level of L1-L2 remains maintained. There is 15 degree levoscoliosis curvature with the apex of curvature at the L1-L2 disc level. No evidence of fracture, lytic lesion or acute bony abnormality. Allergies   Allergen Reactions    Pravachol [Pravastatin] Myalgia         Current Outpatient Medications   Medication Sig    diclofenac EC (VOLTAREN) 75 mg EC tablet Take 1 Tablet by mouth two (2) times daily (with meals). Take 1 tablet by mouth 2 times daily after meals as needed for pain. Indications: PRN pain    liraglutide (Victoza 2-Gilbert) 0.6 mg/0.1 mL (18 mg/3 mL) pnij 1.8 mg by SubCUTAneous route daily. cholecalciferol (VITAMIN D3) (1000 Units /25 mcg) tablet     Vitamin B-12 1,000 mcg tablet     losartan (COZAAR) 25 mg tablet Take 1 Tablet by mouth nightly. DULoxetine (CYMBALTA) 60 mg capsule Take 1 Capsule by mouth daily.     omega-3 fatty acids/fish oil (fish oil-omega-3 fatty acids) 300-1,000 mg cap TAKE ONE CAPSULE BY MOUTH TWICE A DAY    empagliflozin (Jardiance) 25 mg tablet Take 25 mg by mouth daily. insulin glargine (Lantus Solostar U-100 Insulin) 100 unit/mL (3 mL) inpn 20 Units by SubCUTAneous route daily. senna-docusate (PERICOLACE) 8.6-50 mg per tablet Take 1 Tab by mouth two (2) times a day. Indications: constipation    rosuvastatin (CRESTOR) 10 mg tablet Take 10 mg by mouth nightly. metFORMIN ER (GLUCOPHAGE XR) 500 mg tablet Take 500 mg by mouth two (2) times a day. Indications: 500 mg tab taking 2 tabs daily    aspirin 81 mg chewable tablet Take 81 mg by mouth daily. spironolactone (ALDACTONE) 25 mg tablet 12.5 mg. Insulin Needles, Disposable, 32 gauge x 5/32\" ndle     LANCETS, SUPER THIN Lexy     ONETOUCH ULTRA TEST strip      No current facility-administered medications for this visit.          Past Medical History:   Diagnosis Date    (HFpEF) heart failure with preserved ejection fraction (HCC)     Arrhythmia     HEART MURMUR -\"HAD IT FOR YEARS AS AN ADULT)    Arthritis     OSTEO    Back pain     Breast cancer screening 6/28/2016    Chronic bilateral low back pain without sciatica 6/28/2016    Diabetes ([facility] [address]     Encounter for long-term (current) use of medications 6/28/2016    Essential hypertension with goal blood pressure less than 140/90 6/28/2016    Hypercholesteremia 8/6/2015    Hypercholesterolemia     Hypertension     CONTROLLED WITH MEDS    Ill-defined condition     hyperlipidemia    Other ill-defined conditions(799.89)     high cholesterol    Patient non adherence 1/23/2017    Primary osteoarthritis of left knee 3/2/2018    Type 2 diabetes, uncontrolled, with neuropathy 1/23/2017    Type II diabetes mellitus, uncontrolled 8/6/2015          Past Surgical History:   Procedure Laterality Date    HX CARPAL TUNNEL RELEASE      HX GYN      hysterectomy    HX HEENT      Ear surgery    HX HEENT      Left eye    HX ORTHOPAEDIC      left wrist & left elbow    HX ORTHOPAEDIC      Back Surgery x 3    HX ORTHOPAEDIC      right thumb at [address] [facility]  HX ORTHOPAEDIC      REMOVAL OF LT FOOT CYST    HX OTHER SURGICAL      back surgery X 2    HX OTHER SURGICAL      Collapsed left lung    HX OTHER SURGICAL      axillary sweat glands removed    HX OVARIAN CYST REMOVAL      MT CARDIAC SURG PROCEDURE UNLIST      murmur- PT DENIES SURGERY AND MURMUR 17    MT OTOPLASTY PROTRUDING EAR W/WO SIZE RDCTJ           Family History   Problem Relation Age of Onset    Heart Disease Mother     Heart Attack Mother     No Known Problems Father     Cancer Sister         UNKNOWN    Diabetes Son     Sickle Cell Anemia Daughter     Anesth Problems Son         CHEST PAIN          Social History     Tobacco Use    Smoking status: Former     Packs/day: 1.00     Years: 30.00     Pack years: 30.00     Types: Cigarettes     Quit date: 2005     Years since quittin.3    Smokeless tobacco: Never   Substance Use Topics    Alcohol use: No    Drug use: No                 ASSESSMENT/PLAN:      1. Lumbar back pain  2. Lumbar foraminal stenosis  3. Lumbar radiculopathy  4. Spinal stenosis of lumbar region with neurogenic claudication  5. Degenerative scoliosis in adult patient  6. History of lumbar spinal fusion      Below is the assessment and plan developed based on review of pertinent history, physical exam, labs, studies, and medications. Have discussed the diagnosis and radiographic findings at length and have answered all patient questions to her satisfaction. Given the patient's ongoing pain, motor and sensory deficits despite conservative management have referred the patient for MRI lumbar to assess for severe stenosis. Will plan on seeing the patient back for reevaluation and further treatment recommendations once imaging results are available for review. The patient understands and agrees to the treatment plan as outlined above. Return for MRI review. Dr. Lawson Benjamin was available for immediate consult during this encounter.     An electronic signature was used to authenticate this note.     -- SONALI BeanC

## 2022-07-21 DIAGNOSIS — M48.062 LUMBAR STENOSIS WITH NEUROGENIC CLAUDICATION: Primary | ICD-10-CM

## 2022-07-21 RX ORDER — HYDROCODONE BITARTRATE AND ACETAMINOPHEN 5; 325 MG/1; MG/1
1 TABLET ORAL
Qty: 28 TABLET | Refills: 0 | Status: SHIPPED | OUTPATIENT
Start: 2022-07-21 | End: 2022-07-28

## 2022-07-29 ENCOUNTER — HOSPITAL ENCOUNTER (OUTPATIENT)
Dept: MRI IMAGING | Age: 62
Discharge: HOME | End: 2022-07-29
Attending: PHYSICIAN ASSISTANT
Payer: MEDICAID

## 2022-07-29 DIAGNOSIS — M54.50 LUMBAR BACK PAIN: ICD-10-CM

## 2022-07-29 DIAGNOSIS — M54.16 LUMBAR RADICULOPATHY: ICD-10-CM

## 2022-07-29 DIAGNOSIS — M48.061 LUMBAR FORAMINAL STENOSIS: ICD-10-CM

## 2022-07-29 DIAGNOSIS — M41.50 DEGENERATIVE SCOLIOSIS IN ADULT PATIENT: ICD-10-CM

## 2022-07-29 DIAGNOSIS — M48.062 SPINAL STENOSIS OF LUMBAR REGION WITH NEUROGENIC CLAUDICATION: ICD-10-CM

## 2022-07-29 DIAGNOSIS — Z98.1 HISTORY OF LUMBAR SPINAL FUSION: ICD-10-CM

## 2022-07-29 PROCEDURE — 72148 MRI LUMBAR SPINE W/O DYE: CPT

## 2022-08-22 NOTE — PROGRESS NOTES
HPI: Clemente Lund (: 1960) is a 58 y.o. female, patient, here for evaluation of the following chief complaint(s): Patient presents with complaint of right ring finger pain at the DIP joint which occurred approximately 1 month ago when her finger got caught in a grocery cart. She had immediate swelling and bruising. She was not seen for the injury until 2022 when she presented to [facility] and was told that she has a fracture of the finger. 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 An attempt at open reduction internal fixation was performed but effectively the fragment was excised and the terminal extensor tendon was repaired with transarticular DIP pinning on 3/17/2022. She returned for pin removal on 2022. There is minimal articular cartilage that remains at the base of the distal phalanx and when she strikes the tip of the right ring finger she does have pain. She underwent a fusion of the right ring finger DIP joint on 2022. Surgical Follow-up (Right ring finger DIP joint fusion on 2022)       Vitals: There were no vitals taken for this visit. There is no height or weight on file to calculate BMI. Allergies   Allergen Reactions    Pravachol [Pravastatin] Myalgia       Current Outpatient Medications   Medication Sig    liraglutide (Victoza 2-Gilbert) 0.6 mg/0.1 mL (18 mg/3 mL) pnij 1.8 mg by SubCUTAneous route daily. cholecalciferol (VITAMIN D3) (1000 Units /25 mcg) tablet     Vitamin B-12 1,000 mcg tablet     losartan (COZAAR) 25 mg tablet Take 1 Tablet by mouth nightly. DULoxetine (CYMBALTA) 60 mg capsule Take 1 Capsule by mouth daily. omega-3 fatty acids/fish oil (fish oil-omega-3 fatty acids) 300-1,000 mg cap TAKE ONE CAPSULE BY MOUTH TWICE A DAY    empagliflozin (Jardiance) 25 mg tablet Take 25 mg by mouth daily.     insulin glargine (Lantus Solostar U-100 Insulin) 100 unit/mL (3 mL) inpn 20 Units by SubCUTAneous route daily. senna-docusate (PERICOLACE) 8.6-50 mg per tablet Take 1 Tab by mouth two (2) times a day. Indications: constipation    rosuvastatin (CRESTOR) 10 mg tablet Take 10 mg by mouth nightly. metFORMIN ER (GLUCOPHAGE XR) 500 mg tablet Take 500 mg by mouth two (2) times a day. Indications: 500 mg tab taking 2 tabs daily    aspirin 81 mg chewable tablet Take 81 mg by mouth daily. spironolactone (ALDACTONE) 25 mg tablet 12.5 mg. Insulin Needles, Disposable, 32 gauge x 5/32\" ndle     LANCETS, SUPER THIN Slingrc     ONETOUCH ULTRA TEST strip      No current facility-administered medications for this visit.        Past Medical History:   Diagnosis Date    (HFpEF) heart failure with preserved ejection fraction (HCC)     Arrhythmia     HEART MURMUR -\"HAD IT FOR YEARS AS AN ADULT)    Arthritis     OSTEO    Back pain     Breast cancer screening 6/28/2016    Chronic bilateral low back pain without sciatica 6/28/2016    Diabetes ([facility] [address]     Encounter for long-term (current) use of medications 6/28/2016    Essential hypertension with goal blood pressure less than 140/90 6/28/2016    Hypercholesteremia 8/6/2015    Hypercholesterolemia     Hypertension     CONTROLLED WITH MEDS    Ill-defined condition     hyperlipidemia    Other ill-defined conditions(799.89)     high cholesterol    Patient non adherence 1/23/2017    Primary osteoarthritis of left knee 3/2/2018    Type 2 diabetes, uncontrolled, with neuropathy 1/23/2017    Type II diabetes mellitus, uncontrolled 8/6/2015        Past Surgical History:   Procedure Laterality Date    HX CARPAL TUNNEL RELEASE      HX GYN      hysterectomy    HX HEENT      Ear surgery    HX HEENT      Left eye    HX ORTHOPAEDIC      left wrist & left elbow    HX ORTHOPAEDIC      Back Surgery x 3    HX ORTHOPAEDIC      right thumb at [address] CYST    HX OTHER SURGICAL      back surgery X 2    HX OTHER SURGICAL      Collapsed left lung    HX OTHER SURGICAL      axillary sweat glands removed    HX OVARIAN CYST REMOVAL      VA CARDIAC SURG PROCEDURE UNLIST      murmur- PT DENIES SURGERY AND MURMUR 17    VA OTOPLASTY PROTRUDING EAR W/WO SIZE RDCTJ         Family History   Problem Relation Age of Onset    Heart Disease Mother     Heart Attack Mother     No Known Problems Father     Cancer Sister         UNKNOWN    Diabetes Son     Sickle Cell Anemia Daughter     Anesth Problems Son         CHEST PAIN        Social History     Tobacco Use    Smoking status: Former     Packs/day: 1.00     Years: 30.00     Pack years: 30.00     Types: Cigarettes     Quit date: 2005     Years since quittin.4    Smokeless tobacco: Never   Substance Use Topics    Alcohol use: No    Drug use: No        Review of Systems   All other systems reviewed and are negative. Constitutional: No fevers, chills, night sweats, excessive fatigue or weight loss. Musculoskeletal: No joint pain, swelling or redness. No decreased range of motion. Neurologic: No headache, blurred vision, and no areas of focal weakness or numbness. Normal gait. No sensory problems. Respiratory: No dyspnea on exertion, orthopnea, chest pain, cough or hemoptysis. Cardiovascular: No anginal chest pain, irregular heart beat, tachycardia, palpitations or orthopnea  Integumentary: No chronic rashes, inflammation, ulcerations, pruritus, petechiae, purpura, ecchymoses, or skin changes           Physical Exam    Right ring finger wound is well-healed. No redness drainage or sign infection. Patient pleased with alignment and appearance of digit overall doing well with full motion of the MP and PIP joints. Imaging:  Avulsion fracture of the distal phalanx of the right ring finger previously noted on preoperative x-ray nonunited.     XR Results (most recent):  Results from Appointment encounter on 22    XR 4TH FINGER RT MIN 2 V    Narrative  AP, lateral and oblique x-ray of the right ring finger shows interval healing of the DIP fusion in good overall position and alignment. ASSESSMENT/PLAN:  Below is the assessment and plan developed based on review of pertinent history, physical exam, labs, studies, and medications. Right ring finger pain at the DIP joint which occurred approximately 1 month ago when her finger got caught in a grocery cart. She had immediate swelling and bruising. She was not seen for the injury until 2/9/2022 when she presented to [facility] and was told that she has a fracture of the finger. She was placed in a splint and referred to us. She has an avulsion fracture of the distal phalanx of the right ring finger. Surgery was considered early on but she was not able to proceed with surgery for another 6 weeks. By that time the fracture was nonunited and despite intraoperative efforts it was not repairable. The fracture fragment was excised and the extensor tendon repaired with transarticular retrograde DIP pinning on 3/17/2022. I explained that the pin will likely be left in place for 4 to 6 weeks. Return for pin removal on 4/19/2022. She already is demonstrating motion of the DIP joint and thus far it appears stable clinically and radiographically. She can flex the DIP joint 50 degrees and is pleased with her recovery thus far. However, she certainly understands that if she develops subluxation more stiffness or significant droop she may be better served with a DIP fusion. She no longer requires the splints. She is giving stronger consideration to fuse the DIP joint as she states that whenever she has contact it results in pain. She indeed underwent a right ring finger DIP fusion on 7/5/2022. Overall she has good range of motion is pleased with her progress including the solid arthrodesis of the DIP joint now involving the right ring finger. She may continue with desensitization, motion and strengthening.   She is pleased with her progress and plans to return in 6 to 8 weeks likely for final clinical and x-ray assessment. 1. 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 Rupture of extensor tendon of finger  3. Status post finger joint fusion  -     XR 4TH FINGER RT MIN 2 V; Future  4. Pain in finger of right hand      Return in about 6 weeks (around 10/4/2022).

## 2022-08-23 ENCOUNTER — OFFICE VISIT (OUTPATIENT)
Dept: ORTHOPEDIC SURGERY | Age: 62
End: 2022-08-23
Payer: MEDICAID

## 2022-08-23 DIAGNOSIS — M79.644 PAIN IN FINGER OF RIGHT HAND: ICD-10-CM

## 2022-08-23 DIAGNOSIS — S62.639K: Primary | ICD-10-CM

## 2022-08-23 DIAGNOSIS — S56.419A RUPTURE OF EXTENSOR TENDON OF FINGER: ICD-10-CM

## 2022-08-23 DIAGNOSIS — M41.50 DEGENERATIVE SCOLIOSIS IN ADULT PATIENT: ICD-10-CM

## 2022-08-23 DIAGNOSIS — Z98.1 HISTORY OF LUMBAR SPINAL FUSION: ICD-10-CM

## 2022-08-23 DIAGNOSIS — Z98.1 STATUS POST FINGER JOINT FUSION: ICD-10-CM

## 2022-08-23 DIAGNOSIS — M48.062 SPINAL STENOSIS OF LUMBAR REGION WITH NEUROGENIC CLAUDICATION: ICD-10-CM

## 2022-08-23 DIAGNOSIS — M54.16 LUMBAR RADICULOPATHY: ICD-10-CM

## 2022-08-23 DIAGNOSIS — M48.062 LUMBAR STENOSIS WITH NEUROGENIC CLAUDICATION: Primary | ICD-10-CM

## 2022-08-23 DIAGNOSIS — M48.061 LUMBAR FORAMINAL STENOSIS: ICD-10-CM

## 2022-08-23 PROCEDURE — 99024 POSTOP FOLLOW-UP VISIT: CPT | Performed by: ORTHOPAEDIC SURGERY

## 2022-08-23 NOTE — LETTER
8/23/2022    Patient: Lawrence Adams   YOB: 1960   Date of Visit: 8/23/2022     Karin Blanton MD  [address] [place] [address]  Via In Basket    Dear Karin Blanton MD,      Thank you for referring Ms. Ilsa Serra to [facility] for evaluation. My notes for this consultation are attached. If you have questions, please do not hesitate to call me. I look forward to following your patient along with you.       Sincerely,    Hector Hunter MD

## 2022-08-23 NOTE — PATIENT INSTRUCTIONS
Hand Exercises      Tendon glides   In this exercise, the steps follow one another to a make a continuous movement. With your affected hand, point your fingers and thumb straight up. Your wrist should be relaxed, following the line of your fingers and thumb. Curl your fingers so that the top two joints in them are bent, and your fingers wrap down. Your fingertips should touch or be near the base of your fingers. Your fingers will look like a hook. Make a fist by bending your knuckles. Your thumb can gently rest against your index (pointing) finger. Unwind your fingers slightly so that your fingertips can touch the base of your palm. Your thumb can rest against your index finger. Move back to your starting position, with your fingers and thumb pointing up. Repeat the series of motions 8 to 12 times. Switch hands and repeat steps 1 through 6, even if only one hand is sore. Intrinsic flexion   Rest your affected hand on a table and bend the large joints where your fingers connect to your hand. Keep your thumb and the other joints in your fingers straight. Slowly straighten your fingers. Your wrist should be relaxed, following the line of your fingers and thumb. Move back to your starting position, with your hand bent. Repeat 8 to 12 times. Switch hands and repeat steps 1 through 4, even if only one hand is sore. Finger extension   Place your affected hand flat on a table. Lift and then lower one finger at a time off the table. Repeat 8 to 12 times. Switch hands and repeat steps 1 through 3, even if only one hand is sore. MP extension   Place your good hand on a table, palm up. Put your affected hand on top of your good hand with your fingers wrapped around the thumb of your good hand like you are making a fist.  Slowly uncurl the joints of your affected hand where your fingers connect to your hand so that only the top two joints of your fingers are bent. Your fingers will look like a hook.   Move back to your starting position, with your fingers wrapped around your good thumb. Repeat 8 to 12 times. Switch hands and repeat steps 1 through 4, even if only one hand is sore.

## 2022-08-24 ENCOUNTER — TELEPHONE (OUTPATIENT)
Dept: ORTHOPEDIC SURGERY | Age: 62
End: 2022-08-24

## 2022-08-24 LAB — HBA1C MFR BLD HPLC: 7.6 %

## 2022-08-25 DIAGNOSIS — Z98.1 HISTORY OF LUMBAR SPINAL FUSION: ICD-10-CM

## 2022-08-25 DIAGNOSIS — M48.061 LUMBAR FORAMINAL STENOSIS: ICD-10-CM

## 2022-08-25 DIAGNOSIS — M48.062 LUMBAR STENOSIS WITH NEUROGENIC CLAUDICATION: Primary | ICD-10-CM

## 2022-08-25 DIAGNOSIS — M54.16 LUMBAR RADICULOPATHY: ICD-10-CM

## 2022-09-12 ENCOUNTER — TELEPHONE (OUTPATIENT)
Dept: ORTHOPEDIC SURGERY | Age: 62
End: 2022-09-12

## 2022-09-12 DIAGNOSIS — M48.062 SPINAL STENOSIS OF LUMBAR REGION WITH NEUROGENIC CLAUDICATION: Primary | ICD-10-CM

## 2022-09-12 RX ORDER — HYDROCODONE BITARTRATE AND ACETAMINOPHEN 5; 325 MG/1; MG/1
1 TABLET ORAL
Qty: 30 TABLET | Refills: 0 | Status: SHIPPED | OUTPATIENT
Start: 2022-09-12 | End: 2022-09-19

## 2022-10-03 NOTE — PROGRESS NOTES
HPI: Sandeep Gallagher (: 1960) is a 58 y.o. female, patient, here for evaluation of the following chief complaint(s): Patient presents with complaint of right ring finger pain at the DIP joint which occurred approximately 1 month ago when her finger got caught in a grocery cart. She had immediate swelling and bruising. She was not seen for the injury until 2022 when she presented to [facility] and was told that she has a fracture of the finger. 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 An attempt at open reduction internal fixation was performed but effectively the fragment was excised and the terminal extensor tendon was repaired with transarticular DIP pinning on 3/17/2022. She returned for pin removal on 2022. There is minimal articular cartilage that remains at the base of the distal phalanx and when she strikes the tip of the right ring finger she does have pain. She underwent a fusion of the right ring finger DIP joint on 2022. No chief complaint on file. Vitals: There were no vitals taken for this visit. There is no height or weight on file to calculate BMI. Allergies   Allergen Reactions    Pravachol [Pravastatin] Myalgia       Current Outpatient Medications   Medication Sig    liraglutide (Victoza 2-Gilbert) 0.6 mg/0.1 mL (18 mg/3 mL) pnij 1.8 mg by SubCUTAneous route daily. cholecalciferol (VITAMIN D3) (1000 Units /25 mcg) tablet     Vitamin B-12 1,000 mcg tablet     losartan (COZAAR) 25 mg tablet Take 1 Tablet by mouth nightly. DULoxetine (CYMBALTA) 60 mg capsule Take 1 Capsule by mouth daily. omega-3 fatty acids/fish oil (fish oil-omega-3 fatty acids) 300-1,000 mg cap TAKE ONE CAPSULE BY MOUTH TWICE A DAY    empagliflozin (Jardiance) 25 mg tablet Take 25 mg by mouth daily. insulin glargine (Lantus Solostar U-100 Insulin) 100 unit/mL (3 mL) inpn 20 Units by SubCUTAneous route daily.     senna-docusate (PERICOLACE) 8.6-50 mg per tablet Take 1 Tab by mouth two (2) times a day. Indications: constipation    rosuvastatin (CRESTOR) 10 mg tablet Take 10 mg by mouth nightly. metFORMIN ER (GLUCOPHAGE XR) 500 mg tablet Take 500 mg by mouth two (2) times a day. Indications: 500 mg tab taking 2 tabs daily    aspirin 81 mg chewable tablet Take 81 mg by mouth daily. spironolactone (ALDACTONE) 25 mg tablet 12.5 mg. Insulin Needles, Disposable, 32 gauge x 5/32\" ndle     LANCETS, SUPER THIN Vuzec     ONETOUCH ULTRA TEST strip      No current facility-administered medications for this visit.        Past Medical History:   Diagnosis Date    (HFpEF) heart failure with preserved ejection fraction (HCC)     Arrhythmia     HEART MURMUR -\"HAD IT FOR YEARS AS AN ADULT)    Arthritis     OSTEO    Back pain     Breast cancer screening 6/28/2016    Chronic bilateral low back pain without sciatica 6/28/2016    Diabetes ([facility] [address]     Encounter for long-term (current) use of medications 6/28/2016    Essential hypertension with goal blood pressure less than 140/90 6/28/2016    Hypercholesteremia 8/6/2015    Hypercholesterolemia     Hypertension     CONTROLLED WITH MEDS    Ill-defined condition     hyperlipidemia    Other ill-defined conditions(799.89)     high cholesterol    Patient non adherence 1/23/2017    Primary osteoarthritis of left knee 3/2/2018    Type 2 diabetes, uncontrolled, with neuropathy 1/23/2017    Type II diabetes mellitus, uncontrolled 8/6/2015        Past Surgical History:   Procedure Laterality Date    HX CARPAL TUNNEL RELEASE      HX GYN      hysterectomy    HX HEENT      Ear surgery    HX HEENT      Left eye    HX ORTHOPAEDIC      left wrist & left elbow    HX ORTHOPAEDIC      Back Surgery x 3    HX ORTHOPAEDIC      right thumb at [address]    HX OTHER SURGICAL      back surgery X 2    HX OTHER SURGICAL      Collapsed left lung    HX OTHER SURGICAL      axillary sweat glands removed    HX OVARIAN CYST REMOVAL      [place] CARDIAC SURG PROCEDURE UNLIST      murmur- PT DENIES SURGERY AND MURMUR 17    [place] OTOPLASTY PROTRUDING EAR W/WO SIZE RDCTJ         Family History   Problem Relation Age of Onset    Heart Disease Mother     Heart Attack Mother     No Known Problems Father     Cancer Sister         UNKNOWN    Diabetes Son     Sickle Cell Anemia Daughter     Anesth Problems Son         CHEST PAIN        Social History     Tobacco Use    Smoking status: Former     Packs/day: 1.00     Years: 30.00     Pack years: 30.00     Types: Cigarettes     Quit date: 2005     Years since quittin.5    Smokeless tobacco: Never   Substance Use Topics    Alcohol use: No    Drug use: No        Review of Systems   All other systems reviewed and are negative. Constitutional: No fevers, chills, night sweats, excessive fatigue or weight loss. Musculoskeletal: No joint pain, swelling or redness. No decreased range of motion. Neurologic: No headache, blurred vision, and no areas of focal weakness or numbness. Normal gait. No sensory problems. Respiratory: No dyspnea on exertion, orthopnea, chest pain, cough or hemoptysis. Cardiovascular: No anginal chest pain, irregular heart beat, tachycardia, palpitations or orthopnea  Integumentary: No chronic rashes, inflammation, ulcerations, pruritus, petechiae, purpura, ecchymoses, or skin changes           Physical Exam    Right ring finger wound is well-healed. No redness drainage or sign infection. Patient pleased with alignment and appearance of digit overall doing well with full motion of the MP and PIP joints. There is some concern for potential motion of the fusion site but she denies pain. Imaging:  Avulsion fracture of the distal phalanx of the right ring finger previously noted on preoperative x-ray nonunited.     XR Results (most recent):  Results from Appointment encounter on 10/04/22    XR 4TH FINGER RT MIN 2 V    Narrative  AP and lateral x-ray of the right ring finger shows the fusion fixation of the ring finger DIP joint seemingly in good position alignment although there seems to be lucency around the screw in the middle phalanx but some of this was seen on x-ray when obtained last on 8/23/2022. ASSESSMENT/PLAN:  Below is the assessment and plan developed based on review of pertinent history, physical exam, labs, studies, and medications. Right ring finger pain at the DIP joint which occurred approximately 1 month ago when her finger got caught in a grocery cart. She had immediate swelling and bruising. She was not seen for the injury until 2/9/2022 when she presented to [facility] and was told that she has a fracture of the finger. She was placed in a splint and referred to us. She has an avulsion fracture of the distal phalanx of the right ring finger. Surgery was considered early on but she was not able to proceed with surgery for another 6 weeks. By that time the fracture was nonunited and despite intraoperative efforts it was not repairable. The fracture fragment was excised and the extensor tendon repaired with transarticular retrograde DIP pinning on 3/17/2022. I explained that the pin will likely be left in place for 4 to 6 weeks. Return for pin removal on 4/19/2022. She already is demonstrating motion of the DIP joint and thus far it appears stable clinically and radiographically. She can flex the DIP joint 50 degrees and is pleased with her recovery thus far. However, she certainly understands that if she develops subluxation more stiffness or significant droop she may be better served with a DIP fusion. She no longer requires the splints. She is giving stronger consideration to fuse the DIP joint as she states that whenever she has contact it results in pain. She indeed underwent a right ring finger DIP fusion on 7/5/2022. Patient states that she is \"doing great\".   However, I am concerned that there may be fusion site motion and I am less convinced radiographically that this is healing. She is aware of my concerns but wants to continue currently with conservative treatment. Hopefully this will unite and is representing more of a delayed union rather than a nonunion. I plan to see her back for further clinical and x-ray assessment in 3 months. If there is a question a CT scan can be obtained if needed. In addition, she has developed a left trigger thumb and did receive a left trigger thumb corticosteroid injection on 10/4/2022. Eventually this may require surgical attention. 1. 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 Rupture of extensor tendon of finger  3. Status post finger joint fusion  -     XR 4TH FINGER RT MIN 2 V; Future  4. Pain in finger of right hand  5. Trigger thumb, left thumb  -     INJECT TENDON SHEATH/LIGAMENT  -     betamethasone (CELESTONE) injection 6 mg; 6 mg, Other, ONCE, 1 dose, On Tue 10/4/22 at 1800  -     bupivacaine (PF) (MARCAINE) 0.75 % (7.5 mg/mL) injection 7.5 mg; 7.5 mg (1 mL), Intra artICUlar, ONCE, 1 dose, On Tue 10/4/22 at 1800    Informed consent was obtained. The patient received an left trigger thumb injection with 6 mg betamethasone mixed with 1 cc of 0.75% bupivacaine. Return in about 3 months (around 1/4/2023).

## 2022-10-04 ENCOUNTER — OFFICE VISIT (OUTPATIENT)
Dept: ORTHOPEDIC SURGERY | Age: 62
End: 2022-10-04
Payer: MEDICAID

## 2022-10-04 DIAGNOSIS — S62.639K: Primary | ICD-10-CM

## 2022-10-04 DIAGNOSIS — Z98.1 STATUS POST FINGER JOINT FUSION: ICD-10-CM

## 2022-10-04 DIAGNOSIS — S56.419A RUPTURE OF EXTENSOR TENDON OF FINGER: ICD-10-CM

## 2022-10-04 DIAGNOSIS — M79.644 PAIN IN FINGER OF RIGHT HAND: ICD-10-CM

## 2022-10-04 DIAGNOSIS — M65.312 TRIGGER THUMB, LEFT THUMB: ICD-10-CM

## 2022-10-04 PROCEDURE — 99213 OFFICE O/P EST LOW 20 MIN: CPT | Performed by: ORTHOPAEDIC SURGERY

## 2022-10-04 PROCEDURE — 20550 NJX 1 TENDON SHEATH/LIGAMENT: CPT | Performed by: ORTHOPAEDIC SURGERY

## 2022-10-04 RX ORDER — BETAMETHASONE SODIUM PHOSPHATE AND BETAMETHASONE ACETATE 3; 3 MG/ML; MG/ML
6 INJECTION, SUSPENSION INTRA-ARTICULAR; INTRALESIONAL; INTRAMUSCULAR; SOFT TISSUE ONCE
Status: COMPLETED | OUTPATIENT
Start: 2022-10-04 | End: 2022-10-04

## 2022-10-04 RX ORDER — BUPIVACAINE HYDROCHLORIDE 7.5 MG/ML
1 INJECTION, SOLUTION EPIDURAL; RETROBULBAR ONCE
Status: COMPLETED | OUTPATIENT
Start: 2022-10-04 | End: 2022-10-04

## 2022-10-04 RX ADMIN — BUPIVACAINE HYDROCHLORIDE 7.5 MG: 7.5 INJECTION, SOLUTION EPIDURAL; RETROBULBAR at 17:06

## 2022-10-04 RX ADMIN — BETAMETHASONE SODIUM PHOSPHATE AND BETAMETHASONE ACETATE 6 MG: 3; 3 INJECTION, SUSPENSION INTRA-ARTICULAR; INTRALESIONAL; INTRAMUSCULAR; SOFT TISSUE at 17:06

## 2022-10-04 NOTE — LETTER
10/4/2022    Patient: Luis Armando Somers   YOB: 1960   Date of Visit: 10/4/2022     Chapo Michael MD  [address] [place] [address]  Via In Basket    Dear Chapo Michael MD,      Thank you for referring Ms. Lucinda Mosher to [facility] for evaluation. My notes for this consultation are attached. If you have questions, please do not hesitate to call me. I look forward to following your patient along with you.       Sincerely,    Leigh Ann Lazar MD

## 2022-10-04 NOTE — PATIENT INSTRUCTIONS
Trigger Finger: Care Instructions  Overview  A trigger finger is a finger stuck in a bent position. It happens when the tendon that bends and straightens the thumb or finger can't slide smoothly under the ligaments that hold the tendon against the bones. In most cases, it's caused by a bump (nodule) that forms on the tendon. The bent finger usually straightens out on its own. A trigger finger can be painful. But it normally isn't a serious problem. Trigger fingers seem to occur more in some groups of people. These groups include:  People who have diabetes or arthritis. People who have injured their hands in the past.  Musicians. People who  tools often. Rest and exercises may help your finger relax so that it can bend. You may get a corticosteroid shot. This can reduce swelling and pain. Your doctor may put a splint on your finger. It will give your finger some rest. You may need surgery if the finger keeps locking in a bent position. Follow-up care is a key part of your treatment and safety. Be sure to make and go to all appointments, and call your doctor if you are having problems. It's also a good idea to know your test results and keep a list of the medicines you take. How can you care for yourself at home? If your doctor put a splint on your finger, wear it as directed. Don't take it off until your doctor says you can. You may need to change your activities to avoid movements that irritate the finger. Take your medicines exactly as prescribed. Call your doctor if you think you are having a problem with your medicine. Ask your doctor if you can take an over-the-counter pain medicine, such as acetaminophen (Tylenol), ibuprofen (Advil, Motrin), or naproxen (Aleve). Be safe with medicines. Read and follow all instructions on the label. If your doctor recommends exercises, do them as directed. When should you call for help?    Call your doctor now or seek immediate medical care if:    Your finger locks in a bent position and will not straighten. Watch closely for changes in your health, and be sure to contact your doctor if:    You do not get better as expected. Where can you learn more? Go to http://www.reyna.com/  Enter M826 in the search box to learn more about \"Trigger Finger: Care Instructions. \"  Current as of: July 1, 2021               Content Version: 13.2  © 2006-2022 [employer]. Care instructions adapted under license by [employer] (which disclaims liability or warranty for this information). If you have questions about a medical condition or this instruction, always ask your healthcare professional. [address] any warranty or liability for your use of this information.

## 2022-10-11 ENCOUNTER — OFFICE VISIT (OUTPATIENT)
Dept: FAMILY MEDICINE CLINIC | Age: 62
End: 2022-10-11
Payer: MEDICAID

## 2022-10-11 VITALS
WEIGHT: 177 LBS | BODY MASS INDEX: 29.49 KG/M2 | SYSTOLIC BLOOD PRESSURE: 124 MMHG | RESPIRATION RATE: 16 BRPM | DIASTOLIC BLOOD PRESSURE: 81 MMHG | TEMPERATURE: 98.9 F | HEIGHT: 65 IN | HEART RATE: 85 BPM | OXYGEN SATURATION: 100 %

## 2022-10-11 DIAGNOSIS — I10 ESSENTIAL HYPERTENSION: Primary | ICD-10-CM

## 2022-10-11 DIAGNOSIS — I50.32 CHRONIC HEART FAILURE WITH PRESERVED EJECTION FRACTION (HCC): ICD-10-CM

## 2022-10-11 LAB
CREAT UR-MCNC: 50.1 MG/DL
MICROALBUMIN UR-MCNC: <0.5 MG/DL
MICROALBUMIN/CREAT UR-RTO: <10 MG/G (ref 0–30)

## 2022-10-11 PROCEDURE — 99213 OFFICE O/P EST LOW 20 MIN: CPT | Performed by: STUDENT IN AN ORGANIZED HEALTH CARE EDUCATION/TRAINING PROGRAM

## 2022-10-11 NOTE — PROGRESS NOTES
Name and  Verified. Pharmacy verified     Chief Complaint   Patient presents with    Follow-up     6 Months 2022 Hypertension       1. Have you been to the ER, urgent care clinic since your last visit? Hospitalized since your last visit? No    2. Have you seen or consulted any other health care providers outside of the [address] [place] [address] since your last visit? Include any pap smears or colon screening.      Yes  10/4/2022  [facility]  Closed avulsion fracture of distal phalanx of finger with nonunion    Health Maintenance Due   Topic Date Due    Cervical cancer screen  Never done    Shingrix Vaccine Age 50> (1 of 2) Never done    Eye Exam Retinal or Dilated  2018    Pneumococcal 0-64 years (2 - PCV) 2018    MICROALBUMIN Q1  2022    COVID-19 Vaccine (4 - Booster for Moderna series) 2022    Flu Vaccine (1) 2022

## 2022-10-11 NOTE — PATIENT INSTRUCTIONS
Cardiology    [facility]    [address] [facility], [address] [place] [address]    [phone]

## 2022-10-11 NOTE — PROGRESS NOTES
[address] [facility]  [address] [place] [address]  [phone]    C/C: HTN,    HPI:    Shahla Johnston is a 58 y.o. female who presents to blood pressure management. Subjective;  HTN: Out of her Losartan 25 mg and aldactone 25 mg  Compliant and BP at goal.  No side effects from medications. Follows with endocrinology for diabetes. Pt denies any  fever, chill, chest pain, SOB, PND, abdominal pain, n/v/d,HA or dizziness. Other Health Habits and social history:  Smoking history: Former smoker but quit years ago. Alcohol history: no  Occupation: unemployed  Marital status: Patient is currently single and has one son. Had 3 children but 2 passed away. Has 2 grandkids (5 and 7 yo)    Other Specialists/providers:  Endocrinology  Cardiology - HFpEF  Ortho    Allergies- reviewed:    Allergies   Allergen Reactions    Pravachol [Pravastatin] Myalgia       Past Medical History- reviewed:  Past Medical History:   Diagnosis Date    (HFpEF) heart failure with preserved ejection fraction (HCC)     Arrhythmia     HEART MURMUR -\"HAD IT FOR YEARS AS AN ADULT)    Arthritis     OSTEO    Back pain     Breast cancer screening 6/28/2016    Chronic bilateral low back pain without sciatica 6/28/2016    Diabetes ([facility] [address]     Encounter for long-term (current) use of medications 6/28/2016    Essential hypertension with goal blood pressure less than 140/90 6/28/2016    Hypercholesteremia 8/6/2015    Hypercholesterolemia     Hypertension     CONTROLLED WITH MEDS    Ill-defined condition     hyperlipidemia    Other ill-defined conditions(799.89)     high cholesterol    Patient non adherence 1/23/2017    Primary osteoarthritis of left knee 3/2/2018    Type 2 diabetes, uncontrolled, with neuropathy 1/23/2017    Type II diabetes mellitus, uncontrolled 8/6/2015       Family History - reviewed:  Family History   Problem Relation Age of Onset    Heart Disease Mother     Heart Attack Mother No Known Problems Father     Cancer Sister         UNKNOWN    Diabetes Son     Sickle Cell Anemia Daughter     Anesth Problems Son         CHEST PAIN       Social History - reviewed:  Social History     Socioeconomic History    Marital status: SINGLE     Spouse name: Not on file    Number of children: Not on file    Years of education: Not on file    Highest education level: Not on file   Occupational History    Not on file   Tobacco Use    Smoking status: Former     Packs/day: 1.00     Years: 30.00     Pack years: 30.00     Types: Cigarettes     Quit date: 2005     Years since quittin.5    Smokeless tobacco: Never   Substance and Sexual Activity    Alcohol use: No    Drug use: No    Sexual activity: Never   Other Topics Concern    Not on file   Social History Narrative    ** Merged History Encounter **          Social Determinants of Health     Financial Resource Strain: Not on file   Food Insecurity: Not on file   Transportation Needs: Not on file   Physical Activity: Not on file   Stress: Not on file   Social Connections: Not on file   Intimate Partner Violence: Not on file   Housing Stability: Not on file       Depression screening:  3 most recent [address] 2022   Little interest or pleasure in doing things Not at all   Feeling down, depressed, irritable, or hopeless Not at all   Total Score PHQ 2 0         Review of systems:     A comprehensive review of systems was negative except for that written in the History of Present Illness. Visit Vitals  /81 (BP 1 Location: Right arm, BP Patient Position: Sitting, BP Cuff Size: Adult)   Pulse 85   Temp 98.9 °F (37.2 °C) (Oral)   Resp 16   Ht 5' 5\" (1.651 m)   Wt 177 lb (80.3 kg)   SpO2 100%   BMI 29.45 kg/m²       General: Alert and oriented, in no acute distress. Well nourished. EYE: PERRL. Sclera and conjuctival clear. Extraocular movements intact.   EARS: External normal, canals clear, tympanic membranes normal.   NOSE: Mucosa healthy without drainage or ulceration. OROPHARYNX: No suspicious lesions, normal dentition, pharynx, tongue and tonsils normal.  NECK: Supple; no masses; thyroid normal.  LUNGS: Respirations unlabored; clear to auscultation bilaterally. CARDIOVASCULAR: Regular, rate, and rhythm without murmurs, gallops or rubs. ABDOMEN: Soft; nontender; nondistended; normoactive bowel sounds; no masses or organomegaly. MUSCULOSKELETAL: FROM in all extremities     EXT: No edema. Neurovascularlly intact. Normal gait. SKIN: No rash. No suspicious lesions or moles. Neuro: Mental Status: Pt is alert and oriented to person, place, and time. Assessment/Plan       ICD-10-CM ICD-9-CM    1. Essential hypertension  I10 401.9 MICROALBUMIN, UR, RAND W/ MICROALB/CREAT RATIO      2. Chronic heart failure with preserved ejection fraction (HCC)  I50.32 428.9           1. Essential hypertension    - Continue losartan (COZAAR) 25 mg tablet; Take 1 Tablet by mouth nightly. - continue Aldactone 25mg daily     2. HFpEF:  Recommend following up with cardiology. Has not been in their office for over a year. Gave information     Follow up: 6 months or sooner if needed    I have discussed the diagnosis with the patient and the intended plan as seen in the above orders. The patient has received an after-visit summary and questions were answered concerning future plans. I have discussed medication side effects and warnings with the patient as well. Informed patient to return to the office if new symptoms arise.     Signed By: Wojciech Michaels MD     October 11, 2022

## 2022-12-06 ENCOUNTER — OFFICE VISIT (OUTPATIENT)
Dept: ORTHOPEDIC SURGERY | Age: 62
End: 2022-12-06
Payer: MEDICAID

## 2022-12-06 DIAGNOSIS — Z98.1 STATUS POST FINGER JOINT FUSION: ICD-10-CM

## 2022-12-06 DIAGNOSIS — S56.419A RUPTURE OF EXTENSOR TENDON OF FINGER: ICD-10-CM

## 2022-12-06 DIAGNOSIS — M65.312 TRIGGER THUMB, LEFT THUMB: ICD-10-CM

## 2022-12-06 DIAGNOSIS — M96.0 NONUNION AFTER ARTHRODESIS: ICD-10-CM

## 2022-12-06 DIAGNOSIS — S62.639K: Primary | ICD-10-CM

## 2022-12-06 DIAGNOSIS — M79.644 PAIN IN FINGER OF RIGHT HAND: ICD-10-CM

## 2022-12-06 PROCEDURE — 99213 OFFICE O/P EST LOW 20 MIN: CPT | Performed by: ORTHOPAEDIC SURGERY

## 2022-12-06 NOTE — LETTER
12/6/2022    Patient: Mya Hess   YOB: 1960   Date of Visit: 12/6/2022     Arnold Sanchez MD  [address]  [place] [address]  Via In Basket    Dear Arnold Sanchez MD,      Thank you for referring Ms. Stacy Girard to [facility] for evaluation. My notes for this consultation are attached. If you have questions, please do not hesitate to call me. I look forward to following your patient along with you.       Sincerely,    Jaylene Jackson MD

## 2022-12-06 NOTE — PROGRESS NOTES
HPI: Luis Armando Somers (: 1960) is a 58 y.o. female, patient, here for evaluation of the following chief complaint(s): Patient presents with complaint of right ring finger pain at the DIP joint which occurred approximately 1 month ago when her finger got caught in a grocery cart. She had immediate swelling and bruising. She was not seen for the injury until 2022 when she presented to [facility] and was told that she has a fracture of the finger. 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 An attempt at open reduction internal fixation was performed but effectively the fragment was excised and the terminal extensor tendon was repaired with transarticular DIP pinning on 3/17/2022. She returned for pin removal on 2022. There is minimal articular cartilage that remains at the base of the distal phalanx and when she strikes the tip of the right ring finger she does have pain. She underwent a fusion of the right ring finger DIP joint on 2022. She also received a left trigger thumb injection on 10/4/2022. Surgical Follow-up (Right ring finger fracture fragment excision and extensor tendon repair with transarticular DIP joint pinning on 3/17/22. Pin removal on 22. Right ring DIP joint fusion on 22.)       Vitals: There were no vitals taken for this visit. There is no height or weight on file to calculate BMI. Allergies   Allergen Reactions    Pravachol [Pravastatin] Myalgia       Current Outpatient Medications   Medication Sig    losartan (COZAAR) 25 mg tablet TAKE ONE TABLET BY MOUTH ONCE NIGHTLY    liraglutide (Victoza 2-Gilbert) 0.6 mg/0.1 mL (18 mg/3 mL) pnij 1.8 mg by SubCUTAneous route daily. cholecalciferol (VITAMIN D3) (1000 Units /25 mcg) tablet     Vitamin B-12 1,000 mcg tablet     DULoxetine (CYMBALTA) 60 mg capsule Take 1 Capsule by mouth daily.     omega-3 fatty acids/fish oil (fish oil-omega-3 fatty acids) 300-1,000 mg cap TAKE ONE CAPSULE BY MOUTH TWICE A DAY    empagliflozin (JARDIANCE) 25 mg tablet Take 25 mg by mouth daily. insulin glargine (Lantus Solostar U-100 Insulin) 100 unit/mL (3 mL) inpn 20 Units by SubCUTAneous route daily. rosuvastatin (CRESTOR) 10 mg tablet Take 10 mg by mouth nightly. metFORMIN ER (GLUCOPHAGE XR) 500 mg tablet Take 500 mg by mouth two (2) times a day. Indications: 500 mg tab taking 2 tabs daily    aspirin 81 mg chewable tablet Take 81 mg by mouth daily. spironolactone (ALDACTONE) 25 mg tablet 12.5 mg. Insulin Needles, Disposable, 32 gauge x 5/32\" ndle     LANCETS, SUPER THIN [employer]     ONETOUCH ULTRA TEST strip      No current facility-administered medications for this visit.        Past Medical History:   Diagnosis Date    (HFpEF) heart failure with preserved ejection fraction (HCC)     Arrhythmia     HEART MURMUR -\"HAD IT FOR YEARS AS AN ADULT)    Arthritis     OSTEO    Back pain     Breast cancer screening 6/28/2016    Chronic bilateral low back pain without sciatica 6/28/2016    Diabetes ([facility] [address]     Encounter for long-term (current) use of medications 6/28/2016    Essential hypertension with goal blood pressure less than 140/90 6/28/2016    Hypercholesteremia 8/6/2015    Hypercholesterolemia     Hypertension     CONTROLLED WITH MEDS    Ill-defined condition     hyperlipidemia    Other ill-defined conditions(799.89)     high cholesterol    Patient non adherence 1/23/2017    Primary osteoarthritis of left knee 3/2/2018    Type 2 diabetes, uncontrolled, with neuropathy 1/23/2017    Type II diabetes mellitus, uncontrolled 8/6/2015        Past Surgical History:   Procedure Laterality Date    HX CARPAL TUNNEL RELEASE      HX GYN      hysterectomy    HX HEENT      Ear surgery    HX HEENT      Left eye    HX ORTHOPAEDIC      left wrist & left elbow    HX ORTHOPAEDIC      Back Surgery x 3    HX ORTHOPAEDIC      right thumb at [address] HX OTHER SURGICAL      back surgery X 2    HX OTHER SURGICAL      Collapsed left lung    HX OTHER SURGICAL      axillary sweat glands removed    HX OVARIAN CYST REMOVAL      [place] CARDIAC SURG PROCEDURE UNLIST      murmur- PT DENIES SURGERY AND MURMUR 17    [place] OTOPLASTY PROTRUDING EAR W/WO SIZE RDCTJ         Family History   Problem Relation Age of Onset    Heart Disease Mother     Heart Attack Mother     No Known Problems Father     Cancer Sister         UNKNOWN    Diabetes Son     Sickle Cell Anemia Daughter     Anesth Problems Son         CHEST PAIN        Social History     Tobacco Use    Smoking status: Former     Packs/day: 1.00     Years: 30.00     Pack years: 30.00     Types: Cigarettes     Quit date: 2005     Years since quittin.6    Smokeless tobacco: Never   Substance Use Topics    Alcohol use: No    Drug use: No        Review of Systems   All other systems reviewed and are negative. Constitutional: No fevers, chills, night sweats, excessive fatigue or weight loss. Musculoskeletal: No joint pain, swelling or redness. No decreased range of motion. Neurologic: No headache, blurred vision, and no areas of focal weakness or numbness. Normal gait. No sensory problems. Respiratory: No dyspnea on exertion, orthopnea, chest pain, cough or hemoptysis. Cardiovascular: No anginal chest pain, irregular heart beat, tachycardia, palpitations or orthopnea  Integumentary: No chronic rashes, inflammation, ulcerations, pruritus, petechiae, purpura, ecchymoses, or skin changes           Physical Exam    Right ring finger wound is well-healed. No redness drainage or sign infection. Patient pleased with alignment and appearance of digit overall doing well with full motion of the MP and PIP joints. However there is fusion site motion of the DIP region during gentle stress testing.     Imaging:  Avulsion fracture of the distal phalanx of the right ring finger previously noted on preoperative x-ray nonunited. 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 ASSESSMENT/PLAN:  Below is the assessment and plan developed based on review of pertinent history, physical exam, labs, studies, and medications. Right ring finger pain at the DIP joint which occurred approximately 1 month ago when her finger got caught in a grocery cart. She had immediate swelling and bruising. She was not seen for the injury until 2/9/2022 when she presented to [facility] and was told that she has a fracture of the finger. She was placed in a splint and referred to us. She has an avulsion fracture of the distal phalanx of the right ring finger. Surgery was considered early on but she was not able to proceed with surgery for another 6 weeks. By that time the fracture was nonunited and despite intraoperative efforts it was not repairable. The fracture fragment was excised and the extensor tendon repaired with transarticular retrograde DIP pinning on 3/17/2022. I explained that the pin will likely be left in place for 4 to 6 weeks. Return for pin removal on 4/19/2022. She already is demonstrating motion of the DIP joint and thus far it appears stable clinically and radiographically. She can flex the DIP joint 50 degrees and is pleased with her recovery thus far. However, she certainly understands that if she develops subluxation more stiffness or significant droop she may be better served with a DIP fusion. She no longer requires the splints. She is giving stronger consideration to fuse the DIP joint as she states that whenever she has contact it results in pain. She indeed underwent a right ring finger DIP fusion on 7/5/2022.   Patient states that she is \"doing great\". However, I am concerned that there may be fusion site motion and I am less convinced radiographically that this is healing. She is aware of my concerns but wants to continue currently with conservative treatment. Hopefully this will unite and is representing more of a delayed union rather than a nonunion. I plan to see her back for further clinical and x-ray assessment in 3 months. If there is a question a CT scan can be obtained if needed. In addition, she has developed a left trigger thumb and did receive a left trigger thumb corticosteroid injection on 10/4/2022. Eventually this may require surgical attention. When next examined on 12/6/2022 there was fusion site motion of the DIP of the right ring finger. Although there really is not any advanced swelling or pain there is some lucency around the fusion screw and it appears clinically and radiographically that she has a fusion nonunion. I have reviewed the findings with the patient and answered her questions. I recommended screw removal with revision arthrodesis to include distal radius bone graft and fixation ranging from screw or screws versus pins. She is a former smoker but has not smoked in 15 years. I reviewed risks that include but not limited to stiffness, pain, nerve or tendon damage and continued nonunion. Arrangements can be made for this to be performed in outpatient basis at her convenience. 1. Closed avulsion fracture of distal phalanx of finger with nonunion, subsequent encounter  -     XR 4TH FINGER RT MIN 2 V; Future  2. Rupture of extensor tendon of finger  3. Status post finger joint fusion  4. Pain in finger of right hand  5. Trigger thumb, left thumb  6. Nonunion after arthrodesis        Return for Follow-up 7 to 10 days after surgery. Aixa Palm

## 2022-12-08 DIAGNOSIS — S62.639K: Primary | ICD-10-CM

## 2022-12-15 ENCOUNTER — TELEPHONE (OUTPATIENT)
Dept: ORTHOPEDIC SURGERY | Age: 62
End: 2022-12-15

## 2022-12-15 DIAGNOSIS — M48.062 SPINAL STENOSIS OF LUMBAR REGION WITH NEUROGENIC CLAUDICATION: ICD-10-CM

## 2022-12-15 DIAGNOSIS — M43.16 SPONDYLOLISTHESIS OF LUMBAR REGION: Primary | ICD-10-CM

## 2022-12-20 DIAGNOSIS — S56.419A RUPTURE OF EXTENSOR TENDON OF FINGER: ICD-10-CM

## 2022-12-20 DIAGNOSIS — M48.062 SPINAL STENOSIS OF LUMBAR REGION WITH NEUROGENIC CLAUDICATION: Primary | ICD-10-CM

## 2022-12-20 DIAGNOSIS — S62.639K: Primary | ICD-10-CM

## 2022-12-20 RX ORDER — HYDROCODONE BITARTRATE AND ACETAMINOPHEN 5; 325 MG/1; MG/1
1 TABLET ORAL
Qty: 40 TABLET | Refills: 0 | Status: SHIPPED | OUTPATIENT
Start: 2022-12-20 | End: 2022-12-27

## 2023-01-09 ENCOUNTER — TELEPHONE (OUTPATIENT)
Dept: ORTHOPEDIC SURGERY | Age: 63
End: 2023-01-09

## 2023-01-09 DIAGNOSIS — S62.639K: Primary | ICD-10-CM

## 2023-01-09 RX ORDER — HYDROCODONE BITARTRATE AND ACETAMINOPHEN 5; 325 MG/1; MG/1
1 TABLET ORAL
Qty: 15 TABLET | Refills: 0 | Status: SHIPPED | OUTPATIENT
Start: 2023-01-09 | End: 2023-01-16

## 2023-01-16 NOTE — PROGRESS NOTES
HPI: Jesus Dennis (: 1960) is a 58 y.o. female, patient, here for evaluation of the following chief complaint(s): Patient presents with complaint of right ring finger pain at the DIP joint which occurred approximately 1 month ago when her finger got caught in a grocery cart. She had immediate swelling and bruising. She was not seen for the injury until 2022 when she presented to [facility] and was told that she has a fracture of the finger. 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 An attempt at open reduction internal fixation was performed but effectively the fragment was excised and the terminal extensor tendon was repaired with transarticular DIP pinning on 3/17/2022. She returned for pin removal on 2022. There is minimal articular cartilage that remains at the base of the distal phalanx and when she strikes the tip of the right ring finger she does have pain. She underwent a fusion of the right ring finger DIP joint on 2022. She also received a left trigger thumb injection on 10/4/2022. 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 She underwent screw removal with revision arthrodesis of the distal interphalangeal joint including distal radius bone graft and longer fusion screw fixation on 1/10/2023. Surgical Follow-up (Follow up on surgery done 01/10/2023. Pt stated her finger is still sore.)       Vitals:  Ht 5' 5\" (1.651 m)   Wt 177 lb (80.3 kg)   BMI 29.45 kg/m²    Body mass index is 29.45 kg/m². Allergies   Allergen Reactions    Pravachol [Pravastatin] Myalgia       Current Outpatient Medications   Medication Sig    losartan (COZAAR) 25 mg tablet TAKE ONE TABLET BY MOUTH ONCE NIGHTLY    liraglutide (Victoza 2-Gilbert) 0.6 mg/0.1 mL (18 mg/3 mL) pnij 1.8 mg by SubCUTAneous route daily.     cholecalciferol (VITAMIN D3) (1000 Units /25 mcg) tablet     Vitamin B-12 1,000 mcg tablet     DULoxetine (CYMBALTA) 60 mg capsule Take 1 Capsule by mouth daily. omega-3 fatty acids/fish oil (fish oil-omega-3 fatty acids) 300-1,000 mg cap TAKE ONE CAPSULE BY MOUTH TWICE A DAY    empagliflozin (JARDIANCE) 25 mg tablet Take 25 mg by mouth daily. insulin glargine (Lantus Solostar U-100 Insulin) 100 unit/mL (3 mL) inpn 20 Units by SubCUTAneous route daily. rosuvastatin (CRESTOR) 10 mg tablet Take 10 mg by mouth nightly. metFORMIN ER (GLUCOPHAGE XR) 500 mg tablet Take 500 mg by mouth two (2) times a day. Indications: 500 mg tab taking 2 tabs daily    aspirin 81 mg chewable tablet Take 81 mg by mouth daily. spironolactone (ALDACTONE) 25 mg tablet 12.5 mg. Insulin Needles, Disposable, 32 gauge x 5/32\" ndle     LANCETS, SUPER THIN [employer]     ONETOUCH ULTRA TEST strip      No current facility-administered medications for this visit.        Past Medical History:   Diagnosis Date    (HFpEF) heart failure with preserved ejection fraction (HCC)     Arrhythmia     HEART MURMUR -\"HAD IT FOR YEARS AS AN ADULT)    Arthritis     OSTEO    Back pain     Breast cancer screening 6/28/2016    Chronic bilateral low back pain without sciatica 6/28/2016    Diabetes ([facility] [address]     Encounter for long-term (current) use of medications 6/28/2016    Essential hypertension with goal blood pressure less than 140/90 6/28/2016    Hypercholesteremia 8/6/2015    Hypercholesterolemia     Hypertension     CONTROLLED WITH MEDS    Ill-defined condition     hyperlipidemia    Other ill-defined conditions(799.89)     high cholesterol    Patient non adherence 1/23/2017    Primary osteoarthritis of left knee 3/2/2018    Type 2 diabetes, uncontrolled, with neuropathy 1/23/2017    Type II diabetes mellitus, uncontrolled 8/6/2015        Past Surgical History:   Procedure Laterality Date    HX CARPAL TUNNEL RELEASE      HX GYN      hysterectomy    HX HEENT      Ear surgery    HX HEENT      Left eye    HX ORTHOPAEDIC      left wrist & left elbow    HX ORTHOPAEDIC      Back Surgery x 3    HX ORTHOPAEDIC      right thumb at [address] OF LT FOOT CYST    HX OTHER SURGICAL      back surgery X 2    HX OTHER SURGICAL      Collapsed left lung    HX OTHER SURGICAL      axillary sweat glands removed    HX OVARIAN CYST REMOVAL      [place] OTOPLASTY PROTRUDING EAR W/WO SIZE RDCTJ      [place] UNLISTED PROCEDURE CARDIAC SURGERY      murmur- PT DENIES SURGERY AND MURMUR 17       Family History   Problem Relation Age of Onset    Heart Disease Mother     Heart Attack Mother     No Known Problems Father     Cancer Sister         UNKNOWN    Diabetes Son     Sickle Cell Anemia Daughter     Anesth Problems Son         CHEST PAIN        Social History     Tobacco Use    Smoking status: Former     Packs/day: 1.00     Years: 30.00     Pack years: 30.00     Types: Cigarettes     Quit date: 2005     Years since quittin.8    Smokeless tobacco: Never   Substance Use Topics    Alcohol use: No    Drug use: No        Review of Systems   All other systems reviewed and are negative. Constitutional: No fevers, chills, night sweats, excessive fatigue or weight loss. Musculoskeletal: No joint pain, swelling or redness. No decreased range of motion. Neurologic: No headache, blurred vision, and no areas of focal weakness or numbness. Normal gait. No sensory problems. Respiratory: No dyspnea on exertion, orthopnea, chest pain, cough or hemoptysis. Cardiovascular: No anginal chest pain, irregular heart beat, tachycardia, palpitations or orthopnea  Integumentary: No chronic rashes, inflammation, ulcerations, pruritus, petechiae, purpura, ecchymoses, or skin changes           Physical Exam    Right ring finger wound is doing well with good position alignment. Now to work on MP PIP motion recovery. Imaging:  Avulsion fracture of the distal phalanx of the right ring finger previously noted on preoperative x-ray nonunited.     XR Results (most recent):  Results from Appointment encounter on 01/17/23    XR 4TH FINGER RT MIN 2 V    Narrative  AP, lateral and oblique x-ray of the right ring finger shows the DIP fusion thus far healing in good position alignment with impaction bone grafting and revision screw fixation. ASSESSMENT/PLAN:  Below is the assessment and plan developed based on review of pertinent history, physical exam, labs, studies, and medications. Right ring finger pain at the DIP joint which occurred approximately 1 month ago when her finger got caught in a grocery cart. She had immediate swelling and bruising. She was not seen for the injury until 2/9/2022 when she presented to [facility] and was told that she has a fracture of the finger. She was placed in a splint and referred to us. She has an avulsion fracture of the distal phalanx of the right ring finger. Surgery was considered early on but she was not able to proceed with surgery for another 6 weeks. By that time the fracture was nonunited and despite intraoperative efforts it was not repairable. The fracture fragment was excised and the extensor tendon repaired with transarticular retrograde DIP pinning on 3/17/2022. I explained that the pin will likely be left in place for 4 to 6 weeks. Return for pin removal on 4/19/2022. She already is demonstrating motion of the DIP joint and thus far it appears stable clinically and radiographically. She can flex the DIP joint 50 degrees and is pleased with her recovery thus far. However, she certainly understands that if she develops subluxation more stiffness or significant droop she may be better served with a DIP fusion. She no longer requires the splints. She is giving stronger consideration to fuse the DIP joint as she states that whenever she has contact it results in pain. She indeed underwent a right ring finger DIP fusion on 7/5/2022. Patient states that she is \"doing great\".   However, I am concerned that there may be fusion site motion and I am less convinced radiographically that this is healing. She is aware of my concerns but wants to continue currently with conservative treatment. Hopefully this will unite and is representing more of a delayed union rather than a nonunion. I plan to see her back for further clinical and x-ray assessment in 3 months. If there is a question a CT scan can be obtained if needed. In addition, she has developed a left trigger thumb and did receive a left trigger thumb corticosteroid injection on 10/4/2022. Eventually this may require surgical attention. When next examined on 12/6/2022 there was fusion site motion of the DIP of the right ring finger. Although there really is not any advanced swelling or pain there is some lucency around the fusion screw and it appears clinically and radiographically that she has a fusion nonunion. I have reviewed the findings with the patient and answered her questions. I recommended screw removal with revision arthrodesis to include distal radius bone graft and fixation ranging from screw or screws versus pins. She is a former smoker but has not smoked in 15 years. She underwent screw removal with revision arthrodesis of the distal interphalangeal joint including distal radius bone graft and longer fusion screw fixation on 1/10/2023. Recommended a splint for comfort and support and gradual MP and PIP motion recovery. Overall doing well. Follow-up 1 week for suture removal.    1. Nonunion after arthrodesis  -     XR 4TH FINGER RT MIN 2 V; Future  2. Status post finger joint fusion  3. Rupture of extensor tendon of finger  4. Closed avulsion fracture of distal phalanx of finger with nonunion, subsequent encounter  5. Pain in finger of right hand        Return in about 1 week (around 1/24/2023).

## 2023-01-17 ENCOUNTER — OFFICE VISIT (OUTPATIENT)
Dept: ORTHOPEDIC SURGERY | Age: 63
End: 2023-01-17
Payer: MEDICAID

## 2023-01-17 VITALS — BODY MASS INDEX: 29.49 KG/M2 | WEIGHT: 177 LBS | HEIGHT: 65 IN

## 2023-01-17 DIAGNOSIS — M79.644 PAIN IN FINGER OF RIGHT HAND: ICD-10-CM

## 2023-01-17 DIAGNOSIS — Z98.1 STATUS POST FINGER JOINT FUSION: ICD-10-CM

## 2023-01-17 DIAGNOSIS — M96.0 NONUNION AFTER ARTHRODESIS: Primary | ICD-10-CM

## 2023-01-17 DIAGNOSIS — S56.419A RUPTURE OF EXTENSOR TENDON OF FINGER: ICD-10-CM

## 2023-01-17 DIAGNOSIS — S62.639K: ICD-10-CM

## 2023-01-17 NOTE — LETTER
1/17/2023    Patient: Twana Nyhan   YOB: 1960   Date of Visit: 1/17/2023     Flaco Childress MD  [id number]  [place] [address]  Via In Basket    Dear Flaco Childress MD,      Thank you for referring Ms. Olga Easley to [facility] for evaluation. My notes for this consultation are attached. If you have questions, please do not hesitate to call me. I look forward to following your patient along with you.       Sincerely,    Shanthi Street MD

## 2023-01-25 NOTE — PROGRESS NOTES
Has PI: Fili Randolph (: 1960) is a 58 y.o. female, patient, here for evaluation of the following chief complaint(s): Patient presents with complaint of right ring finger pain at the DIP joint which occurred approximately 1 month ago when her finger got caught in a grocery cart. She had immediate swelling and bruising. She was not seen for the injury until 2022 when she presented to [facility] and was told that she has a fracture of the finger. 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 An attempt at open reduction internal fixation was performed but effectively the fragment was excised and the terminal extensor tendon was repaired with transarticular DIP pinning on 3/17/2022. She returned for pin removal on 2022. There is minimal articular cartilage that remains at the base of the distal phalanx and when she strikes the tip of the right ring finger she does have pain. She underwent a fusion of the right ring finger DIP joint on 2022. She also received a left trigger thumb injection on 10/4/2022. 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 She underwent screw removal with revision arthrodesis of the distal interphalangeal joint including distal radius bone graft and longer fusion screw fixation on 1/10/2023. No chief complaint on file. Vitals: There were no vitals taken for this visit. There is no height or weight on file to calculate BMI. Allergies   Allergen Reactions    Pravachol [Pravastatin] Myalgia       Current Outpatient Medications   Medication Sig    losartan (COZAAR) 25 mg tablet TAKE ONE TABLET BY MOUTH ONCE NIGHTLY    liraglutide (Victoza 2-Gilbert) 0.6 mg/0.1 mL (18 mg/3 mL) pnij 1.8 mg by SubCUTAneous route daily. cholecalciferol (VITAMIN D3) (1000 Units /25 mcg) tablet     Vitamin B-12 1,000 mcg tablet     DULoxetine (CYMBALTA) 60 mg capsule Take 1 Capsule by mouth daily. omega-3 fatty acids/fish oil (fish oil-omega-3 fatty acids) 300-1,000 mg cap TAKE ONE CAPSULE BY MOUTH TWICE A DAY    empagliflozin (JARDIANCE) 25 mg tablet Take 25 mg by mouth daily. insulin glargine (Lantus Solostar U-100 Insulin) 100 unit/mL (3 mL) inpn 20 Units by SubCUTAneous route daily. rosuvastatin (CRESTOR) 10 mg tablet Take 10 mg by mouth nightly. metFORMIN ER (GLUCOPHAGE XR) 500 mg tablet Take 500 mg by mouth two (2) times a day. Indications: 500 mg tab taking 2 tabs daily    aspirin 81 mg chewable tablet Take 81 mg by mouth daily. spironolactone (ALDACTONE) 25 mg tablet 12.5 mg. Insulin Needles, Disposable, 32 gauge x 5/32\" ndle     LANCETS, SUPER THIN [employer]     ONETOUCH ULTRA TEST strip      No current facility-administered medications for this visit.        Past Medical History:   Diagnosis Date    (HFpEF) heart failure with preserved ejection fraction (HCC)     Arrhythmia     HEART MURMUR -\"HAD IT FOR YEARS AS AN ADULT)    Arthritis     OSTEO    Back pain     Breast cancer screening 6/28/2016    Chronic bilateral low back pain without sciatica 6/28/2016    Diabetes ([facility] [address]     Encounter for long-term (current) use of medications 6/28/2016    Essential hypertension with goal blood pressure less than 140/90 6/28/2016    Hypercholesteremia 8/6/2015    Hypercholesterolemia     Hypertension     CONTROLLED WITH MEDS    Ill-defined condition     hyperlipidemia    Other ill-defined conditions(799.89)     high cholesterol    Patient non adherence 1/23/2017    Primary osteoarthritis of left knee 3/2/2018    Type 2 diabetes, uncontrolled, with neuropathy 1/23/2017    Type II diabetes mellitus, uncontrolled 8/6/2015        Past Surgical History:   Procedure Laterality Date    HX CARPAL TUNNEL RELEASE      HX GYN      hysterectomy    HX HEENT      Ear surgery    HX HEENT      Left eye    HX ORTHOPAEDIC      left wrist & left elbow    HX ORTHOPAEDIC      Back Surgery x 3    HX ORTHOPAEDIC      right thumb at [address] OF LT FOOT CYST    HX OTHER SURGICAL      back surgery X 2    HX OTHER SURGICAL      Collapsed left lung    HX OTHER SURGICAL      axillary sweat glands removed    HX OVARIAN CYST REMOVAL      ME OTOPLASTY PROTRUDING EAR W/WO SIZE RDCTJ      ME UNLISTED PROCEDURE CARDIAC SURGERY      murmur- PT DENIES SURGERY AND MURMUR 17       Family History   Problem Relation Age of Onset    Heart Disease Mother     Heart Attack Mother     No Known Problems Father     Cancer Sister         UNKNOWN    Diabetes Son     Sickle Cell Anemia Daughter     Anesth Problems Son         CHEST PAIN        Social History     Tobacco Use    Smoking status: Former     Packs/day: 1.00     Years: 30.00     Pack years: 30.00     Types: Cigarettes     Quit date: 2005     Years since quittin.8    Smokeless tobacco: Never   Substance Use Topics    Alcohol use: No    Drug use: No        Review of Systems   All other systems reviewed and are negative. Constitutional: No fevers, chills, night sweats, excessive fatigue or weight loss. Musculoskeletal: No joint pain, swelling or redness. No decreased range of motion. Neurologic: No headache, blurred vision, and no areas of focal weakness or numbness. Normal gait. No sensory problems. Respiratory: No dyspnea on exertion, orthopnea, chest pain, cough or hemoptysis. Cardiovascular: No anginal chest pain, irregular heart beat, tachycardia, palpitations or orthopnea  Integumentary: No chronic rashes, inflammation, ulcerations, pruritus, petechiae, purpura, ecchymoses, or skin changes           Physical Exam    Right ring finger wound is doing well with good position alignment. Now to work on MP PIP motion recovery. Imaging:  Avulsion fracture of the distal phalanx of the right ring finger previously noted on preoperative x-ray nonunited.     XR Results (most recent):  Results from Appointment encounter on 23    XR 4TH FINGER RT MIN 2 V    Narrative  AP, lateral and oblique x-ray of the right ring finger shows the revision DIP arthrodesis in good position alignment with the retrograde 30 mm AccuTrack fusion screw in position. ASSESSMENT/PLAN:  Below is the assessment and plan developed based on review of pertinent history, physical exam, labs, studies, and medications. Right ring finger pain at the DIP joint which occurred approximately 1 month ago when her finger got caught in a grocery cart. She had immediate swelling and bruising. She was not seen for the injury until 2/9/2022 when she presented to [facility] and was told that she has a fracture of the finger. She was placed in a splint and referred to us. She has an avulsion fracture of the distal phalanx of the right ring finger. Surgery was considered early on but she was not able to proceed with surgery for another 6 weeks. By that time the fracture was nonunited and despite intraoperative efforts it was not repairable. The fracture fragment was excised and the extensor tendon repaired with transarticular retrograde DIP pinning on 3/17/2022. I explained that the pin will likely be left in place for 4 to 6 weeks. Return for pin removal on 4/19/2022. She already is demonstrating motion of the DIP joint and thus far it appears stable clinically and radiographically. She can flex the DIP joint 50 degrees and is pleased with her recovery thus far. However, she certainly understands that if she develops subluxation more stiffness or significant droop she may be better served with a DIP fusion. She no longer requires the splints. She is giving stronger consideration to fuse the DIP joint as she states that whenever she has contact it results in pain. She indeed underwent a right ring finger DIP fusion on 7/5/2022. Patient states that she is \"doing great\".   However, I am concerned that there may be fusion site motion and I am less convinced radiographically that this is healing. She is aware of my concerns but wants to continue currently with conservative treatment. Hopefully this will unite and is representing more of a delayed union rather than a nonunion. I plan to see her back for further clinical and x-ray assessment in 3 months. If there is a question a CT scan can be obtained if needed. In addition, she has developed a left trigger thumb and did receive a left trigger thumb corticosteroid injection on 10/4/2022. Eventually this may require surgical attention. When next examined on 12/6/2022 there was fusion site motion of the DIP of the right ring finger. Although there really is not any advanced swelling or pain there is some lucency around the fusion screw and it appears clinically and radiographically that she has a fusion nonunion. I have reviewed the findings with the patient and answered her questions. I recommended screw removal with revision arthrodesis to include distal radius bone graft and fixation ranging from screw or screws versus pins. She is a former smoker but has not smoked in 15 years. She underwent screw removal with revision arthrodesis of the distal interphalangeal joint including distal radius bone graft and longer fusion screw fixation on 1/10/2023. Recommended a splint for comfort and support and gradual MP and PIP motion recovery. Overall doing well. Suture removal on 1/26/2023. Wound looks good and there is no sign of infection and there is also good alignment. Checked U-shaped splint at home for use. May work with MP PIP motion recovery and return in 3 to 4 weeks with further x-rays. 1. Nonunion after arthrodesis  -     XR 4TH FINGER RT MIN 2 V; Future  2. Status post finger joint fusion  3. Rupture of extensor tendon of finger  4. Closed avulsion fracture of distal phalanx of finger with nonunion, subsequent encounter  5. Pain in finger of right hand        Return in about 4 weeks (around 2/23/2023).

## 2023-01-26 ENCOUNTER — OFFICE VISIT (OUTPATIENT)
Dept: ORTHOPEDIC SURGERY | Age: 63
End: 2023-01-26
Payer: MEDICAID

## 2023-01-26 DIAGNOSIS — M96.0 NONUNION AFTER ARTHRODESIS: Primary | ICD-10-CM

## 2023-01-26 DIAGNOSIS — M79.644 PAIN IN FINGER OF RIGHT HAND: ICD-10-CM

## 2023-01-26 DIAGNOSIS — S56.419A RUPTURE OF EXTENSOR TENDON OF FINGER: ICD-10-CM

## 2023-01-26 DIAGNOSIS — S62.639K: ICD-10-CM

## 2023-01-26 DIAGNOSIS — Z98.1 STATUS POST FINGER JOINT FUSION: ICD-10-CM

## 2023-01-26 NOTE — LETTER
1/26/2023    Patient: Clemente Lund   YOB: 1960   Date of Visit: 1/26/2023     Marianne Tran MD  [address] [place] [address]  Via In Basket    Dear Marianne Tran MD,      Thank you for referring Ms. Brandie Araujo to [facility] for evaluation. My notes for this consultation are attached. If you have questions, please do not hesitate to call me. I look forward to following your patient along with you.       Sincerely,    Steven Coates MD

## 2023-02-21 ENCOUNTER — OFFICE VISIT (OUTPATIENT)
Dept: ORTHOPEDIC SURGERY | Age: 63
End: 2023-02-21
Payer: MEDICAID

## 2023-02-21 VITALS — HEIGHT: 65 IN | BODY MASS INDEX: 29.49 KG/M2 | WEIGHT: 177 LBS

## 2023-02-21 DIAGNOSIS — M96.0 NONUNION AFTER ARTHRODESIS: Primary | ICD-10-CM

## 2023-02-21 DIAGNOSIS — M79.644 PAIN IN FINGER OF RIGHT HAND: ICD-10-CM

## 2023-02-21 DIAGNOSIS — S62.639K: ICD-10-CM

## 2023-02-21 DIAGNOSIS — Z98.1 STATUS POST FINGER JOINT FUSION: ICD-10-CM

## 2023-02-21 DIAGNOSIS — M65.312 TRIGGER THUMB, LEFT THUMB: ICD-10-CM

## 2023-02-21 DIAGNOSIS — S56.419A RUPTURE OF EXTENSOR TENDON OF FINGER: ICD-10-CM

## 2023-02-21 RX ORDER — BETAMETHASONE SODIUM PHOSPHATE AND BETAMETHASONE ACETATE 3; 3 MG/ML; MG/ML
6 INJECTION, SUSPENSION INTRA-ARTICULAR; INTRALESIONAL; INTRAMUSCULAR; SOFT TISSUE ONCE
Status: COMPLETED | OUTPATIENT
Start: 2023-02-21 | End: 2023-02-21

## 2023-02-21 RX ORDER — BUPIVACAINE HYDROCHLORIDE 7.5 MG/ML
1 INJECTION, SOLUTION EPIDURAL; RETROBULBAR ONCE
Status: COMPLETED | OUTPATIENT
Start: 2023-02-21 | End: 2023-02-21

## 2023-02-21 RX ADMIN — BUPIVACAINE HYDROCHLORIDE 7.5 MG: 7.5 INJECTION, SOLUTION EPIDURAL; RETROBULBAR at 17:50

## 2023-02-21 RX ADMIN — BETAMETHASONE SODIUM PHOSPHATE AND BETAMETHASONE ACETATE 6 MG: 3; 3 INJECTION, SUSPENSION INTRA-ARTICULAR; INTRALESIONAL; INTRAMUSCULAR; SOFT TISSUE at 17:50

## 2023-02-21 NOTE — LETTER
2/21/2023    Patient: Donell Khanna   YOB: 1960   Date of Visit: 2/21/2023     Martha Wynn MD  [address] [id number]  Via In Basket    Dear Martha Wynn MD,      Thank you for referring Ms. Dao Hamilton to [facility] for evaluation. My notes for this consultation are attached. If you have questions, please do not hesitate to call me. I look forward to following your patient along with you.       Sincerely,    Dariana Muro MD

## 2023-02-21 NOTE — PROGRESS NOTES
Has PI: Tommy Adjutant (: 1960) is a 58 y.o. female, patient, here for evaluation of the following chief complaint(s): Patient presents with complaint of right ring finger pain at the DIP joint which occurred approximately 1 month ago when her finger got caught in a grocery cart. She had immediate swelling and bruising. She was not seen for the injury until 2022 when she presented to [facility] and was told that she has a fracture of the finger. 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 An attempt at open reduction internal fixation was performed but effectively the fragment was excised and the terminal extensor tendon was repaired with transarticular DIP pinning on 3/17/2022. She returned for pin removal on 2022. There is minimal articular cartilage that remains at the base of the distal phalanx and when she strikes the tip of the right ring finger she does have pain. She underwent a fusion of the right ring finger DIP joint on 2022. She also received a left trigger thumb injection on 10/4/2022. 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 She underwent screw removal with revision arthrodesis of the distal interphalangeal joint including distal radius bone graft and longer fusion screw fixation on 1/10/2023. Overall she is doing well. She has complained of pain involving her left thumb and return to repeat the injection on 2023. Surgical Follow-up (Follow up on finger surgeries.)       Vitals:  Ht 5' 5\" (1.651 m)   Wt 177 lb (80.3 kg)   BMI 29.45 kg/m²    Body mass index is 29.45 kg/m². Allergies   Allergen Reactions    Pravachol [Pravastatin] Myalgia       Current Outpatient Medications   Medication Sig    losartan (COZAAR) 25 mg tablet TAKE ONE TABLET BY MOUTH ONCE NIGHTLY    liraglutide (Victoza 2-Gilbert) 0.6 mg/0.1 mL (18 mg/3 mL) pnij 1.8 mg by SubCUTAneous route daily.     cholecalciferol (VITAMIN D3) (1000 Units /25 mcg) tablet     Vitamin B-12 1,000 mcg tablet     DULoxetine (CYMBALTA) 60 mg capsule Take 1 Capsule by mouth daily. omega-3 fatty acids/fish oil (fish oil-omega-3 fatty acids) 300-1,000 mg cap TAKE ONE CAPSULE BY MOUTH TWICE A DAY    empagliflozin (JARDIANCE) 25 mg tablet Take 25 mg by mouth daily. insulin glargine (Lantus Solostar U-100 Insulin) 100 unit/mL (3 mL) inpn 20 Units by SubCUTAneous route daily. rosuvastatin (CRESTOR) 10 mg tablet Take 10 mg by mouth nightly. metFORMIN ER (GLUCOPHAGE XR) 500 mg tablet Take 500 mg by mouth two (2) times a day. Indications: 500 mg tab taking 2 tabs daily    aspirin 81 mg chewable tablet Take 81 mg by mouth daily. spironolactone (ALDACTONE) 25 mg tablet 12.5 mg. Insulin Needles, Disposable, 32 gauge x 5/32\" ndle     LANCETS, SUPER THIN [facility]     ONETOUCH ULTRA TEST strip      No current facility-administered medications for this visit.        Past Medical History:   Diagnosis Date    (HFpEF) heart failure with preserved ejection fraction (HCC)     Arrhythmia     HEART MURMUR -\"HAD IT FOR YEARS AS AN ADULT)    Arthritis     OSTEO    Back pain     Breast cancer screening 6/28/2016    Chronic bilateral low back pain without sciatica 6/28/2016    Diabetes ([facility] [address]     Encounter for long-term (current) use of medications 6/28/2016    Essential hypertension with goal blood pressure less than 140/90 6/28/2016    Hypercholesteremia 8/6/2015    Hypercholesterolemia     Hypertension     CONTROLLED WITH MEDS    Ill-defined condition     hyperlipidemia    Other ill-defined conditions(799.89)     high cholesterol    Patient non adherence 1/23/2017    Primary osteoarthritis of left knee 3/2/2018    Type 2 diabetes, uncontrolled, with neuropathy 1/23/2017    Type II diabetes mellitus, uncontrolled 8/6/2015        Past Surgical History:   Procedure Laterality Date    HX CARPAL TUNNEL RELEASE      HX GYN      hysterectomy    HX HEENT      Ear surgery    HX HEENT      Left eye    HX ORTHOPAEDIC      left wrist & left elbow    HX ORTHOPAEDIC      Back Surgery x 3    HX ORTHOPAEDIC      right thumb at [address] LT FOOT CYST    HX OTHER SURGICAL      back surgery X 2    HX OTHER SURGICAL      Collapsed left lung    HX OTHER SURGICAL      axillary sweat glands removed    HX OVARIAN CYST REMOVAL      ME OTOPLASTY PROTRUDING EAR W/WO SIZE RDCTJ      ME UNLISTED PROCEDURE CARDIAC SURGERY      murmur- PT DENIES SURGERY AND MURMUR 17       Family History   Problem Relation Age of Onset    Heart Disease Mother     Heart Attack Mother     No Known Problems Father     Cancer Sister         UNKNOWN    Diabetes Son     Sickle Cell Anemia Daughter     Anesth Problems Son         CHEST PAIN        Social History     Tobacco Use    Smoking status: Former     Packs/day: 1.00     Years: 30.00     Pack years: 30.00     Types: Cigarettes     Quit date: 2005     Years since quittin.9    Smokeless tobacco: Never   Substance Use Topics    Alcohol use: No    Drug use: No        Review of Systems   All other systems reviewed and are negative. Constitutional: No fevers, chills, night sweats, excessive fatigue or weight loss. Musculoskeletal: No joint pain, swelling or redness. No decreased range of motion. Neurologic: No headache, blurred vision, and no areas of focal weakness or numbness. Normal gait. No sensory problems. Respiratory: No dyspnea on exertion, orthopnea, chest pain, cough or hemoptysis. Cardiovascular: No anginal chest pain, irregular heart beat, tachycardia, palpitations or orthopnea  Integumentary: No chronic rashes, inflammation, ulcerations, pruritus, petechiae, purpura, ecchymoses, or skin changes           Physical Exam    Right ring finger wound is doing well with good position alignment. Overall good MP and PIP motion as well.     Left thumb has tenderness at the A1 pulley with clicking and locking. Imaging:  Avulsion fracture of the distal phalanx of the right ring finger previously noted on preoperative x-ray nonunited. XR Results (most recent):  Results from Appointment encounter on 02/21/23    XR 4TH FINGER RT MIN 2 V    Narrative  AP, lateral and oblique x-ray of the right ring finger shows progressive healing of the revision arthrodesis of the right ring finger DIP joint healing in good position and alignment. ASSESSMENT/PLAN:  Below is the assessment and plan developed based on review of pertinent history, physical exam, labs, studies, and medications. Right ring finger pain at the DIP joint which occurred approximately 1 month ago when her finger got caught in a grocery cart. She had immediate swelling and bruising. She was not seen for the injury until 2/9/2022 when she presented to [facility] and was told that she has a fracture of the finger. She was placed in a splint and referred to us. She has an avulsion fracture of the distal phalanx of the right ring finger. Surgery was considered early on but she was not able to proceed with surgery for another 6 weeks. By that time the fracture was nonunited and despite intraoperative efforts it was not repairable. The fracture fragment was excised and the extensor tendon repaired with transarticular retrograde DIP pinning on 3/17/2022. I explained that the pin will likely be left in place for 4 to 6 weeks. Return for pin removal on 4/19/2022. She already is demonstrating motion of the DIP joint and thus far it appears stable clinically and radiographically. She can flex the DIP joint 50 degrees and is pleased with her recovery thus far. However, she certainly understands that if she develops subluxation more stiffness or significant droop she may be better served with a DIP fusion. She no longer requires the splints.   She is giving stronger consideration to fuse the DIP joint as she states that whenever she has contact it results in pain. She indeed underwent a right ring finger DIP fusion on 7/5/2022. Patient states that she is \"doing great\". However, I am concerned that there may be fusion site motion and I am less convinced radiographically that this is healing. She is aware of my concerns but wants to continue currently with conservative treatment. Hopefully this will unite and is representing more of a delayed union rather than a nonunion. I plan to see her back for further clinical and x-ray assessment in 3 months. If there is a question a CT scan can be obtained if needed. In addition, she has developed a left trigger thumb and did receive a left trigger thumb corticosteroid injection on 10/4/2022. Eventually this may require surgical attention. When next examined on 12/6/2022 there was fusion site motion of the DIP of the right ring finger. Although there really is not any advanced swelling or pain there is some lucency around the fusion screw and it appears clinically and radiographically that she has a fusion nonunion. I have reviewed the findings with the patient and answered her questions. I recommended screw removal with revision arthrodesis to include distal radius bone graft and fixation ranging from screw or screws versus pins. She is a former smoker but has not smoked in 15 years. She underwent screw removal with revision arthrodesis of the distal interphalangeal joint including distal radius bone graft and longer fusion screw fixation on 1/10/2023. Recommended a splint for comfort and support and gradual MP and PIP motion recovery. Overall doing well. Suture removal on 1/26/2023. Wound looks good and there is no sign of infection and there is also good alignment. She no longer requires the use of a splint. She has good MP and PIP motion and virtually no pain of the ring finger. X-rays show continued consolidation of the healing prior fusion nonunion doing much better.     She also has a left thumb stenosing tenosynovitis and did receive a repeat left trigger thumb injection on 2/21/2023 with her first injection on 10/4/2022. Future consideration can be given for repeat injection or eventual an A1 pulley release. Follow-up in 4 to 6 weeks. 1. Nonunion after arthrodesis  2. Status post finger joint fusion  -     XR 4TH FINGER RT MIN 2 V; Future  -     MS WHFO W/O JOINTS PRE OTS  -     REFERRAL TO DME  3. Rupture of extensor tendon of finger  4. Closed avulsion fracture of distal phalanx of finger with nonunion, subsequent encounter  5. Pain in finger of right hand  6. Trigger thumb, left thumb  -     INJECT TENDON SHEATH/LIGAMENT  -     betamethasone (CELESTONE) injection 6 mg; 6 mg, Other, ONCE, 1 dose, On Tue 2/21/23 at 1800  -     bupivacaine (PF) (MARCAINE) 0.75 % (7.5 mg/mL) injection 7.5 mg; 7.5 mg (1 mL), Intra artICUlar, ONCE, 1 dose, On Tue 2/21/23 at 1800    Informed consent was obtained. The patient received a left trigger thumb injection with 6 mg betamethasone mixed with 1 cc 0.75% bupivacaine. Return in about 6 weeks (around 4/4/2023).

## 2023-03-30 ENCOUNTER — OFFICE VISIT (OUTPATIENT)
Dept: ORTHOPEDIC SURGERY | Age: 63
End: 2023-03-30

## 2023-03-30 VITALS — BODY MASS INDEX: 29.49 KG/M2 | WEIGHT: 177 LBS | HEIGHT: 65 IN

## 2023-03-30 DIAGNOSIS — M51.26 PROTRUSION OF LUMBAR INTERVERTEBRAL DISC: ICD-10-CM

## 2023-03-30 DIAGNOSIS — M54.16 LUMBAR RADICULOPATHY: ICD-10-CM

## 2023-03-30 DIAGNOSIS — M48.061 LUMBAR FORAMINAL STENOSIS: ICD-10-CM

## 2023-03-30 DIAGNOSIS — Z98.1 HISTORY OF LUMBAR SPINAL FUSION: Primary | ICD-10-CM

## 2023-03-30 RX ORDER — HYDROCODONE BITARTRATE AND ACETAMINOPHEN 5; 325 MG/1; MG/1
1 TABLET ORAL
Qty: 28 TABLET | Refills: 0 | Status: SHIPPED | OUTPATIENT
Start: 2023-03-30 | End: 2023-04-06

## 2023-03-30 RX ORDER — GABAPENTIN 300 MG/1
300 CAPSULE ORAL
Qty: 90 CAPSULE | Refills: 1 | Status: SHIPPED | OUTPATIENT
Start: 2023-03-30

## 2023-03-30 NOTE — PROGRESS NOTES
1. Have you been to the ER, urgent care clinic since your last visit? Hospitalized since your last visit? No    2. Have you seen or consulted any other health care providers outside of the [address] [place] [address] since your last visit? Include any pap smears or colon screening.  No    Chief Complaint   Patient presents with    Back Pain

## 2023-03-30 NOTE — PROGRESS NOTES
Ez Santana (: 1960) is a 58 y.o. female, patient, here for evaluation of the following chief complaint(s): Back Pain       ASSESSMENT/PLAN:    Below is the assessment and plan developed based on review of pertinent history, physical exam, labs, studies, and medications. Have discussed the patient's diagnosis and radiographic findings at length and have answered all patient questions to her satisfaction. Have sent a prescription for hydrocodone and oral steroid taper electronically to the patient's preferred pharmacy. The patient's PDMP was reviewed today. Have told the patient pain medication cannot be refilled between office visits. The patient does not wish to consider any further physical therapy or injection therapy at this time. Consider referral to pain management. Patient states she is would like to think about it and will get back to us. Will plan on seeing the patient back for reevaluation on an as-needed basis. The patient understands and agrees to the treatment plan as outlined above. 1. History of lumbar spinal fusion  -     HYDROcodone-acetaminophen (NORCO) 5-325 mg per tablet; Take 1 Tablet by mouth every six (6) hours as needed for Pain for up to 7 days. Max Daily Amount: 4 Tablets., Normal, Disp-28 Tablet, R-0Supervising physician: Jeannette Gutierrez MD [id number]  -     gabapentin (NEURONTIN) 300 mg capsule; Take 1 Capsule by mouth three (3) times daily as needed for Pain. Max Daily Amount: 900 mg. Take 1 capsule by mouth 1 hour before bedtime. After 1 week second bedtime dose if needed. May titrate up to three times capsules daily as needed for nerve pain. Indications: neuropathic pain, Normal, Disp-90 Capsule, R-1Supervising physician: Jeannette Gutierrez MD [id number]  2. Lumbar radiculopathy  -     HYDROcodone-acetaminophen (NORCO) 5-325 mg per tablet; Take 1 Tablet by mouth every six (6) hours as needed for Pain for up to 7 days.  Max Daily Amount: 4 Tablets., Normal, Disp-28 Tablet, R-0Supervising physician: Lesley Bhatia MD [id number]  -     gabapentin (NEURONTIN) 300 mg capsule; Take 1 Capsule by mouth three (3) times daily as needed for Pain. Max Daily Amount: 900 mg. Take 1 capsule by mouth 1 hour before bedtime. After 1 week second bedtime dose if needed. May titrate up to three times capsules daily as needed for nerve pain. Indications: neuropathic pain, Normal, Disp-90 Capsule, R-1Supervising physician: Lesley Bhatia MD [id number]      No follow-ups on file. SUBJECTIVE/OBJECTIVE:    Revision Lumbar Laminectomy With Posterior Spinal Fusion L2-5 2020    Adelaida Nieves (: 1960) is a 58 y.o. female who returns for evaluation of lumbar back pain with radiation to the left lower extremity. Symptoms have worsened in severity over the last 3 months. Patient describes bilateral lower lumbar back pain with radiation to the left thigh, lower leg and foot. Patient describes left lower leg and foot numbness, tingling and left leg weakness. States walking and standing elicits severe pain. Patient has been taking ibuprofen 600 mg as needed for pain relief rates her daily pain level as a 9/10 with walking and standing. Patient denies saddle paresthesia/anesthesia or recent bowel/bladder changes. Pain Assessment  2022   Location of Pain Hip;Back   Severity of Pain 8          Imaging:    XR Results (most recent):  Results from Appointment encounter on 23    XR SPINE LUMB MIN 4 V    Narrative  AP and lateral view digital radiographs of the lumbar spine obtained in the office today were reviewed and demonstrate good alignment of posterior and interbody instrumentation at L2-L5. There is a radiolucent line around the pedicle screws in the L2 vertebral body and on the left in the L3 vertebral body. The patient has developed mild dextroscoliosis curvature of 8 degrees above the fusion segment T11-L2.   The proximal adjacent segment disc space distal adjacent segment disc space remains well-maintained. There is no evidence of acute osseous abnormality. MRI Results (most recent):  Results from [facility] encounter on 07/29/22    MRI LUMB SPINE WO CONT    Narrative  EXAM: MRI LUMB SPINE WO CONT    INDICATION: . Low back pain, unspecified    COMPARISON: None    TECHNIQUE: MR imaging of the lumbar spine was performed using the following  sequences: sagittal T1, T2, STIR;  axial T1, T2.    CONTRAST:  None. FINDINGS:    Status post L2-L5 pedicle screw and simone fixation. Grade 1 retrolisthesis of L2 on L3, grade 1 anterolisthesis of L4 on L5. Vertebral body heights are maintained. Marrow signal is normal.    The conus medullaris terminates at L1. Signal and caliber of the distal spinal  cord are within normal limits. The paraspinal soft tissues are within normal limits. Lower thoracic spine: No herniation or stenosis. L1-L2: Mild bilateral facet arthropathy with thickening of ligamentum flavum and  broad-based disc bulge causes mild spinal canal narrowing and mild bilateral  neuroforaminal narrowing, this is exacerbated by posterior epidural lipomatosis  at this level. L2-L3: Broad-based disc bulge causes mild spinal canal narrowing. With bilateral  facet arthropathy, there is moderate bilateral neuroforaminal narrowing. L3-L4: Mild bilateral facet arthropathy causes mild bilateral neuroforaminal  narrowing. L4-L5: Broad-based disc bulge with superimposed left paracentral/foraminal disc  protrusion causes moderate spinal canal narrowing, with narrowing of the left  lateral recess which impinges upon the traversing left L5 nerve root. With  bilateral facet arthropathy, there is moderate right and severe left  neuroforaminal narrowing. L5-S1: Broad-based disc bulge with superimposed central to left foraminal disc  protrusion which causes mild right and severe left neuroforaminal narrowing. Impression  1.  Postoperative and multilevel degenerative changes as detailed above. 2. Left paracentral/foraminal disc protrusion at L4-5 which causes moderate  spinal canal narrowing, narrowing of the left lateral recess, and impingement  upon the traversing left L5 nerve root as well as severe left L4-5  neuroforaminal narrowing. 3. Central to left foraminal disc protrusion at L5-S1 causing severe left  neuroforaminal narrowing. 4. Mild spinal canal narrowing at L1-2 and L2-3. Allergies   Allergen Reactions    Pravachol [Pravastatin] Myalgia       Current Outpatient Medications   Medication Sig    HYDROcodone-acetaminophen (NORCO) 5-325 mg per tablet Take 1 Tablet by mouth every six (6) hours as needed for Pain for up to 7 days. Max Daily Amount: 4 Tablets.    gabapentin (NEURONTIN) 300 mg capsule Take 1 Capsule by mouth three (3) times daily as needed for Pain. Max Daily Amount: 900 mg. Take 1 capsule by mouth 1 hour before bedtime. After 1 week second bedtime dose if needed. May titrate up to three times capsules daily as needed for nerve pain. Indications: neuropathic pain    losartan (COZAAR) 25 mg tablet TAKE ONE TABLET BY MOUTH ONCE NIGHTLY    liraglutide (Victoza 2-Gilbert) 0.6 mg/0.1 mL (18 mg/3 mL) pnij 1.8 mg by SubCUTAneous route daily. cholecalciferol (VITAMIN D3) (1000 Units /25 mcg) tablet     Vitamin B-12 1,000 mcg tablet     DULoxetine (CYMBALTA) 60 mg capsule Take 1 Capsule by mouth daily. omega-3 fatty acids/fish oil (fish oil-omega-3 fatty acids) 300-1,000 mg cap TAKE ONE CAPSULE BY MOUTH TWICE A DAY    empagliflozin (JARDIANCE) 25 mg tablet Take 25 mg by mouth daily. insulin glargine (Lantus Solostar U-100 Insulin) 100 unit/mL (3 mL) inpn 20 Units by SubCUTAneous route daily. rosuvastatin (CRESTOR) 10 mg tablet Take 10 mg by mouth nightly. metFORMIN ER (GLUCOPHAGE XR) 500 mg tablet Take 500 mg by mouth two (2) times a day.  Indications: 500 mg tab taking 2 tabs daily    aspirin 81 mg chewable tablet Take 81 mg by mouth daily. spironolactone (ALDACTONE) 25 mg tablet 12.5 mg. Insulin Needles, Disposable, 32 gauge x 5/32\" ndle     LANCETS, SUPER THIN [facility]     ONETOUCH ULTRA TEST strip      No current facility-administered medications for this visit.        Past Medical History:   Diagnosis Date    (HFpEF) heart failure with preserved ejection fraction (HCC)     Arrhythmia     HEART MURMUR -\"HAD IT FOR YEARS AS AN ADULT)    Arthritis     OSTEO    Back pain     Breast cancer screening 6/28/2016    Chronic bilateral low back pain without sciatica 6/28/2016    Diabetes ([facility] [address]     Encounter for long-term (current) use of medications 6/28/2016    Essential hypertension with goal blood pressure less than 140/90 6/28/2016    Hypercholesteremia 8/6/2015    Hypercholesterolemia     Hypertension     CONTROLLED WITH MEDS    Ill-defined condition     hyperlipidemia    Other ill-defined conditions(799.89)     high cholesterol    Patient non adherence 1/23/2017    Primary osteoarthritis of left knee 3/2/2018    Type 2 diabetes, uncontrolled, with neuropathy 1/23/2017    Type II diabetes mellitus, uncontrolled 8/6/2015        Past Surgical History:   Procedure Laterality Date    HX CARPAL TUNNEL RELEASE      HX GYN      hysterectomy    HX HEENT      Ear surgery    HX HEENT      Left eye    HX ORTHOPAEDIC      left wrist & left elbow    HX ORTHOPAEDIC      Back Surgery x 3    HX ORTHOPAEDIC      right thumb at [address] LT FOOT CYST    HX OTHER SURGICAL      back surgery X 2    HX OTHER SURGICAL      Collapsed left lung    HX OTHER SURGICAL      axillary sweat glands removed    HX OVARIAN CYST REMOVAL      CO OTOPLASTY PROTRUDING EAR W/WO SIZE RDCTJ      CO UNLISTED PROCEDURE CARDIAC SURGERY      murmur- PT DENIES SURGERY AND MURMUR 6/20/17       Family History   Problem Relation Age of Onset    Heart Disease Mother     Heart Attack Mother     No Known Problems Father     Cancer Sister UNKNOWN    Diabetes Son     Sickle Cell Anemia Daughter     Anesth Problems Son         CHEST PAIN        Social History     Tobacco Use    Smoking status: Former     Packs/day: 1.00     Years: 30.00     Pack years: 30.00     Types: Cigarettes     Quit date: 2005     Years since quittin.0    Smokeless tobacco: Never   Substance Use Topics    Alcohol use: No    Drug use: No        Review of Systems       Vitals:  Ht 5' 5\" (1.651 m)   Wt 177 lb (80.3 kg)   BMI 29.45 kg/m²      Body mass index is 29.45 kg/m². Ortho Exam     Physical Exam     Neurologic  Sensory Light Touch - Intact - Globally. Overall Assessment of Muscle Strength and Tone reveals  Lower Extremities - Right Iliopsoas   5/5. Left Iliopsoas  5/5. Right Hip Flexion 5/5. Left Hip Flexion 5/5. Right Quadriceps-5/5. Left Quadriceps 4/5. Right Hamstring 5/5. Left Hamstring 4/5. Right Gastroc-Soleus  5/5. Left Gastroc-Soleus  5/5. Right Tibialis Anterior  5/5. Left Tibialis Anterior - 5/5. Right EHL - 5/5. Left EHL - 5/5. General Assessment of Reflexes  Right Ankle - Clonus is not present. Left Ankle - Clonus is not present. Reflexes (Dermatomes)  2/2 Normal - Left Achilles (L5-S2), Left Knee (L2-4), Right Achilles (L5-S2) and Right Knee (L2-4). Musculoskeletal  Global Assessment  Examination of related systems reveals - well-developed, well-nourished, in no acute distress, alert and oriented x 3 and normal coordination. Gait and Station - normal gait and station and normal posture. Thoracic (Dorsal) Spine - Examination of the thoracic spine reveals - no tenderness over thoracic vertebrae, no pain, normal sensation and normal thoracic spine movements. Lumbosacral Spine - Examination of the lumbosacral spine reveals - no known fractures or deformities. Inspection and Palpation - Tenderness - none. Assessment of pain reveals the following findings - The pain is characterized as - none.    Lumbosacral Spine - Functional Testing - Babinski Test negative, Straight Leg Raise Test positive. Dr. Dominique Dooley was available for immediate consultation as needed. An electronic signature was used to authenticate this note.   -- Ericka Sky, GRACE

## 2023-04-24 NOTE — PROGRESS NOTES
Has PI: Jonel Stauffer (: 1960) is a 58 y.o. female, patient, here for evaluation of the following chief complaint(s): Patient presents with complaint of right ring finger pain at the DIP joint which occurred approximately 1 month ago when her finger got caught in a grocery cart. She had immediate swelling and bruising. She was not seen for the injury until 2022 when she presented to [facility] and was told that she has a fracture of the finger. 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 An attempt at open reduction internal fixation was performed but effectively the fragment was excised and the terminal extensor tendon was repaired with transarticular DIP pinning on 3/17/2022. She returned for pin removal on 2022. There is minimal articular cartilage that remains at the base of the distal phalanx and when she strikes the tip of the right ring finger she does have pain. She underwent a fusion of the right ring finger DIP joint on 2022. She also received a left trigger thumb injection on 10/4/2022. 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 She underwent screw removal with revision arthrodesis of the distal interphalangeal joint including distal radius bone graft and longer fusion screw fixation on 1/10/2023. Overall she is doing well. She has complained of pain involving her left thumb and returned to repeat the injection on 2023. Surgical Follow-up (Right ring finger screw removal with revision DIP joint arthrodesis and distal radius bone graft on 1/10/23. Previous right ring DIP joint fusion on 22 and fracture fragment excision and extensor tendon repair with pinning on 3/17/23.)       Vitals: There were no vitals taken for this visit. There is no height or weight on file to calculate BMI.     Allergies   Allergen Reactions    Pravachol [Pravastatin] Myalgia       Current Outpatient Medications   Medication Sig    methylPREDNISolone (MEDROL DOSEPACK) 4 mg tablet Per dose pack instructions    oxybutynin chloride XL (DITROPAN XL) 10 mg CR tablet Take 1 Tablet by mouth daily. gabapentin (NEURONTIN) 300 mg capsule Take 1 Capsule by mouth three (3) times daily as needed for Pain. Max Daily Amount: 900 mg. Take 1 capsule by mouth 1 hour before bedtime. After 1 week second bedtime dose if needed. May titrate up to three times capsules daily as needed for nerve pain. Indications: neuropathic pain    losartan (COZAAR) 25 mg tablet TAKE ONE TABLET BY MOUTH ONCE NIGHTLY    liraglutide (Victoza 2-Gilbert) 0.6 mg/0.1 mL (18 mg/3 mL) pnij 1.8 mg by SubCUTAneous route daily. cholecalciferol (VITAMIN D3) (1000 Units /25 mcg) tablet     Vitamin B-12 1,000 mcg tablet     omega-3 fatty acids/fish oil (fish oil-omega-3 fatty acids) 300-1,000 mg cap TAKE ONE CAPSULE BY MOUTH TWICE A DAY    empagliflozin (JARDIANCE) 25 mg tablet Take 1 Tablet by mouth daily. insulin glargine (Lantus Solostar U-100 Insulin) 100 unit/mL (3 mL) inpn 20 Units by SubCUTAneous route daily. rosuvastatin (CRESTOR) 10 mg tablet Take 1 Tablet by mouth nightly. metFORMIN ER (GLUCOPHAGE XR) 500 mg tablet Take 1 Tablet by mouth two (2) times a day. Indications: 500 mg tab taking 2 tabs daily    aspirin 81 mg chewable tablet Take 1 Tablet by mouth daily. spironolactone (ALDACTONE) 25 mg tablet 0.5 Tablets. Insulin Needles, Disposable, 32 gauge x 5/32\" ndle     LANCETS, SUPER THIN [facility]     ONETOUCH ULTRA TEST strip      No current facility-administered medications for this visit.        Past Medical History:   Diagnosis Date    (HFpEF) heart failure with preserved ejection fraction (HCC)     Arrhythmia     HEART MURMUR -\"HAD IT FOR YEARS AS AN ADULT)    Arthritis     OSTEO    Back pain     Breast cancer screening 6/28/2016    Chronic bilateral low back pain without sciatica 6/28/2016    Diabetes ([facility] [address]     Encounter for long-term (current) use of medications 2016    Essential hypertension with goal blood pressure less than 140/90 2016    Hypercholesteremia 2015    Hypercholesterolemia     Hypertension     CONTROLLED WITH MEDS    Ill-defined condition     hyperlipidemia    Other ill-defined conditions(799.89)     high cholesterol    Patient non adherence 2017    Primary osteoarthritis of left knee 3/2/2018    Type 2 diabetes, uncontrolled, with neuropathy 2017    Type II diabetes mellitus, uncontrolled 2015        Past Surgical History:   Procedure Laterality Date    HX CARPAL TUNNEL RELEASE      HX GYN      hysterectomy    HX HEENT      Ear surgery    HX HEENT      Left eye    HX ORTHOPAEDIC      left wrist & left elbow    HX ORTHOPAEDIC      Back Surgery x 3    HX ORTHOPAEDIC      right thumb at [address] LT FOOT CYST    HX OTHER SURGICAL      back surgery X 2    HX OTHER SURGICAL      Collapsed left lung    HX OTHER SURGICAL      axillary sweat glands removed    HX OVARIAN CYST REMOVAL      MS OTOPLASTY PROTRUDING EAR W/WO SIZE RDCTJ      MS UNLISTED PROCEDURE CARDIAC SURGERY      murmur- PT DENIES SURGERY AND MURMUR 17       Family History   Problem Relation Age of Onset    Heart Disease Mother     Heart Attack Mother     No Known Problems Father     Cancer Sister         UNKNOWN    Diabetes Son     Sickle Cell Anemia Daughter     Anesth Problems Son         CHEST PAIN        Social History     Tobacco Use    Smoking status: Former     Packs/day: 1.00     Years: 30.00     Pack years: 30.00     Types: Cigarettes     Quit date: 2005     Years since quittin.0    Smokeless tobacco: Never   Substance Use Topics    Alcohol use: No    Drug use: No        Review of Systems   All other systems reviewed and are negative. Constitutional: No fevers, chills, night sweats, excessive fatigue or weight loss. Musculoskeletal: No joint pain, swelling or redness.  No decreased range of motion. Neurologic: No headache, blurred vision, and no areas of focal weakness or numbness. Normal gait. No sensory problems. Respiratory: No dyspnea on exertion, orthopnea, chest pain, cough or hemoptysis. Cardiovascular: No anginal chest pain, irregular heart beat, tachycardia, palpitations or orthopnea  Integumentary: No chronic rashes, inflammation, ulcerations, pruritus, petechiae, purpura, ecchymoses, or skin changes           Physical Exam    Right ring finger wound is doing well with good position alignment. Overall good MP and PIP motion as well. No motion at fusion site. Clinically doing well. Left thumb previously had tenderness at the A1 pulley clicking locking without now is resolved postinjection. Imaging:  Avulsion fracture of the distal phalanx of the right ring finger previously noted on preoperative x-ray nonunited. XR Results (most recent):  Results from Appointment encounter on 04/25/23    XR 4TH FINGER RT MIN 2 V    Narrative  AP, lateral oblique x-ray of the right ring finger shows a fused distal interphalangeal joint with retrograde screw fixation healed in good position and alignment. ASSESSMENT/PLAN:  Below is the assessment and plan developed based on review of pertinent history, physical exam, labs, studies, and medications. Right ring finger pain at the DIP joint which occurred approximately 1 month ago when her finger got caught in a grocery cart. She had immediate swelling and bruising. She was not seen for the injury until 2/9/2022 when she presented to [facility] and was told that she has a fracture of the finger. She was placed in a splint and referred to us. She has an avulsion fracture of the distal phalanx of the right ring finger. Surgery was considered early on but she was not able to proceed with surgery for another 6 weeks. By that time the fracture was nonunited and despite intraoperative efforts it was not repairable. The fracture fragment was excised and the extensor tendon repaired with transarticular retrograde DIP pinning on 3/17/2022. I explained that the pin will likely be left in place for 4 to 6 weeks. Return for pin removal on 4/19/2022. She already is demonstrating motion of the DIP joint and thus far it appears stable clinically and radiographically. She can flex the DIP joint 50 degrees and is pleased with her recovery thus far. However, she certainly understands that if she develops subluxation more stiffness or significant droop she may be better served with a DIP fusion. She no longer requires the splints. She is giving stronger consideration to fuse the DIP joint as she states that whenever she has contact it results in pain. She indeed underwent a right ring finger DIP fusion on 7/5/2022. Patient states that she is \"doing great\". However, I am concerned that there may be fusion site motion and I am less convinced radiographically that this is healing. She is aware of my concerns but wants to continue currently with conservative treatment. Hopefully this will unite and is representing more of a delayed union rather than a nonunion. I plan to see her back for further clinical and x-ray assessment in 3 months. If there is a question a CT scan can be obtained if needed. In addition, she has developed a left trigger thumb and did receive a left trigger thumb corticosteroid injection on 10/4/2022. Eventually this may require surgical attention. When next examined on 12/6/2022 there was fusion site motion of the DIP of the right ring finger. Although there really is not any advanced swelling or pain there is some lucency around the fusion screw and it appears clinically and radiographically that she has a fusion nonunion. I have reviewed the findings with the patient and answered her questions.   I recommended screw removal with revision arthrodesis to include distal radius bone graft and fixation ranging from screw or screws versus pins. She is a former smoker but has not smoked in 15 years. She underwent screw removal with revision arthrodesis of the distal interphalangeal joint including distal radius bone graft and longer fusion screw fixation on 1/10/2023. Recommended a splint for comfort and support and gradual MP and PIP motion recovery. Overall doing well. Suture removal on 1/26/2023. Radiographs show good consolidation of the DIP fusion and successful healing. The MP and PIP joint motion is full. She is pleased with her recovery may continue with motion and strength to her tolerance. Follow-up in 2 to 3 months or as needed      She also has a left thumb stenosing tenosynovitis and did receive a repeat left trigger thumb injection on 2/21/2023 with her first injection on 10/4/2022. Future consideration can be given for repeat injection or eventual an A1 pulley release. 1. Nonunion after arthrodesis  2. Status post finger joint fusion  -     XR 4TH FINGER RT MIN 2 V; Future  -     methylPREDNISolone (MEDROL DOSEPACK) 4 mg tablet; Per dose pack instructions, Normal, Disp-1 Dose Pack, R-0  3. Rupture of extensor tendon of finger  4. Pain in finger of right hand  5. Trigger thumb, left thumb    Return in about 3 months (around 7/25/2023).

## 2023-04-25 ENCOUNTER — OFFICE VISIT (OUTPATIENT)
Dept: ORTHOPEDIC SURGERY | Age: 63
End: 2023-04-25
Payer: MEDICAID

## 2023-04-25 DIAGNOSIS — M65.312 TRIGGER THUMB, LEFT THUMB: ICD-10-CM

## 2023-04-25 DIAGNOSIS — M96.0 NONUNION AFTER ARTHRODESIS: Primary | ICD-10-CM

## 2023-04-25 DIAGNOSIS — M79.644 PAIN IN FINGER OF RIGHT HAND: ICD-10-CM

## 2023-04-25 DIAGNOSIS — S56.419A RUPTURE OF EXTENSOR TENDON OF FINGER: ICD-10-CM

## 2023-04-25 DIAGNOSIS — Z98.1 STATUS POST FINGER JOINT FUSION: ICD-10-CM

## 2023-04-25 PROCEDURE — 99213 OFFICE O/P EST LOW 20 MIN: CPT | Performed by: ORTHOPAEDIC SURGERY

## 2023-04-25 RX ORDER — METHYLPREDNISOLONE 4 MG/1
TABLET ORAL
Qty: 1 DOSE PACK | Refills: 0 | Status: SHIPPED | OUTPATIENT
Start: 2023-04-25

## 2023-06-30 ENCOUNTER — OFFICE VISIT (OUTPATIENT)
Age: 63
End: 2023-06-30
Payer: COMMERCIAL

## 2023-06-30 VITALS
HEART RATE: 76 BPM | RESPIRATION RATE: 16 BRPM | DIASTOLIC BLOOD PRESSURE: 84 MMHG | BODY MASS INDEX: 28.82 KG/M2 | SYSTOLIC BLOOD PRESSURE: 130 MMHG | OXYGEN SATURATION: 96 % | HEIGHT: 65 IN | WEIGHT: 173 LBS | TEMPERATURE: 97.8 F

## 2023-06-30 DIAGNOSIS — Z12.11 COLON CANCER SCREENING: ICD-10-CM

## 2023-06-30 DIAGNOSIS — M54.16 LUMBAR RADICULOPATHY, CHRONIC: ICD-10-CM

## 2023-06-30 DIAGNOSIS — N32.81 OVERACTIVE BLADDER: ICD-10-CM

## 2023-06-30 DIAGNOSIS — I10 PRIMARY HYPERTENSION: Primary | ICD-10-CM

## 2023-06-30 PROCEDURE — 99214 OFFICE O/P EST MOD 30 MIN: CPT | Performed by: STUDENT IN AN ORGANIZED HEALTH CARE EDUCATION/TRAINING PROGRAM

## 2023-06-30 PROCEDURE — 3079F DIAST BP 80-89 MM HG: CPT | Performed by: STUDENT IN AN ORGANIZED HEALTH CARE EDUCATION/TRAINING PROGRAM

## 2023-06-30 PROCEDURE — 3075F SYST BP GE 130 - 139MM HG: CPT | Performed by: STUDENT IN AN ORGANIZED HEALTH CARE EDUCATION/TRAINING PROGRAM

## 2023-06-30 RX ORDER — LOSARTAN POTASSIUM 25 MG/1
25 TABLET ORAL NIGHTLY
Qty: 90 TABLET | Refills: 2
Start: 2023-06-30

## 2023-06-30 RX ORDER — SPIRONOLACTONE 25 MG/1
12.5 TABLET ORAL DAILY
Qty: 30 TABLET | Refills: 0
Start: 2023-06-30

## 2023-06-30 RX ORDER — OMEGA-3 FATTY ACIDS/FISH OIL 300-1000MG
CAPSULE ORAL 2 TIMES DAILY
COMMUNITY
Start: 2023-06-03

## 2023-06-30 RX ORDER — GABAPENTIN 300 MG/1
300 CAPSULE ORAL NIGHTLY
Qty: 90 CAPSULE | Refills: 0 | Status: SHIPPED | OUTPATIENT
Start: 2023-06-30 | End: 2023-09-28

## 2023-06-30 RX ORDER — OXYBUTYNIN CHLORIDE 10 MG/1
10 TABLET, EXTENDED RELEASE ORAL DAILY
Qty: 30 TABLET | Refills: 4
Start: 2023-06-30

## 2023-06-30 SDOH — ECONOMIC STABILITY: HOUSING INSECURITY
IN THE LAST 12 MONTHS, WAS THERE A TIME WHEN YOU DID NOT HAVE A STEADY PLACE TO SLEEP OR SLEPT IN A SHELTER (INCLUDING NOW)?: NO

## 2023-06-30 SDOH — ECONOMIC STABILITY: INCOME INSECURITY: HOW HARD IS IT FOR YOU TO PAY FOR THE VERY BASICS LIKE FOOD, HOUSING, MEDICAL CARE, AND HEATING?: NOT VERY HARD

## 2023-06-30 SDOH — ECONOMIC STABILITY: FOOD INSECURITY: WITHIN THE PAST 12 MONTHS, THE FOOD YOU BOUGHT JUST DIDN'T LAST AND YOU DIDN'T HAVE MONEY TO GET MORE.: NEVER TRUE

## 2023-06-30 SDOH — ECONOMIC STABILITY: FOOD INSECURITY: WITHIN THE PAST 12 MONTHS, YOU WORRIED THAT YOUR FOOD WOULD RUN OUT BEFORE YOU GOT MONEY TO BUY MORE.: NEVER TRUE

## 2023-06-30 ASSESSMENT — PATIENT HEALTH QUESTIONNAIRE - PHQ9
SUM OF ALL RESPONSES TO PHQ QUESTIONS 1-9: 0
SUM OF ALL RESPONSES TO PHQ9 QUESTIONS 1 & 2: 0
SUM OF ALL RESPONSES TO PHQ QUESTIONS 1-9: 0
1. LITTLE INTEREST OR PLEASURE IN DOING THINGS: 0
2. FEELING DOWN, DEPRESSED OR HOPELESS: 0

## 2023-07-08 DIAGNOSIS — N32.81 OVERACTIVE BLADDER: ICD-10-CM

## 2023-07-10 RX ORDER — OXYBUTYNIN CHLORIDE 10 MG/1
TABLET, EXTENDED RELEASE ORAL
Qty: 30 TABLET | Refills: 4 | OUTPATIENT
Start: 2023-07-10

## 2023-07-14 DIAGNOSIS — N32.81 OVERACTIVE BLADDER: ICD-10-CM

## 2023-07-14 RX ORDER — OXYBUTYNIN CHLORIDE 10 MG/1
10 TABLET, EXTENDED RELEASE ORAL DAILY
Qty: 30 TABLET | Refills: 4 | OUTPATIENT
Start: 2023-07-14

## 2023-07-14 RX ORDER — OXYBUTYNIN CHLORIDE 10 MG/1
10 TABLET, EXTENDED RELEASE ORAL DAILY
Qty: 90 TABLET | Refills: 2 | Status: SHIPPED | OUTPATIENT
Start: 2023-07-14

## 2023-08-09 LAB — HBA1C MFR BLD HPLC: 6.8 %

## 2024-01-02 ENCOUNTER — OFFICE VISIT (OUTPATIENT)
Age: 64
End: 2024-01-02
Payer: COMMERCIAL

## 2024-01-02 VITALS
SYSTOLIC BLOOD PRESSURE: 155 MMHG | HEIGHT: 65 IN | WEIGHT: 166.67 LBS | OXYGEN SATURATION: 98 % | DIASTOLIC BLOOD PRESSURE: 90 MMHG | TEMPERATURE: 96.9 F | RESPIRATION RATE: 16 BRPM | HEART RATE: 70 BPM | BODY MASS INDEX: 27.77 KG/M2

## 2024-01-02 DIAGNOSIS — N32.81 OVERACTIVE BLADDER: ICD-10-CM

## 2024-01-02 DIAGNOSIS — I10 PRIMARY HYPERTENSION: Primary | ICD-10-CM

## 2024-01-02 DIAGNOSIS — I10 PRIMARY HYPERTENSION: ICD-10-CM

## 2024-01-02 DIAGNOSIS — Z12.11 COLON CANCER SCREENING: ICD-10-CM

## 2024-01-02 DIAGNOSIS — M54.16 LUMBAR RADICULOPATHY, CHRONIC: ICD-10-CM

## 2024-01-02 LAB
ANION GAP SERPL CALC-SCNC: 5 MMOL/L (ref 5–15)
APPEARANCE UR: ABNORMAL
BACTERIA URNS QL MICRO: ABNORMAL /HPF
BILIRUB UR QL: NEGATIVE
BUN SERPL-MCNC: 15 MG/DL (ref 6–20)
BUN/CREAT SERPL: 15 (ref 12–20)
CALCIUM SERPL-MCNC: 9.1 MG/DL (ref 8.5–10.1)
CHLORIDE SERPL-SCNC: 108 MMOL/L (ref 97–108)
CO2 SERPL-SCNC: 27 MMOL/L (ref 21–32)
COLOR UR: ABNORMAL
CREAT SERPL-MCNC: 1 MG/DL (ref 0.55–1.02)
EPITH CASTS URNS QL MICRO: ABNORMAL /LPF
GLUCOSE SERPL-MCNC: 92 MG/DL (ref 65–100)
GLUCOSE UR STRIP.AUTO-MCNC: >1000 MG/DL
HGB UR QL STRIP: ABNORMAL
HYALINE CASTS URNS QL MICRO: ABNORMAL /LPF (ref 0–5)
KETONES UR QL STRIP.AUTO: NEGATIVE MG/DL
LEUKOCYTE ESTERASE UR QL STRIP.AUTO: ABNORMAL
NITRITE UR QL STRIP.AUTO: POSITIVE
PH UR STRIP: 5 (ref 5–8)
POTASSIUM SERPL-SCNC: 4 MMOL/L (ref 3.5–5.1)
PROT UR STRIP-MCNC: ABNORMAL MG/DL
RBC #/AREA URNS HPF: ABNORMAL /HPF (ref 0–5)
SODIUM SERPL-SCNC: 140 MMOL/L (ref 136–145)
SP GR UR REFRACTOMETRY: 1.02 (ref 1–1.03)
UROBILINOGEN UR QL STRIP.AUTO: 0.2 EU/DL (ref 0.2–1)
WBC URNS QL MICRO: >100 /HPF (ref 0–4)

## 2024-01-02 PROCEDURE — 99214 OFFICE O/P EST MOD 30 MIN: CPT | Performed by: STUDENT IN AN ORGANIZED HEALTH CARE EDUCATION/TRAINING PROGRAM

## 2024-01-02 PROCEDURE — 3080F DIAST BP >= 90 MM HG: CPT | Performed by: STUDENT IN AN ORGANIZED HEALTH CARE EDUCATION/TRAINING PROGRAM

## 2024-01-02 PROCEDURE — 3077F SYST BP >= 140 MM HG: CPT | Performed by: STUDENT IN AN ORGANIZED HEALTH CARE EDUCATION/TRAINING PROGRAM

## 2024-01-02 RX ORDER — GABAPENTIN 300 MG/1
300 CAPSULE ORAL NIGHTLY
Qty: 90 CAPSULE | Refills: 2
Start: 2024-01-02 | End: 2024-12-27

## 2024-01-02 RX ORDER — LOSARTAN POTASSIUM 50 MG/1
50 TABLET ORAL DAILY
Qty: 90 TABLET | Refills: 0 | Status: SHIPPED | OUTPATIENT
Start: 2024-01-02

## 2024-01-02 ASSESSMENT — PATIENT HEALTH QUESTIONNAIRE - PHQ9
SUM OF ALL RESPONSES TO PHQ QUESTIONS 1-9: 0
SUM OF ALL RESPONSES TO PHQ9 QUESTIONS 1 & 2: 0
SUM OF ALL RESPONSES TO PHQ QUESTIONS 1-9: 0
SUM OF ALL RESPONSES TO PHQ QUESTIONS 1-9: 0
2. FEELING DOWN, DEPRESSED OR HOPELESS: 0
1. LITTLE INTEREST OR PLEASURE IN DOING THINGS: 0
SUM OF ALL RESPONSES TO PHQ QUESTIONS 1-9: 0

## 2024-01-02 NOTE — PROGRESS NOTES
Chief Complaint   Patient presents with    Follow-up     2023 Hypertension       Stopped taking spironolactone (ALDACTONE) 25 MG tablet  a few months ago .Was told by various people it causes hair loss. Has appt with Dermatologist 2024.    \"Have you been to the ER, urgent care clinic since your last visit?  Hospitalized since your last visit?\"    NO    “Have you seen or consulted any other health care providers outside of [facility] since your last visit?”      Yes  [facility]  2023  Type II Diabetes    “Have you had a colorectal cancer screening such as a colonoscopy/FIT/Cologuard?    NO  Last performed 2012      “Have you had a pap smear?”    NO          There were no vitals filed for this visit.  Health Maintenance Due   Topic Date Due    HIV screen  Never done    Cervical cancer screen  Never done    Shingles vaccine (1 of 2) Never done    Diabetic retinal exam  2017    Pneumococcal 0-64 years Vaccine (2 - PCV) 2018    Respiratory Syncytial Virus (RSV) Pregnant or age 60 yrs+ (1 - 1-dose 60+ series) Never done    Colorectal Cancer Screen  2022    Diabetic foot exam  2023    Flu vaccine (1) 2023    COVID-19 Vaccine ( - - season) 2023    Breast cancer screen  2024      The patient, Alicia Brooke, identity was verified by name and , pharmacy verified  Labs:Yes  Fasting:Yes

## 2024-01-02 NOTE — PROGRESS NOTES
[facility]  [address] [place] [address] [phone]    Chief Complaint: chronic medical problems    Subjective  Alicia Brooke is a 63 y.o. Black /  female , established patient, here for evaluation of the concern(s) above;    Subjective:    HTN: Out of her Losartan 25 mg.  Pt stopped aldactone due to hair loss.    Compliant with meds. No side effects.  No side effects from medications.    Neuropathy:  On gabapentin    OAB:  States that oxybutynin not helping     Follows with endocrinology for diabetes.     Pt denies any  fever, chill, chest pain, SOB, PND, abdominal pain, n/v/d,HA or dizziness.      Other Health Habits and social history:  Smoking history: Former smoker but quit years ago.  Alcohol history: no  Occupation: unemployed  Marital status: Patient is currently single and has one son. Had 3 children but 2 passed away. Has 2 grandkids (9 and 10 yo)     Other Specialists/providers:  Endocrinology - diabetes  Cardiology - HFpEF  Ortho    Allergies - reviewed:   Allergies   Allergen Reactions    Pravastatin Myalgia       Past Medical History - reviewed:  Past Medical History:   Diagnosis Date    (HFpEF) heart failure with preserved ejection fraction (HCC)     Arrhythmia     HEART MURMUR -\"HAD IT FOR YEARS AS AN ADULT)    Arthritis     OSTEO    Back pain     Breast cancer screening 6/28/2016    Chronic bilateral low back pain without sciatica 6/28/2016    Diabetes (HCC)     Encounter for long-term (current) use of medications 6/28/2016    Essential hypertension with goal blood pressure less than 140/90 6/28/2016    Hypercholesteremia 8/6/2015    Hypercholesterolemia     Hypertension     CONTROLLED WITH MEDS    Ill-defined condition     hyperlipidemia    Other ill-defined conditions(799.89)     high cholesterol    Patient non adherence 1/23/2017    Primary osteoarthritis of left knee 3/2/2018    Type 2 diabetes, uncontrolled, with

## 2024-01-08 DIAGNOSIS — N30.01 ACUTE CYSTITIS WITH HEMATURIA: Primary | ICD-10-CM

## 2024-01-08 RX ORDER — SULFAMETHOXAZOLE AND TRIMETHOPRIM 800; 160 MG/1; MG/1
1 TABLET ORAL 2 TIMES DAILY
Qty: 10 TABLET | Refills: 0 | Status: SHIPPED | OUTPATIENT
Start: 2024-01-08 | End: 2024-01-13

## 2024-01-09 ENCOUNTER — CLINICAL DOCUMENTATION (OUTPATIENT)
Age: 64
End: 2024-01-09

## 2024-01-09 NOTE — PROGRESS NOTES
Informed patient of urinalysis.  Sent abx.  She will get started on it.    Signed By: Herrera Perez MD     January 9, 2024

## 2024-01-10 ENCOUNTER — TELEPHONE (OUTPATIENT)
Age: 64
End: 2024-01-10

## 2024-01-10 NOTE — TELEPHONE ENCOUNTER
----- Message from Herrera Perez MD sent at 1/8/2024  9:19 PM EST -----  Positive UTI.  Sending antibiotics.    Please inform patient I am sending antibiotics to her pharmacy for UTI based on urinalysis results.    Patient stated that medication has been picked up and started.

## 2024-01-19 LAB — NONINV COLON CA DNA+OCC BLD SCRN STL QL: NEGATIVE

## 2024-02-28 ENCOUNTER — CLINICAL DOCUMENTATION (OUTPATIENT)
Age: 64
End: 2024-02-28

## 2024-03-19 ENCOUNTER — APPOINTMENT (OUTPATIENT)
Facility: HOSPITAL | Age: 64
End: 2024-03-19
Payer: COMMERCIAL

## 2024-03-19 ENCOUNTER — HOSPITAL ENCOUNTER (EMERGENCY)
Facility: HOSPITAL | Age: 64
Discharge: HOME | End: 2024-03-19
Attending: EMERGENCY MEDICINE
Payer: COMMERCIAL

## 2024-03-19 VITALS
HEART RATE: 97 BPM | DIASTOLIC BLOOD PRESSURE: 70 MMHG | RESPIRATION RATE: 18 BRPM | OXYGEN SATURATION: 100 % | WEIGHT: 161.82 LBS | SYSTOLIC BLOOD PRESSURE: 105 MMHG | TEMPERATURE: 97.5 F | BODY MASS INDEX: 26.93 KG/M2

## 2024-03-19 DIAGNOSIS — T07.XXXA ABRASIONS OF MULTIPLE SITES: ICD-10-CM
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
## 2024-03-19 DIAGNOSIS — S60.229A CONTUSION OF DORSUM OF HAND: ICD-10-CM

## 2024-03-19 DIAGNOSIS — S80.01XA CONTUSION OF RIGHT KNEE, INITIAL ENCOUNTER: ICD-10-CM

## 2024-03-19 PROCEDURE — 73562 X-RAY EXAM OF KNEE 3: CPT

## 2024-03-19 PROCEDURE — 73130 X-RAY EXAM OF HAND: CPT

## 2024-03-19 PROCEDURE — 6370000000 HC RX 637 (ALT 250 FOR IP): Performed by: EMERGENCY MEDICINE

## 2024-03-19 PROCEDURE — 99283 EMERGENCY DEPT VISIT LOW MDM: CPT

## 2024-03-19 RX ORDER — IBUPROFEN 400 MG/1
400 TABLET ORAL
Status: COMPLETED | OUTPATIENT
Start: 2024-03-19 | End: 2024-03-19

## 2024-03-19 RX ORDER — KETOROLAC TROMETHAMINE 10 MG/1
10 TABLET, FILM COATED ORAL 3 TIMES DAILY PRN
Qty: 12 TABLET | Refills: 0 | Status: SHIPPED | OUTPATIENT
Start: 2024-03-19

## 2024-03-19 RX ADMIN — MUPIROCIN: 20 OINTMENT TOPICAL at 14:10

## 2024-03-19 RX ADMIN — IBUPROFEN 400 MG: 400 TABLET, FILM COATED ORAL at 14:04

## 2024-03-19 ASSESSMENT — ENCOUNTER SYMPTOMS
CHEST TIGHTNESS: 0
NAUSEA: 0
TROUBLE SWALLOWING: 0
VOMITING: 0
DIARRHEA: 0
COLOR CHANGE: 0

## 2024-03-19 ASSESSMENT — PAIN DESCRIPTION - ORIENTATION: ORIENTATION: RIGHT;LEFT

## 2024-03-19 ASSESSMENT — PAIN DESCRIPTION - DESCRIPTORS
DESCRIPTORS: ACHING
DESCRIPTORS: SHARP

## 2024-03-19 ASSESSMENT — PAIN - FUNCTIONAL ASSESSMENT
PAIN_FUNCTIONAL_ASSESSMENT: 0-10
PAIN_FUNCTIONAL_ASSESSMENT: NONE - DENIES PAIN
PAIN_FUNCTIONAL_ASSESSMENT: PREVENTS OR INTERFERES SOME ACTIVE ACTIVITIES AND ADLS

## 2024-03-19 ASSESSMENT — PAIN SCALES - GENERAL
PAINLEVEL_OUTOF10: 8
PAINLEVEL_OUTOF10: 7

## 2024-03-19 ASSESSMENT — PAIN DESCRIPTION - LOCATION
LOCATION: KNEE
LOCATION: HAND

## 2024-03-19 NOTE — ED TRIAGE NOTES
Pt tripped and fell in the  parking here p[place], no blood thinners, did not strike her head, no loc, c/o pain to right knee and both hands , denies neck or back pain , some left knee pain also, abrasions noted to hands

## 2024-03-19 NOTE — ED PROVIDER NOTES
[facility] EMERGENCY DEP  EMERGENCY DEPARTMENT ENCOUNTER      Pt Name: Alicia Brooke  MRN: [id number]  Birthdate 1960  Date of evaluation: 3/19/2024  Provider: MADELAINE Davies NP    CHIEF COMPLAINT       Chief Complaint   Patient presents with    Fall         HISTORY OF PRESENT ILLNESS   (Location/Symptom, Timing/Onset, Context/Setting, Quality, Duration, Modifying Factors, Severity)  Note limiting factors.   HPI  Patient is a 63-year-old female with an extensive past medical history please see list below who presents to the ED after taking a fall in the [employer] parking area.  She had stepped off the curb and misjudged her footing causing her to fall forward she sustained abrasions to both dorsal aspects of her hands and her right knee.  Denies any head injury, neck injury or LOC. Skin is abraded on the right anterior knee. There is no obvious deformity. Good neurovascular sensation. No obvious joint effusion or joint instability. Pain increases with weight bearing; flexion and extension.  Fall was witnessed by her family member.      Review of External Medical Records:     Nursing Notes were reviewed.    REVIEW OF SYSTEMS    (2-9 systems for level 4, 10 or more for level 5)     Review of Systems   Constitutional:  Negative for activity change, appetite change, fever and unexpected weight change.   HENT:  Negative for congestion and trouble swallowing.    Eyes:  Negative for visual disturbance.   Respiratory:  Negative for chest tightness.    Cardiovascular:  Negative for chest pain, palpitations and leg swelling.   Gastrointestinal:  Negative for diarrhea, nausea and vomiting.   Genitourinary:  Negative for dysuria.   Skin:  Positive for wound. Negative for color change, pallor and rash.        Skin abrasions both dorsal hands and right knee; good neurovascular sensation; no active bleeding.   Neurological:  Negative for headaches.   All other systems reviewed and are negative.      Except as noted above the

## 2024-04-11 ENCOUNTER — TRANSCRIBE ORDERS (OUTPATIENT)
Facility: HOSPITAL | Age: 64
End: 2024-04-11

## 2024-04-11 DIAGNOSIS — Z12.31 VISIT FOR SCREENING MAMMOGRAM: Primary | ICD-10-CM

## 2024-06-13 ENCOUNTER — OFFICE VISIT (OUTPATIENT)
Age: 64
End: 2024-06-13
Payer: COMMERCIAL

## 2024-06-13 VITALS
TEMPERATURE: 96.9 F | HEART RATE: 85 BPM | RESPIRATION RATE: 16 BRPM | WEIGHT: 168.43 LBS | HEIGHT: 65 IN | SYSTOLIC BLOOD PRESSURE: 117 MMHG | BODY MASS INDEX: 28.06 KG/M2 | DIASTOLIC BLOOD PRESSURE: 80 MMHG

## 2024-06-13 DIAGNOSIS — E11.65 TYPE 2 DIABETES MELLITUS WITH HYPERGLYCEMIA, WITH LONG-TERM CURRENT USE OF INSULIN (HCC): ICD-10-CM

## 2024-06-13 DIAGNOSIS — Z79.4 TYPE 2 DIABETES MELLITUS WITH HYPERGLYCEMIA, WITH LONG-TERM CURRENT USE OF INSULIN (HCC): ICD-10-CM

## 2024-06-13 DIAGNOSIS — N32.81 OVERACTIVE BLADDER: Primary | ICD-10-CM

## 2024-06-13 LAB
BILIRUBIN, URINE, POC: NEGATIVE
BLOOD URINE, POC: NEGATIVE
GLUCOSE URINE, POC: NORMAL
KETONES, URINE, POC: NEGATIVE
LEUKOCYTE ESTERASE, URINE, POC: NEGATIVE
NITRITE, URINE, POC: NEGATIVE
PH, URINE, POC: 5.5 (ref 4.6–8)
PROTEIN,URINE, POC: NEGATIVE
SPECIFIC GRAVITY, URINE, POC: 1.01 (ref 1–1.03)
URINALYSIS CLARITY, POC: CLEAR
URINALYSIS COLOR, POC: YELLOW
UROBILINOGEN, POC: NORMAL

## 2024-06-13 PROCEDURE — 81003 URINALYSIS AUTO W/O SCOPE: CPT | Performed by: STUDENT IN AN ORGANIZED HEALTH CARE EDUCATION/TRAINING PROGRAM

## 2024-06-13 PROCEDURE — PBSHW AMB POC URINALYSIS DIP STICK AUTO W/O MICRO: Performed by: STUDENT IN AN ORGANIZED HEALTH CARE EDUCATION/TRAINING PROGRAM

## 2024-06-13 PROCEDURE — 3074F SYST BP LT 130 MM HG: CPT | Performed by: STUDENT IN AN ORGANIZED HEALTH CARE EDUCATION/TRAINING PROGRAM

## 2024-06-13 PROCEDURE — 3079F DIAST BP 80-89 MM HG: CPT | Performed by: STUDENT IN AN ORGANIZED HEALTH CARE EDUCATION/TRAINING PROGRAM

## 2024-06-13 PROCEDURE — 99213 OFFICE O/P EST LOW 20 MIN: CPT | Performed by: STUDENT IN AN ORGANIZED HEALTH CARE EDUCATION/TRAINING PROGRAM

## 2024-06-13 RX ORDER — PEN NEEDLE, DIABETIC 30 GX5/16"
1 NEEDLE, DISPOSABLE MISCELLANEOUS DAILY
Qty: 100 EACH | Refills: 3 | Status: SHIPPED | OUTPATIENT
Start: 2024-06-13

## 2024-06-13 RX ORDER — TROSPIUM CHLORIDE 20 MG/1
20 TABLET, FILM COATED ORAL 2 TIMES DAILY
Qty: 60 TABLET | Refills: 2 | Status: SHIPPED | OUTPATIENT
Start: 2024-06-13

## 2024-06-13 NOTE — ASSESSMENT & PLAN NOTE
Discussed pelvic floor muscle exercises to suppress urgency, modification of contributory medications/substances, and promotion of a healthy lifestyle and behaviors (eg, smoking cessation, weight loss for individuals with obesity).

## 2024-06-13 NOTE — PROGRESS NOTES
Chief Complaint   Patient presents with    Urinary Frequency     X 2 months       Patient see's [facility] for Diabetic Management. Patient stated her blood sugar readings have been in the 120's.    \"Have you been to the ER, urgent care clinic since your last visit?  Hospitalized since your last visit?\"      Yes  3/19/2024  [facility]  [facility]    “Have you seen or consulted any other health care providers outside of [facility] since your last visit?”      Yes  [facility]   Cardiology  2024  Chronic heart failure with preserved ejection fraction        Have you had a mammogram?”   NO    Needs order for Mammogram    Date of last Mammogram: 2022      “Have you had a pap smear?”    NO    Had Hysterectomy           Vitals:    24 1502   BP: 117/80   Pulse:    Resp:    Temp:      Health Maintenance Due   Topic Date Due    HIV screen  Never done    Cervical cancer screen  Never done    Shingles vaccine (1 of 2) Never done    Diabetic retinal exam  2017    Pneumococcal 0-64 years Vaccine (2 of 2 - PCV) 2018    Respiratory Syncytial Virus (RSV) Pregnant or age 60 yrs+ (1 - 1-dose 60+ series) Never done    Diabetic foot exam  2023    COVID-19 Vaccine ( - - season) 2023    Breast cancer screen  2024    Diabetic Alb to Cr ratio (uACR) test  2024    Lipids  2024      The patient, Alicia Brooke, identity was verified by name and , pharmacy verified  Labs:  Fasting:         
cholesterol    Patient non adherence 1/23/2017    Primary osteoarthritis of left knee 3/2/2018    Type 2 diabetes, uncontrolled, with neuropathy 1/23/2017    Type II diabetes mellitus, uncontrolled 8/6/2015       Depression screening:  No data recorded      Review of systems:   A comprehensive review of systems was negative except for that written in the History of Present Illness.     Physical Exam  /80   Pulse 85   Temp 96.9 °F (36.1 °C) (Tympanic)   Resp 16   Ht 1.651 m (5' 5\")   Wt 76.4 kg (168 lb 6.9 oz)   BMI 28.03 kg/m²     General: Alert and oriented, in no acute distress.  EYE: PERRL. Sclera and conjuctival clear. Extraocular movements intact.  EARS: External normal, canals clear, tympanic membranes normal.   NOSE: Mucosa healthy without drainage or ulceration.  OROPHARYNX: No suspicious lesions, normal dentition, pharynx, tongue and tonsils normal.  NECK: Supple; no masses; thyroid normal.  LUNGS: Respirations unlabored; clear to auscultation bilaterally.  CARDIOVASCULAR: Regular, rate, and rhythm without murmurs, gallops or rubs.  ABDOMEN: Soft; nontender; nondistended; normoactive bowel sounds; no masses or organomegaly.  MUSCULOSKELETAL: FROM in all extremities     EXT: No edema. Neurovascularlly intact. Normal gait.  Neurological: Alert and oriented X 3, normal strength and tone. Normal symmetric reflexes. Normal coordination and gait     Assessment/Plan   Diagnosis Orders   1. Overactive bladder  AMB POC URINALYSIS DIP STICK AUTO W/O MICRO    trospium (SANCTURA) 20 MG tablet      2. Type 2 diabetes mellitus with hyperglycemia, with long-term current use of insulin ([facility])  Insulin Pen Needle (PEN NEEDLES 5/16\") 30G X 8 MM MISC        1. Overactive bladder  Assessment & Plan:  Discussed pelvic floor muscle exercises to suppress urgency, modification of contributory medications/substances, and promotion of a healthy lifestyle and behaviors (eg, smoking cessation, weight loss for individuals

## 2024-06-25 NOTE — TELEPHONE ENCOUNTER
Last appointment: 6/13/24  Next appointment: 7/3/24    Requested Prescriptions     Pending Prescriptions Disp Refills    Omega 3 1000 MG CAPS 180 capsule 3     Sig: Take 1 capsule by mouth 2 times daily         For Pharmacy Admin Tracking Only    Program: Medication Refill  CPA in place:    Recommendation Provided To:   Intervention Detail: New Rx: 1, reason: Patient Preference  Intervention Accepted By:   Gap Closed?:    Time Spent (min): 5

## 2024-06-26 ENCOUNTER — CLINICAL DOCUMENTATION (OUTPATIENT)
Age: 64
End: 2024-06-26

## 2024-06-26 RX ORDER — OMEGA-3 FATTY ACIDS/FISH OIL 300-1000MG
1 CAPSULE ORAL 2 TIMES DAILY
Qty: 180 CAPSULE | Refills: 3 | Status: SHIPPED | OUTPATIENT
Start: 2024-06-26

## 2024-07-01 ENCOUNTER — TELEPHONE (OUTPATIENT)
Age: 64
End: 2024-07-01

## 2024-07-01 NOTE — TELEPHONE ENCOUNTER
[employer] with [employer] would like to get the paperwork faxed back to them regarding patient incontinent supplies the forms are in the system 6/26 fax number is on first sheet.

## 2024-07-03 ENCOUNTER — OFFICE VISIT (OUTPATIENT)
Age: 64
End: 2024-07-03
Payer: COMMERCIAL

## 2024-07-03 VITALS
DIASTOLIC BLOOD PRESSURE: 83 MMHG | OXYGEN SATURATION: 98 % | HEIGHT: 65 IN | BODY MASS INDEX: 28.72 KG/M2 | TEMPERATURE: 98.2 F | SYSTOLIC BLOOD PRESSURE: 135 MMHG | HEART RATE: 68 BPM | RESPIRATION RATE: 16 BRPM | WEIGHT: 172.4 LBS

## 2024-07-03 DIAGNOSIS — N32.81 OVERACTIVE BLADDER: ICD-10-CM

## 2024-07-03 DIAGNOSIS — M54.16 LUMBAR RADICULOPATHY, CHRONIC: ICD-10-CM

## 2024-07-03 DIAGNOSIS — I10 PRIMARY HYPERTENSION: Primary | ICD-10-CM

## 2024-07-03 DIAGNOSIS — Z12.31 ENCOUNTER FOR SCREENING MAMMOGRAM FOR MALIGNANT NEOPLASM OF BREAST: ICD-10-CM

## 2024-07-03 PROCEDURE — 3075F SYST BP GE 130 - 139MM HG: CPT | Performed by: STUDENT IN AN ORGANIZED HEALTH CARE EDUCATION/TRAINING PROGRAM

## 2024-07-03 PROCEDURE — 99214 OFFICE O/P EST MOD 30 MIN: CPT | Performed by: STUDENT IN AN ORGANIZED HEALTH CARE EDUCATION/TRAINING PROGRAM

## 2024-07-03 PROCEDURE — 3079F DIAST BP 80-89 MM HG: CPT | Performed by: STUDENT IN AN ORGANIZED HEALTH CARE EDUCATION/TRAINING PROGRAM

## 2024-07-03 RX ORDER — SPIRONOLACTONE 25 MG/1
25 TABLET ORAL DAILY
COMMUNITY

## 2024-07-03 RX ORDER — GABAPENTIN 600 MG/1
600 TABLET ORAL
Qty: 90 TABLET | Refills: 1
Start: 2024-07-03 | End: 2024-12-30

## 2024-07-03 RX ORDER — LOSARTAN POTASSIUM 50 MG/1
50 TABLET ORAL DAILY
Qty: 90 TABLET | Refills: 2 | Status: SHIPPED | OUTPATIENT
Start: 2024-07-03

## 2024-07-03 ASSESSMENT — PATIENT HEALTH QUESTIONNAIRE - PHQ9
SUM OF ALL RESPONSES TO PHQ QUESTIONS 1-9: 0
SUM OF ALL RESPONSES TO PHQ9 QUESTIONS 1 & 2: 0
SUM OF ALL RESPONSES TO PHQ QUESTIONS 1-9: 0
SUM OF ALL RESPONSES TO PHQ QUESTIONS 1-9: 0
2. FEELING DOWN, DEPRESSED OR HOPELESS: NOT AT ALL
1. LITTLE INTEREST OR PLEASURE IN DOING THINGS: NOT AT ALL
SUM OF ALL RESPONSES TO PHQ QUESTIONS 1-9: 0

## 2024-07-03 NOTE — PROGRESS NOTES
Identified pt with two pt identifiers(name and ). Reviewed record in preparation for visit and have obtained necessary documentation.  Chief Complaint   Patient presents with    Follow-up    Medication Refill        Health Maintenance Due   Topic    HIV screen     Cervical cancer screen     Shingles vaccine (1 of 2)    Diabetic retinal exam     Pneumococcal 0-64 years Vaccine (2 of 2 - PCV)    Respiratory Syncytial Virus (RSV) Pregnant or age 60 yrs+ (1 - 1-dose 60+ series)    Diabetic foot exam     COVID-19 Vaccine (2023- season)    Breast cancer screen     Diabetic Alb to Cr ratio (uACR) test     Lipids      Vitals:    24 0859   BP: 135/83   Site: Left Upper Arm   Position: Sitting   Cuff Size: Large Adult   Pulse: 68   Resp: 16   Temp: 98.2 °F (36.8 °C)   TempSrc: Oral   SpO2: 98%   Weight: 78.2 kg (172 lb 6.4 oz)   Height: 1.651 m (5' 5\")      Pain Scale: 0 - No pain/10    Coordination of Care Questionnaire:  :   1. Have you been to the ER, urgent care clinic since your last visit?  Hospitalized since your last visit?No    2. Have you seen or consulted any other health care providers outside of the [facility] since your last visit?  Include any pap smears or colon screening. No    
pelvic floor muscle exercises to suppress urgency, modification of contributory medications/substances, and promotion of a healthy lifestyle and behaviors (eg, smoking cessation, weight loss for individuals with obesity). Will send to uro/gyn.  -continue trospium  Orders:  -     Lizbeth Andre MD, Urogynecology, [place]  3. Lumbar radiculopathy, chronic  Assessment & Plan:  S/p surgery. Will resume the gabapentin    Orders:  -     gabapentin (NEURONTIN) 600 MG tablet; Take 1 tablet by mouth nightly for 180 days. Max Daily Amount: 600 mg, Disp-90 tablet, R-1NO PRINT  4. Encounter for screening mammogram for malignant neoplasm of breast  -     JOSE DIGITAL SCREEN W OR WO CAD BILATERAL; Future        Follow up: 6 months. RTC to clinic sooner should symptoms persist, worsen or fail to improve as anticipated.    We discussed the expected course, resolution and complications of the diagnosis(es) in detail.  Medication risks, benefits, costs, interactions, and alternatives were discussed as indicated.  I advised to contact the office if his condition worsens, changes or fails to improve as anticipated. Pt expressed understanding with the diagnosis(es) and plan. Patient understands that this encounter was a temporary measure, and the importance of further follow up and examination was emphasized.  Patient verbalized understanding.      Signed By: Herrera Perez MD     July 3, 2024

## 2024-07-03 NOTE — ASSESSMENT & PLAN NOTE
Discussed pelvic floor muscle exercises to suppress urgency, modification of contributory medications/substances, and promotion of a healthy lifestyle and behaviors (eg, smoking cessation, weight loss for individuals with obesity). Will send to uro[facility]gyn.  [facility]

## 2024-07-08 DIAGNOSIS — M54.16 LUMBAR RADICULOPATHY, CHRONIC: ICD-10-CM

## 2024-07-08 RX ORDER — GABAPENTIN 600 MG/1
600 TABLET ORAL
Qty: 90 TABLET | Refills: 1 | Status: SHIPPED | OUTPATIENT
Start: 2024-07-08 | End: 2025-01-04

## 2024-07-08 NOTE — TELEPHONE ENCOUNTER
Interface down gabapentin (NEURONTIN) 600 MG tablet did not get received by pharmacy. Please resend.    **also call from patient requesting stating has not received the refill yet**    Previous refill encounter(s): 7/3/24 90 + 1 (not received by pharmacy)    Requested Prescriptions     Pending Prescriptions Disp Refills    gabapentin (NEURONTIN) 600 MG tablet 90 tablet 1     Sig: Take 1 tablet by mouth nightly for 180 days. Max Daily Amount: 600 mg     For Pharmacy Admin Tracking Only    Program: Medication Refill  CPA in place:    Recommendation Provided To:   Intervention Detail: New Rx: 1, reason: Patient Preference  Intervention Accepted By:   Gap Closed?:    Time Spent (min): 5

## 2024-07-08 NOTE — TELEPHONE ENCOUNTER
Interface down gabapentin (NEURONTIN) 600 MG tablet did not get received by pharmacy. Please resend.

## 2024-07-25 ENCOUNTER — HOSPITAL ENCOUNTER (OUTPATIENT)
Facility: HOSPITAL | Age: 64
Discharge: HOME | End: 2024-07-25
Attending: STUDENT IN AN ORGANIZED HEALTH CARE EDUCATION/TRAINING PROGRAM
Payer: COMMERCIAL

## 2024-07-25 VITALS — WEIGHT: 172 LBS | HEIGHT: 64 IN | BODY MASS INDEX: 29.37 KG/M2

## 2024-07-25 DIAGNOSIS — Z12.31 ENCOUNTER FOR SCREENING MAMMOGRAM FOR MALIGNANT NEOPLASM OF BREAST: ICD-10-CM

## 2024-07-25 PROCEDURE — 77067 SCR MAMMO BI INCL CAD: CPT

## 2024-07-27 ENCOUNTER — HOSPITAL ENCOUNTER (EMERGENCY)
Facility: HOSPITAL | Age: 64
Discharge: ANOTHER HOSPITAL | End: 2024-07-27
Attending: EMERGENCY MEDICINE
Payer: COMMERCIAL

## 2024-07-27 ENCOUNTER — HOSPITAL ENCOUNTER (INPATIENT)
Facility: HOSPITAL | Age: 64
LOS: 4 days | Discharge: HOME | End: 2024-07-31
Attending: INTERNAL MEDICINE | Admitting: INTERNAL MEDICINE
Payer: COMMERCIAL

## 2024-07-27 ENCOUNTER — APPOINTMENT (OUTPATIENT)
Facility: HOSPITAL | Age: 64
End: 2024-07-27
Payer: COMMERCIAL

## 2024-07-27 VITALS
HEART RATE: 105 BPM | OXYGEN SATURATION: 96 % | SYSTOLIC BLOOD PRESSURE: 115 MMHG | RESPIRATION RATE: 23 BRPM | TEMPERATURE: 98 F | DIASTOLIC BLOOD PRESSURE: 77 MMHG

## 2024-07-27 DIAGNOSIS — E87.20 LACTIC ACIDOSIS: ICD-10-CM

## 2024-07-27 DIAGNOSIS — N39.0 ACUTE UTI: ICD-10-CM

## 2024-07-27 DIAGNOSIS — I10 PRIMARY HYPERTENSION: ICD-10-CM

## 2024-07-27 DIAGNOSIS — N13.8 OBSTRUCTIVE NEPHROPATHY: Primary | ICD-10-CM

## 2024-07-27 DIAGNOSIS — R10.9 ABDOMINAL PAIN, UNSPECIFIED ABDOMINAL LOCATION: Primary | ICD-10-CM

## 2024-07-27 DIAGNOSIS — E86.0 DEHYDRATION: ICD-10-CM

## 2024-07-27 PROBLEM — A41.9 SEPSIS (HCC): Status: ACTIVE | Noted: 2024-07-27

## 2024-07-27 LAB
ALBUMIN SERPL-MCNC: 4.6 G/DL (ref 3.5–5)
ALBUMIN/GLOB SERPL: 1.1 (ref 1.1–2.2)
ALP SERPL-CCNC: 66 U/L (ref 45–117)
ALT SERPL-CCNC: 32 U/L (ref 12–78)
AMPHET UR QL SCN: NEGATIVE
ANION GAP BLD CALC-SCNC: 10 (ref 10–20)
ANION GAP SERPL CALC-SCNC: 20 MMOL/L (ref 5–15)
APPEARANCE UR: ABNORMAL
ARTERIAL PATENCY WRIST A: YES
AST SERPL-CCNC: 26 U/L (ref 15–37)
B-OH-BUTYR SERPL-SCNC: 0.43 MMOL/L
BACTERIA URNS QL MICRO: ABNORMAL /HPF
BARBITURATES UR QL SCN: NEGATIVE
BASE DEFICIT BLDA-SCNC: 2.1 MMOL/L
BASE DEFICIT BLDV-SCNC: 8.1 MMOL/L
BASOPHILS # BLD: 0 K/UL (ref 0–0.1)
BASOPHILS NFR BLD: 0 % (ref 0–1)
BDY SITE: ABNORMAL
BDY SITE: ABNORMAL
BENZODIAZ UR QL: NEGATIVE
BILIRUB SERPL-MCNC: 0.5 MG/DL (ref 0.2–1)
BILIRUB UR QL: NEGATIVE
BREATHS.SPONTANEOUS ON VENT: 16
BREATHS.SPONTANEOUS ON VENT: 20
BUN SERPL-MCNC: 23 MG/DL (ref 6–20)
BUN/CREAT SERPL: 9 (ref 12–20)
CA-I BLD-MCNC: 1.24 MMOL/L (ref 1.12–1.32)
CALCIUM SERPL-MCNC: 9.3 MG/DL (ref 8.5–10.1)
CANNABINOIDS UR QL SCN: NEGATIVE
CHLORIDE BLD-SCNC: 107 MMOL/L (ref 100–108)
CHLORIDE SERPL-SCNC: 99 MMOL/L (ref 97–108)
CO2 BLD-SCNC: 24 MMOL/L (ref 19–24)
CO2 SERPL-SCNC: 21 MMOL/L (ref 21–32)
COCAINE UR QL SCN: NEGATIVE
COLOR UR: ABNORMAL
CREAT SERPL-MCNC: 2.51 MG/DL (ref 0.55–1.02)
CREAT UR-MCNC: 1.6 MG/DL (ref 0.6–1.3)
DIFFERENTIAL METHOD BLD: ABNORMAL
EOSINOPHIL # BLD: 0 K/UL (ref 0–0.4)
EOSINOPHIL NFR BLD: 0 % (ref 0–7)
EPITH CASTS URNS QL MICRO: ABNORMAL /LPF
ERYTHROCYTE [DISTWIDTH] IN BLOOD BY AUTOMATED COUNT: 17.2 % (ref 11.5–14.5)
GLOBULIN SER CALC-MCNC: 4.3 G/DL (ref 2–4)
GLUCOSE BLD STRIP.AUTO-MCNC: 190 MG/DL (ref 65–117)
GLUCOSE BLD STRIP.AUTO-MCNC: 334 MG/DL (ref 74–99)
GLUCOSE SERPL-MCNC: 344 MG/DL (ref 65–100)
GLUCOSE UR STRIP.AUTO-MCNC: >1000 MG/DL
HCO3 BLDA-SCNC: 23 MMOL/L (ref 22–26)
HCO3 BLDV-SCNC: 19 MMOL/L (ref 23–28)
HCT VFR BLD AUTO: 41.7 % (ref 35–47)
HGB BLD-MCNC: 12.3 G/DL (ref 11.5–16)
HGB UR QL STRIP: ABNORMAL
IMM GRANULOCYTES # BLD AUTO: 0 K/UL
IMM GRANULOCYTES NFR BLD AUTO: 0 %
KETONES UR QL STRIP.AUTO: NEGATIVE MG/DL
LACTATE BLD-SCNC: 6.8 MMOL/L (ref 0.4–2)
LACTATE SERPL-SCNC: 1.7 MMOL/L (ref 0.4–2)
LACTATE SERPL-SCNC: 4.5 MMOL/L (ref 0.4–2)
LEUKOCYTE ESTERASE UR QL STRIP.AUTO: ABNORMAL
LYMPHOCYTES # BLD: 1.4 K/UL (ref 0.8–3.5)
LYMPHOCYTES NFR BLD: 10 % (ref 12–49)
Lab: ABNORMAL
MCH RBC QN AUTO: 21.4 PG (ref 26–34)
MCHC RBC AUTO-ENTMCNC: 29.5 G/DL (ref 30–36.5)
MCV RBC AUTO: 72.5 FL (ref 80–99)
METHADONE UR QL: NEGATIVE
MONOCYTES # BLD: 0.4 K/UL (ref 0–1)
MONOCYTES NFR BLD: 3 % (ref 5–13)
NEUTS BAND NFR BLD MANUAL: 1 % (ref 0–6)
NEUTS SEG # BLD: 12.5 K/UL (ref 1.8–8)
NEUTS SEG NFR BLD: 86 % (ref 32–75)
NITRITE UR QL STRIP.AUTO: POSITIVE
NRBC # BLD: 0 K/UL (ref 0–0.01)
NRBC BLD-RTO: 0 PER 100 WBC
OPIATES UR QL: POSITIVE
PCO2 BLDA: 38 MMHG (ref 35–45)
PCO2 BLDV: 46.2 MMHG (ref 41–51)
PCP UR QL: NEGATIVE
PH BLDA: 7.39 (ref 7.35–7.45)
PH BLDV: 7.24 (ref 7.32–7.42)
PH UR STRIP: 8.5 (ref 5–8)
PLATELET # BLD AUTO: 255 K/UL (ref 150–400)
PMV BLD AUTO: 9.2 FL (ref 8.9–12.9)
PO2 BLDA: 55 MMHG (ref 80–100)
PO2 BLDV: 30 MMHG (ref 25–40)
POTASSIUM BLD-SCNC: 4.3 MMOL/L (ref 3.5–5.5)
POTASSIUM SERPL-SCNC: 3.9 MMOL/L (ref 3.5–5.1)
PROT SERPL-MCNC: 8.9 G/DL (ref 6.4–8.2)
PROT UR STRIP-MCNC: ABNORMAL MG/DL
RBC # BLD AUTO: 5.75 M/UL (ref 3.8–5.2)
RBC #/AREA URNS HPF: ABNORMAL /HPF (ref 0–5)
RBC MORPH BLD: ABNORMAL
RBC MORPH BLD: ABNORMAL
SAO2 % BLD: 88 % (ref 92–97)
SAO2 % BLDV: 46 % (ref 65–88)
SAO2% DEVICE SAO2% SENSOR NAME: ABNORMAL
SAO2% DEVICE SAO2% SENSOR NAME: ABNORMAL
SERVICE CMNT-IMP: ABNORMAL
SERVICE CMNT-IMP: ABNORMAL
SODIUM BLD-SCNC: 141 MMOL/L (ref 136–145)
SODIUM SERPL-SCNC: 140 MMOL/L (ref 136–145)
SP GR UR REFRACTOMETRY: 1.02
SPECIMEN SITE: ABNORMAL
TROPONIN I SERPL HS-MCNC: 20 NG/L (ref 0–51)
URINE CULTURE IF INDICATED: ABNORMAL
UROBILINOGEN UR QL STRIP.AUTO: 0.2 EU/DL (ref 0.2–1)
WBC # BLD AUTO: 14.3 K/UL (ref 3.6–11)
WBC URNS QL MICRO: ABNORMAL /HPF (ref 0–4)

## 2024-07-27 PROCEDURE — 1100000000 HC RM PRIVATE

## 2024-07-27 PROCEDURE — 2700000000 HC OXYGEN THERAPY PER DAY

## 2024-07-27 PROCEDURE — 85025 COMPLETE CBC W/AUTO DIFF WBC: CPT

## 2024-07-27 PROCEDURE — 96375 TX/PRO/DX INJ NEW DRUG ADDON: CPT

## 2024-07-27 PROCEDURE — 84484 ASSAY OF TROPONIN QUANT: CPT

## 2024-07-27 PROCEDURE — 87086 URINE CULTURE/COLONY COUNT: CPT

## 2024-07-27 PROCEDURE — 82962 GLUCOSE BLOOD TEST: CPT

## 2024-07-27 PROCEDURE — 36415 COLL VENOUS BLD VENIPUNCTURE: CPT

## 2024-07-27 PROCEDURE — 96361 HYDRATE IV INFUSION ADD-ON: CPT

## 2024-07-27 PROCEDURE — 87040 BLOOD CULTURE FOR BACTERIA: CPT

## 2024-07-27 PROCEDURE — 82010 KETONE BODYS QUAN: CPT

## 2024-07-27 PROCEDURE — 6370000000 HC RX 637 (ALT 250 FOR IP): Performed by: INTERNAL MEDICINE

## 2024-07-27 PROCEDURE — 82803 BLOOD GASES ANY COMBINATION: CPT

## 2024-07-27 PROCEDURE — 96365 THER/PROPH/DIAG IV INF INIT: CPT

## 2024-07-27 PROCEDURE — 81001 URINALYSIS AUTO W/SCOPE: CPT

## 2024-07-27 PROCEDURE — 6360000002 HC RX W HCPCS: Performed by: EMERGENCY MEDICINE

## 2024-07-27 PROCEDURE — 6370000000 HC RX 637 (ALT 250 FOR IP): Performed by: EMERGENCY MEDICINE

## 2024-07-27 PROCEDURE — 99285 EMERGENCY DEPT VISIT HI MDM: CPT

## 2024-07-27 PROCEDURE — 36600 WITHDRAWAL OF ARTERIAL BLOOD: CPT

## 2024-07-27 PROCEDURE — 80053 COMPREHEN METABOLIC PANEL: CPT

## 2024-07-27 PROCEDURE — 6360000002 HC RX W HCPCS: Performed by: INTERNAL MEDICINE

## 2024-07-27 PROCEDURE — 83605 ASSAY OF LACTIC ACID: CPT

## 2024-07-27 PROCEDURE — 96360 HYDRATION IV INFUSION INIT: CPT

## 2024-07-27 PROCEDURE — 2580000003 HC RX 258: Performed by: INTERNAL MEDICINE

## 2024-07-27 PROCEDURE — 80307 DRUG TEST PRSMV CHEM ANLYZR: CPT

## 2024-07-27 PROCEDURE — 74176 CT ABD & PELVIS W/O CONTRAST: CPT

## 2024-07-27 PROCEDURE — 2580000003 HC RX 258: Performed by: EMERGENCY MEDICINE

## 2024-07-27 PROCEDURE — 80047 BASIC METABLC PNL IONIZED CA: CPT

## 2024-07-27 RX ORDER — ASPIRIN 81 MG/1
81 TABLET, CHEWABLE ORAL DAILY
Status: DISCONTINUED | OUTPATIENT
Start: 2024-07-28 | End: 2024-07-31 | Stop reason: HOSPADM

## 2024-07-27 RX ORDER — ROSUVASTATIN CALCIUM 10 MG/1
10 TABLET, COATED ORAL NIGHTLY
Status: DISCONTINUED | OUTPATIENT
Start: 2024-07-27 | End: 2024-07-31 | Stop reason: HOSPADM

## 2024-07-27 RX ORDER — TROSPIUM CHLORIDE 20 MG/1
20 TABLET, FILM COATED ORAL NIGHTLY
Status: DISCONTINUED | OUTPATIENT
Start: 2024-07-27 | End: 2024-07-28

## 2024-07-27 RX ORDER — HEPARIN SODIUM 5000 [USP'U]/ML
5000 INJECTION, SOLUTION INTRAVENOUS; SUBCUTANEOUS EVERY 8 HOURS SCHEDULED
Status: DISCONTINUED | OUTPATIENT
Start: 2024-07-27 | End: 2024-07-31 | Stop reason: HOSPADM

## 2024-07-27 RX ORDER — ACETAMINOPHEN 650 MG/1
650 SUPPOSITORY RECTAL EVERY 6 HOURS PRN
Status: DISCONTINUED | OUTPATIENT
Start: 2024-07-27 | End: 2024-07-31 | Stop reason: HOSPADM

## 2024-07-27 RX ORDER — SODIUM CHLORIDE, SODIUM LACTATE, POTASSIUM CHLORIDE, AND CALCIUM CHLORIDE .6; .31; .03; .02 G/100ML; G/100ML; G/100ML; G/100ML
1000 INJECTION, SOLUTION INTRAVENOUS ONCE
Status: COMPLETED | OUTPATIENT
Start: 2024-07-27 | End: 2024-07-27

## 2024-07-27 RX ORDER — INSULIN GLARGINE 100 [IU]/ML
20 INJECTION, SOLUTION SUBCUTANEOUS DAILY
Status: DISCONTINUED | OUTPATIENT
Start: 2024-07-28 | End: 2024-07-31 | Stop reason: HOSPADM

## 2024-07-27 RX ORDER — SODIUM CHLORIDE, SODIUM LACTATE, POTASSIUM CHLORIDE, CALCIUM CHLORIDE 600; 310; 30; 20 MG/100ML; MG/100ML; MG/100ML; MG/100ML
INJECTION, SOLUTION INTRAVENOUS CONTINUOUS
Status: DISCONTINUED | OUTPATIENT
Start: 2024-07-27 | End: 2024-07-31 | Stop reason: HOSPADM

## 2024-07-27 RX ORDER — DEXTROSE MONOHYDRATE 100 MG/ML
INJECTION, SOLUTION INTRAVENOUS CONTINUOUS PRN
Status: DISCONTINUED | OUTPATIENT
Start: 2024-07-27 | End: 2024-07-31 | Stop reason: HOSPADM

## 2024-07-27 RX ORDER — INSULIN LISPRO 100 [IU]/ML
0-8 INJECTION, SOLUTION INTRAVENOUS; SUBCUTANEOUS
Status: DISCONTINUED | OUTPATIENT
Start: 2024-07-28 | End: 2024-07-31 | Stop reason: HOSPADM

## 2024-07-27 RX ORDER — ONDANSETRON 2 MG/ML
4 INJECTION INTRAMUSCULAR; INTRAVENOUS EVERY 6 HOURS PRN
Status: DISCONTINUED | OUTPATIENT
Start: 2024-07-27 | End: 2024-07-27 | Stop reason: HOSPADM

## 2024-07-27 RX ORDER — SODIUM CHLORIDE 0.9 % (FLUSH) 0.9 %
5-40 SYRINGE (ML) INJECTION EVERY 12 HOURS SCHEDULED
Status: DISCONTINUED | OUTPATIENT
Start: 2024-07-27 | End: 2024-07-31 | Stop reason: HOSPADM

## 2024-07-27 RX ORDER — SODIUM CHLORIDE 9 MG/ML
INJECTION, SOLUTION INTRAVENOUS PRN
Status: DISCONTINUED | OUTPATIENT
Start: 2024-07-27 | End: 2024-07-31 | Stop reason: HOSPADM

## 2024-07-27 RX ORDER — INSULIN LISPRO 100 [IU]/ML
0-4 INJECTION, SOLUTION INTRAVENOUS; SUBCUTANEOUS NIGHTLY
Status: DISCONTINUED | OUTPATIENT
Start: 2024-07-27 | End: 2024-07-31 | Stop reason: HOSPADM

## 2024-07-27 RX ORDER — OXYCODONE HYDROCHLORIDE 5 MG/1
5 TABLET ORAL EVERY 4 HOURS PRN
Status: DISCONTINUED | OUTPATIENT
Start: 2024-07-27 | End: 2024-07-31 | Stop reason: HOSPADM

## 2024-07-27 RX ORDER — GABAPENTIN 600 MG/1
600 TABLET ORAL
Status: DISCONTINUED | OUTPATIENT
Start: 2024-07-27 | End: 2024-07-31 | Stop reason: HOSPADM

## 2024-07-27 RX ORDER — ACETAMINOPHEN 500 MG
1000 TABLET ORAL
Status: COMPLETED | OUTPATIENT
Start: 2024-07-27 | End: 2024-07-27

## 2024-07-27 RX ORDER — SODIUM CHLORIDE 0.9 % (FLUSH) 0.9 %
5-40 SYRINGE (ML) INJECTION PRN
Status: DISCONTINUED | OUTPATIENT
Start: 2024-07-27 | End: 2024-07-31 | Stop reason: HOSPADM

## 2024-07-27 RX ORDER — ACETAMINOPHEN 325 MG/1
650 TABLET ORAL EVERY 6 HOURS PRN
Status: DISCONTINUED | OUTPATIENT
Start: 2024-07-27 | End: 2024-07-31 | Stop reason: HOSPADM

## 2024-07-27 RX ORDER — HYDROMORPHONE HYDROCHLORIDE 1 MG/ML
1 INJECTION, SOLUTION INTRAMUSCULAR; INTRAVENOUS; SUBCUTANEOUS EVERY 4 HOURS PRN
Status: DISCONTINUED | OUTPATIENT
Start: 2024-07-27 | End: 2024-07-31 | Stop reason: HOSPADM

## 2024-07-27 RX ORDER — MORPHINE SULFATE 4 MG/ML
4 INJECTION, SOLUTION INTRAMUSCULAR; INTRAVENOUS
Status: COMPLETED | OUTPATIENT
Start: 2024-07-27 | End: 2024-07-27

## 2024-07-27 RX ADMIN — MORPHINE SULFATE 4 MG: 4 INJECTION INTRAVENOUS at 10:09

## 2024-07-27 RX ADMIN — SODIUM CHLORIDE, POTASSIUM CHLORIDE, SODIUM LACTATE AND CALCIUM CHLORIDE 1000 ML: 600; 310; 30; 20 INJECTION, SOLUTION INTRAVENOUS at 10:09

## 2024-07-27 RX ADMIN — SODIUM CHLORIDE, POTASSIUM CHLORIDE, SODIUM LACTATE AND CALCIUM CHLORIDE: 600; 310; 30; 20 INJECTION, SOLUTION INTRAVENOUS at 22:30

## 2024-07-27 RX ADMIN — ONDANSETRON 4 MG: 2 INJECTION INTRAMUSCULAR; INTRAVENOUS at 10:08

## 2024-07-27 RX ADMIN — CEFEPIME 2000 MG: 2 INJECTION, POWDER, FOR SOLUTION INTRAVENOUS at 13:21

## 2024-07-27 RX ADMIN — INSULIN HUMAN 8 UNITS: 100 INJECTION, SOLUTION PARENTERAL at 13:56

## 2024-07-27 RX ADMIN — HEPARIN SODIUM 5000 UNITS: 5000 INJECTION INTRAVENOUS; SUBCUTANEOUS at 22:24

## 2024-07-27 RX ADMIN — SODIUM CHLORIDE, POTASSIUM CHLORIDE, SODIUM LACTATE AND CALCIUM CHLORIDE 1000 ML: 600; 310; 30; 20 INJECTION, SOLUTION INTRAVENOUS at 17:35

## 2024-07-27 RX ADMIN — TROSPIUM CHLORIDE 20 MG: 20 TABLET, FILM COATED ORAL at 22:24

## 2024-07-27 RX ADMIN — SODIUM CHLORIDE, POTASSIUM CHLORIDE, SODIUM LACTATE AND CALCIUM CHLORIDE 1000 ML: 600; 310; 30; 20 INJECTION, SOLUTION INTRAVENOUS at 13:56

## 2024-07-27 RX ADMIN — ACETAMINOPHEN 1000 MG: 500 TABLET ORAL at 09:25

## 2024-07-27 RX ADMIN — GABAPENTIN 600 MG: 600 TABLET, FILM COATED ORAL at 22:25

## 2024-07-27 RX ADMIN — ROSUVASTATIN 10 MG: 10 TABLET, FILM COATED ORAL at 22:24

## 2024-07-27 RX ADMIN — HYDROMORPHONE HYDROCHLORIDE 1 MG: 1 INJECTION, SOLUTION INTRAMUSCULAR; INTRAVENOUS; SUBCUTANEOUS at 22:24

## 2024-07-27 ASSESSMENT — PAIN SCALES - GENERAL: PAINLEVEL_OUTOF10: 8

## 2024-07-27 NOTE — ED PROVIDER NOTES
CT shows significant distention of the bladder with hydronephrosis bilaterally.  Will place Kelsey catheter on return from CT scanner, plan admission for obstructive nephropathy [WB]   1231 Kelsey successfully placed [WB]   1239 Urine appears infected, chart review she had ESBL in 2017, susceptible to cefepime.  Will draw blood cultures and give dose of IV cefepime. [WB]   1306 Spoke with Dr Lopez, hospitalist. Is requesting I speak with urology to determine need for inpatient urology evaluation.  [WB]   1310 Patient saw uro gynecologist, Monica Pierson [WB]   1321 Bicarb 19, pH 7.24 pending beta hydroxybutyrate [WB]   1343 She has received IV fluids, will give additional IV fluids, insyulin bolis pending lactic acid and beta hydroxybutyrate result [WB]   1348 Spoke with Urology, Dr Grayson. Will reconsult hospitalist [WB]   1420 Lactic acid 6.8, she has received approximately 2 L IV fluid.  Will continue fluid resuscitation with an additional liter IV fluid [WB]   1438 Will update [facility] hospitalist with lab abnormalities [WB]   1445 Blood pressure remains stable 140/80, mildly tachycardic 100s [WB]   1503 SOURCE, [id number]: VENOUS BLOOD [WB]   1509 But hydroxybutyrate is very minimally elevated at 0.43 I think this is very unlikely to be contributing to her anion gap acidosis, will trend glucose [WB]   1529 pH 7.39 on ABG PaO2 55 and O2 sat of 80% at the time, patient satting 98% throughout ED stay without complaints of dyspnea [WB]   1548 Formal lactic is 4.5 [WB]   1549 Patient's oxygen saturation is 98%, placed on 2 L nasal cannula.  I suspect her hypoxemia may be due to impaired oxygen delivery in the setting of sepsis from from urinary origin [WB]      ED Course User Index  [WB] Bobby Ramirez MD     FINAL IMPRESSION     1. Obstructive nephropathy    2. Acute UTI    3. Lactic acidosis    4. Dehydration          DISPOSITION/PLAN     CLINICAL IMPRESSION    Transfer: The patient is being transferred to [facility] for

## 2024-07-27 NOTE — ED NOTES
Attempted to call report to [place].  
Bladder Scan: 1264 ml of urine  
Pt presents to ED via wheelchair with EMS complaining of lower abdominal cramping without n/v/d since last night. Pt also reports urinary output has been less than usual. Pt only reports hx with abdomen is . Pt is alert and oriented x 4, RR even and unlabored, skin is warm and dry. Assessment completed and pt updated on plan of care.  Call bell in reach.      Patient arrives via wheelchair with EMS from home with lower abdominal cramping without n/v/d since last night. She states that she normally has OAB but since yesterday her urine output has been less. Patient reports only hx with abdomen is .       Emergency Department Nursing Plan of Care       The Nursing Plan of Care is developed from the Nursing assessment and Emergency Department Attending provider initial evaluation.  The plan of care may be reviewed in the “ED Provider note”.    The Plan of Care was developed with the following considerations:   Patient / Family readiness to learn indicated by:verbalized understanding  Persons(s) to be included in education: patient  Barriers to Learning/Limitations:None    Signed      
Grossly Intact

## 2024-07-27 NOTE — ED TRIAGE NOTES
Patient arrives via wheelchair with EMS from home with lower abdominal cramping without n/v/d since last night. She states that she normally has OAB but since yesterday her urine output has been less. Patient reports only hx with abdomen is .

## 2024-07-28 ENCOUNTER — APPOINTMENT (OUTPATIENT)
Facility: HOSPITAL | Age: 64
End: 2024-07-28
Attending: INTERNAL MEDICINE
Payer: COMMERCIAL

## 2024-07-28 LAB
ACCESSION NUMBER, LLC1M: ABNORMAL
ACINETOBACTER CALCOAC BAUMANNII COMPLEX BY PCR: NOT DETECTED
ALBUMIN SERPL-MCNC: 3.6 G/DL (ref 3.5–5)
ALBUMIN/GLOB SERPL: 1.1 (ref 1.1–2.2)
ALP SERPL-CCNC: 51 U/L (ref 45–117)
ALT SERPL-CCNC: 33 U/L (ref 12–78)
ANION GAP SERPL CALC-SCNC: 3 MMOL/L (ref 5–15)
AST SERPL-CCNC: 66 U/L (ref 15–37)
B FRAGILIS DNA BLD POS QL NAA+NON-PROBE: NOT DETECTED
BACTERIA SPEC CULT: ABNORMAL
BACTERIA SPEC CULT: ABNORMAL
BACTERIA SPEC CULT: NORMAL
BASOPHILS # BLD: 0 K/UL (ref 0–0.1)
BASOPHILS NFR BLD: 0 % (ref 0–1)
BILIRUB SERPL-MCNC: 0.7 MG/DL (ref 0.2–1)
BIOFIRE TEST COMMENT: ABNORMAL
BLACTX-M ISLT/SPM QL: NOT DETECTED
BLAIMP ISLT/SPM QL: NOT DETECTED
BLAKPC ISLT/SPM QL: NOT DETECTED
BLAOXA-48-LIKE ISLT/SPM QL: NOT DETECTED
BLAVIM ISLT/SPM QL: NOT DETECTED
BUN SERPL-MCNC: 23 MG/DL (ref 6–20)
BUN/CREAT SERPL: 17 (ref 12–20)
C ALBICANS DNA BLD POS QL NAA+NON-PROBE: NOT DETECTED
C AURIS DNA BLD POS QL NAA+NON-PROBE: NOT DETECTED
C GATTII+NEOFOR DNA BLD POS QL NAA+N-PRB: NOT DETECTED
C GLABRATA DNA BLD POS QL NAA+NON-PROBE: NOT DETECTED
C KRUSEI DNA BLD POS QL NAA+NON-PROBE: NOT DETECTED
C PARAP DNA BLD POS QL NAA+NON-PROBE: NOT DETECTED
C TROPICLS DNA BLD POS QL NAA+NON-PROBE: NOT DETECTED
CALCIUM SERPL-MCNC: 9.4 MG/DL (ref 8.5–10.1)
CC UR VC: ABNORMAL
CHLORIDE SERPL-SCNC: 105 MMOL/L (ref 97–108)
CO2 SERPL-SCNC: 29 MMOL/L (ref 21–32)
COMMENT:: NORMAL
CREAT SERPL-MCNC: 1.35 MG/DL (ref 0.55–1.02)
DIFFERENTIAL METHOD BLD: ABNORMAL
E CLOAC COMP DNA BLD POS NAA+NON-PROBE: NOT DETECTED
E COLI DNA BLD POS QL NAA+NON-PROBE: NOT DETECTED
E FAECALIS DNA BLD POS QL NAA+NON-PROBE: NOT DETECTED
E FAECIUM DNA BLD POS QL NAA+NON-PROBE: NOT DETECTED
ENTEROBACTERALES DNA BLD POS NAA+N-PRB: DETECTED
EOSINOPHIL # BLD: 0 K/UL (ref 0–0.4)
EOSINOPHIL NFR BLD: 0 % (ref 0–7)
ERYTHROCYTE [DISTWIDTH] IN BLOOD BY AUTOMATED COUNT: 16.8 % (ref 11.5–14.5)
EST. AVERAGE GLUCOSE BLD GHB EST-MCNC: 166 MG/DL
GLOBULIN SER CALC-MCNC: 3.4 G/DL (ref 2–4)
GLUCOSE BLD STRIP.AUTO-MCNC: 119 MG/DL (ref 65–117)
GLUCOSE BLD STRIP.AUTO-MCNC: 66 MG/DL (ref 65–117)
GLUCOSE BLD STRIP.AUTO-MCNC: 70 MG/DL (ref 65–117)
GLUCOSE BLD STRIP.AUTO-MCNC: 89 MG/DL (ref 65–117)
GLUCOSE BLD STRIP.AUTO-MCNC: 90 MG/DL (ref 65–117)
GLUCOSE SERPL-MCNC: 142 MG/DL (ref 65–100)
GP B STREP DNA BLD POS QL NAA+NON-PROBE: NOT DETECTED
HAEM INFLU DNA BLD POS QL NAA+NON-PROBE: NOT DETECTED
HBA1C MFR BLD: 7.4 % (ref 4–5.6)
HCT VFR BLD AUTO: 36.9 % (ref 35–47)
HGB BLD-MCNC: 11.4 G/DL (ref 11.5–16)
IMM GRANULOCYTES # BLD AUTO: 0 K/UL
IMM GRANULOCYTES NFR BLD AUTO: 0 %
K OXYTOCA DNA BLD POS QL NAA+NON-PROBE: NOT DETECTED
KLEBSIELLA SP DNA BLD POS QL NAA+NON-PRB: NOT DETECTED
KLEBSIELLA SP DNA BLD POS QL NAA+NON-PRB: NOT DETECTED
L MONOCYTOG DNA BLD POS QL NAA+NON-PROBE: NOT DETECTED
LACTATE SERPL-SCNC: 2 MMOL/L (ref 0.4–2)
LIPASE SERPL-CCNC: 62 U/L (ref 13–75)
LYMPHOCYTES # BLD: 0.8 K/UL (ref 0.8–3.5)
LYMPHOCYTES NFR BLD: 6 % (ref 12–49)
MAGNESIUM SERPL-MCNC: 2 MG/DL (ref 1.6–2.4)
MCH RBC QN AUTO: 22 PG (ref 26–34)
MCHC RBC AUTO-ENTMCNC: 30.9 G/DL (ref 30–36.5)
MCV RBC AUTO: 71.1 FL (ref 80–99)
MONOCYTES # BLD: 1.1 K/UL (ref 0–1)
MONOCYTES NFR BLD: 8 % (ref 5–13)
N MEN DNA BLD POS QL NAA+NON-PROBE: NOT DETECTED
NEUTS SEG # BLD: 11.9 K/UL (ref 1.8–8)
NEUTS SEG NFR BLD: 86 % (ref 32–75)
NRBC # BLD: 0 K/UL (ref 0–0.01)
NRBC BLD-RTO: 0 PER 100 WBC
NT PRO BNP: 574 PG/ML
P AERUGINOSA DNA BLD POS NAA+NON-PROBE: NOT DETECTED
PHOSPHATE SERPL-MCNC: 3 MG/DL (ref 2.6–4.7)
PLATELET # BLD AUTO: 202 K/UL (ref 150–400)
PMV BLD AUTO: 10.2 FL (ref 8.9–12.9)
POTASSIUM SERPL-SCNC: 5.2 MMOL/L (ref 3.5–5.1)
PROT SERPL-MCNC: 7 G/DL (ref 6.4–8.2)
PROTEUS SP DNA BLD POS QL NAA+NON-PROBE: NOT DETECTED
RBC # BLD AUTO: 5.19 M/UL (ref 3.8–5.2)
RBC MORPH BLD: ABNORMAL
RESISTANT GENE NDM BY PCR: NOT DETECTED
RESISTANT GENE TARGETS: ABNORMAL
S AUREUS DNA BLD POS QL NAA+NON-PROBE: NOT DETECTED
S AUREUS+CONS DNA BLD POS NAA+NON-PROBE: NOT DETECTED
S EPIDERMIDIS DNA BLD POS QL NAA+NON-PRB: NOT DETECTED
S LUGDUNENSIS DNA BLD POS QL NAA+NON-PRB: NOT DETECTED
S MALTOPHILIA DNA BLD POS QL NAA+NON-PRB: NOT DETECTED
S MARCESCENS DNA BLD POS NAA+NON-PROBE: NOT DETECTED
S PNEUM DNA BLD POS QL NAA+NON-PROBE: NOT DETECTED
S PYO DNA BLD POS QL NAA+NON-PROBE: NOT DETECTED
SALMONELLA DNA BLD POS QL NAA+NON-PROBE: NOT DETECTED
SERVICE CMNT-IMP: ABNORMAL
SERVICE CMNT-IMP: NORMAL
SODIUM SERPL-SCNC: 137 MMOL/L (ref 136–145)
SPECIMEN HOLD: NORMAL
STREPTOCOCCUS DNA BLD POS NAA+NON-PROBE: NOT DETECTED
TROPONIN I SERPL HS-MCNC: 30 NG/L (ref 0–51)
TROPONIN I SERPL HS-MCNC: 35 NG/L (ref 0–51)
TSH SERPL DL<=0.05 MIU/L-ACNC: 0.56 UIU/ML (ref 0.36–3.74)
WBC # BLD AUTO: 13.8 K/UL (ref 3.6–11)

## 2024-07-28 PROCEDURE — 84484 ASSAY OF TROPONIN QUANT: CPT

## 2024-07-28 PROCEDURE — 51702 INSERT TEMP BLADDER CATH: CPT

## 2024-07-28 PROCEDURE — 99222 1ST HOSP IP/OBS MODERATE 55: CPT | Performed by: INTERNAL MEDICINE

## 2024-07-28 PROCEDURE — 84100 ASSAY OF PHOSPHORUS: CPT

## 2024-07-28 PROCEDURE — 36415 COLL VENOUS BLD VENIPUNCTURE: CPT

## 2024-07-28 PROCEDURE — 6360000002 HC RX W HCPCS: Performed by: INTERNAL MEDICINE

## 2024-07-28 PROCEDURE — 83735 ASSAY OF MAGNESIUM: CPT

## 2024-07-28 PROCEDURE — 80053 COMPREHEN METABOLIC PANEL: CPT

## 2024-07-28 PROCEDURE — 83880 ASSAY OF NATRIURETIC PEPTIDE: CPT

## 2024-07-28 PROCEDURE — 83690 ASSAY OF LIPASE: CPT

## 2024-07-28 PROCEDURE — 6370000000 HC RX 637 (ALT 250 FOR IP): Performed by: INTERNAL MEDICINE

## 2024-07-28 PROCEDURE — 83036 HEMOGLOBIN GLYCOSYLATED A1C: CPT

## 2024-07-28 PROCEDURE — 84443 ASSAY THYROID STIM HORMONE: CPT

## 2024-07-28 PROCEDURE — 82962 GLUCOSE BLOOD TEST: CPT

## 2024-07-28 PROCEDURE — 83605 ASSAY OF LACTIC ACID: CPT

## 2024-07-28 PROCEDURE — 1100000000 HC RM PRIVATE

## 2024-07-28 PROCEDURE — 71045 X-RAY EXAM CHEST 1 VIEW: CPT

## 2024-07-28 PROCEDURE — 2580000003 HC RX 258: Performed by: INTERNAL MEDICINE

## 2024-07-28 PROCEDURE — 85025 COMPLETE CBC W/AUTO DIFF WBC: CPT

## 2024-07-28 RX ADMIN — ROSUVASTATIN 10 MG: 10 TABLET, FILM COATED ORAL at 20:22

## 2024-07-28 RX ADMIN — ASPIRIN 81 MG CHEWABLE TABLET 81 MG: 81 TABLET CHEWABLE at 09:33

## 2024-07-28 RX ADMIN — HEPARIN SODIUM 5000 UNITS: 5000 INJECTION INTRAVENOUS; SUBCUTANEOUS at 06:07

## 2024-07-28 RX ADMIN — ACETAMINOPHEN 650 MG: 325 TABLET ORAL at 20:22

## 2024-07-28 RX ADMIN — CEFEPIME 2000 MG: 2 INJECTION, POWDER, FOR SOLUTION INTRAVENOUS at 01:31

## 2024-07-28 RX ADMIN — SODIUM CHLORIDE, POTASSIUM CHLORIDE, SODIUM LACTATE AND CALCIUM CHLORIDE: 600; 310; 30; 20 INJECTION, SOLUTION INTRAVENOUS at 06:08

## 2024-07-28 RX ADMIN — INSULIN GLARGINE 20 UNITS: 100 INJECTION, SOLUTION SUBCUTANEOUS at 09:33

## 2024-07-28 RX ADMIN — SODIUM CHLORIDE, PRESERVATIVE FREE 10 ML: 5 INJECTION INTRAVENOUS at 09:34

## 2024-07-28 RX ADMIN — SODIUM CHLORIDE, POTASSIUM CHLORIDE, SODIUM LACTATE AND CALCIUM CHLORIDE: 600; 310; 30; 20 INJECTION, SOLUTION INTRAVENOUS at 15:06

## 2024-07-28 RX ADMIN — HEPARIN SODIUM 5000 UNITS: 5000 INJECTION INTRAVENOUS; SUBCUTANEOUS at 13:30

## 2024-07-28 RX ADMIN — CEFEPIME 2000 MG: 2 INJECTION, POWDER, FOR SOLUTION INTRAVENOUS at 13:27

## 2024-07-28 RX ADMIN — HEPARIN SODIUM 5000 UNITS: 5000 INJECTION INTRAVENOUS; SUBCUTANEOUS at 20:23

## 2024-07-28 RX ADMIN — OXYCODONE HYDROCHLORIDE 5 MG: 5 TABLET ORAL at 09:37

## 2024-07-28 RX ADMIN — GABAPENTIN 600 MG: 600 TABLET, FILM COATED ORAL at 20:22

## 2024-07-28 ASSESSMENT — PAIN DESCRIPTION - LOCATION: LOCATION: FLANK

## 2024-07-28 ASSESSMENT — PAIN SCALES - GENERAL: PAINLEVEL_OUTOF10: 5

## 2024-07-28 ASSESSMENT — PAIN DESCRIPTION - ORIENTATION: ORIENTATION: RIGHT;LEFT

## 2024-07-28 NOTE — CONSULTS
Urology Consult    Subjective:     Date of Consultation:  July 28, 2024    Referring Physician: James    Reason for Consultation:  Retention with hydro    Patient Name: Alicia Brooke  MRN: [id number]    History of Present Illness:   Patient is a 64 y.o.  female who is being seen for urinary retention and bilateral hydronephrosis. She was admitted to the hospital for Sepsis (HCC) [A41.9].  Hx includes OAB treated with Trospium. Presented with abdominal pain, difficulty urinating and found to have a distended bladder with bilateral hydro. Patient also with complaints of constipation.     Past Medical History:   Diagnosis Date    (HFpEF) heart failure with preserved ejection fraction (HCC)     Arrhythmia     HEART MURMUR -\"HAD IT FOR YEARS AS AN ADULT)    Arthritis     OSTEO    Back pain     Breast cancer screening 6/28/2016    Chronic bilateral low back pain without sciatica 6/28/2016    Diabetes ([facility])     Encounter for long-term (current) use of medications 6/28/2016    Essential hypertension with goal blood pressure less than 140/90 6/28/2016    Hypercholesteremia 8/6/2015    Hypercholesterolemia     Hypertension     CONTROLLED WITH MEDS    Ill-defined condition     hyperlipidemia    Other ill-defined conditions(799.89)     high cholesterol    Patient non adherence 1/23/2017    Primary osteoarthritis of left knee 3/2/2018    Type 2 diabetes, uncontrolled, with neuropathy 1/23/2017    Type II diabetes mellitus, uncontrolled 8/6/2015      Past Surgical History:   Procedure Laterality Date    CARPAL TUNNEL RELEASE      GYN      hysterectomy    HEENT      Ear surgery    HEENT      Left eye    HYSTERECTOMY (CERVIX STATUS UNKNOWN)      age unknown    ORTHOPEDIC SURGERY      left wrist & left elbow    ORTHOPEDIC SURGERY      Back Surgery x 3    ORTHOPEDIC SURGERY      right thumb at [facility] [place]    ORTHOPEDIC SURGERY      REMOVAL OF LT FOOT CYST    OTHER SURGICAL HISTORY      Collapsed left lung    OTHER  (JARDIANCE) 25 MG tablet Take 1 tablet by mouth daily    Automatic Reconciliation, Ar   insulin glargine (LANTUS SOLOSTAR) 100 UNIT/ML injection pen Inject 20 Units into the skin daily    Automatic Reconciliation, Ar   metFORMIN (GLUCOPHAGE-XR) 500 MG extended release tablet Take 1 tablet by mouth 2 times daily 17   Automatic Reconciliation, Ar   rosuvastatin (CRESTOR) 10 MG tablet Take 1 tablet by mouth nightly    Automatic Reconciliation, Ar         Review of Systems:  Pertinent items are noted in HPI.    Objective:     Data Review (Labs):    Recent Labs     24  1006   WBC 14.3*   HGB 12.3   MCV 72.5*         K 3.9   BUN 23*   ALT 32   IPVITALS(8:)Temp (24hrs), Av.3 °F (37.4 °C), Min:98.3 °F (36.8 °C), Max:100.6 °F (38.1 °C)    Temp (24hrs), Av.3 °F (37.4 °C), Min:98.3 °F (36.8 °C), Max:100.6 °F (38.1 °C)  [id number]/ 1901 -  0700  In: -   Out: 2400 [Urine:2400]    Physical Exam:  Nad, aaox3  Resp: normal effort  Abd: soft, Nt, ND, urine clear    Assessment:     Principal Problem:    Sepsis (HCC)  Resolved Problems:    * No resolved hospital problems. *    Bilateral hydro    Plan:     - possible causes include Trospium, DM and opiates in urine on admission. Continue with bowser catheter for 1 week due to overdistention injury. Stop Trospium.  - renal U/S tomorrow to re-eval bilateral hydro  - trend Cr  - continue empiric abx, cx pending  - will need f/u in  clinic after discharge. Will follow tomorrow    Signed By: Jeremy Torres MD                         2024

## 2024-07-28 NOTE — CONSULTS
Infectious Disease Consult    INFECTIOUS DISEASE Attending:     I agree with the above infectious disease daily progress note in its entirety as authored by and discussed in detail with the nurse practitioner.   I have reviewed pertinent laboratory studies, microbiology cultures, radiologic reports with review of the consultations and progress notes as appropriate.   I agree with today's subjective findings, physical examination, assessment and plan of care as described above and discussed extensively with the nurse practitioner.       Continue Ceftriaxone as Morganella appears to be susceptible to it.    Appreciate urology input and bowser drainage as you are, defer.    Impression/Plan     64 y.o. female with past medical Hx of type 2 diabetes, dyslipidemia, hypertension, overactive bladder, heart failure with preserved ejection fraction who was transferred from [facility] to [facility] and admitted for sepsis secondary due urinary retention. Infectious disease services was consulted to assist with antibiotic management of bacteremia.     GNR bacteremia secondary to urinary retention  Moderate to severe Bilateral hydronephrosis, Pyelonephritis  Leukocytosis  Hx overactive bladder  Urine cx GNR    Follow blood culture, follow urine cx  Appreciate urology recommendation and holding Trospium  Continue cefepime pending blood culture           Anti-infectives:   cefepime    Subjective:   Date of Consultation:  July 28, 2024  Date of Admission: 7/27/2024   Referring Physician: Jo Martines    Patient is a 64 y.o. female with past medical Hx of type 2 diabetes, dyslipidemia, hypertension, overactive bladder, heart failure with preserved ejection fraction whoa was transferred from [facility] to [facility] and admitted for sepsis secondary due urinary retention. Infectious disease services was consulted to assist with antibiotic management of bacteremia.     Presented to the ED at [facility] with abdominal pain. ED workup significant for leukocytosis, LOLIS.

## 2024-07-28 NOTE — PROGRESS NOTES
[facility]                                                                                          Hospitalist Progress Note  Jo Martines PA-C  Answering service: [phone] OR [phone] from in house phone        Date of Service:  2024  NAME:  Alicia Brooke  :  1960  MRN:  [id number]       Admission Summary:   Alicia Brooke is a 64 y.o. female with past medical history significant for type 2 diabetes, dyslipidemia, hypertension, overactive bladder, heart failure with preserved ejection fraction presented at [facility] emergency room on 2024 with abdominal pain.  The pain was located at the lower abdomen and started last night, described as cramping with no nausea and vomiting and diarrhea, constant, no radiation, no known aggravating or relieving factors.  The pain was associated with decreased urine output.  Patient also reported dysuria urgency and frequency.  She was under the care of urogynecologist, Dr. Lizbeth Pierson with the [facility].    CT of the abdomen and pelvis done on arrival at the emergency room showed significant distention of the urinary bladder with bilateral hydronephrosis.  Kelsey catheter was placed in the emergency room.  Patient was discussed with the urologist on-call at [facility] who agreed to consult on the patient on arrival at the hospital.  Patient was directly admitted to [facility] by the hospitalist service for further evaluation of obstructive uropathy.    Patient also presented with leukocytosis, elevated lactic acid level and urinalysis consistent with acute cystitis with hematuria thereby meeting sepsis criteria.  Patient received fluid therapy as per sepsis protocol.  She was started on cefepime given the history of ESBL UTI in the past.  Interval history / Subjective:   Upon seeing the patient on rounds this morning, patient was sitting up in bed, comfortable. Patient

## 2024-07-28 NOTE — H&P
History and Physical    Date of Service:  7/28/2024  Primary Care Provider: Herrera Perez MD  Source of information: The patient    Chief Complaint: Abdominal pain    History of Presenting Illness:   Alicia Brooke is a 64 y.o. female with past medical history significant for type 2 diabetes, dyslipidemia, hypertension, overactive bladder, heart failure with preserved ejection fraction presented at [facility] emergency room with abdominal pain.  The pain is located at the lower abdomen and started last night, described as cramping with no nausea and vomiting and diarrhea, constant, no radiation, no known aggravating or relieving factors.  The pain is associated with decreased urine output.  Patient also reported dysuria urgency and frequency.  She is under the care of urogynecologist, Dr. Lizbeth Pierson with the [facility].  CT of the abdomen and pelvis done on arrival at the emergency room shows significant distention of the urinary bladder with bilateral hydronephrosis.  Kelsey catheter was placed in the emergency room.  Patient was discussed with the urologist on-call at [facility] who agreed to consult on the patient on arrival at the hospital.  Patient was directly admitted to [facility] by the hospitalist service for further evaluation of obstructive uropathy.  Patient also presented with leukocytosis, elevated lactic acid level and urinalysis consistent with acute cystitis with hematuria thereby meeting sepsis criteria.  Patient received fluid therapy as per sepsis protocol.  She was started on cefepime given the history of ESBL UTI in the past.       REVIEW OF SYSTEMS:  REVIEW OF SYSTEMS:  HEAD, EYES, EARS, NOSE AND THROAT:  No headache.  No dizziness.  No blurring of vision.  No photophobia.  RESPIRATORY SYSTEM:  No shortness of breath.  No cough.  No hemoptysis.  CARDIOVASCULAR SYSTEM:  No chest pain.  No orthopnea.  No  POA: Will admit the patient for further evaluation and treatment.  This is most likely coming from acute cystitis with hematuria.  Will continue with cefepime started in the emergency room.  Will await blood and urine culture results.  Will obtain chest x-ray to evaluate for pneumonia as another possible source of infection.    2.  Acute cystitis with hematuria POA: We will start the patient on cefepime as stated above and await urine culture.    3.  Type 2 diabetes with hyperglycemia POA: We will start the patient on sliding scale with insulin coverage.  Will check A1c level.  Will also resume home basal insulin.    4.  Obstructive nephropathy POA: Urology consult will be requested to assist in further evaluation and treatment.    5.  Acute kidney injury POA: This is most likely due to obstructive nephropathy.  Volume depletion possibly contributing.  Fluid therapy.  Will request nephrology consult to assist in further evaluation and treatment as well.    6.  Dyslipidemia POA: We will continue Crestor.    7.  Essential hypertension POA: We will hold losartan and Aldactone because of the acute kidney injury.  Will monitor blood pressure closely.    8.  Heart failure with preserved ejection fraction POA: Patient appears to be volume depleted.  Aldactone on hold to allow for gentle fluid therapy.  Will obtain chest x-ray to evaluate for vascular congestion.  Will also check a BNP level.    9.  Overactive bladder POA: We will continue Sanctura.        DIET: ADULT DIET; Regular; 3 carb choices (45 gm/meal)   ISOLATION PRECAUTIONS: No active isolations  CODE STATUS: Full Code   Central Line:   None  DVT PROPHYLAXIS: Heparin  FUNCTIONAL STATUS PRIOR TO HOSPITALIZATION: Fully active and ambulatory; able to carry on all self-care without restriction.  Ambulatory status/function: By self   EARLY MOBILITY ASSESSMENT: Recommend routine ambulation while hospitalized with the assistance of nursing staff  ANTICIPATED DISCHARGE:

## 2024-07-29 ENCOUNTER — APPOINTMENT (OUTPATIENT)
Facility: HOSPITAL | Age: 64
End: 2024-07-29
Attending: INTERNAL MEDICINE
Payer: COMMERCIAL

## 2024-07-29 LAB
ALBUMIN SERPL-MCNC: 3.1 G/DL (ref 3.5–5)
ALBUMIN/GLOB SERPL: 0.8 (ref 1.1–2.2)
ALP SERPL-CCNC: 44 U/L (ref 45–117)
ALT SERPL-CCNC: 31 U/L (ref 12–78)
ANION GAP SERPL CALC-SCNC: 6 MMOL/L (ref 5–15)
AST SERPL-CCNC: 49 U/L (ref 15–37)
BASOPHILS # BLD: 0 K/UL (ref 0–0.1)
BASOPHILS NFR BLD: 0 % (ref 0–1)
BILIRUB SERPL-MCNC: 0.6 MG/DL (ref 0.2–1)
BUN SERPL-MCNC: 22 MG/DL (ref 6–20)
BUN/CREAT SERPL: 19 (ref 12–20)
CALCIUM SERPL-MCNC: 9.8 MG/DL (ref 8.5–10.1)
CHLORIDE SERPL-SCNC: 105 MMOL/L (ref 97–108)
CO2 SERPL-SCNC: 26 MMOL/L (ref 21–32)
CREAT SERPL-MCNC: 1.14 MG/DL (ref 0.55–1.02)
DIFFERENTIAL METHOD BLD: ABNORMAL
EKG ATRIAL RATE: 75 BPM
EKG DIAGNOSIS: NORMAL
EKG P AXIS: 52 DEGREES
EKG P-R INTERVAL: 186 MS
EKG Q-T INTERVAL: 366 MS
EKG QRS DURATION: 90 MS
EKG QTC CALCULATION (BAZETT): 408 MS
EKG R AXIS: -33 DEGREES
EKG T AXIS: 11 DEGREES
EKG VENTRICULAR RATE: 75 BPM
EOSINOPHIL # BLD: 0 K/UL (ref 0–0.4)
EOSINOPHIL NFR BLD: 0 % (ref 0–7)
ERYTHROCYTE [DISTWIDTH] IN BLOOD BY AUTOMATED COUNT: 16.3 % (ref 11.5–14.5)
GLOBULIN SER CALC-MCNC: 3.9 G/DL (ref 2–4)
GLUCOSE BLD STRIP.AUTO-MCNC: 107 MG/DL (ref 65–117)
GLUCOSE BLD STRIP.AUTO-MCNC: 115 MG/DL (ref 65–117)
GLUCOSE BLD STRIP.AUTO-MCNC: 132 MG/DL (ref 65–117)
GLUCOSE BLD STRIP.AUTO-MCNC: 134 MG/DL (ref 65–117)
GLUCOSE BLD STRIP.AUTO-MCNC: 145 MG/DL (ref 65–117)
GLUCOSE BLD STRIP.AUTO-MCNC: 72 MG/DL (ref 65–117)
GLUCOSE SERPL-MCNC: 155 MG/DL (ref 65–100)
HCT VFR BLD AUTO: 33.8 % (ref 35–47)
HGB BLD-MCNC: 10.4 G/DL (ref 11.5–16)
IMM GRANULOCYTES # BLD AUTO: 0.1 K/UL (ref 0–0.04)
IMM GRANULOCYTES NFR BLD AUTO: 1 % (ref 0–0.5)
LYMPHOCYTES # BLD: 0.6 K/UL (ref 0.8–3.5)
LYMPHOCYTES NFR BLD: 8 % (ref 12–49)
MCH RBC QN AUTO: 21.7 PG (ref 26–34)
MCHC RBC AUTO-ENTMCNC: 30.8 G/DL (ref 30–36.5)
MCV RBC AUTO: 70.6 FL (ref 80–99)
MONOCYTES # BLD: 0.7 K/UL (ref 0–1)
MONOCYTES NFR BLD: 9 % (ref 5–13)
NEUTS SEG # BLD: 6.6 K/UL (ref 1.8–8)
NEUTS SEG NFR BLD: 82 % (ref 32–75)
NRBC # BLD: 0 K/UL (ref 0–0.01)
NRBC BLD-RTO: 0 PER 100 WBC
PLATELET # BLD AUTO: 155 K/UL (ref 150–400)
PMV BLD AUTO: 9.2 FL (ref 8.9–12.9)
POTASSIUM SERPL-SCNC: 4.2 MMOL/L (ref 3.5–5.1)
PROT SERPL-MCNC: 7 G/DL (ref 6.4–8.2)
RBC # BLD AUTO: 4.79 M/UL (ref 3.8–5.2)
RBC MORPH BLD: ABNORMAL
SERVICE CMNT-IMP: ABNORMAL
SERVICE CMNT-IMP: NORMAL
SODIUM SERPL-SCNC: 137 MMOL/L (ref 136–145)
WBC # BLD AUTO: 8 K/UL (ref 3.6–11)

## 2024-07-29 PROCEDURE — 76770 US EXAM ABDO BACK WALL COMP: CPT

## 2024-07-29 PROCEDURE — 36415 COLL VENOUS BLD VENIPUNCTURE: CPT

## 2024-07-29 PROCEDURE — 6370000000 HC RX 637 (ALT 250 FOR IP): Performed by: INTERNAL MEDICINE

## 2024-07-29 PROCEDURE — 85025 COMPLETE CBC W/AUTO DIFF WBC: CPT

## 2024-07-29 PROCEDURE — 6360000002 HC RX W HCPCS: Performed by: INTERNAL MEDICINE

## 2024-07-29 PROCEDURE — 2580000003 HC RX 258: Performed by: INTERNAL MEDICINE

## 2024-07-29 PROCEDURE — 82962 GLUCOSE BLOOD TEST: CPT

## 2024-07-29 PROCEDURE — 93005 ELECTROCARDIOGRAM TRACING: CPT

## 2024-07-29 PROCEDURE — 6360000002 HC RX W HCPCS

## 2024-07-29 PROCEDURE — 80053 COMPREHEN METABOLIC PANEL: CPT

## 2024-07-29 PROCEDURE — 1100000000 HC RM PRIVATE

## 2024-07-29 PROCEDURE — 6370000000 HC RX 637 (ALT 250 FOR IP)

## 2024-07-29 RX ORDER — AMLODIPINE BESYLATE 5 MG/1
10 TABLET ORAL DAILY
Status: DISCONTINUED | OUTPATIENT
Start: 2024-07-29 | End: 2024-07-31 | Stop reason: HOSPADM

## 2024-07-29 RX ORDER — ONDANSETRON 2 MG/ML
4 INJECTION INTRAMUSCULAR; INTRAVENOUS EVERY 6 HOURS PRN
Status: DISCONTINUED | OUTPATIENT
Start: 2024-07-29 | End: 2024-07-31 | Stop reason: HOSPADM

## 2024-07-29 RX ADMIN — CEFEPIME 2000 MG: 2 INJECTION, POWDER, FOR SOLUTION INTRAVENOUS at 00:49

## 2024-07-29 RX ADMIN — HEPARIN SODIUM 5000 UNITS: 5000 INJECTION INTRAVENOUS; SUBCUTANEOUS at 13:02

## 2024-07-29 RX ADMIN — INSULIN GLARGINE 20 UNITS: 100 INJECTION, SOLUTION SUBCUTANEOUS at 09:59

## 2024-07-29 RX ADMIN — HEPARIN SODIUM 5000 UNITS: 5000 INJECTION INTRAVENOUS; SUBCUTANEOUS at 21:36

## 2024-07-29 RX ADMIN — SODIUM CHLORIDE, POTASSIUM CHLORIDE, SODIUM LACTATE AND CALCIUM CHLORIDE: 600; 310; 30; 20 INJECTION, SOLUTION INTRAVENOUS at 18:57

## 2024-07-29 RX ADMIN — ACETAMINOPHEN 650 MG: 325 TABLET ORAL at 06:56

## 2024-07-29 RX ADMIN — SODIUM CHLORIDE, POTASSIUM CHLORIDE, SODIUM LACTATE AND CALCIUM CHLORIDE: 600; 310; 30; 20 INJECTION, SOLUTION INTRAVENOUS at 13:02

## 2024-07-29 RX ADMIN — SODIUM CHLORIDE 2000 MG: 9 INJECTION INTRAMUSCULAR; INTRAVENOUS; SUBCUTANEOUS at 10:02

## 2024-07-29 RX ADMIN — HYDROMORPHONE HYDROCHLORIDE 1 MG: 1 INJECTION, SOLUTION INTRAMUSCULAR; INTRAVENOUS; SUBCUTANEOUS at 15:32

## 2024-07-29 RX ADMIN — ASPIRIN 81 MG CHEWABLE TABLET 81 MG: 81 TABLET CHEWABLE at 10:01

## 2024-07-29 RX ADMIN — AMLODIPINE BESYLATE 10 MG: 5 TABLET ORAL at 16:54

## 2024-07-29 RX ADMIN — SODIUM CHLORIDE, PRESERVATIVE FREE 10 ML: 5 INJECTION INTRAVENOUS at 10:01

## 2024-07-29 RX ADMIN — OXYCODONE HYDROCHLORIDE 5 MG: 5 TABLET ORAL at 13:02

## 2024-07-29 RX ADMIN — ROSUVASTATIN 10 MG: 10 TABLET, FILM COATED ORAL at 21:36

## 2024-07-29 RX ADMIN — HEPARIN SODIUM 5000 UNITS: 5000 INJECTION INTRAVENOUS; SUBCUTANEOUS at 06:56

## 2024-07-29 RX ADMIN — GABAPENTIN 600 MG: 600 TABLET, FILM COATED ORAL at 21:36

## 2024-07-29 RX ADMIN — ONDANSETRON 4 MG: 2 INJECTION INTRAMUSCULAR; INTRAVENOUS at 17:42

## 2024-07-29 ASSESSMENT — PAIN SCALES - GENERAL
PAINLEVEL_OUTOF10: 3
PAINLEVEL_OUTOF10: 6
PAINLEVEL_OUTOF10: 8
PAINLEVEL_OUTOF10: 3
PAINLEVEL_OUTOF10: 5

## 2024-07-29 ASSESSMENT — PAIN DESCRIPTION - LOCATION
LOCATION: ABDOMEN;BACK
LOCATION: ABDOMEN;FLANK
LOCATION: GENERALIZED

## 2024-07-29 ASSESSMENT — PAIN DESCRIPTION - ORIENTATION: ORIENTATION: LOWER

## 2024-07-29 ASSESSMENT — PAIN DESCRIPTION - DESCRIPTORS
DESCRIPTORS: ACHING;THROBBING
DESCRIPTORS: ACHING;THROBBING

## 2024-07-29 NOTE — PROGRESS NOTES
Patient: Alicia Brooke MRN: [id number]  SSN: [id number]    YOB: 1960  Age: 64 y.o.  Sex: female        ADMITTED: 2024 to Yadira Enamorado MD by Korin Wan MD for Sepsis ([facility]) [A41.9]    Alicia Brooke is doing well. Feeling better. Notes taking one dose of Gemtesa on  prior to the onset of the retention. Also endorses that she did not feel effect from the trospium.     AF, VSS. WBC normalized. Ucx+Morganella morganii; Bcx + probable enterobacteriaceae in  bottles  Serum Cr improved to 1.14 (1.35), 1.0 in 2024. >4 L UOP last 24 hours.     Vitals: Temp (24hrs), Av °F (37.8 °C), Min:99.3 °F (37.4 °C), Max:101.1 °F (38.4 °C)    Blood pressure (!) 148/87, pulse (!) 104, temperature 99.5 °F (37.5 °C), temperature source Oral, resp. rate 17, SpO2 96 %.    Intake and Output:   1901 -  0700  In: -   Out: 6450 [Urine:6450]  No intake/output data recorded.  RAQUEL Output lats 24 hrs: No data found.   RAQUEL Output last 8 hrs: No data found.  BM over last 24 hrs: No data found.    Physical Exam  General: NAD, pleasant  Respiratory: no distress, breathing easily, room air  Abdomen: soft, no distention; suprapubic-tenderness to palpation  : mild bilateral CVA tenderness, bowser, clear yellow UA  Neuro: Appropriate, no focal neurological deficits  Skin: warm, dry  Extremities: moves all, full ROM    Labs:  CBC:   Lab Results   Component Value Date/Time    WBC 8.0 2024 05:53 AM    HCT 33.8 2024 05:53 AM     2024 05:53 AM     BMP:   Lab Results   Component Value Date/Time     2024 05:53 AM    K 4.2 2024 05:53 AM     2024 05:53 AM    CO2 26 2024 05:53 AM    BUN 22 2024 05:53 AM     Assessment/Plan:   63 yo F with bilateral hydronephrosis 2/2 large volume urinary retention, LOLIS, post obstructive diuresis, bacteremia/UTI    - Continue bowser for 10-14 days for large volume retention with follow up for voiding trial   - Stop

## 2024-07-29 NOTE — PROGRESS NOTES
[facility]                                                                                          Hospitalist Progress Note  Jo Martines PA-C  Answering service: [phone] OR [phone] from in house phone        Date of Service:  2024  NAME:  Alicia Brooke  :  1960  MRN:  [id number]       Admission Summary:   Alicia Brooke is a 64 y.o. female with past medical history significant for type 2 diabetes, dyslipidemia, hypertension, overactive bladder, heart failure with preserved ejection fraction presented at [facility] emergency room on 2024 with abdominal pain.  The pain was located at the lower abdomen and started last night, described as cramping with no nausea and vomiting and diarrhea, constant, no radiation, no known aggravating or relieving factors.  The pain was associated with decreased urine output.  Patient also reported dysuria urgency and frequency.  She was under the care of urogynecologist, Dr. Lizbeth Pierson with the [facility].     CT of the abdomen and pelvis done on arrival at the emergency room showed significant distention of the urinary bladder with bilateral hydronephrosis.  Kelsey catheter was placed in the emergency room.  Patient was discussed with the urologist on-call at [facility] who agreed to consult on the patient on arrival at the hospital.  Patient was directly admitted to [facility] by the hospitalist service for further evaluation of obstructive uropathy.     Patient also presented with leukocytosis, elevated lactic acid level and urinalysis consistent with acute cystitis with hematuria thereby meeting sepsis criteria.  Patient received fluid therapy as per sepsis protocol.  She was started on cefepime given the history of ESBL UTI in the past.  Interval history / Subjective:   Upon seeing the patient on rounds this morning, patient was sitting up in bed, comfortable.  clinical/nonclinical/ nursing providers based on care coordination needs.     Patients current active Medications were reviewed, considered, added and adjusted based on the clinical condition today.      Home Medications were reconciled to the best of my ability given all available resources at the time of admission. Route is PO if not otherwise noted.  Medications Reviewed:     Current Facility-Administered Medications   Medication Dose Route Frequency    cefTRIAXone (ROCEPHIN) 2,000 mg in sodium chloride (PF) 0.9 % 20 mL IV syringe  2,000 mg IntraVENous Q24H    aspirin chewable tablet 81 mg  81 mg Oral Daily    gabapentin (NEURONTIN) tablet 600 mg  600 mg Oral QHS    insulin glargine (LANTUS) injection vial 20 Units  20 Units SubCUTAneous Daily    rosuvastatin (CRESTOR) tablet 10 mg  10 mg Oral Nightly    sodium chloride flush 0.9 % injection 5-40 mL  5-40 mL IntraVENous 2 times per day    sodium chloride flush 0.9 % injection 5-40 mL  5-40 mL IntraVENous PRN    0.9 % sodium chloride infusion   IntraVENous PRN    acetaminophen (TYLENOL) tablet 650 mg  650 mg Oral Q6H PRN    Or    acetaminophen (TYLENOL) suppository 650 mg  650 mg Rectal Q6H PRN    heparin (porcine) injection 5,000 Units  5,000 Units SubCUTAneous 3 times per day    lactated ringers IV soln infusion   IntraVENous Continuous    insulin lispro (HUMALOG,ADMELOG) injection vial 0-8 Units  0-8 Units SubCUTAneous TID WC    insulin lispro (HUMALOG,ADMELOG) injection vial 0-4 Units  0-4 Units SubCUTAneous Nightly    HYDROmorphone HCl PF (DILAUDID) injection 1 mg  1 mg IntraVENous Q4H PRN    oxyCODONE (ROXICODONE) immediate release tablet 5 mg  5 mg Oral Q4H PRN    glucose chewable tablet 16 g  4 tablet Oral PRN    dextrose bolus 10% 125 mL  125 mL IntraVENous PRN    Or    dextrose bolus 10% 250 mL  250 mL IntraVENous PRN    glucagon injection 1 mg  1 mg SubCUTAneous PRN    dextrose 10 % infusion   IntraVENous Continuous PRN

## 2024-07-29 NOTE — PROGRESS NOTES
Provider aware of elevated BP after emesis following BP medication, provider reports continue to monitor and notify night team if it is higher.

## 2024-07-30 LAB
ALBUMIN SERPL-MCNC: 3 G/DL (ref 3.5–5)
ALBUMIN/GLOB SERPL: 0.9 (ref 1.1–2.2)
ALP SERPL-CCNC: 40 U/L (ref 45–117)
ALT SERPL-CCNC: 34 U/L (ref 12–78)
ANION GAP SERPL CALC-SCNC: 9 MMOL/L (ref 5–15)
AST SERPL-CCNC: 55 U/L (ref 15–37)
BACTERIA SPEC CULT: ABNORMAL
BACTERIA SPEC CULT: ABNORMAL
BASOPHILS # BLD: 0 K/UL (ref 0–0.1)
BASOPHILS NFR BLD: 0 % (ref 0–1)
BILIRUB SERPL-MCNC: 0.5 MG/DL (ref 0.2–1)
BUN SERPL-MCNC: 16 MG/DL (ref 6–20)
BUN/CREAT SERPL: 17 (ref 12–20)
CALCIUM SERPL-MCNC: 9.4 MG/DL (ref 8.5–10.1)
CC UR VC: ABNORMAL
CHLORIDE SERPL-SCNC: 103 MMOL/L (ref 97–108)
CO2 SERPL-SCNC: 26 MMOL/L (ref 21–32)
CREAT SERPL-MCNC: 0.95 MG/DL (ref 0.55–1.02)
DIFFERENTIAL METHOD BLD: ABNORMAL
EOSINOPHIL # BLD: 0.2 K/UL (ref 0–0.4)
EOSINOPHIL NFR BLD: 3 % (ref 0–7)
ERYTHROCYTE [DISTWIDTH] IN BLOOD BY AUTOMATED COUNT: 15.8 % (ref 11.5–14.5)
GLOBULIN SER CALC-MCNC: 3.3 G/DL (ref 2–4)
GLUCOSE BLD STRIP.AUTO-MCNC: 117 MG/DL (ref 65–117)
GLUCOSE BLD STRIP.AUTO-MCNC: 132 MG/DL (ref 65–117)
GLUCOSE BLD STRIP.AUTO-MCNC: 138 MG/DL (ref 65–117)
GLUCOSE BLD STRIP.AUTO-MCNC: 72 MG/DL (ref 65–117)
GLUCOSE SERPL-MCNC: 73 MG/DL (ref 65–100)
HCT VFR BLD AUTO: 32 % (ref 35–47)
HGB BLD-MCNC: 9.9 G/DL (ref 11.5–16)
IMM GRANULOCYTES # BLD AUTO: 0 K/UL (ref 0–0.04)
IMM GRANULOCYTES NFR BLD AUTO: 0 % (ref 0–0.5)
LYMPHOCYTES # BLD: 1 K/UL (ref 0.8–3.5)
LYMPHOCYTES NFR BLD: 15 % (ref 12–49)
MCH RBC QN AUTO: 21.6 PG (ref 26–34)
MCHC RBC AUTO-ENTMCNC: 30.9 G/DL (ref 30–36.5)
MCV RBC AUTO: 69.7 FL (ref 80–99)
MONOCYTES # BLD: 1.2 K/UL (ref 0–1)
MONOCYTES NFR BLD: 18 % (ref 5–13)
NEUTS SEG # BLD: 4.1 K/UL (ref 1.8–8)
NEUTS SEG NFR BLD: 64 % (ref 32–75)
NRBC # BLD: 0 K/UL (ref 0–0.01)
NRBC BLD-RTO: 0 PER 100 WBC
PLATELET # BLD AUTO: 168 K/UL (ref 150–400)
PMV BLD AUTO: 10 FL (ref 8.9–12.9)
POTASSIUM SERPL-SCNC: 4 MMOL/L (ref 3.5–5.1)
PROT SERPL-MCNC: 6.3 G/DL (ref 6.4–8.2)
RBC # BLD AUTO: 4.59 M/UL (ref 3.8–5.2)
RBC MORPH BLD: ABNORMAL
SERVICE CMNT-IMP: ABNORMAL
SERVICE CMNT-IMP: NORMAL
SERVICE CMNT-IMP: NORMAL
SODIUM SERPL-SCNC: 138 MMOL/L (ref 136–145)
WBC # BLD AUTO: 6.5 K/UL (ref 3.6–11)

## 2024-07-30 PROCEDURE — 99232 SBSQ HOSP IP/OBS MODERATE 35: CPT | Performed by: INTERNAL MEDICINE

## 2024-07-30 PROCEDURE — 85025 COMPLETE CBC W/AUTO DIFF WBC: CPT

## 2024-07-30 PROCEDURE — 2580000003 HC RX 258: Performed by: INTERNAL MEDICINE

## 2024-07-30 PROCEDURE — 80053 COMPREHEN METABOLIC PANEL: CPT

## 2024-07-30 PROCEDURE — 1100000000 HC RM PRIVATE

## 2024-07-30 PROCEDURE — 6360000002 HC RX W HCPCS: Performed by: INTERNAL MEDICINE

## 2024-07-30 PROCEDURE — 87040 BLOOD CULTURE FOR BACTERIA: CPT

## 2024-07-30 PROCEDURE — 6370000000 HC RX 637 (ALT 250 FOR IP): Performed by: INTERNAL MEDICINE

## 2024-07-30 PROCEDURE — 82962 GLUCOSE BLOOD TEST: CPT

## 2024-07-30 PROCEDURE — 6370000000 HC RX 637 (ALT 250 FOR IP)

## 2024-07-30 RX ADMIN — ACETAMINOPHEN 650 MG: 325 TABLET ORAL at 08:35

## 2024-07-30 RX ADMIN — ROSUVASTATIN 10 MG: 10 TABLET, FILM COATED ORAL at 21:49

## 2024-07-30 RX ADMIN — OXYCODONE HYDROCHLORIDE 5 MG: 5 TABLET ORAL at 21:49

## 2024-07-30 RX ADMIN — AMLODIPINE BESYLATE 10 MG: 5 TABLET ORAL at 08:35

## 2024-07-30 RX ADMIN — HEPARIN SODIUM 5000 UNITS: 5000 INJECTION INTRAVENOUS; SUBCUTANEOUS at 21:50

## 2024-07-30 RX ADMIN — INSULIN GLARGINE 20 UNITS: 100 INJECTION, SOLUTION SUBCUTANEOUS at 09:09

## 2024-07-30 RX ADMIN — HEPARIN SODIUM 5000 UNITS: 5000 INJECTION INTRAVENOUS; SUBCUTANEOUS at 14:41

## 2024-07-30 RX ADMIN — HEPARIN SODIUM 5000 UNITS: 5000 INJECTION INTRAVENOUS; SUBCUTANEOUS at 06:15

## 2024-07-30 RX ADMIN — SODIUM CHLORIDE, PRESERVATIVE FREE 10 ML: 5 INJECTION INTRAVENOUS at 09:00

## 2024-07-30 RX ADMIN — SODIUM CHLORIDE 2000 MG: 9 INJECTION INTRAMUSCULAR; INTRAVENOUS; SUBCUTANEOUS at 13:09

## 2024-07-30 RX ADMIN — ASPIRIN 81 MG CHEWABLE TABLET 81 MG: 81 TABLET CHEWABLE at 08:35

## 2024-07-30 RX ADMIN — SODIUM CHLORIDE, PRESERVATIVE FREE 10 ML: 5 INJECTION INTRAVENOUS at 21:51

## 2024-07-30 RX ADMIN — GABAPENTIN 600 MG: 600 TABLET, FILM COATED ORAL at 21:49

## 2024-07-30 ASSESSMENT — PAIN DESCRIPTION - DESCRIPTORS
DESCRIPTORS: ACHING
DESCRIPTORS: STABBING

## 2024-07-30 ASSESSMENT — PAIN DESCRIPTION - LOCATION
LOCATION: ABDOMEN
LOCATION: HEAD

## 2024-07-30 ASSESSMENT — PAIN SCALES - GENERAL
PAINLEVEL_OUTOF10: 5
PAINLEVEL_OUTOF10: 5

## 2024-07-30 NOTE — PROGRESS NOTES
Infectious Diseases Follow Up    7/30/2024    INFECTIOUS DISEASE Attending:     I agree with the above infectious disease daily progress note in its entirety as authored by and discussed in detail with the nurse practitioner.   I have reviewed pertinent laboratory studies, microbiology cultures, radiologic reports with review of the consultations and progress notes as appropriate.   I agree with today's subjective findings, physical examination, assessment and plan of care as described above and discussed extensively with the nurse practitioner.       Continue Ceftriaxone while inpatient.    When ready for discharge, transition to Cefdinir 300mg PO BID until 8/9/24, inclusive.    Appreciate urology input and/or timing of eventual bowser catheter removal.    Will sign off, please call with questions.    Assessment & Plan:     64 y.o. female with past medical Hx of type 2 diabetes, dyslipidemia, hypertension, overactive bladder, heart failure with preserved ejection fraction who was transferred from [facility] to [facility] and admitted for sepsis secondary due urinary retention. Infectious disease services was consulted to assist with antibiotic management of bacteremia.      Morganella Morganii bacteremia secondary to urinary retention  Moderate to severe Bilateral hydronephrosis, Pyelonephritis  Leukocytosis - resolved  Hx overactive bladder  Urine cx Morganella morganii  Repeat Blood cx (7/30)     Follow repeat blood cultures    Appreciate urology recommendations and holding Trospium    Continue ceftriaxone while inpatient. Can transition to cefdinir 300 mg BID through 8/9/24, inclusive, when stable for discharge.     ID team signing off. Please reach out with any questions.            Subjective:     Feels better.     Objective:     Vitals: /71   Pulse 70   Temp 99.5 °F (37.5 °C) (Oral)   Resp 18   Wt 81.2 kg (179 lb 0.2 oz)   LMP  (LMP Unknown) Comment: LMP age unknown  SpO2 94%   BMI 30.73 kg/m²      Tmax:  Temp

## 2024-07-30 NOTE — PROGRESS NOTES
Patient: Alicia Brooke MRN: [id number]  SSN: [id number]    YOB: 1960  Age: 64 y.o.  Sex: female        ADMITTED: 2024 to Yadira Enamorado MD by Korin Wan MD for Sepsis ([facility]) [A41.9]    Alicia Brooke is doing well. Feeling better.      AF (tmax 100), VSS. WBC normalized. Bcx / Ucx+Morganella morganii. Serum Cr normalized to 0.95, 1.0 in 2024. >4 L UOP last 24 hours. Electrolytes wnl.     Vitals: Temp (24hrs), Av.1 °F (37.3 °C), Min:98.6 °F (37 °C), Max:100 °F (37.8 °C)    Blood pressure (!) 147/68, pulse 75, temperature 100 °F (37.8 °C), temperature source Oral, resp. rate 18, weight 81.2 kg (179 lb 0.2 oz), SpO2 96 %.    Intake and Output:   1901 -  0700  In: -   Out: 7201 [Urine:6900]  No intake/output data recorded.  RAQUEL Output lats 24 hrs: No data found.   RAQUEL Output last 8 hrs: No data found.  BM over last 24 hrs: No data found.    Physical Exam  General: NAD, pleasant  Respiratory: no distress, breathing easily, room air  Abdomen: soft, no distention; suprapubic-tenderness to palpation  : mild bilateral CVA tenderness, bowser, clear yellow UA  Neuro: Appropriate, no focal neurological deficits  Skin: warm, dry  Extremities: moves all, full ROM    Labs:  CBC:   Lab Results   Component Value Date/Time    WBC 6.5 2024 06:04 AM    HCT 32.0 2024 06:04 AM     2024 06:04 AM     BMP:   Lab Results   Component Value Date/Time     2024 06:04 AM    K 4.0 2024 06:04 AM     2024 06:04 AM    CO2 26 2024 06:04 AM    BUN 16 2024 06:04 AM      Narrative & Impression  EXAM: US RETROPERITONEAL COMPLETE      INDICATION: Evaluate hydronephrosis.     COMPARISON: None.     TECHNIQUE:  Real-time sonography of the kidneys, retroperitoneum and bladder was performed  with multiple static images obtained.     FINDINGS:  RIGHT KIDNEY:  The right kidney has normal echogenicity with no mass, stone or hydronephrosis.  1.2 x 1.2 x

## 2024-07-30 NOTE — CARE COORDINATION
Care Management Initial Assessment     16%  RUR:  Readmission? No  1st IM letter given? No  1st  letter given: No     Patient lives at home with her grandchildren.  She might need IV abx at discharge.  If so family can assist her with this.    CM met with patient/spouse at bedside to introduce self and role.     ADLs: Independent  DME: None  PCP follow up: Herrera Perez MD  Previous Home Health: None  Previous Skilled Nursing Facility: None  Previous Inpatient Rehab: None  Insurance verified: [facility]  Pharmacy: [facility]  Emergency Contact: [phone]    CM will follow patient progress and assist as needed with MANDIE plan.     Debora Lopez RN/CRM  [phone]       07/30/24 8041   Service Assessment   Patient Orientation Alert and Oriented   Cognition Alert   History Provided By Patient   Primary Caregiver Self   Support Systems Family Members   Patient's Healthcare Decision Maker is: Legal Next of Kin   PCP Verified by CM Yes   Last Visit to PCP Within last 3 months   Prior Functional Level Independent in ADLs/IADLs   Current Functional Level Independent in ADLs/IADLs   Can patient return to prior living arrangement Yes   Ability to make needs known: Good   Family able to assist with home care needs: Yes   Would you like for me to discuss the discharge plan with any other family members/significant others, and if so, who? No   Financial Resources Medicare   Social/Functional History   Lives With Family   Type of Home Condo   Home Layout Able to Live on Main level with bedroom/bathroom   Receives Help From Family   ADL Assistance Independent   Homemaking Assistance Independent   Homemaking Responsibilities No   Ambulation Assistance Independent   Transfer Assistance Independent   Active  Yes   Mode of Transportation Car   Occupation Retired   Discharge Planning   Current Services Prior To Admission None   Potential Assistance Needed Outpatient IV   DME Ordered? No    Potential Assistance Purchasing Medications No   Type of Home Care Services None   Patient expects to be discharged to: House

## 2024-07-30 NOTE — PROGRESS NOTES
face to face encounter with this patient and independently examined them on 7/30/2024 as outlined below:          General : alert x 3, awake, no acute distress,   HEENT: PEERL, EOMI, moist mucus membrane, TM clear  Neck: supple, no JVD, no meningeal signs  Chest: Clear to auscultation bilaterally   CVS: S1 S2 heard, Capillary refill less than 2 seconds  Abd: soft/ non tender, non distended, BS physiological,   Ext: no clubbing, no cyanosis, no edema, brisk 2+ DP pulses  Neuro/Psych: pleasant mood and affect, CN 2-12 grossly intact, sensory grossly within normal limit, Strength 5/5 in all extremities, DTR 1+ x 4  Skin: warm     Data Review:    Review and/or order of clinical lab test  Review and/or order of tests in the radiology section of CPT  Review and/or order of tests in the medicine section of CPT      I have personally and independently reviewed all pertinent labs, diagnostic studies, imaging, and have provided independent interpretation of the same.     Labs:     Recent Labs     07/29/24  0553 07/30/24  0604   WBC 8.0 6.5   HGB 10.4* 9.9*   HCT 33.8* 32.0*    168       Recent Labs     07/28/24  0849 07/29/24  0553 07/30/24  0604    137 138   K 5.2* 4.2 4.0    105 103   CO2 29 26 26   BUN 23* 22* 16   MG 2.0  --   --    PHOS 3.0  --   --        Recent Labs     07/28/24  0849 07/29/24  0553 07/30/24  0604   ALT 33 31 34   GLOB 3.4 3.9 3.3       Lab Results   Component Value Date/Time    CHOL 82 04/11/2023 04:00 PM    HDL 40 04/11/2023 04:00 PM    LDL 26.6 04/11/2023 04:00 PM     Notes reviewed from all clinical/nonclinical/nursing services involved in patient's clinical care. Care coordination discussions were held with appropriate clinical/nonclinical/ nursing providers based on care coordination needs.     Patients current active Medications were reviewed, considered, added and adjusted based on the clinical condition today.      Home Medications were reconciled to the best of my ability

## 2024-07-31 VITALS
BODY MASS INDEX: 29.14 KG/M2 | RESPIRATION RATE: 16 BRPM | SYSTOLIC BLOOD PRESSURE: 127 MMHG | HEART RATE: 83 BPM | DIASTOLIC BLOOD PRESSURE: 80 MMHG | TEMPERATURE: 98.2 F | WEIGHT: 169.75 LBS | OXYGEN SATURATION: 95 %

## 2024-07-31 PROBLEM — A41.9 SEPSIS (HCC): Status: RESOLVED | Noted: 2024-07-27 | Resolved: 2024-07-31

## 2024-07-31 LAB
GLUCOSE BLD STRIP.AUTO-MCNC: 100 MG/DL (ref 65–117)
GLUCOSE BLD STRIP.AUTO-MCNC: 144 MG/DL (ref 65–117)
SERVICE CMNT-IMP: ABNORMAL
SERVICE CMNT-IMP: NORMAL

## 2024-07-31 PROCEDURE — 6370000000 HC RX 637 (ALT 250 FOR IP): Performed by: INTERNAL MEDICINE

## 2024-07-31 PROCEDURE — 6360000002 HC RX W HCPCS: Performed by: INTERNAL MEDICINE

## 2024-07-31 PROCEDURE — 6370000000 HC RX 637 (ALT 250 FOR IP)

## 2024-07-31 PROCEDURE — 2580000003 HC RX 258: Performed by: INTERNAL MEDICINE

## 2024-07-31 PROCEDURE — 82962 GLUCOSE BLOOD TEST: CPT

## 2024-07-31 RX ORDER — AMLODIPINE BESYLATE 10 MG/1
10 TABLET ORAL DAILY
Qty: 30 TABLET | Refills: 0 | Status: SHIPPED | OUTPATIENT
Start: 2024-08-01

## 2024-07-31 RX ORDER — OXYCODONE HYDROCHLORIDE 5 MG/1
5 TABLET ORAL EVERY 8 HOURS PRN
Qty: 5 TABLET | Refills: 0 | Status: SHIPPED | OUTPATIENT
Start: 2024-07-31 | End: 2024-08-03

## 2024-07-31 RX ADMIN — HEPARIN SODIUM 5000 UNITS: 5000 INJECTION INTRAVENOUS; SUBCUTANEOUS at 06:13

## 2024-07-31 RX ADMIN — INSULIN GLARGINE 20 UNITS: 100 INJECTION, SOLUTION SUBCUTANEOUS at 11:14

## 2024-07-31 RX ADMIN — ASPIRIN 81 MG CHEWABLE TABLET 81 MG: 81 TABLET CHEWABLE at 09:45

## 2024-07-31 RX ADMIN — SODIUM CHLORIDE, PRESERVATIVE FREE 10 ML: 5 INJECTION INTRAVENOUS at 11:33

## 2024-07-31 RX ADMIN — SODIUM CHLORIDE 2000 MG: 9 INJECTION INTRAMUSCULAR; INTRAVENOUS; SUBCUTANEOUS at 11:14

## 2024-07-31 RX ADMIN — SODIUM CHLORIDE, POTASSIUM CHLORIDE, SODIUM LACTATE AND CALCIUM CHLORIDE: 600; 310; 30; 20 INJECTION, SOLUTION INTRAVENOUS at 06:24

## 2024-07-31 RX ADMIN — AMLODIPINE BESYLATE 10 MG: 5 TABLET ORAL at 09:45

## 2024-07-31 NOTE — DISCHARGE INSTRUCTIONS
Discharge Instructions       PATIENT ID: Alicia Brooke  MRN: [id number]   YOB: 1960    DATE OF ADMISSION: 7/27/2024   DATE OF DISCHARGE: 7/31/2024    PRIMARY CARE PROVIDER: Herrera Perez     ATTENDING PHYSICIAN: Yadira Enamorado MD   DISCHARGING PROVIDER: Ashia Albarran PA-C    To contact this individual call [phone] and ask the  to page.   If unavailable ask to be transferred the Adult Hospitalist Department.    DISCHARGE DIAGNOSES     Sepsis (POA) - resolved   Elevated Lactic Acid (Resolved)  -Likely coming from acute cystitis with hematuria  -Cefepime was given x 2 days, d/c'd due to culture results. Start Rocephin Day #2  -Urine culture (+) Morganella morganii, sensitive to Rocephin  -Blood cultures: Blood culture #2 no growth, blood culture #1 probable enterobacteriaceae in both bottles.   - Repeat blood culture ordered 7/30 - NGTD   -ID following, recommending Cefdinir 300mg PO BID 8/9/24  -CXR: Mild pulmonary edema. Low lung volumes with bibasal atelectasis.   -Repeat lactic acid 2.0 (7/28)     Acute cystitis with hematuria (POA)  Obstructive nephropathy (POA)  Hx Overactive Bladder (POA)  Urine Tox +Opiates (POA)  -CT Abd/Pelvis (7/27): Bilateral moderate to severe hydronephrosis and has associated diffuse hydroureter extending from markedly distended urinary bladder with estimated  volume of 1.536 L, suspicious for bladder outlet obstruction, no obvious obstructing bladder or pelvic mass lesion identified.   -Bowser catheter placed  -Urology consulted: Continue bowser for 10-14 days for large volume retention with follow up for voiding trial  - Repeat renal US - no hydronephrosis   -Trospium d/c'd     Acute kidney injury (POA) - Resolved  -Likely due to obstructive nephropathy, volume depletion possibly contributing  -Sidelined nephrology (Dr. Jackson), did not think formal consult necessary given improvement. Continue IVF.     Type 2 diabetes with hyperglycemia

## 2024-07-31 NOTE — DISCHARGE SUMMARY
Discharge Summary       PATIENT ID: Alicia Brooke  MRN: [id number]   YOB: 1960    DATE OF ADMISSION: 7/27/2024  7:39 PM    DATE OF DISCHARGE: 7/31/24   PRIMARY CARE PROVIDER: Herrera Perez MD     ATTENDING PHYSICIAN: HOLLY Enamorado MD  DISCHARGING PROVIDER: Ashia Albarran PA-C    To contact this individual call [phone] and ask the  to page.  If unavailable ask to be transferred the Adult Hospitalist Department.    CONSULTATIONS: IP CONSULT TO UROLOGY  IP CONSULT TO INFECTIOUS DISEASES    PROCEDURES/SURGERIES: * No surgery found *     ADMITTING DIAGNOSES & HOSPITAL COURSE:     Alicia Brooke is a 64 y.o. female with past medical history significant for type 2 diabetes, dyslipidemia, hypertension, overactive bladder, heart failure with preserved ejection fraction presented at [facility] emergency room on 7/27/2024 with abdominal pain.  The pain was located at the lower abdomen and started last night, described as cramping with no nausea and vomiting and diarrhea, constant, no radiation, no known aggravating or relieving factors.  The pain was associated with decreased urine output.  Patient also reported dysuria urgency and frequency.  She was under the care of urogynecologist, Dr. Lizbeth Pierson with the [facility].     CT of the abdomen and pelvis done on arrival at the emergency room showed significant distention of the urinary bladder with bilateral hydronephrosis.  Kelsey catheter was placed in the emergency room.  Patient was discussed with the urologist on-call at [facility] who agreed to consult on the patient on arrival at the hospital.  Patient was directly admitted to [facility] by the hospitalist service for further evaluation of obstructive uropathy.     Patient also presented with leukocytosis, elevated lactic acid level and urinalysis consistent with acute cystitis with hematuria thereby meeting sepsis criteria.  Patient received  indication)   x Kelsey     PICC     PEG     None      Code status    x Full code     DNR      PHYSICAL EXAMINATION AT DISCHARGE:    General : alert x 3, awake, no acute distress,   HEENT: PEERL, EOMI, moist mucus membrane  Neck: supple, no JVD, no meningeal signs  Chest: Clear to auscultation bilaterally   CVS: S1 S2 heard, Capillary refill less than 2 seconds  Abd: soft/ Non tender, non distended, BS physiological,   Ext: no clubbing, no cyanosis, no edema, brisk 2+ DP pulses  Neuro/Psych: pleasant mood and affect, CN 2-12 grossly intact, sensory grossly within normal limit, Strength 5/5 in all extremities  Skin: warm     CHRONIC MEDICAL DIAGNOSES:  Principal Problem (Resolved):    Sepsis (HCC)  Active Problems:    * No active hospital problems. *        Greater than 31 minutes were spent with the patient on counseling and coordination of care    Signed:   Ashia Albarran PA-C  7/31/2024  10:54 AM

## 2024-08-01 ENCOUNTER — TELEPHONE (OUTPATIENT)
Age: 64
End: 2024-08-01

## 2024-08-01 LAB
BACTERIA SPEC CULT: NORMAL
SERVICE CMNT-IMP: NORMAL

## 2024-08-01 NOTE — TELEPHONE ENCOUNTER
Care Transitions Initial Follow Up Call    Outreach made within 2 business days of discharge: Yes    Patient: Alicia Brooke Patient : 1960   MRN: [id number]  Reason for Admission: SEPSIS  Discharge Date: 24       Spoke with: PATIENT    Discharge department/facility: [facility]    TCM Interactive Patient Contact:  Was patient able to fill all prescriptions: Yes  Was patient instructed to bring all medications to the follow-up visit: Yes  Is patient taking all medications as directed in the discharge summary? Yes  Does patient understand their discharge instructions: Yes  Does patient have questions or concerns that need addressed prior to 7-14 day follow up office visit: no    Additional needs identified to be addressed with provider  No needs identified             Scheduled appointment with PCP within 7-14 days    Follow Up  Future Appointments   Date Time Provider Department Center   2024  2:00 PM Herrera Perez MD [facility] DEP   2025 10:20 AM Herrera Perez MD [facility] DEP

## 2024-08-04 LAB
BACTERIA SPEC CULT: NORMAL
SERVICE CMNT-IMP: NORMAL

## 2024-08-08 ENCOUNTER — OFFICE VISIT (OUTPATIENT)
Age: 64
End: 2024-08-08
Payer: COMMERCIAL

## 2024-08-08 VITALS
WEIGHT: 168.43 LBS | OXYGEN SATURATION: 97 % | RESPIRATION RATE: 18 BRPM | HEART RATE: 82 BPM | SYSTOLIC BLOOD PRESSURE: 105 MMHG | BODY MASS INDEX: 28.76 KG/M2 | DIASTOLIC BLOOD PRESSURE: 69 MMHG | TEMPERATURE: 98.1 F | HEIGHT: 64 IN

## 2024-08-08 DIAGNOSIS — Z09 HOSPITAL DISCHARGE FOLLOW-UP: Primary | ICD-10-CM

## 2024-08-08 DIAGNOSIS — N13.8 OBSTRUCTIVE NEPHROPATHY: ICD-10-CM

## 2024-08-08 DIAGNOSIS — N17.9 AKI (ACUTE KIDNEY INJURY) (HCC): ICD-10-CM

## 2024-08-08 DIAGNOSIS — E78.2 MIXED HYPERLIPIDEMIA: ICD-10-CM

## 2024-08-08 DIAGNOSIS — R51.9 NONINTRACTABLE EPISODIC HEADACHE, UNSPECIFIED HEADACHE TYPE: ICD-10-CM

## 2024-08-08 DIAGNOSIS — N30.01 ACUTE CYSTITIS WITH HEMATURIA: ICD-10-CM

## 2024-08-08 PROCEDURE — 99495 TRANSJ CARE MGMT MOD F2F 14D: CPT | Performed by: STUDENT IN AN ORGANIZED HEALTH CARE EDUCATION/TRAINING PROGRAM

## 2024-08-08 RX ORDER — OXYCODONE HYDROCHLORIDE 5 MG/1
5 TABLET ORAL EVERY 8 HOURS PRN
COMMUNITY

## 2024-08-08 RX ORDER — AMOXICILLIN 250 MG
CAPSULE ORAL
COMMUNITY
Start: 2024-07-23

## 2024-08-08 RX ORDER — BUTALBITAL, ACETAMINOPHEN AND CAFFEINE 50; 325; 40 MG/1; MG/1; MG/1
1 TABLET ORAL EVERY 6 HOURS PRN
Qty: 30 TABLET | Refills: 0 | Status: SHIPPED | OUTPATIENT
Start: 2024-08-08

## 2024-08-08 RX ORDER — VIBEGRON 75 MG/1
TABLET, FILM COATED ORAL
COMMUNITY
Start: 2024-07-25 | End: 2024-08-08 | Stop reason: ALTCHOICE

## 2024-08-08 RX ORDER — TRIAMCINOLONE ACETONIDE 1 MG/G
OINTMENT TOPICAL
COMMUNITY
Start: 2024-07-23

## 2024-08-08 RX ORDER — ROSUVASTATIN CALCIUM 10 MG/1
10 TABLET, COATED ORAL NIGHTLY
Qty: 90 TABLET | Refills: 2
Start: 2024-08-08

## 2024-08-08 SDOH — ECONOMIC STABILITY: INCOME INSECURITY: HOW HARD IS IT FOR YOU TO PAY FOR THE VERY BASICS LIKE FOOD, HOUSING, MEDICAL CARE, AND HEATING?: NOT HARD AT ALL

## 2024-08-08 SDOH — ECONOMIC STABILITY: FOOD INSECURITY: WITHIN THE PAST 12 MONTHS, YOU WORRIED THAT YOUR FOOD WOULD RUN OUT BEFORE YOU GOT MONEY TO BUY MORE.: NEVER TRUE

## 2024-08-08 SDOH — ECONOMIC STABILITY: FOOD INSECURITY: WITHIN THE PAST 12 MONTHS, THE FOOD YOU BOUGHT JUST DIDN'T LAST AND YOU DIDN'T HAVE MONEY TO GET MORE.: NEVER TRUE

## 2024-08-08 NOTE — ASSESSMENT & PLAN NOTE
Improved. Has catheter in place.  Follow up scheduled with urology for catheter removal.  No blood in the urinary catheter.

## 2024-08-08 NOTE — PROGRESS NOTES
Chief Complaint   Patient presents with    Follow-Up from Hospital       1. Have you been to the ER, urgent care clinic since your last visit?  Hospitalized since your last visit?Yes [facility] [place]    2. Have you seen or consulted any other health care providers outside of the [facility] since your last visit?  Include any pap smears or colon screening. No

## 2024-08-08 NOTE — PROGRESS NOTES
Post-Discharge Transitional Care  Follow Up      Alicia Brooke   YOB: 1960    Date of Office Visit:  8/8/2024  Date of Hospital Admission: 7/27/24  Date of Hospital Discharge: 7/31/24  Risk of hospital readmission (high >=14%. Medium >=10%) :Readmission Risk Score: 14.2  Admitted at [facility]    Care management risk score Rising risk (score 2-5) and Complex Care (Scores >=6): No Risk Score On File     Non face to face  following discharge, date last encounter closed (first attempt may have been earlier): 08/01/2024    Call initiated 2 business days of discharge: Yes    ASSESSMENT/PLAN:   Hospital discharge follow-up  -     VT DISCHARGE MEDS RECONCILED W/ CURRENT OUTPATIENT MED LIST  Obstructive nephropathy  Assessment & Plan:  Improved. Has catheter in place.  Follow up scheduled with urology for catheter removal.  No blood in the urinary catheter.   Acute cystitis with hematuria  Assessment & Plan:    Completed abx. Doing well. No UTI symptoms.  LOLIS (acute kidney injury) (HCC)  Assessment & Plan:   Resolved.    Nonintractable episodic headache, unspecified headache type  -     butalbital-acetaminophen-caffeine (FIORICET, ESGIC) -40 MG per tablet; Take 1 tablet by mouth every 6 hours as needed for Headaches, Disp-30 tablet, R-0Normal  Mixed hyperlipidemia  -     rosuvastatin (CRESTOR) 10 MG tablet; Take 1 tablet by mouth nightly, Disp-90 tablet, R-2NO PRINT      Medical Decision Making: moderate complexity  Return in 3 months (on 11/8/2024).           Subjective:   HPI:  Follow up of Hospital problems/diagnosis(es): Sepsis, acute cystititis and obstructive nephropathy    Inpatient course: Discharge summary reviewed- see chart.    Interval history/Current status: significantly improved.     Patient Active Problem List   Diagnosis    Encounter for long-term (current) use of medications    Type 2 diabetes mellitus with nephropathy (HCC)    Elevated bilirubin    Postmenopausal    Spondylolisthesis of

## 2024-09-04 DIAGNOSIS — I10 PRIMARY HYPERTENSION: Primary | ICD-10-CM

## 2024-09-04 RX ORDER — AMLODIPINE BESYLATE 10 MG/1
10 TABLET ORAL DAILY
Qty: 90 TABLET | Refills: 3 | Status: SHIPPED | OUTPATIENT
Start: 2024-09-04

## 2024-09-04 NOTE — TELEPHONE ENCOUNTER
Last Visit: 8/8/2024    Next Visit: 1/9/2025    Last Refill: 8/1/2024    Last prescribed by: GARIMA BAILON

## 2024-10-16 ENCOUNTER — OFFICE VISIT (OUTPATIENT)
Age: 64
End: 2024-10-16

## 2024-10-16 VITALS
SYSTOLIC BLOOD PRESSURE: 132 MMHG | HEIGHT: 64 IN | HEART RATE: 73 BPM | RESPIRATION RATE: 18 BRPM | TEMPERATURE: 97.2 F | WEIGHT: 176.2 LBS | BODY MASS INDEX: 30.08 KG/M2 | DIASTOLIC BLOOD PRESSURE: 83 MMHG | OXYGEN SATURATION: 98 %
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
## 2024-10-16 NOTE — PATIENT INSTRUCTIONS
If symptoms worsens or fail to improve follow-up with PCP or ER.    Thank you for visiting [facility] today.    -Wash wound with soap and water twice daily  -Use over the counter topical benadryl ointment.  -Use calamine lotion as needed.    Follow up in 24 hours if symptoms worsen, you develop fever or notice red \"streaks\" from the wound.    If no improvement in 24-48 hours, go to the ER for further evaluation/treatment.

## 2024-10-16 NOTE — PROGRESS NOTES
Alicia Brooke (:  1960) is a 64 y.o. 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 Ha picture of the bug.)      Assessment & Plan :    Below is the assessment and plan developed based on review of history and physical exam.  1. Bug bite, subsequent encounter  Patient appears well, VSS, afebrile, SPO2 98% on room air. No distress noted. Patient presents with bug bites from bed bugs. Patient advised of treatment plan but discussed if symptoms persist or worsen, they will need to follow up with their PCP or a dermatologist to evaluate further. Patient advised to stop scratching.  Pt discharged with instructions to go to Emergency Department for sensation of throat closing, facial swelling, difficulty swallowing, difficulty breathing, shortness of breath, chest pain and uncontrolled fever above 100.4F. Patient advised if symptoms fail to improve or worsens to follow-up with PCP or ER. Patient verbalized understanding, no questions or concerns at this time.       1. Handout given with care instructions.     2. OTC for symptom management. Increase fluid intake.     3. Follow-up with PCP as needed.     4. Go to ED with development of any acute symptoms.         Follow-up    Follow-up with PCP or ER immediately for any new worsening or changes or if symptoms are not improving over the next 5-7 days.        Subjective :  HPI     64 y.o. female presents with symptoms of bug bites x 1 week. Patient states she was bitten by bed bugs.  Patient has been applying hydrocortisone with mild relief. Patient states the bites itch. Denies pain at this time.     Vitals:    10/16/24 1010 10/16/24 1015   BP: (!) 152/93 132/83  Comment: patient has not taken bp meds   Site: Left Upper Arm Left Upper Arm   Position: Sitting Sitting   Cuff Size: Large Adult Large Adult   Pulse: 73    Resp: 18    Temp: 97.2 °F (36.2 °C)  
General
General

## 2025-02-12 DIAGNOSIS — M54.16 LUMBAR RADICULOPATHY, CHRONIC: ICD-10-CM

## 2025-02-12 RX ORDER — GABAPENTIN 600 MG/1
600 TABLET ORAL
Qty: 90 TABLET | Refills: 1 | Status: SHIPPED | OUTPATIENT
Start: 2025-02-12 | End: 2025-08-11

## 2025-02-12 NOTE — TELEPHONE ENCOUNTER
Last appointment: 8/8/24  Next appointment: 6/4/25  Previous refill encounter(s): 7/8/24 #90 with 1 refill    Requested Prescriptions     Pending Prescriptions Disp Refills    gabapentin (NEURONTIN) 600 MG tablet 90 tablet 1     Sig: Take 1 tablet by mouth nightly for 180 days. Max Daily Amount: 600 mg         For Pharmacy Admin Tracking Only    Program: Medication Refill  CPA in place:    Recommendation Provided To:   Intervention Detail: New Rx: 1, reason: Patient Preference  Intervention Accepted By:   Gap Closed?:    Time Spent (min): 5

## 2025-02-24 ENCOUNTER — CLINICAL DOCUMENTATION (OUTPATIENT)
Age: 65
End: 2025-02-24

## 2025-05-03 ENCOUNTER — APPOINTMENT (OUTPATIENT)
Facility: HOSPITAL | Age: 65
End: 2025-05-03
Payer: COMMERCIAL

## 2025-05-03 ENCOUNTER — HOSPITAL ENCOUNTER (EMERGENCY)
Facility: HOSPITAL | Age: 65
Discharge: HOME | End: 2025-05-03
Attending: EMERGENCY MEDICINE
Payer: COMMERCIAL

## 2025-05-03 VITALS
HEIGHT: 65 IN | OXYGEN SATURATION: 100 % | BODY MASS INDEX: 29.97 KG/M2 | TEMPERATURE: 97.5 F | WEIGHT: 179.9 LBS | SYSTOLIC BLOOD PRESSURE: 136 MMHG | HEART RATE: 64 BPM | RESPIRATION RATE: 18 BRPM | DIASTOLIC BLOOD PRESSURE: 82 MMHG
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
## 2025-05-03 DIAGNOSIS — S39.012A STRAIN OF LUMBAR REGION, INITIAL ENCOUNTER: ICD-10-CM

## 2025-05-03 DIAGNOSIS — R10.819 RIGHT FLANK TENDERNESS: ICD-10-CM

## 2025-05-03 DIAGNOSIS — I71.40 ABDOMINAL AORTIC ANEURYSM (AAA) WITHOUT RUPTURE, UNSPECIFIED PART: ICD-10-CM

## 2025-05-03 LAB
ANION GAP SERPL CALC-SCNC: 5 MMOL/L (ref 2–12)
BASOPHILS # BLD: 0.04 K/UL (ref 0–0.1)
BASOPHILS NFR BLD: 0.9 % (ref 0–1)
BUN SERPL-MCNC: 14 MG/DL (ref 6–20)
BUN/CREAT SERPL: 11 (ref 12–20)
CALCIUM SERPL-MCNC: 9.6 MG/DL (ref 8.5–10.1)
CHLORIDE SERPL-SCNC: 107 MMOL/L (ref 97–108)
CO2 SERPL-SCNC: 26 MMOL/L (ref 21–32)
COMMENT:: NORMAL
CREAT SERPL-MCNC: 1.32 MG/DL (ref 0.55–1.02)
DIFFERENTIAL METHOD BLD: ABNORMAL
EOSINOPHIL # BLD: 0.07 K/UL (ref 0–0.4)
EOSINOPHIL NFR BLD: 1.6 % (ref 0–7)
ERYTHROCYTE [DISTWIDTH] IN BLOOD BY AUTOMATED COUNT: 15.1 % (ref 11.5–14.5)
GLUCOSE SERPL-MCNC: 259 MG/DL (ref 65–100)
HCT VFR BLD AUTO: 37.4 % (ref 35–47)
HGB BLD-MCNC: 11.6 G/DL (ref 11.5–16)
IMM GRANULOCYTES # BLD AUTO: 0.01 K/UL (ref 0–0.04)
IMM GRANULOCYTES NFR BLD AUTO: 0.2 % (ref 0–0.5)
LYMPHOCYTES # BLD: 1.23 K/UL (ref 0.8–3.5)
LYMPHOCYTES NFR BLD: 27.9 % (ref 12–49)
MCH RBC QN AUTO: 22.1 PG (ref 26–34)
MCHC RBC AUTO-ENTMCNC: 31 G/DL (ref 30–36.5)
MCV RBC AUTO: 71.4 FL (ref 80–99)
MONOCYTES # BLD: 0.64 K/UL (ref 0–1)
MONOCYTES NFR BLD: 14.5 % (ref 5–13)
NEUTS SEG # BLD: 2.42 K/UL (ref 1.8–8)
NEUTS SEG NFR BLD: 54.9 % (ref 32–75)
NRBC # BLD: 0 K/UL (ref 0–0.01)
NRBC BLD-RTO: 0 PER 100 WBC
PLATELET # BLD AUTO: 249 K/UL (ref 150–400)
PMV BLD AUTO: 10.6 FL (ref 8.9–12.9)
POTASSIUM SERPL-SCNC: 4.5 MMOL/L (ref 3.5–5.1)
RBC # BLD AUTO: 5.24 M/UL (ref 3.8–5.2)
SODIUM SERPL-SCNC: 138 MMOL/L (ref 136–145)
SPECIMEN HOLD: NORMAL
WBC # BLD AUTO: 4.4 K/UL (ref 3.6–11)

## 2025-05-03 PROCEDURE — 99285 EMERGENCY DEPT VISIT HI MDM: CPT

## 2025-05-03 PROCEDURE — 80048 BASIC METABOLIC PNL TOTAL CA: CPT

## 2025-05-03 PROCEDURE — 85025 COMPLETE CBC W/AUTO DIFF WBC: CPT

## 2025-05-03 PROCEDURE — 74177 CT ABD & PELVIS W/CONTRAST: CPT

## 2025-05-03 PROCEDURE — 6360000004 HC RX CONTRAST MEDICATION: Performed by: EMERGENCY MEDICINE

## 2025-05-03 PROCEDURE — 6370000000 HC RX 637 (ALT 250 FOR IP)

## 2025-05-03 RX ORDER — LIDOCAINE 4 G/G
1 PATCH TOPICAL
Status: DISCONTINUED | OUTPATIENT
Start: 2025-05-03 | End: 2025-05-03 | Stop reason: HOSPADM

## 2025-05-03 RX ORDER — IOPAMIDOL 755 MG/ML
100 INJECTION, SOLUTION INTRAVASCULAR
Status: COMPLETED | OUTPATIENT
Start: 2025-05-03 | End: 2025-05-03

## 2025-05-03 RX ORDER — METHOCARBAMOL 500 MG/1
500 TABLET, FILM COATED ORAL ONCE
Status: COMPLETED | OUTPATIENT
Start: 2025-05-03 | End: 2025-05-03

## 2025-05-03 RX ORDER — METHOCARBAMOL 750 MG/1
750 TABLET, FILM COATED ORAL 4 TIMES DAILY
Qty: 40 TABLET | Refills: 0 | Status: SHIPPED | OUTPATIENT
Start: 2025-05-03 | End: 2025-05-13

## 2025-05-03 RX ORDER — HYDROCODONE BITARTRATE AND ACETAMINOPHEN 5; 325 MG/1; MG/1
1 TABLET ORAL
Refills: 0 | Status: COMPLETED | OUTPATIENT
Start: 2025-05-03 | End: 2025-05-03

## 2025-05-03 RX ORDER — LIDOCAINE 4 G/G
1 PATCH TOPICAL DAILY
Qty: 30 PATCH | Refills: 0 | Status: SHIPPED | OUTPATIENT
Start: 2025-05-03 | End: 2025-06-02

## 2025-05-03 RX ADMIN — IOPAMIDOL 100 ML: 755 INJECTION, SOLUTION INTRAVENOUS at 16:28

## 2025-05-03 RX ADMIN — HYDROCODONE BITARTRATE AND ACETAMINOPHEN 1 TABLET: 5; 325 TABLET ORAL at 17:24

## 2025-05-03 RX ADMIN — METHOCARBAMOL 500 MG: 500 TABLET ORAL at 15:26

## 2025-05-03 ASSESSMENT — PAIN DESCRIPTION - FREQUENCY: FREQUENCY: CONTINUOUS

## 2025-05-03 ASSESSMENT — PAIN DESCRIPTION - DESCRIPTORS
DESCRIPTORS: SHARP
DESCRIPTORS: ACHING
DESCRIPTORS: SHARP;SHOOTING

## 2025-05-03 ASSESSMENT — PAIN DESCRIPTION - LOCATION
LOCATION: BACK
LOCATION: BACK;FLANK
LOCATION: FLANK;BACK

## 2025-05-03 ASSESSMENT — PAIN DESCRIPTION - PAIN TYPE: TYPE: ACUTE PAIN

## 2025-05-03 ASSESSMENT — PAIN DESCRIPTION - ORIENTATION
ORIENTATION: RIGHT;LOWER

## 2025-05-03 ASSESSMENT — PAIN SCALES - GENERAL
PAINLEVEL_OUTOF10: 9
PAINLEVEL_OUTOF10: 8
PAINLEVEL_OUTOF10: 10

## 2025-05-03 ASSESSMENT — PAIN - FUNCTIONAL ASSESSMENT: PAIN_FUNCTIONAL_ASSESSMENT: PREVENTS OR INTERFERES SOME ACTIVE ACTIVITIES AND ADLS

## 2025-05-03 ASSESSMENT — PAIN DESCRIPTION - ONSET: ONSET: ON-GOING

## 2025-05-03 NOTE — ED TRIAGE NOTES
Pt ambulatory to triage for c/o right lower back pain after GLF yesterday, tripping over curb. Denies hitting head. Denies LOC. Hx multiple back surgeries

## 2025-05-03 NOTE — DISCHARGE INSTRUCTIONS
Discussed visit today. You were seen in the ER today after your fall yesterday. Your labs were reassuring today. Your imaging was reassuring and this is likely a contusion and muscle strain. Incidental findings of Aneurysmal dilation of the abdominal aorta at 30mm with recommendations of repeat US in 3 years. Please follow-up with your PCP provider. Continue to treat your symptoms at home.     Return to the ER with any worsening of symptoms.

## 2025-05-03 NOTE — ED PROVIDER NOTES
[facility] EMERGENCY DEPARTMENT  EMERGENCY DEPARTMENT ENCOUNTER      Pt Name: Alicia Brooke  MRN: [id number]  Birthdate 1960  Date of evaluation: 5/3/2025  Provider: Kennedy Ontiveros PA-C    CHIEF COMPLAINT       Chief Complaint   Patient presents with    Fall         HISTORY OF PRESENT ILLNESS    Patient is a 64-year-old female with history of heart murmur, osteoarthritis, chronic bilateral low back pain without sciatica, diabetes, hypertension, hyperlipidemia, multiple spinal surgeries who presents emergency room with son for reports of right flank pain after a ground-level fall yesterday.  Patient reports she was carrying a microwave when she fell right into the microwave with her right side.  Patient reports she has been taking ibuprofen at home without much relief.  No head injury or loss of consciousness.  No blood thinners. Patient denies chest pain, shortness of breath, abdominal pain, urinary symptoms, nausea or vomiting, diarrhea or constipation, headache, dizziness, lightheadedness, fever or chills.  Patient denies alcohol use, former cigarette smoking, denies any illicit drug use.          Nursing Notes were reviewed.    REVIEW OF SYSTEMS       Review of Systems      PAST MEDICAL HISTORY     Past Medical History:   Diagnosis Date    (HFpEF) heart failure with preserved ejection fraction (HCC)     Arrhythmia     HEART MURMUR -\"HAD IT FOR YEARS AS AN ADULT)    Arthritis     OSTEO    Back pain     Breast cancer screening 6/28/2016    Chronic bilateral low back pain without sciatica 6/28/2016    Diabetes (HCC)     Encounter for long-term (current) use of medications 6/28/2016    Essential hypertension with goal blood pressure less than 140/90 6/28/2016    Hypercholesteremia 8/6/2015    Hypercholesterolemia     Hypertension     CONTROLLED WITH MEDS    Ill-defined condition     hyperlipidemia    Other ill-defined conditions(799.89)     high cholesterol    Patient non adherence 1/23/2017    Primary  osteoarthritis of left knee 3/2/2018    Type 2 diabetes, uncontrolled, with neuropathy 1/23/2017    Type II diabetes mellitus, uncontrolled 8/6/2015         SURGICAL HISTORY       Past Surgical History:   Procedure Laterality Date    CARPAL TUNNEL RELEASE      GYN      hysterectomy    HEENT      Ear surgery    HEENT      Left eye    HYSTERECTOMY (CERVIX STATUS UNKNOWN)      age unknown    ORTHOPEDIC SURGERY      left wrist & left elbow    ORTHOPEDIC SURGERY      Back Surgery x 3    ORTHOPEDIC SURGERY      right thumb at [facility]    ORTHOPEDIC SURGERY      REMOVAL OF LT FOOT CYST    OTHER SURGICAL HISTORY      Collapsed left lung    OTHER SURGICAL HISTORY      axillary sweat glands removed    OTHER SURGICAL HISTORY      back surgery X 2    OTOPLASTY PROTRUDING EAR W/WO SIZE RDCTJ      OVARIAN CYST REMOVAL      OR UNLISTED PROCEDURE CARDIAC SURGERY      murmur- PT DENIES SURGERY AND MURMUR 6/20/17         CURRENT MEDICATIONS       Previous Medications    AMLODIPINE (NORVASC) 10 MG TABLET    Take 1 tablet by mouth daily    ASPIRIN 81 MG CHEWABLE TABLET    Take 1 tablet by mouth daily    BUTALBITAL-ACETAMINOPHEN-CAFFEINE (FIORICET, ESGIC) -40 MG PER TABLET    Take 1 tablet by mouth every 6 hours as needed for Headaches    DICLOFENAC (VOLTAREN) 75 MG EC TABLET    Take 1 tablet by mouth 2 times daily    DULAGLUTIDE (TRULICITY) 1.5 MG/0.5ML SC INJECTION    Inject 0.5 mLs into the skin once a week    EMPAGLIFLOZIN (JARDIANCE) 25 MG TABLET    Take 1 tablet by mouth daily    GABAPENTIN (NEURONTIN) 600 MG TABLET    Take 1 tablet by mouth nightly for 180 days. Max Daily Amount: 600 mg    INSULIN GLARGINE (LANTUS SOLOSTAR) 100 UNIT/ML INJECTION PEN    Inject 20 Units into the skin daily    INSULIN PEN NEEDLE (PEN NEEDLES 5/16\") 30G X 8 MM MISC    1 each by Does not apply route daily Use one needle per blood glucose check    METFORMIN (GLUCOPHAGE-XR) 500 MG EXTENDED RELEASE TABLET    Take 1 tablet by mouth 2 times

## 2025-05-06 ENCOUNTER — TELEPHONE (OUTPATIENT)
Age: 65
End: 2025-05-06

## 2025-05-06 NOTE — TELEPHONE ENCOUNTER
Patient requesting a call back to discus  ED follow up pain in right middle back due to fall. Patient was seen at [facility] this past Saturday . CT Scan was done that day. Patient's next appointment is June 4, 2025 and requesting to be seen sooner.

## 2025-05-07 ENCOUNTER — TELEPHONE (OUTPATIENT)
Age: 65
End: 2025-05-07

## 2025-05-07 NOTE — TELEPHONE ENCOUNTER
Patient had come to office when phones and EPIC were sown wanting to schedule an ED f/u appt. Offered virtual to be seen sooner but patient would rather in office visit. Has an appt. Already for 6/4/2025. Stated that she will wait until then but if she cannot she will call the office to check for availability.

## 2025-05-08 ENCOUNTER — TELEPHONE (OUTPATIENT)
Age: 65
End: 2025-05-08

## 2025-05-08 NOTE — TELEPHONE ENCOUNTER
Krystian Fields Jr. [facility] Clinical Staff  ECC Escalation To Practice      Type of Escalation: Acute Care Symptom  --------------------------------------------------------------------------------------------------------------------------    Information for Provider:  Patient is looking for appointment for: Bruise  Reasons for Message: Unable to reach practice    Additional Information:patient is having bruise on her back for 3 days  --------------------------------------------------------------------------------------------------------------------------    Relationship to Patient: Other/Jeremie/friend  Call Back Info: OK to leave message on voicemail  Preferred Call Back Number:[phone]

## 2025-05-09 NOTE — TELEPHONE ENCOUNTER
Patient was offered sooner appt. as a virtual but would like to be seen in person. Will put on cancellation list and call if any available appt's. Already has appt. Set for 6/4/2025 @ 11:40 am

## 2025-05-12 ENCOUNTER — OFFICE VISIT (OUTPATIENT)
Age: 65
End: 2025-05-12
Payer: COMMERCIAL

## 2025-05-12 VITALS
WEIGHT: 174.16 LBS | HEART RATE: 68 BPM | BODY MASS INDEX: 29.02 KG/M2 | SYSTOLIC BLOOD PRESSURE: 116 MMHG | RESPIRATION RATE: 16 BRPM | DIASTOLIC BLOOD PRESSURE: 74 MMHG | HEIGHT: 65 IN | TEMPERATURE: 97.9 F | OXYGEN SATURATION: 100 %

## 2025-05-12 DIAGNOSIS — R07.81 RIB PAIN ON RIGHT SIDE: Primary | ICD-10-CM

## 2025-05-12 PROCEDURE — 3078F DIAST BP <80 MM HG: CPT | Performed by: STUDENT IN AN ORGANIZED HEALTH CARE EDUCATION/TRAINING PROGRAM

## 2025-05-12 PROCEDURE — 99213 OFFICE O/P EST LOW 20 MIN: CPT | Performed by: STUDENT IN AN ORGANIZED HEALTH CARE EDUCATION/TRAINING PROGRAM

## 2025-05-12 PROCEDURE — 3074F SYST BP LT 130 MM HG: CPT | Performed by: STUDENT IN AN ORGANIZED HEALTH CARE EDUCATION/TRAINING PROGRAM

## 2025-05-12 RX ORDER — OXYCODONE AND ACETAMINOPHEN 5; 325 MG/1; MG/1
1 TABLET ORAL EVERY 12 HOURS PRN
Qty: 30 TABLET | Refills: 0 | Status: SHIPPED | OUTPATIENT
Start: 2025-05-12 | End: 2025-06-11

## 2025-05-12 SDOH — ECONOMIC STABILITY: FOOD INSECURITY: WITHIN THE PAST 12 MONTHS, THE FOOD YOU BOUGHT JUST DIDN'T LAST AND YOU DIDN'T HAVE MONEY TO GET MORE.: NEVER TRUE

## 2025-05-12 SDOH — ECONOMIC STABILITY: FOOD INSECURITY: WITHIN THE PAST 12 MONTHS, YOU WORRIED THAT YOUR FOOD WOULD RUN OUT BEFORE YOU GOT MONEY TO BUY MORE.: NEVER TRUE

## 2025-05-12 ASSESSMENT — PATIENT HEALTH QUESTIONNAIRE - PHQ9
SUM OF ALL RESPONSES TO PHQ QUESTIONS 1-9: 0
SUM OF ALL RESPONSES TO PHQ QUESTIONS 1-9: 0
2. FEELING DOWN, DEPRESSED OR HOPELESS: NOT AT ALL
SUM OF ALL RESPONSES TO PHQ QUESTIONS 1-9: 0
1. LITTLE INTEREST OR PLEASURE IN DOING THINGS: NOT AT ALL
SUM OF ALL RESPONSES TO PHQ QUESTIONS 1-9: 0

## 2025-05-12 NOTE — PROGRESS NOTES
Chief Complaint   Patient presents with    Follow-up     7/3/2024 Primary Hypertension    ED Follow-up     5/3/2025 (2 hours)  [facility]  Fall, initial   Right flank tenderness   Strain of lumbar region        Still having pain right side rib area. Pain when coughing and sneezing and breathing in and out. Hurts when she tries to sit up in her bed.    Pain scale 8 out of 10.    \"Have you been to the ER, urgent care clinic since your last visit?  Hospitalized since your last visit?\"      Yes  5/3/2025 (2 hours)  [facility]  Fall, initial   Right flank tenderness   Strain of lumbar region     “Have you seen or consulted any other health care providers outside of [facility] since your last visit?”    NO      Vitals:    25 1210   BP: 116/74   Pulse: 68   Resp: 16   Temp: 97.9 °F (36.6 °C)   TempSrc: Temporal   SpO2: 100%   Weight: 79 kg (174 lb 2.6 oz)   Height: 1.651 m (5' 5\")      Health Maintenance Due   Topic Date Due    HIV screen  Never done    Shingles vaccine (1 of 2) Never done    Pneumococcal 50+ years Vaccine (2 of 2 - PCV) 2018    Respiratory Syncytial Virus (RSV) Pregnant or age 60 yrs+ (1 - Risk 60-74 years 1-dose series) Never done    Diabetic foot exam  2023    Diabetic Alb to Cr ratio (uACR) test  2024    Lipids  2024    COVID-19 Vaccine ( season) 2024      The patient, Alicia Brooke, identity was verified by name and , pharmacy verified  Labs:Yes  Fasting:No

## 2025-05-12 NOTE — PROGRESS NOTES
[facility]  [address] [place] [place] [address] [phone]    Chief Complaint: chronic medical problems    Subjective  Alicia Brooke is a 64 y.o. Black /  female , established patient, here for evaluation of the concern(s) above;    PMHx: HFpEF (sees Cards - [facility]), HTN, HLD, OAB, DM 2 (followed by Endocrine), Radicular back pain s/p surgery, and sickle cell trait. Here for follow up    Subjective:    ER follow up:  Pt states that she missed a step on her front door and fell on the microwave which she was carrying. She hit her right flank and went to the ER for evaluation the day after the incident. She had CT ABD PELV (5/3/2025) which was unremarkable.  She received patches and muscle relaxants but states that neither have helped with her pain. She continue to have some pain with deep breath and sometimes sleeping is difficult. Coughing and sneezing or anything requiring exertion makes it worst.    They also gave her Tramadol but she does not like it as it has not helped.    Pt denies any  fever, chill, chest pain, SOB, PND, n/v/d,HA or dizziness.      Other Health Habits and social history:  Smoking history: Former smoker but quit years ago.  Alcohol history: no  Occupation: unemployed  Marital status: Patient is currently single and has one son. Had 3 children but 2 passed away. Has 2 grandkids (9 and 10 yo)     Other Specialists/providers:  Endocrinology - diabetes  Cardiology - HFpEF  Ortho    Allergies - reviewed:   Allergies   Allergen Reactions    Vibegron Other (See Comments)     Gemtesa    Oxybutynin Other (See Comments)     Reports urinary retention requiring catheter    Pravastatin Myalgia       Past Medical History - reviewed:  Past Medical History:   Diagnosis Date    (HFpEF) heart failure with preserved ejection fraction (HCC)     Arrhythmia     HEART MURMUR -\"HAD IT FOR YEARS AS AN ADULT)    Arthritis     OSTEO    Back pain

## 2025-05-13 ENCOUNTER — CLINICAL DOCUMENTATION (OUTPATIENT)
Age: 65
End: 2025-05-13

## 2025-05-14 ENCOUNTER — CLINICAL DOCUMENTATION (OUTPATIENT)
Age: 65
End: 2025-05-14

## 2025-06-04 ENCOUNTER — OFFICE VISIT (OUTPATIENT)
Age: 65
End: 2025-06-04
Payer: MEDICARE

## 2025-06-04 VITALS
RESPIRATION RATE: 16 BRPM | TEMPERATURE: 97.8 F | HEART RATE: 65 BPM | DIASTOLIC BLOOD PRESSURE: 68 MMHG | HEIGHT: 65 IN | BODY MASS INDEX: 29.38 KG/M2 | WEIGHT: 176.37 LBS | OXYGEN SATURATION: 100 % | SYSTOLIC BLOOD PRESSURE: 126 MMHG

## 2025-06-04 DIAGNOSIS — I10 PRIMARY HYPERTENSION: ICD-10-CM

## 2025-06-04 DIAGNOSIS — M54.16 LUMBAR RADICULOPATHY, CHRONIC: ICD-10-CM

## 2025-06-04 PROCEDURE — 1123F ACP DISCUSS/DSCN MKR DOCD: CPT | Performed by: STUDENT IN AN ORGANIZED HEALTH CARE EDUCATION/TRAINING PROGRAM

## 2025-06-04 PROCEDURE — 3078F DIAST BP <80 MM HG: CPT | Performed by: STUDENT IN AN ORGANIZED HEALTH CARE EDUCATION/TRAINING PROGRAM

## 2025-06-04 PROCEDURE — 3074F SYST BP LT 130 MM HG: CPT | Performed by: STUDENT IN AN ORGANIZED HEALTH CARE EDUCATION/TRAINING PROGRAM

## 2025-06-04 PROCEDURE — 99213 OFFICE O/P EST LOW 20 MIN: CPT | Performed by: STUDENT IN AN ORGANIZED HEALTH CARE EDUCATION/TRAINING PROGRAM

## 2025-06-04 RX ORDER — LOSARTAN POTASSIUM 50 MG/1
50 TABLET ORAL DAILY
COMMUNITY
Start: 2025-04-28

## 2025-06-04 RX ORDER — GABAPENTIN 600 MG/1
600 TABLET ORAL 2 TIMES DAILY
Qty: 90 TABLET | Refills: 1 | Status: CANCELLED | OUTPATIENT
Start: 2025-06-04 | End: 2025-12-01

## 2025-06-04 RX ORDER — OXYCODONE AND ACETAMINOPHEN 5; 325 MG/1; MG/1
1 TABLET ORAL DAILY PRN
Qty: 90 TABLET | Refills: 0 | Status: SHIPPED | OUTPATIENT
Start: 2025-06-12 | End: 2025-09-10

## 2025-06-04 RX ORDER — GABAPENTIN 300 MG/1
300 CAPSULE ORAL 2 TIMES DAILY
Qty: 180 CAPSULE | Refills: 1 | Status: SHIPPED | OUTPATIENT
Start: 2025-06-04 | End: 2025-12-01

## 2025-06-04 NOTE — PROGRESS NOTES
[facility]  [address] [place] [address] [phone]    Chief Complaint: chronic medical problems    Subjective  Alicia Brooke is a 65 y.o. Black /  female , established patient, here for evaluation of the concern(s) above;    PMHx: HFpEF (sees Cards - [facility]), HTN, HLD, OAB, DM 2 (followed by Endocrine), Radicular back pain s/p spinal fusion, and sickle cell traiT. Here for follow up    Subjective:    HTN: ON Losartan 50 mg, Amlodipine 10mg and Aldactone  Compliant with meds. No side effects.  No side effects from medications.    Neuropathy-  Controlled with gabapentin and percocet for breakthrough pain.    OAB:  Chronic problem.  Oxybutynin was tried in the past but did not help.  Started on Trospium which she has been taking for about 2 weeks without significant improvement.  Still wakes up about 4-5 times at night to use the bathroom.    Pt denies any  fever, chill, chest pain, SOB, PND, abdominal pain, n/v/d,HA or dizziness.      Other Health Habits and social history:  Smoking history: Former smoker but quit years ago.  Alcohol history: no  Occupation: unemployed  Marital status: Patient is currently single and has one son. Had 3 children but 2 passed away. Has 2 grandkids (13 and 15 yo)     Other Specialists/providers:  Endocrinology - diabetes  Cardiology - HFpEF  Ortho - spine and knee    Allergies - reviewed:   Allergies   Allergen Reactions    Vibegron Other (See Comments)     Gemtesa    Oxybutynin Other (See Comments)     Reports urinary retention requiring catheter    Pravastatin Myalgia       Past Medical History - reviewed:  Past Medical History:   Diagnosis Date    (HFpEF) heart failure with preserved ejection fraction (HCC)     Arrhythmia     HEART MURMUR -\"HAD IT FOR YEARS AS AN ADULT)    Arthritis     OSTEO    Back pain     Breast cancer screening 6/28/2016    Chronic bilateral low back pain without sciatica 6/28/2016

## 2025-06-04 NOTE — PROGRESS NOTES
Chief Complaint   Patient presents with    Follow-up     Last Seen 2025 Hypertension     Accompanied by: Patrick    Macho'Parminder Castle DO  [facility]    \"Have you been to the ER, urgent care clinic since your last visit?  Hospitalized since your last visit?\"    NO    “Have you seen or consulted any other health care providers outside of [facility] since your last visit?”      Yes [facility] scheduled for 2025             Vitals:    25 0913   BP: 126/68   Pulse: 65   Resp: 16   Temp: 97.8 °F (36.6 °C)   TempSrc: Temporal   SpO2: 100%   Weight: 80 kg (176 lb 5.9 oz)   Height: 1.651 m (5' 5\")      Health Maintenance Due   Topic Date Due    HIV screen  Never done    Shingles vaccine (1 of 2) Never done    DEXA (modify frequency per FRAX score)  Never done    Pneumococcal 50+ years Vaccine (2 of 2 - PCV) 2018    Respiratory Syncytial Virus (RSV) Pregnant or age 60 yrs+ (1 - Risk 60-74 years 1-dose series) Never done    Diabetic foot exam  2023    Diabetic Alb to Cr ratio (uACR) test  2024    Lipids  2024    COVID-19 Vaccine ( season) 2024    Annual Wellness Visit (Medicare Advantage)  Never done      The patient, Alicia SMITH Edwardo, identity was verified by name and , pharmacy verified  Labs:Yes  Fasting:Yes

## 2025-06-10 ENCOUNTER — TELEPHONE (OUTPATIENT)
Age: 65
End: 2025-06-10

## 2025-06-10 NOTE — TELEPHONE ENCOUNTER
Fariba Brewster /Aravind will be faxing over a Verification of Chronic Diseases  to be completed so the patient  will not use loose insurance coverage. Fariba can be reached at [phone] for questions.

## 2025-06-10 NOTE — TELEPHONE ENCOUNTER
Notified that form has been received and on Providers desk ready to be sign. Will fax back once signature is completed.

## 2025-06-19 ENCOUNTER — CLINICAL DOCUMENTATION (OUTPATIENT)
Age: 65
End: 2025-06-19

## 2025-06-24 ENCOUNTER — HOSPITAL ENCOUNTER (OUTPATIENT)
Facility: HOSPITAL | Age: 65
Discharge: HOME | End: 2025-06-27
Payer: MEDICARE

## 2025-06-24 DIAGNOSIS — G89.29 CHRONIC BILATERAL LOW BACK PAIN WITHOUT SCIATICA: ICD-10-CM

## 2025-06-24 DIAGNOSIS — M54.50 CHRONIC BILATERAL LOW BACK PAIN WITHOUT SCIATICA: ICD-10-CM

## 2025-06-24 DIAGNOSIS — Z98.1 S/P LUMBAR SPINAL FUSION: ICD-10-CM

## 2025-06-24 PROCEDURE — 72131 CT LUMBAR SPINE W/O DYE: CPT

## 2025-07-30 PROBLEM — M54.50 LOW BACK PAIN: Status: ACTIVE | Noted: 2025-07-29

## 2025-07-30 PROBLEM — Z98.1 S/P SPINAL FUSION: Status: ACTIVE | Noted: 2025-07-29

## 2025-08-04 ENCOUNTER — HOSPITAL ENCOUNTER (OUTPATIENT)
Facility: HOSPITAL | Age: 65
Discharge: HOME | End: 2025-08-07
Payer: COMMERCIAL

## 2025-08-04 VITALS
RESPIRATION RATE: 18 BRPM | TEMPERATURE: 98.2 F | BODY MASS INDEX: 27.97 KG/M2 | HEIGHT: 67 IN | HEART RATE: 57 BPM | OXYGEN SATURATION: 100 % | WEIGHT: 178.2 LBS | SYSTOLIC BLOOD PRESSURE: 132 MMHG | DIASTOLIC BLOOD PRESSURE: 71 MMHG

## 2025-08-04 LAB
ABO + RH BLD: NORMAL
ANION GAP SERPL CALC-SCNC: 15 MMOL/L (ref 2–14)
APPEARANCE UR: CLEAR
BACTERIA URNS QL MICRO: NEGATIVE /HPF
BILIRUB UR QL: NEGATIVE
BLOOD GROUP ANTIBODIES SERPL: NORMAL
BUN SERPL-MCNC: 18 MG/DL (ref 8–23)
BUN/CREAT SERPL: 21 (ref 12–20)
CALCIUM SERPL-MCNC: 9.7 MG/DL (ref 8.8–10.2)
CHLORIDE SERPL-SCNC: 107 MMOL/L (ref 98–107)
CO2 SERPL-SCNC: 21 MMOL/L (ref 20–29)
COLOR UR: ABNORMAL
CREAT SERPL-MCNC: 0.87 MG/DL (ref 0.6–1)
EPITH CASTS URNS QL MICRO: ABNORMAL /LPF
ERYTHROCYTE [DISTWIDTH] IN BLOOD BY AUTOMATED COUNT: 14.6 % (ref 11.5–14.5)
GLUCOSE SERPL-MCNC: 84 MG/DL (ref 65–100)
GLUCOSE UR STRIP.AUTO-MCNC: NEGATIVE MG/DL
HCT VFR BLD AUTO: 36.9 % (ref 35–47)
HGB BLD-MCNC: 11.4 G/DL (ref 11.5–16)
HGB UR QL STRIP: NEGATIVE
HYALINE CASTS URNS QL MICRO: ABNORMAL /LPF (ref 0–5)
INR PPP: 1 (ref 0.9–1.1)
KETONES UR QL STRIP.AUTO: NEGATIVE MG/DL
LEUKOCYTE ESTERASE UR QL STRIP.AUTO: ABNORMAL
MCH RBC QN AUTO: 22.8 PG (ref 26–34)
MCHC RBC AUTO-ENTMCNC: 30.9 G/DL (ref 30–36.5)
MCV RBC AUTO: 73.8 FL (ref 80–99)
NITRITE UR QL STRIP.AUTO: NEGATIVE
NRBC # BLD: 0 K/UL (ref 0–0.01)
NRBC BLD-RTO: 0 PER 100 WBC
PH UR STRIP: 5.5 (ref 5–8)
PLATELET # BLD AUTO: 221 K/UL (ref 150–400)
PMV BLD AUTO: 11.5 FL (ref 8.9–12.9)
POTASSIUM SERPL-SCNC: 4.2 MMOL/L (ref 3.5–5.1)
PROT UR STRIP-MCNC: NEGATIVE MG/DL
PROTHROMBIN TIME: 10.9 SEC (ref 9.2–11.2)
RBC # BLD AUTO: 5 M/UL (ref 3.8–5.2)
RBC #/AREA URNS HPF: ABNORMAL /HPF (ref 0–5)
SODIUM SERPL-SCNC: 143 MMOL/L (ref 136–145)
SP GR UR REFRACTOMETRY: 1.02 (ref 1–1.03)
SPECIMEN EXP DATE BLD: NORMAL
URINE CULTURE IF INDICATED: ABNORMAL
UROBILINOGEN UR QL STRIP.AUTO: 0.2 EU/DL (ref 0.2–1)
WBC # BLD AUTO: 3.9 K/UL (ref 3.6–11)
WBC URNS QL MICRO: ABNORMAL /HPF (ref 0–4)

## 2025-08-04 PROCEDURE — 86850 RBC ANTIBODY SCREEN: CPT

## 2025-08-04 PROCEDURE — 80048 BASIC METABOLIC PNL TOTAL CA: CPT

## 2025-08-04 PROCEDURE — 86901 BLOOD TYPING SEROLOGIC RH(D): CPT

## 2025-08-04 PROCEDURE — 86900 BLOOD TYPING SEROLOGIC ABO: CPT

## 2025-08-04 PROCEDURE — 85027 COMPLETE CBC AUTOMATED: CPT

## 2025-08-04 PROCEDURE — 85610 PROTHROMBIN TIME: CPT

## 2025-08-04 PROCEDURE — 81001 URINALYSIS AUTO W/SCOPE: CPT

## 2025-08-04 RX ORDER — ACETAMINOPHEN 500 MG
1000 TABLET ORAL EVERY 6 HOURS PRN
COMMUNITY

## 2025-08-04 ASSESSMENT — PAIN DESCRIPTION - DESCRIPTORS: DESCRIPTORS: BURNING

## 2025-08-04 ASSESSMENT — PAIN DESCRIPTION - ORIENTATION: ORIENTATION: LOWER

## 2025-08-04 ASSESSMENT — PAIN DESCRIPTION - LOCATION: LOCATION: BACK

## 2025-08-04 ASSESSMENT — PAIN DESCRIPTION - PAIN TYPE: TYPE: CHRONIC PAIN

## 2025-08-04 ASSESSMENT — PAIN SCALES - GENERAL: PAINLEVEL_OUTOF10: 7

## 2025-08-05 LAB
BACTERIA SPEC CULT: NORMAL
BACTERIA SPEC CULT: NORMAL
SERVICE CMNT-IMP: NORMAL

## 2025-08-12 ENCOUNTER — OFFICE VISIT (OUTPATIENT)
Age: 65
End: 2025-08-12
Payer: COMMERCIAL

## 2025-08-12 VITALS
BODY MASS INDEX: 30.12 KG/M2 | TEMPERATURE: 97.1 F | OXYGEN SATURATION: 100 % | RESPIRATION RATE: 16 BRPM | HEART RATE: 59 BPM | WEIGHT: 180.78 LBS | DIASTOLIC BLOOD PRESSURE: 68 MMHG | HEIGHT: 65 IN | SYSTOLIC BLOOD PRESSURE: 118 MMHG

## 2025-08-12 DIAGNOSIS — M54.16 LUMBAR RADICULOPATHY, CHRONIC: ICD-10-CM

## 2025-08-12 DIAGNOSIS — Z01.818 PRE-OPERATIVE CLEARANCE: Primary | ICD-10-CM

## 2025-08-12 PROCEDURE — 99213 OFFICE O/P EST LOW 20 MIN: CPT | Performed by: STUDENT IN AN ORGANIZED HEALTH CARE EDUCATION/TRAINING PROGRAM

## 2025-08-12 PROCEDURE — 1123F ACP DISCUSS/DSCN MKR DOCD: CPT | Performed by: STUDENT IN AN ORGANIZED HEALTH CARE EDUCATION/TRAINING PROGRAM

## 2025-08-12 PROCEDURE — 3078F DIAST BP <80 MM HG: CPT | Performed by: STUDENT IN AN ORGANIZED HEALTH CARE EDUCATION/TRAINING PROGRAM

## 2025-08-12 PROCEDURE — 3074F SYST BP LT 130 MM HG: CPT | Performed by: STUDENT IN AN ORGANIZED HEALTH CARE EDUCATION/TRAINING PROGRAM

## 2025-08-18 ENCOUNTER — ANESTHESIA EVENT (OUTPATIENT)
Facility: HOSPITAL | Age: 65
End: 2025-08-18
Payer: COMMERCIAL

## 2025-08-19 ENCOUNTER — APPOINTMENT (OUTPATIENT)
Facility: HOSPITAL | Age: 65
DRG: 304 | End: 2025-08-19
Attending: ORTHOPAEDIC SURGERY
Payer: COMMERCIAL

## 2025-08-19 ENCOUNTER — ANESTHESIA (OUTPATIENT)
Facility: HOSPITAL | Age: 65
End: 2025-08-19
Payer: COMMERCIAL

## 2025-08-19 ENCOUNTER — HOSPITAL ENCOUNTER (INPATIENT)
Facility: HOSPITAL | Age: 65
LOS: 5 days | Discharge: HOME HEALTH | DRG: 304 | End: 2025-08-24
Attending: ORTHOPAEDIC SURGERY | Admitting: ORTHOPAEDIC SURGERY
Payer: COMMERCIAL

## 2025-08-19 DIAGNOSIS — Z98.1 S/P SPINAL FUSION: Primary | ICD-10-CM

## 2025-08-19 PROBLEM — M48.062 LUMBAR STENOSIS WITH NEUROGENIC CLAUDICATION: Status: ACTIVE | Noted: 2025-08-19

## 2025-08-19 PROBLEM — S32.009K PSEUDOARTHROSIS OF LUMBAR SPINE: Status: ACTIVE | Noted: 2025-08-19

## 2025-08-19 LAB
ANION GAP SERPL CALC-SCNC: 20 MMOL/L (ref 2–14)
BUN SERPL-MCNC: 25 MG/DL (ref 8–23)
BUN/CREAT SERPL: 18 (ref 12–20)
CALCIUM SERPL-MCNC: 8.5 MG/DL (ref 8.8–10.2)
CHLORIDE SERPL-SCNC: 99 MMOL/L (ref 98–107)
CO2 SERPL-SCNC: 11 MMOL/L (ref 20–29)
CREAT SERPL-MCNC: 1.36 MG/DL (ref 0.6–1)
GLUCOSE BLD STRIP.AUTO-MCNC: 219 MG/DL (ref 65–117)
GLUCOSE BLD STRIP.AUTO-MCNC: 324 MG/DL (ref 65–117)
GLUCOSE BLD STRIP.AUTO-MCNC: 403 MG/DL (ref 65–117)
GLUCOSE BLD STRIP.AUTO-MCNC: 429 MG/DL (ref 65–117)
GLUCOSE SERPL-MCNC: 462 MG/DL (ref 65–100)
POTASSIUM SERPL-SCNC: 5.7 MMOL/L (ref 3.5–5.1)
SERVICE CMNT-IMP: ABNORMAL
SODIUM SERPL-SCNC: 131 MMOL/L (ref 136–145)

## 2025-08-19 PROCEDURE — C1889 IMPLANT/INSERT DEVICE, NOC: HCPCS | Performed by: ORTHOPAEDIC SURGERY

## 2025-08-19 PROCEDURE — 72128 CT CHEST SPINE W/O DYE: CPT

## 2025-08-19 PROCEDURE — 2709999900 HC NON-CHARGEABLE SUPPLY: Performed by: ORTHOPAEDIC SURGERY

## 2025-08-19 PROCEDURE — 2500000003 HC RX 250 WO HCPCS: Performed by: ORTHOPAEDIC SURGERY

## 2025-08-19 PROCEDURE — 00NY0ZZ RELEASE LUMBAR SPINAL CORD, OPEN APPROACH: ICD-10-PCS | Performed by: ORTHOPAEDIC SURGERY

## 2025-08-19 PROCEDURE — 07DR3ZZ EXTRACTION OF ILIAC BONE MARROW, PERCUTANEOUS APPROACH: ICD-10-PCS | Performed by: ORTHOPAEDIC SURGERY

## 2025-08-19 PROCEDURE — 1100000000 HC RM PRIVATE

## 2025-08-19 PROCEDURE — 3700000000 HC ANESTHESIA ATTENDED CARE: Performed by: ORTHOPAEDIC SURGERY

## 2025-08-19 PROCEDURE — 2580000003 HC RX 258: Performed by: ANESTHESIOLOGY

## 2025-08-19 PROCEDURE — 3600000004 HC SURGERY LEVEL 4 BASE: Performed by: ORTHOPAEDIC SURGERY

## 2025-08-19 PROCEDURE — 8E0W0CZ ROBOTIC ASSISTED PROCEDURE OF TRUNK REGION, OPEN APPROACH: ICD-10-PCS | Performed by: ORTHOPAEDIC SURGERY

## 2025-08-19 PROCEDURE — 6370000000 HC RX 637 (ALT 250 FOR IP)

## 2025-08-19 PROCEDURE — 3600000014 HC SURGERY LEVEL 4 ADDTL 15MIN: Performed by: ORTHOPAEDIC SURGERY

## 2025-08-19 PROCEDURE — 82962 GLUCOSE BLOOD TEST: CPT

## 2025-08-19 PROCEDURE — 2580000003 HC RX 258: Performed by: NURSE ANESTHETIST, CERTIFIED REGISTERED

## 2025-08-19 PROCEDURE — 2500000003 HC RX 250 WO HCPCS: Performed by: STUDENT IN AN ORGANIZED HEALTH CARE EDUCATION/TRAINING PROGRAM

## 2025-08-19 PROCEDURE — 6360000002 HC RX W HCPCS: Performed by: ORTHOPAEDIC SURGERY

## 2025-08-19 PROCEDURE — C1776 JOINT DEVICE (IMPLANTABLE): HCPCS | Performed by: ORTHOPAEDIC SURGERY

## 2025-08-19 PROCEDURE — 6360000002 HC RX W HCPCS: Performed by: STUDENT IN AN ORGANIZED HEALTH CARE EDUCATION/TRAINING PROGRAM

## 2025-08-19 PROCEDURE — 4A11X4G MONITORING OF PERIPHERAL NERVOUS ELECTRICAL ACTIVITY, INTRAOPERATIVE, EXTERNAL APPROACH: ICD-10-PCS | Performed by: ORTHOPAEDIC SURGERY

## 2025-08-19 PROCEDURE — 7100000000 HC PACU RECOVERY - FIRST 15 MIN: Performed by: ORTHOPAEDIC SURGERY

## 2025-08-19 PROCEDURE — 0RGA071 FUSION OF THORACOLUMBAR VERTEBRAL JOINT WITH AUTOLOGOUS TISSUE SUBSTITUTE, POSTERIOR APPROACH, POSTERIOR COLUMN, OPEN APPROACH: ICD-10-PCS | Performed by: ORTHOPAEDIC SURGERY

## 2025-08-19 PROCEDURE — 2500000003 HC RX 250 WO HCPCS

## 2025-08-19 PROCEDURE — 7100000001 HC PACU RECOVERY - ADDTL 15 MIN: Performed by: ORTHOPAEDIC SURGERY

## 2025-08-19 PROCEDURE — 01NB0ZZ RELEASE LUMBAR NERVE, OPEN APPROACH: ICD-10-PCS | Performed by: ORTHOPAEDIC SURGERY

## 2025-08-19 PROCEDURE — 80048 BASIC METABOLIC PNL TOTAL CA: CPT

## 2025-08-19 PROCEDURE — 6360000002 HC RX W HCPCS: Performed by: NURSE ANESTHETIST, CERTIFIED REGISTERED

## 2025-08-19 PROCEDURE — 6360000002 HC RX W HCPCS

## 2025-08-19 PROCEDURE — 3700000001 HC ADD 15 MINUTES (ANESTHESIA): Performed by: ORTHOPAEDIC SURGERY

## 2025-08-19 PROCEDURE — 6370000000 HC RX 637 (ALT 250 FOR IP): Performed by: STUDENT IN AN ORGANIZED HEALTH CARE EDUCATION/TRAINING PROGRAM

## 2025-08-19 PROCEDURE — 0RG7071 FUSION OF 2 TO 7 THORACIC VERTEBRAL JOINTS WITH AUTOLOGOUS TISSUE SUBSTITUTE, POSTERIOR APPROACH, POSTERIOR COLUMN, OPEN APPROACH: ICD-10-PCS | Performed by: ORTHOPAEDIC SURGERY

## 2025-08-19 PROCEDURE — 6370000000 HC RX 637 (ALT 250 FOR IP): Performed by: ORTHOPAEDIC SURGERY

## 2025-08-19 PROCEDURE — 01NR0ZZ RELEASE SACRAL NERVE, OPEN APPROACH: ICD-10-PCS | Performed by: ORTHOPAEDIC SURGERY

## 2025-08-19 PROCEDURE — C1713 ANCHOR/SCREW BN/BN,TIS/BN: HCPCS | Performed by: ORTHOPAEDIC SURGERY

## 2025-08-19 PROCEDURE — 0SG30AJ FUSION OF LUMBOSACRAL JOINT WITH INTERBODY FUSION DEVICE, POSTERIOR APPROACH, ANTERIOR COLUMN, OPEN APPROACH: ICD-10-PCS | Performed by: ORTHOPAEDIC SURGERY

## 2025-08-19 PROCEDURE — 2720000010 HC SURG SUPPLY STERILE: Performed by: ORTHOPAEDIC SURGERY

## 2025-08-19 PROCEDURE — C1763 CONN TISS, NON-HUMAN: HCPCS | Performed by: ORTHOPAEDIC SURGERY

## 2025-08-19 PROCEDURE — 2500000003 HC RX 250 WO HCPCS: Performed by: NURSE ANESTHETIST, CERTIFIED REGISTERED

## 2025-08-19 PROCEDURE — P9045 ALBUMIN (HUMAN), 5%, 250 ML: HCPCS | Performed by: NURSE ANESTHETIST, CERTIFIED REGISTERED

## 2025-08-19 PROCEDURE — 6370000000 HC RX 637 (ALT 250 FOR IP): Performed by: NURSE PRACTITIONER

## 2025-08-19 PROCEDURE — 72131 CT LUMBAR SPINE W/O DYE: CPT

## 2025-08-19 DEVICE — IMPLANTABLE DEVICE: Type: IMPLANTABLE DEVICE | Site: SPINE LUMBAR | Status: FUNCTIONAL

## 2025-08-19 DEVICE — SCREW SPNL L45MM DIA6.5MM POST THORACOLUMBOSACRAL CO CHROM: Type: IMPLANTABLE DEVICE | Site: SPINE LUMBAR | Status: FUNCTIONAL

## 2025-08-19 DEVICE — SET SCR SPNL L6MM DIA5.5MM TI BRK OFF LOK W/ DETACH CDH: Type: IMPLANTABLE DEVICE | Site: SPINE LUMBAR | Status: FUNCTIONAL

## 2025-08-19 DEVICE — GRAFT BNE FIBER 15 CC OSTEOAMP SEL: Type: IMPLANTABLE DEVICE | Site: SPINE LUMBAR | Status: FUNCTIONAL

## 2025-08-19 DEVICE — ROD SPNL 4 LEVEL 5.5 MM TI NS UNID EXP LTX: Type: IMPLANTABLE DEVICE | Site: SPINE LUMBAR | Status: FUNCTIONAL

## 2025-08-19 DEVICE — KIT BNE GRFT XSM 1.4CC RHBMP-2 ABSRB CLLGN SPNG INFUSE: Type: IMPLANTABLE DEVICE | Site: SPINE LUMBAR | Status: FUNCTIONAL

## 2025-08-19 RX ORDER — SODIUM CHLORIDE 9 MG/ML
INJECTION, SOLUTION INTRAVENOUS PRN
Status: DISCONTINUED | OUTPATIENT
Start: 2025-08-19 | End: 2025-08-19 | Stop reason: HOSPADM

## 2025-08-19 RX ORDER — SODIUM CHLORIDE 0.9 % (FLUSH) 0.9 %
5-40 SYRINGE (ML) INJECTION EVERY 12 HOURS SCHEDULED
Status: DISCONTINUED | OUTPATIENT
Start: 2025-08-19 | End: 2025-08-19 | Stop reason: HOSPADM

## 2025-08-19 RX ORDER — DIPHENHYDRAMINE HCL 25 MG
25 CAPSULE ORAL EVERY 6 HOURS PRN
Status: DISCONTINUED | OUTPATIENT
Start: 2025-08-19 | End: 2025-08-24 | Stop reason: HOSPADM

## 2025-08-19 RX ORDER — ONDANSETRON 2 MG/ML
4 INJECTION INTRAMUSCULAR; INTRAVENOUS
Status: DISCONTINUED | OUTPATIENT
Start: 2025-08-19 | End: 2025-08-19 | Stop reason: HOSPADM

## 2025-08-19 RX ORDER — SODIUM CHLORIDE 0.9 % (FLUSH) 0.9 %
5-40 SYRINGE (ML) INJECTION PRN
Status: DISCONTINUED | OUTPATIENT
Start: 2025-08-19 | End: 2025-08-24 | Stop reason: HOSPADM

## 2025-08-19 RX ORDER — CYCLOBENZAPRINE HCL 10 MG
10 TABLET ORAL EVERY 12 HOURS PRN
Status: DISCONTINUED | OUTPATIENT
Start: 2025-08-19 | End: 2025-08-24 | Stop reason: HOSPADM

## 2025-08-19 RX ORDER — SODIUM CHLORIDE 0.9 % (FLUSH) 0.9 %
5-40 SYRINGE (ML) INJECTION EVERY 12 HOURS SCHEDULED
Status: DISCONTINUED | OUTPATIENT
Start: 2025-08-19 | End: 2025-08-24 | Stop reason: HOSPADM

## 2025-08-19 RX ORDER — SPIRONOLACTONE 25 MG/1
25 TABLET ORAL EVERY MORNING
Status: DISCONTINUED | OUTPATIENT
Start: 2025-08-20 | End: 2025-08-24 | Stop reason: HOSPADM

## 2025-08-19 RX ORDER — DIPHENHYDRAMINE HYDROCHLORIDE 50 MG/ML
12.5 INJECTION, SOLUTION INTRAMUSCULAR; INTRAVENOUS
Status: DISCONTINUED | OUTPATIENT
Start: 2025-08-19 | End: 2025-08-19 | Stop reason: HOSPADM

## 2025-08-19 RX ORDER — OXYCODONE HYDROCHLORIDE 5 MG/1
10 TABLET ORAL EVERY 4 HOURS PRN
Status: DISCONTINUED | OUTPATIENT
Start: 2025-08-19 | End: 2025-08-24 | Stop reason: HOSPADM

## 2025-08-19 RX ORDER — DEXTROSE MONOHYDRATE 100 MG/ML
INJECTION, SOLUTION INTRAVENOUS CONTINUOUS PRN
Status: DISCONTINUED | OUTPATIENT
Start: 2025-08-19 | End: 2025-08-24 | Stop reason: HOSPADM

## 2025-08-19 RX ORDER — VANCOMYCIN HYDROCHLORIDE 1 G/20ML
INJECTION, POWDER, LYOPHILIZED, FOR SOLUTION INTRAVENOUS PRN
Status: DISCONTINUED | OUTPATIENT
Start: 2025-08-19 | End: 2025-08-19 | Stop reason: HOSPADM

## 2025-08-19 RX ORDER — OXYCODONE HYDROCHLORIDE 5 MG/1
5 TABLET ORAL EVERY 4 HOURS PRN
Status: DISCONTINUED | OUTPATIENT
Start: 2025-08-19 | End: 2025-08-24 | Stop reason: HOSPADM

## 2025-08-19 RX ORDER — ASPIRIN 81 MG/1
81 TABLET, CHEWABLE ORAL DAILY
Status: DISCONTINUED | OUTPATIENT
Start: 2025-08-21 | End: 2025-08-24 | Stop reason: HOSPADM

## 2025-08-19 RX ORDER — SODIUM CHLORIDE, SODIUM LACTATE, POTASSIUM CHLORIDE, CALCIUM CHLORIDE 600; 310; 30; 20 MG/100ML; MG/100ML; MG/100ML; MG/100ML
INJECTION, SOLUTION INTRAVENOUS
Status: DISCONTINUED | OUTPATIENT
Start: 2025-08-19 | End: 2025-08-19 | Stop reason: SDUPTHER

## 2025-08-19 RX ORDER — INSULIN LISPRO 100 [IU]/ML
0-8 INJECTION, SOLUTION INTRAVENOUS; SUBCUTANEOUS
Status: DISCONTINUED | OUTPATIENT
Start: 2025-08-19 | End: 2025-08-24 | Stop reason: HOSPADM

## 2025-08-19 RX ORDER — OXYCODONE HYDROCHLORIDE 5 MG/1
5 TABLET ORAL
Status: DISCONTINUED | OUTPATIENT
Start: 2025-08-19 | End: 2025-08-19 | Stop reason: HOSPADM

## 2025-08-19 RX ORDER — FENTANYL CITRATE 50 UG/ML
50 INJECTION, SOLUTION INTRAMUSCULAR; INTRAVENOUS PRN
Status: DISCONTINUED | OUTPATIENT
Start: 2025-08-19 | End: 2025-08-19 | Stop reason: HOSPADM

## 2025-08-19 RX ORDER — PHENYLEPHRINE HCL IN 0.9% NACL 0.4MG/10ML
SYRINGE (ML) INTRAVENOUS
Status: DISCONTINUED | OUTPATIENT
Start: 2025-08-19 | End: 2025-08-19 | Stop reason: SDUPTHER

## 2025-08-19 RX ORDER — PREGABALIN 75 MG/1
75 CAPSULE ORAL ONCE
Status: COMPLETED | OUTPATIENT
Start: 2025-08-19 | End: 2025-08-19

## 2025-08-19 RX ORDER — ROSUVASTATIN CALCIUM 10 MG/1
10 TABLET, COATED ORAL NIGHTLY
Status: DISCONTINUED | OUTPATIENT
Start: 2025-08-19 | End: 2025-08-24 | Stop reason: HOSPADM

## 2025-08-19 RX ORDER — ACETAMINOPHEN 325 MG/1
650 TABLET ORAL ONCE
Status: DISCONTINUED | OUTPATIENT
Start: 2025-08-19 | End: 2025-08-19 | Stop reason: HOSPADM

## 2025-08-19 RX ORDER — AMLODIPINE BESYLATE 5 MG/1
10 TABLET ORAL DAILY
Status: DISCONTINUED | OUTPATIENT
Start: 2025-08-20 | End: 2025-08-24 | Stop reason: HOSPADM

## 2025-08-19 RX ORDER — FENTANYL CITRATE 50 UG/ML
25 INJECTION, SOLUTION INTRAMUSCULAR; INTRAVENOUS PRN
Status: DISCONTINUED | OUTPATIENT
Start: 2025-08-19 | End: 2025-08-19 | Stop reason: HOSPADM

## 2025-08-19 RX ORDER — MEPERIDINE HYDROCHLORIDE 25 MG/ML
12.5 INJECTION INTRAMUSCULAR; INTRAVENOUS; SUBCUTANEOUS EVERY 5 MIN PRN
Status: DISCONTINUED | OUTPATIENT
Start: 2025-08-19 | End: 2025-08-19 | Stop reason: HOSPADM

## 2025-08-19 RX ORDER — GABAPENTIN 300 MG/1
300 CAPSULE ORAL 2 TIMES DAILY
Status: DISCONTINUED | OUTPATIENT
Start: 2025-08-19 | End: 2025-08-24 | Stop reason: HOSPADM

## 2025-08-19 RX ORDER — SODIUM CHLORIDE 9 MG/ML
INJECTION, SOLUTION INTRAVENOUS CONTINUOUS
Status: DISCONTINUED | OUTPATIENT
Start: 2025-08-19 | End: 2025-08-19 | Stop reason: HOSPADM

## 2025-08-19 RX ORDER — INSULIN LISPRO 100 [IU]/ML
10 INJECTION, SOLUTION INTRAVENOUS; SUBCUTANEOUS ONCE
Status: COMPLETED | OUTPATIENT
Start: 2025-08-19 | End: 2025-08-19

## 2025-08-19 RX ORDER — HEPARIN SOD,PORCINE/0.9 % NACL 30K/1000ML
INTRAVENOUS SOLUTION INTRAVENOUS PRN
Status: DISCONTINUED | OUTPATIENT
Start: 2025-08-19 | End: 2025-08-19 | Stop reason: HOSPADM

## 2025-08-19 RX ORDER — MIDAZOLAM HYDROCHLORIDE 2 MG/2ML
2 INJECTION, SOLUTION INTRAMUSCULAR; INTRAVENOUS
Status: DISCONTINUED | OUTPATIENT
Start: 2025-08-19 | End: 2025-08-19 | Stop reason: HOSPADM

## 2025-08-19 RX ORDER — OMEGA-3-ACID ETHYL ESTERS 1 G/1
1 CAPSULE, LIQUID FILLED ORAL 2 TIMES DAILY
Status: DISCONTINUED | OUTPATIENT
Start: 2025-08-19 | End: 2025-08-24 | Stop reason: HOSPADM

## 2025-08-19 RX ORDER — FAMOTIDINE 20 MG/1
20 TABLET, FILM COATED ORAL 2 TIMES DAILY
Status: DISCONTINUED | OUTPATIENT
Start: 2025-08-19 | End: 2025-08-24 | Stop reason: HOSPADM

## 2025-08-19 RX ORDER — SENNA AND DOCUSATE SODIUM 50; 8.6 MG/1; MG/1
1 TABLET, FILM COATED ORAL 2 TIMES DAILY
Status: DISCONTINUED | OUTPATIENT
Start: 2025-08-19 | End: 2025-08-22

## 2025-08-19 RX ORDER — ACETAMINOPHEN 500 MG
1000 TABLET ORAL EVERY 6 HOURS
Status: DISCONTINUED | OUTPATIENT
Start: 2025-08-19 | End: 2025-08-24 | Stop reason: HOSPADM

## 2025-08-19 RX ORDER — POLYETHYLENE GLYCOL 3350 17 G/17G
17 POWDER, FOR SOLUTION ORAL DAILY
Status: DISCONTINUED | OUTPATIENT
Start: 2025-08-19 | End: 2025-08-24 | Stop reason: HOSPADM

## 2025-08-19 RX ORDER — LABETALOL HYDROCHLORIDE 5 MG/ML
10 INJECTION, SOLUTION INTRAVENOUS
Status: DISCONTINUED | OUTPATIENT
Start: 2025-08-19 | End: 2025-08-19 | Stop reason: HOSPADM

## 2025-08-19 RX ORDER — HYDROMORPHONE HYDROCHLORIDE 1 MG/ML
0.5 INJECTION, SOLUTION INTRAMUSCULAR; INTRAVENOUS; SUBCUTANEOUS EVERY 5 MIN PRN
Status: DISCONTINUED | OUTPATIENT
Start: 2025-08-19 | End: 2025-08-19 | Stop reason: HOSPADM

## 2025-08-19 RX ORDER — SODIUM CHLORIDE 0.9 % (FLUSH) 0.9 %
5-40 SYRINGE (ML) INJECTION PRN
Status: DISCONTINUED | OUTPATIENT
Start: 2025-08-19 | End: 2025-08-19 | Stop reason: HOSPADM

## 2025-08-19 RX ORDER — FENTANYL CITRATE 50 UG/ML
INJECTION, SOLUTION INTRAMUSCULAR; INTRAVENOUS
Status: DISCONTINUED | OUTPATIENT
Start: 2025-08-19 | End: 2025-08-19 | Stop reason: SDUPTHER

## 2025-08-19 RX ORDER — EPHEDRINE SULFATE 50 MG/ML
INJECTION INTRAVENOUS
Status: DISCONTINUED | OUTPATIENT
Start: 2025-08-19 | End: 2025-08-19 | Stop reason: SDUPTHER

## 2025-08-19 RX ORDER — LIDOCAINE HYDROCHLORIDE 10 MG/ML
1 INJECTION, SOLUTION EPIDURAL; INFILTRATION; INTRACAUDAL; PERINEURAL
Status: DISCONTINUED | OUTPATIENT
Start: 2025-08-19 | End: 2025-08-19 | Stop reason: HOSPADM

## 2025-08-19 RX ORDER — ONDANSETRON 2 MG/ML
INJECTION INTRAMUSCULAR; INTRAVENOUS
Status: DISCONTINUED | OUTPATIENT
Start: 2025-08-19 | End: 2025-08-19 | Stop reason: SDUPTHER

## 2025-08-19 RX ORDER — LOSARTAN POTASSIUM 50 MG/1
50 TABLET ORAL
Status: DISCONTINUED | OUTPATIENT
Start: 2025-08-19 | End: 2025-08-24 | Stop reason: HOSPADM

## 2025-08-19 RX ORDER — ACETAMINOPHEN 500 MG
1000 TABLET ORAL ONCE
Status: COMPLETED | OUTPATIENT
Start: 2025-08-19 | End: 2025-08-19

## 2025-08-19 RX ORDER — ALBUMIN HUMAN 50 G/1000ML
SOLUTION INTRAVENOUS
Status: DISCONTINUED | OUTPATIENT
Start: 2025-08-19 | End: 2025-08-19 | Stop reason: SDUPTHER

## 2025-08-19 RX ORDER — SODIUM CHLORIDE 9 MG/ML
INJECTION, SOLUTION INTRAVENOUS PRN
Status: DISCONTINUED | OUTPATIENT
Start: 2025-08-19 | End: 2025-08-24 | Stop reason: HOSPADM

## 2025-08-19 RX ORDER — SODIUM CHLORIDE 9 MG/ML
INJECTION, SOLUTION INTRAVENOUS CONTINUOUS
Status: DISCONTINUED | OUTPATIENT
Start: 2025-08-19 | End: 2025-08-22

## 2025-08-19 RX ORDER — DEXAMETHASONE SODIUM PHOSPHATE 4 MG/ML
INJECTION, SOLUTION INTRA-ARTICULAR; INTRALESIONAL; INTRAMUSCULAR; INTRAVENOUS; SOFT TISSUE
Status: DISCONTINUED | OUTPATIENT
Start: 2025-08-19 | End: 2025-08-19 | Stop reason: SDUPTHER

## 2025-08-19 RX ORDER — LIDOCAINE HYDROCHLORIDE 20 MG/ML
INJECTION, SOLUTION EPIDURAL; INFILTRATION; INTRACAUDAL; PERINEURAL
Status: DISCONTINUED | OUTPATIENT
Start: 2025-08-19 | End: 2025-08-19 | Stop reason: SDUPTHER

## 2025-08-19 RX ORDER — KETOROLAC TROMETHAMINE 30 MG/ML
15 INJECTION, SOLUTION INTRAMUSCULAR; INTRAVENOUS EVERY 6 HOURS
Status: COMPLETED | OUTPATIENT
Start: 2025-08-20 | End: 2025-08-21

## 2025-08-19 RX ORDER — FENTANYL CITRATE 50 UG/ML
25 INJECTION, SOLUTION INTRAMUSCULAR; INTRAVENOUS EVERY 5 MIN PRN
Status: DISCONTINUED | OUTPATIENT
Start: 2025-08-19 | End: 2025-08-19 | Stop reason: HOSPADM

## 2025-08-19 RX ORDER — DIPHENHYDRAMINE HYDROCHLORIDE 50 MG/ML
25 INJECTION, SOLUTION INTRAMUSCULAR; INTRAVENOUS EVERY 6 HOURS PRN
Status: DISCONTINUED | OUTPATIENT
Start: 2025-08-19 | End: 2025-08-24 | Stop reason: HOSPADM

## 2025-08-19 RX ORDER — MIDAZOLAM HYDROCHLORIDE 1 MG/ML
INJECTION, SOLUTION INTRAMUSCULAR; INTRAVENOUS
Status: DISCONTINUED | OUTPATIENT
Start: 2025-08-19 | End: 2025-08-19 | Stop reason: SDUPTHER

## 2025-08-19 RX ORDER — SODIUM CHLORIDE, SODIUM LACTATE, POTASSIUM CHLORIDE, CALCIUM CHLORIDE 600; 310; 30; 20 MG/100ML; MG/100ML; MG/100ML; MG/100ML
INJECTION, SOLUTION INTRAVENOUS CONTINUOUS
Status: DISCONTINUED | OUTPATIENT
Start: 2025-08-19 | End: 2025-08-19 | Stop reason: HOSPADM

## 2025-08-19 RX ORDER — PROCHLORPERAZINE MALEATE 10 MG
10 TABLET ORAL EVERY 6 HOURS PRN
Status: DISCONTINUED | OUTPATIENT
Start: 2025-08-19 | End: 2025-08-24 | Stop reason: HOSPADM

## 2025-08-19 RX ORDER — INSULIN GLARGINE 100 [IU]/ML
24 INJECTION, SOLUTION SUBCUTANEOUS EVERY MORNING
Status: DISCONTINUED | OUTPATIENT
Start: 2025-08-20 | End: 2025-08-21

## 2025-08-19 RX ORDER — CELECOXIB 200 MG/1
200 CAPSULE ORAL ONCE
Status: COMPLETED | OUTPATIENT
Start: 2025-08-19 | End: 2025-08-19

## 2025-08-19 RX ORDER — MIDAZOLAM HYDROCHLORIDE 2 MG/2ML
2 INJECTION, SOLUTION INTRAMUSCULAR; INTRAVENOUS PRN
Status: DISCONTINUED | OUTPATIENT
Start: 2025-08-19 | End: 2025-08-19 | Stop reason: HOSPADM

## 2025-08-19 RX ORDER — PROPOFOL 10 MG/ML
INJECTION, EMULSION INTRAVENOUS
Status: DISCONTINUED | OUTPATIENT
Start: 2025-08-19 | End: 2025-08-19 | Stop reason: SDUPTHER

## 2025-08-19 RX ORDER — PROCHLORPERAZINE EDISYLATE 5 MG/ML
5 INJECTION INTRAMUSCULAR; INTRAVENOUS
Status: DISCONTINUED | OUTPATIENT
Start: 2025-08-19 | End: 2025-08-19 | Stop reason: HOSPADM

## 2025-08-19 RX ORDER — SUCCINYLCHOLINE/SOD CL,ISO/PF 200MG/10ML
SYRINGE (ML) INTRAVENOUS
Status: DISCONTINUED | OUTPATIENT
Start: 2025-08-19 | End: 2025-08-19 | Stop reason: SDUPTHER

## 2025-08-19 RX ORDER — TRANEXAMIC ACID 100 MG/ML
INJECTION, SOLUTION INTRAVENOUS
Status: DISCONTINUED | OUTPATIENT
Start: 2025-08-19 | End: 2025-08-19 | Stop reason: SDUPTHER

## 2025-08-19 RX ORDER — PROCHLORPERAZINE EDISYLATE 5 MG/ML
10 INJECTION INTRAMUSCULAR; INTRAVENOUS EVERY 6 HOURS PRN
Status: DISCONTINUED | OUTPATIENT
Start: 2025-08-19 | End: 2025-08-24 | Stop reason: HOSPADM

## 2025-08-19 RX ORDER — MAGNESIUM SULFATE HEPTAHYDRATE 40 MG/ML
INJECTION, SOLUTION INTRAVENOUS
Status: DISCONTINUED | OUTPATIENT
Start: 2025-08-19 | End: 2025-08-19 | Stop reason: SDUPTHER

## 2025-08-19 RX ADMIN — WATER 2000 MG: 1 INJECTION INTRAMUSCULAR; INTRAVENOUS; SUBCUTANEOUS at 16:14

## 2025-08-19 RX ADMIN — PREGABALIN 75 MG: 75 CAPSULE ORAL at 06:53

## 2025-08-19 RX ADMIN — EPHEDRINE SULFATE 10 MG: 50 INJECTION INTRAVENOUS at 08:27

## 2025-08-19 RX ADMIN — KETAMINE HYDROCHLORIDE 5 MCG/KG/MIN: 100 INJECTION INTRAMUSCULAR; INTRAVENOUS at 08:05

## 2025-08-19 RX ADMIN — FENTANYL CITRATE 50 MCG: 50 INJECTION INTRAMUSCULAR; INTRAVENOUS at 07:54

## 2025-08-19 RX ADMIN — SODIUM CHLORIDE, POTASSIUM CHLORIDE, SODIUM LACTATE AND CALCIUM CHLORIDE: 600; 310; 30; 20 INJECTION, SOLUTION INTRAVENOUS at 07:25

## 2025-08-19 RX ADMIN — ALBUMIN (HUMAN) 12.5 G: 12.5 INJECTION, SOLUTION INTRAVENOUS at 08:20

## 2025-08-19 RX ADMIN — ACETAMINOPHEN 1000 MG: 500 TABLET ORAL at 22:03

## 2025-08-19 RX ADMIN — MIDAZOLAM 2 MG: 1 INJECTION INTRAMUSCULAR; INTRAVENOUS at 07:51

## 2025-08-19 RX ADMIN — PROPOFOL 70 MG: 10 INJECTION, EMULSION INTRAVENOUS at 08:02

## 2025-08-19 RX ADMIN — ROSUVASTATIN 10 MG: 10 TABLET, FILM COATED ORAL at 22:03

## 2025-08-19 RX ADMIN — INSULIN LISPRO 6 UNITS: 100 INJECTION, SOLUTION INTRAVENOUS; SUBCUTANEOUS at 12:47

## 2025-08-19 RX ADMIN — CELECOXIB 200 MG: 200 CAPSULE ORAL at 06:53

## 2025-08-19 RX ADMIN — ACETAMINOPHEN 1000 MG: 500 TABLET ORAL at 06:53

## 2025-08-19 RX ADMIN — INSULIN HUMAN 5 UNITS: 100 INJECTION, SUSPENSION SUBCUTANEOUS at 16:15

## 2025-08-19 RX ADMIN — INSULIN LISPRO 4 UNITS: 100 INJECTION, SOLUTION INTRAVENOUS; SUBCUTANEOUS at 17:23

## 2025-08-19 RX ADMIN — FAMOTIDINE 20 MG: 20 TABLET, FILM COATED ORAL at 22:02

## 2025-08-19 RX ADMIN — ACETAMINOPHEN 1000 MG: 500 TABLET ORAL at 16:15

## 2025-08-19 RX ADMIN — DEXAMETHASONE SODIUM PHOSPHATE 8 MG: 4 INJECTION INTRA-ARTICULAR; INTRALESIONAL; INTRAMUSCULAR; INTRAVENOUS; SOFT TISSUE at 08:00

## 2025-08-19 RX ADMIN — PROPOFOL 130 MG: 10 INJECTION, EMULSION INTRAVENOUS at 07:54

## 2025-08-19 RX ADMIN — Medication 80 MCG: at 07:54

## 2025-08-19 RX ADMIN — ONDANSETRON 4 MG: 2 INJECTION, SOLUTION INTRAMUSCULAR; INTRAVENOUS at 11:02

## 2025-08-19 RX ADMIN — WATER 2000 MG: 1 INJECTION INTRAMUSCULAR; INTRAVENOUS; SUBCUTANEOUS at 08:24

## 2025-08-19 RX ADMIN — Medication: at 14:03

## 2025-08-19 RX ADMIN — INSULIN LISPRO 10 UNITS: 100 INJECTION, SOLUTION INTRAVENOUS; SUBCUTANEOUS at 22:02

## 2025-08-19 RX ADMIN — SODIUM CHLORIDE, SODIUM LACTATE, POTASSIUM CHLORIDE, CALCIUM CHLORIDE: 600; 310; 30; 20 INJECTION, SOLUTION INTRAVENOUS at 08:05

## 2025-08-19 RX ADMIN — TRANEXAMIC ACID 1000 MG: 100 INJECTION, SOLUTION INTRAVENOUS at 08:16

## 2025-08-19 RX ADMIN — TRANEXAMIC ACID 1 MG/KG/HR: 100 INJECTION, SOLUTION INTRAVENOUS at 08:05

## 2025-08-19 RX ADMIN — FENTANYL CITRATE 50 MCG: 50 INJECTION INTRAMUSCULAR; INTRAVENOUS at 08:00

## 2025-08-19 RX ADMIN — OMEGA-3-ACID ETHYL ESTERS CAPSULES 1 CAPSULE: 1 CAPSULE, LIQUID FILLED ORAL at 22:02

## 2025-08-19 RX ADMIN — GABAPENTIN 300 MG: 300 CAPSULE ORAL at 22:03

## 2025-08-19 RX ADMIN — PROPOFOL 100 MCG/KG/MIN: 10 INJECTION, EMULSION INTRAVENOUS at 08:05

## 2025-08-19 RX ADMIN — MAGNESIUM SULFATE HEPTAHYDRATE 2000 MG: 40 INJECTION, SOLUTION INTRAVENOUS at 08:22

## 2025-08-19 RX ADMIN — HYDROMORPHONE HYDROCHLORIDE 0.5 MG: 1 INJECTION, SOLUTION INTRAMUSCULAR; INTRAVENOUS; SUBCUTANEOUS at 11:03

## 2025-08-19 RX ADMIN — Medication 140 MG: at 07:55

## 2025-08-19 RX ADMIN — PHENYLEPHRINE HYDROCHLORIDE 40 MCG/MIN: 10 INJECTION INTRAVENOUS at 08:24

## 2025-08-19 RX ADMIN — EPHEDRINE SULFATE 10 MG: 50 INJECTION INTRAVENOUS at 10:17

## 2025-08-19 RX ADMIN — LIDOCAINE HYDROCHLORIDE 100 MG: 20 INJECTION, SOLUTION EPIDURAL; INFILTRATION; INTRACAUDAL; PERINEURAL at 07:54

## 2025-08-19 RX ADMIN — SENNOSIDES, DOCUSATE SODIUM 1 TABLET: 50; 8.6 TABLET, FILM COATED ORAL at 22:03

## 2025-08-19 RX ADMIN — INSULIN LISPRO 8 UNITS: 100 INJECTION, SOLUTION INTRAVENOUS; SUBCUTANEOUS at 17:22

## 2025-08-19 RX ADMIN — EPHEDRINE SULFATE 10 MG: 50 INJECTION INTRAVENOUS at 11:11

## 2025-08-19 RX ADMIN — SODIUM CHLORIDE, SODIUM LACTATE, POTASSIUM CHLORIDE, AND CALCIUM CHLORIDE: .6; .31; .03; .02 INJECTION, SOLUTION INTRAVENOUS at 07:09

## 2025-08-19 ASSESSMENT — PAIN DESCRIPTION - DESCRIPTORS
DESCRIPTORS: ACHING;BURNING
DESCRIPTORS: ACHING
DESCRIPTORS: SORE

## 2025-08-19 ASSESSMENT — PAIN DESCRIPTION - PAIN TYPE: TYPE: SURGICAL PAIN

## 2025-08-19 ASSESSMENT — PAIN SCALES - GENERAL
PAINLEVEL_OUTOF10: 5
PAINLEVEL_OUTOF10: 6

## 2025-08-19 ASSESSMENT — PAIN - FUNCTIONAL ASSESSMENT
PAIN_FUNCTIONAL_ASSESSMENT: 0-10

## 2025-08-19 ASSESSMENT — PAIN DESCRIPTION - LOCATION
LOCATION: BACK
LOCATION: BACK

## 2025-08-19 ASSESSMENT — PAIN DESCRIPTION - ORIENTATION: ORIENTATION: LOWER

## 2025-08-20 PROBLEM — E11.65 TYPE 2 DIABETES MELLITUS WITH HYPERGLYCEMIA, WITH LONG-TERM CURRENT USE OF INSULIN (HCC): Status: ACTIVE | Noted: 2025-08-20

## 2025-08-20 PROBLEM — Z79.4 TYPE 2 DIABETES MELLITUS WITH HYPERGLYCEMIA, WITH LONG-TERM CURRENT USE OF INSULIN (HCC): Status: ACTIVE | Noted: 2025-08-20

## 2025-08-20 PROBLEM — T38.0X5A STEROID-INDUCED HYPERGLYCEMIA: Status: ACTIVE | Noted: 2025-08-20

## 2025-08-20 PROBLEM — R73.9 STEROID-INDUCED HYPERGLYCEMIA: Status: ACTIVE | Noted: 2025-08-20

## 2025-08-20 LAB
ANION GAP SERPL CALC-SCNC: 13 MMOL/L (ref 2–14)
BUN SERPL-MCNC: 27 MG/DL (ref 8–23)
BUN/CREAT SERPL: 22 (ref 12–20)
CALCIUM SERPL-MCNC: 8.4 MG/DL (ref 8.8–10.2)
CHLORIDE SERPL-SCNC: 102 MMOL/L (ref 98–107)
CO2 SERPL-SCNC: 20 MMOL/L (ref 20–29)
CREAT SERPL-MCNC: 1.21 MG/DL (ref 0.6–1)
GLUCOSE BLD STRIP.AUTO-MCNC: 252 MG/DL (ref 65–117)
GLUCOSE BLD STRIP.AUTO-MCNC: 253 MG/DL (ref 65–117)
GLUCOSE BLD STRIP.AUTO-MCNC: 256 MG/DL (ref 65–117)
GLUCOSE BLD STRIP.AUTO-MCNC: 285 MG/DL (ref 65–117)
GLUCOSE BLD STRIP.AUTO-MCNC: 294 MG/DL (ref 65–117)
GLUCOSE SERPL-MCNC: 300 MG/DL (ref 65–100)
HCT VFR BLD AUTO: 27.4 % (ref 35–47)
HGB BLD-MCNC: 8.4 G/DL (ref 11.5–16)
POTASSIUM SERPL-SCNC: 4.9 MMOL/L (ref 3.5–5.1)
SERVICE CMNT-IMP: ABNORMAL
SODIUM SERPL-SCNC: 135 MMOL/L (ref 136–145)

## 2025-08-20 PROCEDURE — 80048 BASIC METABOLIC PNL TOTAL CA: CPT

## 2025-08-20 PROCEDURE — 6370000000 HC RX 637 (ALT 250 FOR IP): Performed by: CLINICAL NURSE SPECIALIST

## 2025-08-20 PROCEDURE — 99221 1ST HOSP IP/OBS SF/LOW 40: CPT | Performed by: CLINICAL NURSE SPECIALIST

## 2025-08-20 PROCEDURE — 97166 OT EVAL MOD COMPLEX 45 MIN: CPT

## 2025-08-20 PROCEDURE — 97530 THERAPEUTIC ACTIVITIES: CPT

## 2025-08-20 PROCEDURE — 85014 HEMATOCRIT: CPT

## 2025-08-20 PROCEDURE — 82962 GLUCOSE BLOOD TEST: CPT

## 2025-08-20 PROCEDURE — 97535 SELF CARE MNGMENT TRAINING: CPT

## 2025-08-20 PROCEDURE — 85018 HEMOGLOBIN: CPT

## 2025-08-20 PROCEDURE — 6370000000 HC RX 637 (ALT 250 FOR IP): Performed by: NURSE PRACTITIONER

## 2025-08-20 PROCEDURE — 97161 PT EVAL LOW COMPLEX 20 MIN: CPT

## 2025-08-20 PROCEDURE — 1100000000 HC RM PRIVATE

## 2025-08-20 PROCEDURE — 6360000002 HC RX W HCPCS: Performed by: STUDENT IN AN ORGANIZED HEALTH CARE EDUCATION/TRAINING PROGRAM

## 2025-08-20 PROCEDURE — 6370000000 HC RX 637 (ALT 250 FOR IP): Performed by: STUDENT IN AN ORGANIZED HEALTH CARE EDUCATION/TRAINING PROGRAM

## 2025-08-20 PROCEDURE — 2500000003 HC RX 250 WO HCPCS: Performed by: STUDENT IN AN ORGANIZED HEALTH CARE EDUCATION/TRAINING PROGRAM

## 2025-08-20 PROCEDURE — 2580000003 HC RX 258: Performed by: STUDENT IN AN ORGANIZED HEALTH CARE EDUCATION/TRAINING PROGRAM

## 2025-08-20 PROCEDURE — L0464 TLSO 4MOD SACRO-SCAP PRE: HCPCS

## 2025-08-20 RX ADMIN — OXYCODONE HYDROCHLORIDE 5 MG: 5 TABLET ORAL at 15:26

## 2025-08-20 RX ADMIN — OMEGA-3-ACID ETHYL ESTERS CAPSULES 1 CAPSULE: 1 CAPSULE, LIQUID FILLED ORAL at 09:01

## 2025-08-20 RX ADMIN — ACETAMINOPHEN 1000 MG: 500 TABLET ORAL at 17:26

## 2025-08-20 RX ADMIN — SODIUM CHLORIDE, PRESERVATIVE FREE 10 ML: 5 INJECTION INTRAVENOUS at 20:59

## 2025-08-20 RX ADMIN — INSULIN GLARGINE 24 UNITS: 100 INJECTION, SOLUTION SUBCUTANEOUS at 09:02

## 2025-08-20 RX ADMIN — WATER 2000 MG: 1 INJECTION INTRAMUSCULAR; INTRAVENOUS; SUBCUTANEOUS at 00:18

## 2025-08-20 RX ADMIN — INSULIN HUMAN 7 UNITS: 100 INJECTION, SUSPENSION SUBCUTANEOUS at 17:26

## 2025-08-20 RX ADMIN — SPIRONOLACTONE 25 MG: 25 TABLET ORAL at 09:01

## 2025-08-20 RX ADMIN — GABAPENTIN 300 MG: 300 CAPSULE ORAL at 09:01

## 2025-08-20 RX ADMIN — KETOROLAC TROMETHAMINE 15 MG: 30 INJECTION, SOLUTION INTRAMUSCULAR; INTRAVENOUS at 20:47

## 2025-08-20 RX ADMIN — INSULIN LISPRO 4 UNITS: 100 INJECTION, SOLUTION INTRAVENOUS; SUBCUTANEOUS at 21:03

## 2025-08-20 RX ADMIN — KETOROLAC TROMETHAMINE 15 MG: 30 INJECTION, SOLUTION INTRAMUSCULAR; INTRAVENOUS at 09:02

## 2025-08-20 RX ADMIN — POLYETHYLENE GLYCOL 3350 17 G: 17 POWDER, FOR SOLUTION ORAL at 09:02

## 2025-08-20 RX ADMIN — INSULIN LISPRO 4 UNITS: 100 INJECTION, SOLUTION INTRAVENOUS; SUBCUTANEOUS at 17:26

## 2025-08-20 RX ADMIN — OMEGA-3-ACID ETHYL ESTERS CAPSULES 1 CAPSULE: 1 CAPSULE, LIQUID FILLED ORAL at 20:47

## 2025-08-20 RX ADMIN — ACETAMINOPHEN 1000 MG: 500 TABLET ORAL at 04:32

## 2025-08-20 RX ADMIN — ACETAMINOPHEN 1000 MG: 500 TABLET ORAL at 21:55

## 2025-08-20 RX ADMIN — FAMOTIDINE 20 MG: 20 TABLET, FILM COATED ORAL at 09:01

## 2025-08-20 RX ADMIN — SODIUM CHLORIDE: 0.9 INJECTION, SOLUTION INTRAVENOUS at 00:23

## 2025-08-20 RX ADMIN — CYCLOBENZAPRINE 10 MG: 10 TABLET, FILM COATED ORAL at 21:56

## 2025-08-20 RX ADMIN — OXYCODONE HYDROCHLORIDE 5 MG: 5 TABLET ORAL at 06:53

## 2025-08-20 RX ADMIN — OXYCODONE HYDROCHLORIDE 10 MG: 5 TABLET ORAL at 10:50

## 2025-08-20 RX ADMIN — FAMOTIDINE 20 MG: 20 TABLET, FILM COATED ORAL at 20:48

## 2025-08-20 RX ADMIN — ACETAMINOPHEN 1000 MG: 500 TABLET ORAL at 09:01

## 2025-08-20 RX ADMIN — ROSUVASTATIN 10 MG: 10 TABLET, FILM COATED ORAL at 20:47

## 2025-08-20 RX ADMIN — KETOROLAC TROMETHAMINE 15 MG: 30 INJECTION, SOLUTION INTRAMUSCULAR; INTRAVENOUS at 15:26

## 2025-08-20 RX ADMIN — INSULIN HUMAN 5 UNITS: 100 INJECTION, SUSPENSION SUBCUTANEOUS at 06:40

## 2025-08-20 RX ADMIN — INSULIN HUMAN 7 UNITS: 100 INJECTION, SUSPENSION SUBCUTANEOUS at 12:06

## 2025-08-20 RX ADMIN — GABAPENTIN 300 MG: 300 CAPSULE ORAL at 20:47

## 2025-08-20 RX ADMIN — SENNOSIDES, DOCUSATE SODIUM 1 TABLET: 50; 8.6 TABLET, FILM COATED ORAL at 20:47

## 2025-08-20 RX ADMIN — SODIUM ZIRCONIUM CYCLOSILICATE 15 G: 10 POWDER, FOR SUSPENSION ORAL at 01:22

## 2025-08-20 RX ADMIN — INSULIN LISPRO 4 UNITS: 100 INJECTION, SOLUTION INTRAVENOUS; SUBCUTANEOUS at 06:40

## 2025-08-20 RX ADMIN — SODIUM CHLORIDE, PRESERVATIVE FREE 10 ML: 5 INJECTION INTRAVENOUS at 00:19

## 2025-08-20 RX ADMIN — INSULIN LISPRO 4 UNITS: 100 INJECTION, SOLUTION INTRAVENOUS; SUBCUTANEOUS at 12:05

## 2025-08-20 RX ADMIN — SENNOSIDES, DOCUSATE SODIUM 1 TABLET: 50; 8.6 TABLET, FILM COATED ORAL at 09:01

## 2025-08-20 RX ADMIN — OXYCODONE HYDROCHLORIDE 5 MG: 5 TABLET ORAL at 20:47

## 2025-08-20 ASSESSMENT — PAIN - FUNCTIONAL ASSESSMENT
PAIN_FUNCTIONAL_ASSESSMENT: 0-10

## 2025-08-20 ASSESSMENT — PAIN DESCRIPTION - LOCATION
LOCATION: BACK

## 2025-08-20 ASSESSMENT — PAIN DESCRIPTION - DESCRIPTORS
DESCRIPTORS: ACHING
DESCRIPTORS: ACHING

## 2025-08-20 ASSESSMENT — PAIN SCALES - GENERAL
PAINLEVEL_OUTOF10: 7
PAINLEVEL_OUTOF10: 6
PAINLEVEL_OUTOF10: 8
PAINLEVEL_OUTOF10: 8

## 2025-08-21 LAB
ANION GAP SERPL CALC-SCNC: 13 MMOL/L (ref 2–14)
BUN SERPL-MCNC: 22 MG/DL (ref 8–23)
BUN/CREAT SERPL: 21 (ref 12–20)
CALCIUM SERPL-MCNC: 8.4 MG/DL (ref 8.8–10.2)
CHLORIDE SERPL-SCNC: 105 MMOL/L (ref 98–107)
CO2 SERPL-SCNC: 21 MMOL/L (ref 20–29)
CREAT SERPL-MCNC: 1.03 MG/DL (ref 0.6–1)
GLUCOSE BLD STRIP.AUTO-MCNC: 124 MG/DL (ref 65–117)
GLUCOSE BLD STRIP.AUTO-MCNC: 152 MG/DL (ref 65–117)
GLUCOSE BLD STRIP.AUTO-MCNC: 216 MG/DL (ref 65–117)
GLUCOSE BLD STRIP.AUTO-MCNC: 322 MG/DL (ref 65–117)
GLUCOSE SERPL-MCNC: 179 MG/DL (ref 65–100)
HCT VFR BLD AUTO: 26.8 % (ref 35–47)
HGB BLD-MCNC: 8.2 G/DL (ref 11.5–16)
POTASSIUM SERPL-SCNC: 4.4 MMOL/L (ref 3.5–5.1)
SERVICE CMNT-IMP: ABNORMAL
SODIUM SERPL-SCNC: 139 MMOL/L (ref 136–145)

## 2025-08-21 PROCEDURE — 2500000003 HC RX 250 WO HCPCS: Performed by: STUDENT IN AN ORGANIZED HEALTH CARE EDUCATION/TRAINING PROGRAM

## 2025-08-21 PROCEDURE — APPNB30 APP NON BILLABLE TIME 0-30 MINS: Performed by: NURSE PRACTITIONER

## 2025-08-21 PROCEDURE — 6360000002 HC RX W HCPCS: Performed by: STUDENT IN AN ORGANIZED HEALTH CARE EDUCATION/TRAINING PROGRAM

## 2025-08-21 PROCEDURE — 6370000000 HC RX 637 (ALT 250 FOR IP): Performed by: CLINICAL NURSE SPECIALIST

## 2025-08-21 PROCEDURE — 80048 BASIC METABOLIC PNL TOTAL CA: CPT

## 2025-08-21 PROCEDURE — 82962 GLUCOSE BLOOD TEST: CPT

## 2025-08-21 PROCEDURE — 97530 THERAPEUTIC ACTIVITIES: CPT

## 2025-08-21 PROCEDURE — 94760 N-INVAS EAR/PLS OXIMETRY 1: CPT

## 2025-08-21 PROCEDURE — 1100000000 HC RM PRIVATE

## 2025-08-21 PROCEDURE — 97535 SELF CARE MNGMENT TRAINING: CPT

## 2025-08-21 PROCEDURE — 6370000000 HC RX 637 (ALT 250 FOR IP): Performed by: STUDENT IN AN ORGANIZED HEALTH CARE EDUCATION/TRAINING PROGRAM

## 2025-08-21 PROCEDURE — 85014 HEMATOCRIT: CPT

## 2025-08-21 PROCEDURE — 85018 HEMOGLOBIN: CPT

## 2025-08-21 PROCEDURE — 99232 SBSQ HOSP IP/OBS MODERATE 35: CPT | Performed by: CLINICAL NURSE SPECIALIST

## 2025-08-21 RX ORDER — INSULIN LISPRO 100 [IU]/ML
7 INJECTION, SOLUTION INTRAVENOUS; SUBCUTANEOUS
Status: DISCONTINUED | OUTPATIENT
Start: 2025-08-21 | End: 2025-08-24 | Stop reason: HOSPADM

## 2025-08-21 RX ORDER — INSULIN GLARGINE 100 [IU]/ML
30 INJECTION, SOLUTION SUBCUTANEOUS EVERY MORNING
Status: DISCONTINUED | OUTPATIENT
Start: 2025-08-21 | End: 2025-08-24 | Stop reason: HOSPADM

## 2025-08-21 RX ADMIN — SENNOSIDES, DOCUSATE SODIUM 1 TABLET: 50; 8.6 TABLET, FILM COATED ORAL at 09:09

## 2025-08-21 RX ADMIN — ACETAMINOPHEN 1000 MG: 500 TABLET ORAL at 09:09

## 2025-08-21 RX ADMIN — ACETAMINOPHEN 1000 MG: 500 TABLET ORAL at 20:15

## 2025-08-21 RX ADMIN — ACETAMINOPHEN 1000 MG: 500 TABLET ORAL at 16:36

## 2025-08-21 RX ADMIN — FAMOTIDINE 20 MG: 20 TABLET, FILM COATED ORAL at 09:09

## 2025-08-21 RX ADMIN — OXYCODONE HYDROCHLORIDE 10 MG: 5 TABLET ORAL at 04:04

## 2025-08-21 RX ADMIN — INSULIN HUMAN 7 UNITS: 100 INJECTION, SUSPENSION SUBCUTANEOUS at 06:15

## 2025-08-21 RX ADMIN — OMEGA-3-ACID ETHYL ESTERS CAPSULES 1 CAPSULE: 1 CAPSULE, LIQUID FILLED ORAL at 20:14

## 2025-08-21 RX ADMIN — KETOROLAC TROMETHAMINE 15 MG: 30 INJECTION, SOLUTION INTRAMUSCULAR; INTRAVENOUS at 04:03

## 2025-08-21 RX ADMIN — OMEGA-3-ACID ETHYL ESTERS CAPSULES 1 CAPSULE: 1 CAPSULE, LIQUID FILLED ORAL at 09:11

## 2025-08-21 RX ADMIN — GABAPENTIN 300 MG: 300 CAPSULE ORAL at 20:14

## 2025-08-21 RX ADMIN — FAMOTIDINE 20 MG: 20 TABLET, FILM COATED ORAL at 20:14

## 2025-08-21 RX ADMIN — SODIUM CHLORIDE, PRESERVATIVE FREE 10 ML: 5 INJECTION INTRAVENOUS at 04:04

## 2025-08-21 RX ADMIN — OXYCODONE HYDROCHLORIDE 5 MG: 5 TABLET ORAL at 07:59

## 2025-08-21 RX ADMIN — INSULIN LISPRO 2 UNITS: 100 INJECTION, SOLUTION INTRAVENOUS; SUBCUTANEOUS at 06:16

## 2025-08-21 RX ADMIN — INSULIN LISPRO 7 UNITS: 100 INJECTION, SOLUTION INTRAVENOUS; SUBCUTANEOUS at 11:46

## 2025-08-21 RX ADMIN — ROSUVASTATIN 10 MG: 10 TABLET, FILM COATED ORAL at 20:14

## 2025-08-21 RX ADMIN — INSULIN LISPRO 6 UNITS: 100 INJECTION, SOLUTION INTRAVENOUS; SUBCUTANEOUS at 11:45

## 2025-08-21 RX ADMIN — POLYETHYLENE GLYCOL 3350 17 G: 17 POWDER, FOR SOLUTION ORAL at 09:11

## 2025-08-21 RX ADMIN — SODIUM CHLORIDE, PRESERVATIVE FREE 10 ML: 5 INJECTION INTRAVENOUS at 20:15

## 2025-08-21 RX ADMIN — INSULIN GLARGINE 30 UNITS: 100 INJECTION, SOLUTION SUBCUTANEOUS at 09:45

## 2025-08-21 RX ADMIN — ASPIRIN 81 MG: 81 TABLET, CHEWABLE ORAL at 09:11

## 2025-08-21 RX ADMIN — GABAPENTIN 300 MG: 300 CAPSULE ORAL at 09:11

## 2025-08-21 RX ADMIN — SPIRONOLACTONE 25 MG: 25 TABLET ORAL at 09:11

## 2025-08-21 RX ADMIN — SENNOSIDES, DOCUSATE SODIUM 1 TABLET: 50; 8.6 TABLET, FILM COATED ORAL at 20:14

## 2025-08-21 ASSESSMENT — PAIN DESCRIPTION - LOCATION
LOCATION: BACK
LOCATION: BACK

## 2025-08-21 ASSESSMENT — PAIN SCALES - GENERAL
PAINLEVEL_OUTOF10: 5
PAINLEVEL_OUTOF10: 5
PAINLEVEL_OUTOF10: 8
PAINLEVEL_OUTOF10: 6
PAINLEVEL_OUTOF10: 4
PAINLEVEL_OUTOF10: 4

## 2025-08-21 ASSESSMENT — PAIN DESCRIPTION - DESCRIPTORS
DESCRIPTORS: ACHING
DESCRIPTORS: ACHING;PRESSURE
DESCRIPTORS: ACHING

## 2025-08-21 ASSESSMENT — PAIN - FUNCTIONAL ASSESSMENT
PAIN_FUNCTIONAL_ASSESSMENT: 0-10

## 2025-08-21 ASSESSMENT — PAIN DESCRIPTION - ORIENTATION: ORIENTATION: MID

## 2025-08-22 LAB
GLUCOSE BLD STRIP.AUTO-MCNC: 172 MG/DL (ref 65–117)
GLUCOSE BLD STRIP.AUTO-MCNC: 209 MG/DL (ref 65–117)
GLUCOSE BLD STRIP.AUTO-MCNC: 217 MG/DL (ref 65–117)
GLUCOSE BLD STRIP.AUTO-MCNC: 239 MG/DL (ref 65–117)
SERVICE CMNT-IMP: ABNORMAL

## 2025-08-22 PROCEDURE — 6370000000 HC RX 637 (ALT 250 FOR IP): Performed by: NURSE PRACTITIONER

## 2025-08-22 PROCEDURE — APPNB30 APP NON BILLABLE TIME 0-30 MINS: Performed by: NURSE PRACTITIONER

## 2025-08-22 PROCEDURE — 97535 SELF CARE MNGMENT TRAINING: CPT

## 2025-08-22 PROCEDURE — 6370000000 HC RX 637 (ALT 250 FOR IP): Performed by: STUDENT IN AN ORGANIZED HEALTH CARE EDUCATION/TRAINING PROGRAM

## 2025-08-22 PROCEDURE — 97530 THERAPEUTIC ACTIVITIES: CPT

## 2025-08-22 PROCEDURE — 97116 GAIT TRAINING THERAPY: CPT

## 2025-08-22 PROCEDURE — 82962 GLUCOSE BLOOD TEST: CPT

## 2025-08-22 PROCEDURE — 94760 N-INVAS EAR/PLS OXIMETRY 1: CPT

## 2025-08-22 PROCEDURE — 1100000000 HC RM PRIVATE

## 2025-08-22 PROCEDURE — 6370000000 HC RX 637 (ALT 250 FOR IP): Performed by: CLINICAL NURSE SPECIALIST

## 2025-08-22 RX ORDER — SENNA AND DOCUSATE SODIUM 50; 8.6 MG/1; MG/1
2 TABLET, FILM COATED ORAL 2 TIMES DAILY
Status: DISCONTINUED | OUTPATIENT
Start: 2025-08-22 | End: 2025-08-24 | Stop reason: HOSPADM

## 2025-08-22 RX ORDER — CYCLOBENZAPRINE HCL 10 MG
10 TABLET ORAL EVERY 12 HOURS PRN
Qty: 30 TABLET | Refills: 0 | Status: SHIPPED | OUTPATIENT
Start: 2025-08-22 | End: 2025-09-06

## 2025-08-22 RX ORDER — OXYCODONE HYDROCHLORIDE 5 MG/1
5-10 TABLET ORAL EVERY 4 HOURS PRN
Qty: 42 TABLET | Refills: 0 | Status: SHIPPED | OUTPATIENT
Start: 2025-08-22 | End: 2025-08-29

## 2025-08-22 RX ADMIN — GABAPENTIN 300 MG: 300 CAPSULE ORAL at 21:20

## 2025-08-22 RX ADMIN — OMEGA-3-ACID ETHYL ESTERS CAPSULES 1 CAPSULE: 1 CAPSULE, LIQUID FILLED ORAL at 09:30

## 2025-08-22 RX ADMIN — POLYETHYLENE GLYCOL 3350 17 G: 17 POWDER, FOR SOLUTION ORAL at 09:31

## 2025-08-22 RX ADMIN — FAMOTIDINE 20 MG: 20 TABLET, FILM COATED ORAL at 09:30

## 2025-08-22 RX ADMIN — ACETAMINOPHEN 1000 MG: 500 TABLET ORAL at 09:30

## 2025-08-22 RX ADMIN — MAGNESIUM HYDROXIDE 30 ML: 400 SUSPENSION ORAL at 13:31

## 2025-08-22 RX ADMIN — ACETAMINOPHEN 1000 MG: 500 TABLET ORAL at 21:21

## 2025-08-22 RX ADMIN — ACETAMINOPHEN 1000 MG: 500 TABLET ORAL at 17:06

## 2025-08-22 RX ADMIN — SENNOSIDES, DOCUSATE SODIUM 2 TABLET: 50; 8.6 TABLET, FILM COATED ORAL at 21:23

## 2025-08-22 RX ADMIN — INSULIN LISPRO 2 UNITS: 100 INJECTION, SOLUTION INTRAVENOUS; SUBCUTANEOUS at 21:21

## 2025-08-22 RX ADMIN — GABAPENTIN 300 MG: 300 CAPSULE ORAL at 09:30

## 2025-08-22 RX ADMIN — INSULIN LISPRO 2 UNITS: 100 INJECTION, SOLUTION INTRAVENOUS; SUBCUTANEOUS at 17:06

## 2025-08-22 RX ADMIN — FAMOTIDINE 20 MG: 20 TABLET, FILM COATED ORAL at 21:20

## 2025-08-22 RX ADMIN — INSULIN LISPRO 7 UNITS: 100 INJECTION, SOLUTION INTRAVENOUS; SUBCUTANEOUS at 17:49

## 2025-08-22 RX ADMIN — INSULIN GLARGINE 30 UNITS: 100 INJECTION, SOLUTION SUBCUTANEOUS at 09:29

## 2025-08-22 RX ADMIN — OXYCODONE HYDROCHLORIDE 5 MG: 5 TABLET ORAL at 01:31

## 2025-08-22 RX ADMIN — LOSARTAN POTASSIUM 50 MG: 50 TABLET, FILM COATED ORAL at 21:20

## 2025-08-22 RX ADMIN — ROSUVASTATIN 10 MG: 10 TABLET, FILM COATED ORAL at 21:20

## 2025-08-22 RX ADMIN — AMLODIPINE BESYLATE 10 MG: 5 TABLET ORAL at 09:29

## 2025-08-22 RX ADMIN — SENNOSIDES, DOCUSATE SODIUM 1 TABLET: 50; 8.6 TABLET, FILM COATED ORAL at 09:30

## 2025-08-22 RX ADMIN — SPIRONOLACTONE 25 MG: 25 TABLET ORAL at 09:30

## 2025-08-22 RX ADMIN — OMEGA-3-ACID ETHYL ESTERS CAPSULES 1 CAPSULE: 1 CAPSULE, LIQUID FILLED ORAL at 21:21

## 2025-08-22 RX ADMIN — ASPIRIN 81 MG: 81 TABLET, CHEWABLE ORAL at 09:30

## 2025-08-22 RX ADMIN — INSULIN LISPRO 2 UNITS: 100 INJECTION, SOLUTION INTRAVENOUS; SUBCUTANEOUS at 11:46

## 2025-08-22 ASSESSMENT — PAIN SCALES - GENERAL
PAINLEVEL_OUTOF10: 7
PAINLEVEL_OUTOF10: 5
PAINLEVEL_OUTOF10: 6

## 2025-08-22 ASSESSMENT — PAIN - FUNCTIONAL ASSESSMENT
PAIN_FUNCTIONAL_ASSESSMENT: 0-10

## 2025-08-22 ASSESSMENT — PAIN DESCRIPTION - LOCATION
LOCATION: INCISION
LOCATION: BACK

## 2025-08-22 ASSESSMENT — PAIN DESCRIPTION - DESCRIPTORS
DESCRIPTORS: ACHING
DESCRIPTORS: SORE;DISCOMFORT

## 2025-08-23 LAB
GLUCOSE BLD STRIP.AUTO-MCNC: 166 MG/DL (ref 65–117)
GLUCOSE BLD STRIP.AUTO-MCNC: 178 MG/DL (ref 65–117)
GLUCOSE BLD STRIP.AUTO-MCNC: 197 MG/DL (ref 65–117)
GLUCOSE BLD STRIP.AUTO-MCNC: 229 MG/DL (ref 65–117)
SERVICE CMNT-IMP: ABNORMAL

## 2025-08-23 PROCEDURE — 6370000000 HC RX 637 (ALT 250 FOR IP): Performed by: STUDENT IN AN ORGANIZED HEALTH CARE EDUCATION/TRAINING PROGRAM

## 2025-08-23 PROCEDURE — 6370000000 HC RX 637 (ALT 250 FOR IP): Performed by: CLINICAL NURSE SPECIALIST

## 2025-08-23 PROCEDURE — 82962 GLUCOSE BLOOD TEST: CPT

## 2025-08-23 PROCEDURE — 6370000000 HC RX 637 (ALT 250 FOR IP): Performed by: NURSE PRACTITIONER

## 2025-08-23 PROCEDURE — 1100000000 HC RM PRIVATE

## 2025-08-23 PROCEDURE — 2500000003 HC RX 250 WO HCPCS: Performed by: STUDENT IN AN ORGANIZED HEALTH CARE EDUCATION/TRAINING PROGRAM

## 2025-08-23 PROCEDURE — 97116 GAIT TRAINING THERAPY: CPT

## 2025-08-23 RX ORDER — SORBITOL SOLUTION 70 %
15 SOLUTION, ORAL MISCELLANEOUS ONCE
Status: COMPLETED | OUTPATIENT
Start: 2025-08-23 | End: 2025-08-23

## 2025-08-23 RX ADMIN — ACETAMINOPHEN 1000 MG: 500 TABLET ORAL at 04:34

## 2025-08-23 RX ADMIN — SPIRONOLACTONE 25 MG: 25 TABLET ORAL at 09:11

## 2025-08-23 RX ADMIN — SODIUM CHLORIDE, PRESERVATIVE FREE 10 ML: 5 INJECTION INTRAVENOUS at 09:15

## 2025-08-23 RX ADMIN — INSULIN LISPRO 2 UNITS: 100 INJECTION, SOLUTION INTRAVENOUS; SUBCUTANEOUS at 17:12

## 2025-08-23 RX ADMIN — GABAPENTIN 300 MG: 300 CAPSULE ORAL at 21:28

## 2025-08-23 RX ADMIN — ASPIRIN 81 MG: 81 TABLET, CHEWABLE ORAL at 09:11

## 2025-08-23 RX ADMIN — OXYCODONE HYDROCHLORIDE 10 MG: 5 TABLET ORAL at 04:40

## 2025-08-23 RX ADMIN — ACETAMINOPHEN 1000 MG: 500 TABLET ORAL at 18:32

## 2025-08-23 RX ADMIN — INSULIN LISPRO 7 UNITS: 100 INJECTION, SOLUTION INTRAVENOUS; SUBCUTANEOUS at 13:21

## 2025-08-23 RX ADMIN — INSULIN LISPRO 7 UNITS: 100 INJECTION, SOLUTION INTRAVENOUS; SUBCUTANEOUS at 09:12

## 2025-08-23 RX ADMIN — SORBITOL SOLUTION (BULK) 15 ML: 70 SOLUTION at 11:33

## 2025-08-23 RX ADMIN — AMLODIPINE BESYLATE 10 MG: 5 TABLET ORAL at 09:11

## 2025-08-23 RX ADMIN — ACETAMINOPHEN 1000 MG: 500 TABLET ORAL at 09:56

## 2025-08-23 RX ADMIN — LOSARTAN POTASSIUM 50 MG: 50 TABLET, FILM COATED ORAL at 21:28

## 2025-08-23 RX ADMIN — GABAPENTIN 300 MG: 300 CAPSULE ORAL at 09:11

## 2025-08-23 RX ADMIN — ROSUVASTATIN 10 MG: 10 TABLET, FILM COATED ORAL at 21:28

## 2025-08-23 RX ADMIN — OMEGA-3-ACID ETHYL ESTERS CAPSULES 1 CAPSULE: 1 CAPSULE, LIQUID FILLED ORAL at 21:28

## 2025-08-23 RX ADMIN — OMEGA-3-ACID ETHYL ESTERS CAPSULES 1 CAPSULE: 1 CAPSULE, LIQUID FILLED ORAL at 09:10

## 2025-08-23 RX ADMIN — SENNOSIDES, DOCUSATE SODIUM 2 TABLET: 50; 8.6 TABLET, FILM COATED ORAL at 09:11

## 2025-08-23 RX ADMIN — FAMOTIDINE 20 MG: 20 TABLET, FILM COATED ORAL at 21:28

## 2025-08-23 RX ADMIN — SENNOSIDES, DOCUSATE SODIUM 2 TABLET: 50; 8.6 TABLET, FILM COATED ORAL at 21:28

## 2025-08-23 RX ADMIN — SODIUM CHLORIDE, PRESERVATIVE FREE 10 ML: 5 INJECTION INTRAVENOUS at 21:28

## 2025-08-23 RX ADMIN — FAMOTIDINE 20 MG: 20 TABLET, FILM COATED ORAL at 09:11

## 2025-08-23 RX ADMIN — POLYETHYLENE GLYCOL 3350 17 G: 17 POWDER, FOR SOLUTION ORAL at 09:12

## 2025-08-23 RX ADMIN — INSULIN GLARGINE 30 UNITS: 100 INJECTION, SOLUTION SUBCUTANEOUS at 09:12

## 2025-08-23 RX ADMIN — INSULIN LISPRO 2 UNITS: 100 INJECTION, SOLUTION INTRAVENOUS; SUBCUTANEOUS at 11:48

## 2025-08-23 ASSESSMENT — PAIN - FUNCTIONAL ASSESSMENT
PAIN_FUNCTIONAL_ASSESSMENT: 0-10

## 2025-08-23 ASSESSMENT — PAIN SCALES - GENERAL
PAINLEVEL_OUTOF10: 5
PAINLEVEL_OUTOF10: 6
PAINLEVEL_OUTOF10: 8
PAINLEVEL_OUTOF10: 8

## 2025-08-23 ASSESSMENT — PAIN DESCRIPTION - DESCRIPTORS
DESCRIPTORS: DISCOMFORT;SORE;ACHING
DESCRIPTORS: BURNING;DISCOMFORT;SORE

## 2025-08-23 ASSESSMENT — PAIN DESCRIPTION - ORIENTATION
ORIENTATION: LEFT
ORIENTATION: LEFT

## 2025-08-23 ASSESSMENT — PAIN DESCRIPTION - LOCATION
LOCATION: BACK
LOCATION: BACK

## 2025-08-24 VITALS
WEIGHT: 180 LBS | HEART RATE: 98 BPM | OXYGEN SATURATION: 100 % | BODY MASS INDEX: 29.99 KG/M2 | HEIGHT: 65 IN | TEMPERATURE: 98.6 F | RESPIRATION RATE: 16 BRPM | SYSTOLIC BLOOD PRESSURE: 119 MMHG | DIASTOLIC BLOOD PRESSURE: 83 MMHG

## 2025-08-24 LAB
GLUCOSE BLD STRIP.AUTO-MCNC: 125 MG/DL (ref 65–117)
SERVICE CMNT-IMP: ABNORMAL

## 2025-08-24 PROCEDURE — 6370000000 HC RX 637 (ALT 250 FOR IP): Performed by: STUDENT IN AN ORGANIZED HEALTH CARE EDUCATION/TRAINING PROGRAM

## 2025-08-24 PROCEDURE — 82962 GLUCOSE BLOOD TEST: CPT

## 2025-08-24 PROCEDURE — 6370000000 HC RX 637 (ALT 250 FOR IP): Performed by: CLINICAL NURSE SPECIALIST

## 2025-08-24 RX ADMIN — OXYCODONE HYDROCHLORIDE 10 MG: 5 TABLET ORAL at 01:19

## 2025-08-24 RX ADMIN — ASPIRIN 81 MG: 81 TABLET, CHEWABLE ORAL at 09:54

## 2025-08-24 RX ADMIN — ACETAMINOPHEN 1000 MG: 500 TABLET ORAL at 06:00

## 2025-08-24 RX ADMIN — FAMOTIDINE 20 MG: 20 TABLET, FILM COATED ORAL at 09:54

## 2025-08-24 RX ADMIN — ACETAMINOPHEN 1000 MG: 500 TABLET ORAL at 00:58

## 2025-08-24 RX ADMIN — INSULIN GLARGINE 30 UNITS: 100 INJECTION, SOLUTION SUBCUTANEOUS at 09:53

## 2025-08-24 RX ADMIN — GABAPENTIN 300 MG: 300 CAPSULE ORAL at 09:54

## 2025-08-24 RX ADMIN — OMEGA-3-ACID ETHYL ESTERS CAPSULES 1 CAPSULE: 1 CAPSULE, LIQUID FILLED ORAL at 09:53

## 2025-08-24 RX ADMIN — SPIRONOLACTONE 25 MG: 25 TABLET ORAL at 09:53

## 2025-08-24 ASSESSMENT — PAIN SCALES - GENERAL
PAINLEVEL_OUTOF10: 8
PAINLEVEL_OUTOF10: 6
PAINLEVEL_OUTOF10: 8

## 2025-08-24 ASSESSMENT — PAIN DESCRIPTION - DESCRIPTORS
DESCRIPTORS: ACHING;DISCOMFORT;SORE
DESCRIPTORS: ACHING;SORE;DISCOMFORT
DESCRIPTORS: ACHING;DISCOMFORT;SORE

## 2025-08-24 ASSESSMENT — PAIN DESCRIPTION - LOCATION
LOCATION: BACK
LOCATION: BACK;BUTTOCKS
LOCATION: BACK

## 2025-08-24 ASSESSMENT — PAIN DESCRIPTION - ORIENTATION: ORIENTATION: LEFT

## 2025-08-24 ASSESSMENT — PAIN - FUNCTIONAL ASSESSMENT
PAIN_FUNCTIONAL_ASSESSMENT: 0-10

## 2025-08-28 DIAGNOSIS — I10 PRIMARY HYPERTENSION: ICD-10-CM

## 2025-08-28 RX ORDER — AMLODIPINE BESYLATE 10 MG/1
10 TABLET ORAL DAILY
Qty: 90 TABLET | Refills: 3 | Status: SHIPPED | OUTPATIENT
Start: 2025-08-28

## (undated) DEVICE — HANDPIECE SET WITH BONE CLEANING TIP AND SUCTION TUBE: Brand: INTERPULSE

## (undated) DEVICE — SUTURE VCRL SZ 3-0 L27IN ABSRB UD CP-2 L26MM 1/2 CIR REV J868H

## (undated) DEVICE — SYSTEN PATIENT SPECIFIC UNID TECHNOLOGY

## (undated) DEVICE — Device

## (undated) DEVICE — KIT ROBOTIC SPINE DISP MAZORX

## (undated) DEVICE — STERILE POLYISOPRENE POWDER-FREE SURGICAL GLOVES WITH EMOLLIENT COATING: Brand: PROTEXIS

## (undated) DEVICE — WAX BNE 2.5GM WHT NONABSORBABLE ST

## (undated) DEVICE — EVACUATOR SMOKE L 10 FT SMOKE MANAGEMENT EXTENDED EDGE ELECTRODE STERILE DISP

## (undated) DEVICE — TOOL 14BA50 LEGEND 14CM 5MM BA: Brand: MIDAS REX ™

## (undated) DEVICE — SOLUTION WND IRRIGATION 450 ML 0.5 PVP-I 0.9 NACL

## (undated) DEVICE — PAD ABD W5XL9IN GEN USE NONADHESIVE EXTRA ABSRB SFT

## (undated) DEVICE — CANISTER, RIGID, 3000CC: Brand: MEDLINE INDUSTRIES, INC.

## (undated) DEVICE — DRAPE PT Z FLD FOR IORT 3D RADIOGRAPHIC IMAG STERILE-Z

## (undated) DEVICE — STERILE POLYISOPRENE POWDER-FREE SURGICAL GLOVES: Brand: PROTEXIS

## (undated) DEVICE — TOTAL TRAY, 16FR 10ML SIL FOLEY, URN: Brand: MEDLINE

## (undated) DEVICE — GARMENT,MEDLINE,DVT,INT,CALF,MED, GEN2: Brand: MEDLINE

## (undated) DEVICE — SOLUTION IV 250ML 0.9% SOD CHL CLR INJ FLX BG CONT PRT CLSR

## (undated) DEVICE — SURGIFOAM SPNG SZ 100

## (undated) DEVICE — GOWN,SIRUS,NONRNF,SETINSLV,2XL,18/CS: Brand: MEDLINE

## (undated) DEVICE — SYR IRR BLB 2OZ DISP BLU STRL -- CONVERT TO ITEM 357637

## (undated) DEVICE — POSITIONER HD REST FOAM CMFRT TCH

## (undated) DEVICE — OCCLUSIVE GAUZE STRIP,3% BISMUTH TRIBROMOPHENATE IN PETROLATUM BLEND: Brand: XEROFORM

## (undated) DEVICE — SUTURE MCRYL SZ 4-0 L27IN ABSRB UD L19MM PS-2 1/2 CIR PRIM Y426H

## (undated) DEVICE — PENCIL ES L3M BTTN SWCH S STL HEX LOK BLDE ELECTRD HOLSTER

## (undated) DEVICE — FLOSEAL HEMOSTATIC MATRIX, 10ML: Brand: FLOSEAL HEMOSTATIC MATRIX

## (undated) DEVICE — BIPOLAR IRRIGATOR INTEGRATED TUBING AND BIPOLAR CORD SET, DISPOSABLE

## (undated) DEVICE — BANDAGE COMPR 9 FTX4 IN SMOOTH COMFORTABLE SYNTH ESMRK LF

## (undated) DEVICE — SEALER BPLR DIA6.0MM DISP AQUAMANTYS

## (undated) DEVICE — SOL IRR SOD CHL 0.9% TITAN XL CNTNR 3000ML

## (undated) DEVICE — HANDPIECE IRRIG FOR INTERPULSE BTTRY PWR SUCT BNE CLN TIP

## (undated) DEVICE — STRETCH BANDAGE ROLL: Brand: DERMACEA

## (undated) DEVICE — SUTURE VCRL 2-0 L27IN ABSRB UD CP-2 L26MM 1/2 CIR REV CUT J869H

## (undated) DEVICE — MANIFOLD SUCT 4 PRT 2 CANSTR FLTR DISP NEPTUNE 2

## (undated) DEVICE — SOLUTION IRRIG 3000ML 0.9% SOD CHL FLX CONT 0797208] ICU MEDICAL INC]

## (undated) DEVICE — INFECTION CONTROL KIT SYS

## (undated) DEVICE — PREP SKN PREVAIL 40ML APPL --

## (undated) DEVICE — DEVON™ KNEE AND BODY STRAP 60" X 3" (1.5 M X 7.6 CM): Brand: DEVON

## (undated) DEVICE — HOOK LOCK LATEX FREE ELASTIC BANDAGE 4INX5YD

## (undated) DEVICE — ADHESIVE SKIN CLOSURE 4X22 CM PREMIERPRO EXOFINFUSION DISP

## (undated) DEVICE — NEEDLE HYPO 25GA L1.5IN BVL ORIENTED ECLIPSE

## (undated) DEVICE — KIT EVAC 0.13IN RECT TB DIA10FR 400CC PVC 3 SPR Y CONN DRN

## (undated) DEVICE — MASTISOL ADHESIVE LIQ 2/3ML

## (undated) DEVICE — SURGICAL PROCEDURE PACK BASIN MAJ SET CUST NO CAUT

## (undated) DEVICE — COVER MAYO STD W24XL53IN UNIV SMS DISP

## (undated) DEVICE — BONE WAX WHITE: Brand: BONE WAX WHITE

## (undated) DEVICE — GLOVE SURG SZ 75 L12IN FNGR THK94MIL STD WHT LTX FREE

## (undated) DEVICE — DRAPE C ARM W/ POLY STRP W42XL72IN FOR MOB XR

## (undated) DEVICE — SUTURE MONOCRYL STRATAFIX SPRL + SZ 3-0 L24IN ABSRB UD PS-2 SXMP1B108

## (undated) DEVICE — SEALANT HEMOSTATIC FAST PREP 10.75X5.75X13.25 IN 10 CC

## (undated) DEVICE — INTENDED FOR TISSUE SEPARATION, AND OTHER PROCEDURES THAT REQUIRE A SHARP SURGICAL BLADE TO PUNCTURE OR CUT.: Brand: BARD-PARKER ® CARBON RIB-BACK BLADES

## (undated) DEVICE — SET BPLR IRRIG INTEGR TBNG AND BPLR DISPOSABLE/SNGLE USE

## (undated) DEVICE — LIGHT HANDLE: Brand: DEVON

## (undated) DEVICE — COVER TBL W60XL90IN SMS SURG HVY DUTY REINF DISPOSABLE

## (undated) DEVICE — GLOVE SURG SZ 8 L12IN FNGR THK79MIL GRN LTX FREE

## (undated) DEVICE — BASIC PACK: Brand: CONVERTORS

## (undated) DEVICE — SOLUTION IV 1000ML 0.9% SOD CHL

## (undated) DEVICE — COVER,TABLE,HEAVY DUTY,60"X90",STRL: Brand: MEDLINE

## (undated) DEVICE — LAMINECTOMY RICHMOND-LF: Brand: MEDLINE INDUSTRIES, INC.

## (undated) DEVICE — PAD BD MATTRESS 73X32 IN STD CONVOLUTED FOAM LTX FREE

## (undated) DEVICE — SURGIFOAM SPNG SZ 12-7

## (undated) DEVICE — DRAPE,EXTREMITY,89X128,STERILE: Brand: MEDLINE

## (undated) DEVICE — SUTURE ABSRB BRAID COAT UD OS-6 NO 1 27IN VCRL J535H

## (undated) DEVICE — STRAP,POSITIONING,KNEE/BODY,FOAM,4X60": Brand: MEDLINE